# Patient Record
Sex: FEMALE | Race: WHITE | URBAN - METROPOLITAN AREA
[De-identification: names, ages, dates, MRNs, and addresses within clinical notes are randomized per-mention and may not be internally consistent; named-entity substitution may affect disease eponyms.]

---

## 2017-07-08 ENCOUNTER — OUTPATIENT (OUTPATIENT)
Dept: OUTPATIENT SERVICES | Facility: HOSPITAL | Age: 52
LOS: 1 days | Discharge: HOME | End: 2017-07-08

## 2017-07-08 DIAGNOSIS — Z12.31 ENCOUNTER FOR SCREENING MAMMOGRAM FOR MALIGNANT NEOPLASM OF BREAST: ICD-10-CM

## 2017-10-24 ENCOUNTER — OUTPATIENT (OUTPATIENT)
Dept: OUTPATIENT SERVICES | Facility: HOSPITAL | Age: 52
LOS: 1 days | Discharge: HOME | End: 2017-10-24

## 2017-10-24 DIAGNOSIS — R52 PAIN, UNSPECIFIED: ICD-10-CM

## 2018-09-14 ENCOUNTER — OUTPATIENT (OUTPATIENT)
Dept: OUTPATIENT SERVICES | Facility: HOSPITAL | Age: 53
LOS: 1 days | Discharge: HOME | End: 2018-09-14

## 2018-09-14 DIAGNOSIS — N92.1 EXCESSIVE AND FREQUENT MENSTRUATION WITH IRREGULAR CYCLE: ICD-10-CM

## 2018-09-14 DIAGNOSIS — Z12.31 ENCOUNTER FOR SCREENING MAMMOGRAM FOR MALIGNANT NEOPLASM OF BREAST: ICD-10-CM

## 2018-12-17 ENCOUNTER — OUTPATIENT (OUTPATIENT)
Dept: OUTPATIENT SERVICES | Facility: HOSPITAL | Age: 53
LOS: 1 days | Discharge: HOME | End: 2018-12-17

## 2018-12-17 DIAGNOSIS — E11.9 TYPE 2 DIABETES MELLITUS WITHOUT COMPLICATIONS: ICD-10-CM

## 2018-12-17 DIAGNOSIS — B58.81 TOXOPLASMA MYOCARDITIS: ICD-10-CM

## 2018-12-17 DIAGNOSIS — R78.89 FINDING OF OTHER SPECIFIED SUBSTANCES, NOT NORMALLY FOUND IN BLOOD: ICD-10-CM

## 2018-12-17 DIAGNOSIS — I25.10 ATHEROSCLEROTIC HEART DISEASE OF NATIVE CORONARY ARTERY WITHOUT ANGINA PECTORIS: ICD-10-CM

## 2019-03-14 ENCOUNTER — APPOINTMENT (OUTPATIENT)
Dept: SURGERY | Facility: CLINIC | Age: 54
End: 2019-03-14
Payer: COMMERCIAL

## 2019-03-14 VITALS
DIASTOLIC BLOOD PRESSURE: 84 MMHG | WEIGHT: 215 LBS | SYSTOLIC BLOOD PRESSURE: 148 MMHG | BODY MASS INDEX: 36.7 KG/M2 | HEIGHT: 64 IN

## 2019-03-14 DIAGNOSIS — Z86.69 PERSONAL HISTORY OF OTHER DISEASES OF THE NERVOUS SYSTEM AND SENSE ORGANS: ICD-10-CM

## 2019-03-14 DIAGNOSIS — Z83.3 FAMILY HISTORY OF DIABETES MELLITUS: ICD-10-CM

## 2019-03-14 DIAGNOSIS — Z82.49 FAMILY HISTORY OF ISCHEMIC HEART DISEASE AND OTHER DISEASES OF THE CIRCULATORY SYSTEM: ICD-10-CM

## 2019-03-14 DIAGNOSIS — Z78.9 OTHER SPECIFIED HEALTH STATUS: ICD-10-CM

## 2019-03-14 DIAGNOSIS — Z86.39 PERSONAL HISTORY OF OTHER ENDOCRINE, NUTRITIONAL AND METABOLIC DISEASE: ICD-10-CM

## 2019-03-14 DIAGNOSIS — Z87.898 PERSONAL HISTORY OF OTHER SPECIFIED CONDITIONS: ICD-10-CM

## 2019-03-14 DIAGNOSIS — Z87.39 PERSONAL HISTORY OF OTHER DISEASES OF THE MUSCULOSKELETAL SYSTEM AND CONNECTIVE TISSUE: ICD-10-CM

## 2019-03-14 PROCEDURE — 99202 OFFICE O/P NEW SF 15 MIN: CPT

## 2019-03-15 PROBLEM — Z86.39 HISTORY OF HIGH CHOLESTEROL: Status: RESOLVED | Noted: 2019-03-14 | Resolved: 2019-03-15

## 2019-03-15 PROBLEM — Z87.39 HISTORY OF JOINT PAIN: Status: RESOLVED | Noted: 2019-03-14 | Resolved: 2019-03-15

## 2019-03-15 PROBLEM — Z87.898 HISTORY OF HEARTBURN: Status: RESOLVED | Noted: 2019-03-14 | Resolved: 2019-03-15

## 2019-03-15 PROBLEM — Z78.9 CAFFEINE USE: Status: ACTIVE | Noted: 2019-03-14

## 2019-03-15 PROBLEM — Z86.69 HISTORY OF SLEEP APNEA: Status: RESOLVED | Noted: 2019-03-14 | Resolved: 2019-03-15

## 2019-03-15 PROBLEM — Z82.49 FAMILY HISTORY OF CARDIAC DISORDER: Status: ACTIVE | Noted: 2019-03-14

## 2019-03-15 PROBLEM — Z83.3 FAMILY HISTORY OF DIABETES MELLITUS: Status: ACTIVE | Noted: 2019-03-14

## 2019-03-15 NOTE — HISTORY OF PRESENT ILLNESS
[de-identified] : The patient presents with her  to be evaluated for umbilical hernia repair. This has been present for about one year and is enlarging. She has sharp pain in the area when sneezing and had one episode of persistent pain when it did not reduce quickly. She has had no weight loss or change in the bowel or bladder habit and she has had no prior abdominal surgery.

## 2019-03-15 NOTE — ASSESSMENT
[FreeTextEntry1] : Reducible umbilical hernia for which the patient should undergo elective repair, possibly with mesh, so as to try to avoid possible complications of an untreated hernia which were explained. The procedure and its risks and benefits were discussed and full and all their questions were answered. They will consult the pulmonologist to see if preoperative reevaluation as needed. They understand and agree and will contact the office for scheduling. Appropriate precautions and warning signs were discussed in full. They are happy with the assessment and plan.

## 2019-03-15 NOTE — PHYSICAL EXAM
[Normal Thyroid] : the thyroid was normal [Normal Breath Sounds] : Normal breath sounds [Normal Heart Sounds] : normal heart sounds [Normal Rate and Rhythm] : normal rate and rhythm [Abdominal Masses] : No abdominal masses [Abdomen Tenderness] : ~T ~M No abdominal tenderness [No HSM] : no hepatosplenomegaly [de-identified] : no adenopathy [de-identified] : The abdomen is obese and protuberant but soft, with a dependent pannus. She has a small umbilical hernia which is freely reducible and not associated with any overlying skin changes. No inguinal or femoral hernias are noted bilaterally.

## 2019-03-28 ENCOUNTER — OUTPATIENT (OUTPATIENT)
Dept: OUTPATIENT SERVICES | Facility: HOSPITAL | Age: 54
LOS: 1 days | Discharge: HOME | End: 2019-03-28

## 2019-03-28 VITALS
RESPIRATION RATE: 16 BRPM | WEIGHT: 210.1 LBS | OXYGEN SATURATION: 96 % | TEMPERATURE: 98 F | HEART RATE: 90 BPM | DIASTOLIC BLOOD PRESSURE: 90 MMHG | SYSTOLIC BLOOD PRESSURE: 138 MMHG | HEIGHT: 65 IN

## 2019-03-28 DIAGNOSIS — Z98.890 OTHER SPECIFIED POSTPROCEDURAL STATES: Chronic | ICD-10-CM

## 2019-03-28 DIAGNOSIS — K42.9 UMBILICAL HERNIA WITHOUT OBSTRUCTION OR GANGRENE: ICD-10-CM

## 2019-03-28 DIAGNOSIS — Z01.818 ENCOUNTER FOR OTHER PREPROCEDURAL EXAMINATION: ICD-10-CM

## 2019-03-28 NOTE — H&P PST ADULT - HISTORY OF PRESENT ILLNESS
53yr old female states was diagnosed with umbilical hernia for about 1yr but recently has increased discomfort-presents for umbilical hernia repair. Denies c/o CP, PALP, SOB, ABD PAIN , N/V, URI, FEVER, RASH OR UTI SYMPTOMS. Exercise alberta 1 FOS LTD by SANDRA.

## 2019-04-02 NOTE — ASU PATIENT PROFILE, ADULT - PMH
Asthmatic bronchitis  MILD , USES VENTOLIN Q2-3M  Chronic neck pain    GERD (gastroesophageal reflux disease)    HTN (hypertension)    Migraines    RAMAKRISHNA on CPAP

## 2019-04-03 ENCOUNTER — RESULT REVIEW (OUTPATIENT)
Age: 54
End: 2019-04-03

## 2019-04-03 ENCOUNTER — APPOINTMENT (OUTPATIENT)
Dept: SURGERY | Facility: AMBULATORY SURGERY CENTER | Age: 54
End: 2019-04-03

## 2019-04-03 ENCOUNTER — OUTPATIENT (OUTPATIENT)
Dept: OUTPATIENT SERVICES | Facility: HOSPITAL | Age: 54
LOS: 1 days | Discharge: HOME | End: 2019-04-03
Payer: COMMERCIAL

## 2019-04-03 VITALS
SYSTOLIC BLOOD PRESSURE: 119 MMHG | TEMPERATURE: 98 F | OXYGEN SATURATION: 98 % | RESPIRATION RATE: 18 BRPM | WEIGHT: 210.1 LBS | HEIGHT: 65 IN | DIASTOLIC BLOOD PRESSURE: 56 MMHG | HEART RATE: 94 BPM

## 2019-04-03 VITALS
TEMPERATURE: 98 F | DIASTOLIC BLOOD PRESSURE: 70 MMHG | OXYGEN SATURATION: 97 % | HEART RATE: 72 BPM | RESPIRATION RATE: 20 BRPM | SYSTOLIC BLOOD PRESSURE: 111 MMHG

## 2019-04-03 DIAGNOSIS — Z98.890 OTHER SPECIFIED POSTPROCEDURAL STATES: Chronic | ICD-10-CM

## 2019-04-03 PROCEDURE — 49585: CPT

## 2019-04-03 PROCEDURE — 12032 INTMD RPR S/A/T/EXT 2.6-7.5: CPT | Mod: 59

## 2019-04-03 PROCEDURE — 88302 TISSUE EXAM BY PATHOLOGIST: CPT | Mod: 26

## 2019-04-03 RX ORDER — SODIUM CHLORIDE 9 MG/ML
1000 INJECTION, SOLUTION INTRAVENOUS
Qty: 0 | Refills: 0 | Status: DISCONTINUED | OUTPATIENT
Start: 2019-04-03 | End: 2019-04-18

## 2019-04-03 RX ORDER — OXYCODONE AND ACETAMINOPHEN 5; 325 MG/1; MG/1
1 TABLET ORAL ONCE
Qty: 0 | Refills: 0 | Status: DISCONTINUED | OUTPATIENT
Start: 2019-04-03 | End: 2019-04-03

## 2019-04-03 RX ORDER — ONDANSETRON 8 MG/1
4 TABLET, FILM COATED ORAL ONCE
Qty: 0 | Refills: 0 | Status: DISCONTINUED | OUTPATIENT
Start: 2019-04-03 | End: 2019-04-18

## 2019-04-03 RX ORDER — MORPHINE SULFATE 50 MG/1
2 CAPSULE, EXTENDED RELEASE ORAL
Qty: 0 | Refills: 0 | Status: DISCONTINUED | OUTPATIENT
Start: 2019-04-03 | End: 2019-04-03

## 2019-04-03 RX ORDER — OXYCODONE AND ACETAMINOPHEN 5; 325 MG/1; MG/1
2 TABLET ORAL ONCE
Qty: 0 | Refills: 0 | Status: DISCONTINUED | OUTPATIENT
Start: 2019-04-03 | End: 2019-04-03

## 2019-04-03 RX ORDER — ACETAMINOPHEN WITH CODEINE 300MG-30MG
1 TABLET ORAL
Qty: 20 | Refills: 0
Start: 2019-04-03 | End: 2019-04-07

## 2019-04-03 RX ADMIN — MORPHINE SULFATE 2 MILLIGRAM(S): 50 CAPSULE, EXTENDED RELEASE ORAL at 10:26

## 2019-04-03 RX ADMIN — SODIUM CHLORIDE 100 MILLILITER(S): 9 INJECTION, SOLUTION INTRAVENOUS at 10:27

## 2019-04-03 NOTE — ASU DISCHARGE PLAN (ADULT/PEDIATRIC) - CARE PROVIDER_API CALL
Hernandez Orona (MD)  Surgery  [address] [place] [address], [address] [place] [address]  Phone: [phone]  Fax: [phone]  Follow Up Time:

## 2019-04-03 NOTE — ASU DISCHARGE PLAN (ADULT/PEDIATRIC) - CALL YOUR DOCTOR IF YOU HAVE ANY OF THE FOLLOWING:
Unable to urinate/Pain not relieved by Medications/Bleeding that does not stop/Nausea and vomiting that does not stop/Swelling that gets worse

## 2019-04-08 DIAGNOSIS — I10 ESSENTIAL (PRIMARY) HYPERTENSION: ICD-10-CM

## 2019-04-08 DIAGNOSIS — K42.9 UMBILICAL HERNIA WITHOUT OBSTRUCTION OR GANGRENE: ICD-10-CM

## 2019-04-08 DIAGNOSIS — G47.33 OBSTRUCTIVE SLEEP APNEA (ADULT) (PEDIATRIC): ICD-10-CM

## 2019-04-08 DIAGNOSIS — J45.909 UNSPECIFIED ASTHMA, UNCOMPLICATED: ICD-10-CM

## 2019-04-09 LAB — SURGICAL PATHOLOGY STUDY: SIGNIFICANT CHANGE UP

## 2019-04-15 ENCOUNTER — APPOINTMENT (OUTPATIENT)
Dept: SURGERY | Facility: CLINIC | Age: 54
End: 2019-04-15
Payer: COMMERCIAL

## 2019-04-15 VITALS
DIASTOLIC BLOOD PRESSURE: 82 MMHG | SYSTOLIC BLOOD PRESSURE: 136 MMHG | HEIGHT: 64 IN | BODY MASS INDEX: 36.7 KG/M2 | WEIGHT: 215 LBS

## 2019-04-15 PROCEDURE — 99024 POSTOP FOLLOW-UP VISIT: CPT

## 2019-04-15 NOTE — PHYSICAL EXAM
[de-identified] : Abdomen soft and not distended. This umbilical incision is clean and dry. Repair is intact.

## 2019-04-15 NOTE — HISTORY OF PRESENT ILLNESS
[de-identified] : First postoperative visit after umbilical hernia repair without mesh. The patient looks and feels well with a good appetite, bowel and bladder function. No specific complaints.

## 2019-04-15 NOTE — PLAN
[FreeTextEntry1] : Return to office in 3 months for final check. Local wound care and activity instructions given, precautions advised. All questions answered.

## 2019-04-23 PROBLEM — I10 ESSENTIAL (PRIMARY) HYPERTENSION: Chronic | Status: ACTIVE | Noted: 2019-03-28

## 2019-04-23 PROBLEM — K21.9 GASTRO-ESOPHAGEAL REFLUX DISEASE WITHOUT ESOPHAGITIS: Chronic | Status: ACTIVE | Noted: 2019-03-28

## 2019-04-23 PROBLEM — J45.909 UNSPECIFIED ASTHMA, UNCOMPLICATED: Chronic | Status: ACTIVE | Noted: 2019-03-28

## 2019-04-23 PROBLEM — G47.33 OBSTRUCTIVE SLEEP APNEA (ADULT) (PEDIATRIC): Chronic | Status: ACTIVE | Noted: 2019-03-28

## 2019-04-23 PROBLEM — G43.909 MIGRAINE, UNSPECIFIED, NOT INTRACTABLE, WITHOUT STATUS MIGRAINOSUS: Chronic | Status: ACTIVE | Noted: 2019-03-28

## 2019-04-23 PROBLEM — M54.2 CERVICALGIA: Chronic | Status: ACTIVE | Noted: 2019-03-28

## 2019-08-12 ENCOUNTER — APPOINTMENT (OUTPATIENT)
Dept: SURGERY | Facility: CLINIC | Age: 54
End: 2019-08-12
Payer: COMMERCIAL

## 2019-08-12 VITALS
DIASTOLIC BLOOD PRESSURE: 80 MMHG | BODY MASS INDEX: 37.9 KG/M2 | SYSTOLIC BLOOD PRESSURE: 124 MMHG | WEIGHT: 222 LBS | HEIGHT: 64 IN

## 2019-08-12 DIAGNOSIS — K42.9 UMBILICAL HERNIA W/OUT OBSTRUCTION OR GANGRENE: ICD-10-CM

## 2019-08-12 PROCEDURE — 99212 OFFICE O/P EST SF 10 MIN: CPT

## 2019-08-12 NOTE — PHYSICAL EXAM
[Abdominal Masses] : No abdominal masses [Abdomen Tenderness] : ~T ~M No abdominal tenderness [No HSM] : no hepatosplenomegaly [de-identified] : Protuberant but soft and not distended. Umbilical incision is healing well and fading very nicely. Both supine and standing with coughing and straining, the repair is intact. Postop changes have progressed very nicely

## 2019-08-12 NOTE — HISTORY OF PRESENT ILLNESS
[de-identified] : The patient looks and feels well. Her only complaints regarding her recent umbilical hernia is an occasional twinge in the area. Her bowel and bladder function are normal.

## 2019-08-12 NOTE — ASSESSMENT
[FreeTextEntry1] : No postoperative problems, umbilical hernia repair intact. No restrictions at this point the patient was encouraged to try to lose weight and reduce her risk of recurrence. All questions answered, she understands and agrees.

## 2019-12-13 ENCOUNTER — OUTPATIENT (OUTPATIENT)
Dept: OUTPATIENT SERVICES | Facility: HOSPITAL | Age: 54
LOS: 1 days | Discharge: HOME | End: 2019-12-13
Payer: COMMERCIAL

## 2019-12-13 DIAGNOSIS — Z12.31 ENCOUNTER FOR SCREENING MAMMOGRAM FOR MALIGNANT NEOPLASM OF BREAST: ICD-10-CM

## 2019-12-13 DIAGNOSIS — Z98.890 OTHER SPECIFIED POSTPROCEDURAL STATES: Chronic | ICD-10-CM

## 2019-12-13 PROCEDURE — 77063 BREAST TOMOSYNTHESIS BI: CPT | Mod: 26

## 2019-12-13 PROCEDURE — 77067 SCR MAMMO BI INCL CAD: CPT | Mod: 26

## 2020-06-12 NOTE — H&P PST ADULT - NSICDXPASTMEDICALHX_GEN_ALL_CORE_FT
[Breast Self Exam] : breast self exam [Nutrition] : nutrition [Exercise] : exercise PAST MEDICAL HISTORY:  Asthmatic bronchitis MILD , USES VENTOLIN Q2-3M    Chronic neck pain     GERD (gastroesophageal reflux disease)     HTN (hypertension)     Migraines     RAMAKRISHNA on CPAP

## 2020-06-18 ENCOUNTER — OUTPATIENT (OUTPATIENT)
Dept: OUTPATIENT SERVICES | Facility: HOSPITAL | Age: 55
LOS: 1 days | Discharge: HOME | End: 2020-06-18

## 2020-06-18 VITALS
OXYGEN SATURATION: 99 % | TEMPERATURE: 97 F | SYSTOLIC BLOOD PRESSURE: 142 MMHG | RESPIRATION RATE: 16 BRPM | HEART RATE: 68 BPM | HEIGHT: 62 IN | WEIGHT: 209.44 LBS | DIASTOLIC BLOOD PRESSURE: 76 MMHG

## 2020-06-18 DIAGNOSIS — Z98.890 OTHER SPECIFIED POSTPROCEDURAL STATES: Chronic | ICD-10-CM

## 2020-06-18 DIAGNOSIS — Z01.818 ENCOUNTER FOR OTHER PREPROCEDURAL EXAMINATION: ICD-10-CM

## 2020-06-18 DIAGNOSIS — G56.01 CARPAL TUNNEL SYNDROME, RIGHT UPPER LIMB: ICD-10-CM

## 2020-06-18 LAB
ALBUMIN SERPL ELPH-MCNC: 4.8 G/DL — SIGNIFICANT CHANGE UP (ref 3.5–5.2)
ALP SERPL-CCNC: 68 U/L — SIGNIFICANT CHANGE UP (ref 30–115)
ALT FLD-CCNC: 31 U/L — SIGNIFICANT CHANGE UP (ref 0–41)
ANION GAP SERPL CALC-SCNC: 15 MMOL/L — HIGH (ref 7–14)
APTT BLD: 29.7 SEC — SIGNIFICANT CHANGE UP (ref 27–39.2)
AST SERPL-CCNC: 28 U/L — SIGNIFICANT CHANGE UP (ref 0–41)
BASOPHILS # BLD AUTO: 0.04 K/UL — SIGNIFICANT CHANGE UP (ref 0–0.2)
BASOPHILS NFR BLD AUTO: 0.4 % — SIGNIFICANT CHANGE UP (ref 0–1)
BILIRUB SERPL-MCNC: 0.2 MG/DL — SIGNIFICANT CHANGE UP (ref 0.2–1.2)
BUN SERPL-MCNC: 11 MG/DL — SIGNIFICANT CHANGE UP (ref 10–20)
CALCIUM SERPL-MCNC: 9.8 MG/DL — SIGNIFICANT CHANGE UP (ref 8.5–10.1)
CHLORIDE SERPL-SCNC: 96 MMOL/L — LOW (ref 98–110)
CO2 SERPL-SCNC: 27 MMOL/L — SIGNIFICANT CHANGE UP (ref 17–32)
CREAT SERPL-MCNC: 0.6 MG/DL — LOW (ref 0.7–1.5)
EOSINOPHIL # BLD AUTO: 0.25 K/UL — SIGNIFICANT CHANGE UP (ref 0–0.7)
EOSINOPHIL NFR BLD AUTO: 2.7 % — SIGNIFICANT CHANGE UP (ref 0–8)
GLUCOSE SERPL-MCNC: 115 MG/DL — HIGH (ref 70–99)
HCT VFR BLD CALC: 31.8 % — LOW (ref 37–47)
HGB BLD-MCNC: 9.1 G/DL — LOW (ref 12–16)
IMM GRANULOCYTES NFR BLD AUTO: 0.2 % — SIGNIFICANT CHANGE UP (ref 0.1–0.3)
INR BLD: 0.93 RATIO — SIGNIFICANT CHANGE UP (ref 0.65–1.3)
LYMPHOCYTES # BLD AUTO: 3.3 K/UL — SIGNIFICANT CHANGE UP (ref 1.2–3.4)
LYMPHOCYTES # BLD AUTO: 35.6 % — SIGNIFICANT CHANGE UP (ref 20.5–51.1)
MCHC RBC-ENTMCNC: 20 PG — LOW (ref 27–31)
MCHC RBC-ENTMCNC: 28.6 G/DL — LOW (ref 32–37)
MCV RBC AUTO: 70 FL — LOW (ref 81–99)
MONOCYTES # BLD AUTO: 0.58 K/UL — SIGNIFICANT CHANGE UP (ref 0.1–0.6)
MONOCYTES NFR BLD AUTO: 6.3 % — SIGNIFICANT CHANGE UP (ref 1.7–9.3)
NEUTROPHILS # BLD AUTO: 5.09 K/UL — SIGNIFICANT CHANGE UP (ref 1.4–6.5)
NEUTROPHILS NFR BLD AUTO: 54.8 % — SIGNIFICANT CHANGE UP (ref 42.2–75.2)
NRBC # BLD: 0 /100 WBCS — SIGNIFICANT CHANGE UP (ref 0–0)
PLATELET # BLD AUTO: 507 K/UL — HIGH (ref 130–400)
POTASSIUM SERPL-MCNC: 3.8 MMOL/L — SIGNIFICANT CHANGE UP (ref 3.5–5)
POTASSIUM SERPL-SCNC: 3.8 MMOL/L — SIGNIFICANT CHANGE UP (ref 3.5–5)
PROT SERPL-MCNC: 7.5 G/DL — SIGNIFICANT CHANGE UP (ref 6–8)
PROTHROM AB SERPL-ACNC: 10.7 SEC — SIGNIFICANT CHANGE UP (ref 9.95–12.87)
RBC # BLD: 4.54 M/UL — SIGNIFICANT CHANGE UP (ref 4.2–5.4)
RBC # FLD: 16.6 % — HIGH (ref 11.5–14.5)
SODIUM SERPL-SCNC: 138 MMOL/L — SIGNIFICANT CHANGE UP (ref 135–146)
WBC # BLD: 9.28 K/UL — SIGNIFICANT CHANGE UP (ref 4.8–10.8)
WBC # FLD AUTO: 9.28 K/UL — SIGNIFICANT CHANGE UP (ref 4.8–10.8)

## 2020-06-18 RX ORDER — TOPIRAMATE 25 MG
0 TABLET ORAL
Qty: 0 | Refills: 0 | DISCHARGE

## 2020-06-18 NOTE — H&P PST ADULT - REASON FOR ADMISSION
54 yr old female to past for right open carpal tunnel release due to cts many yrs r > l s/p emg 2020  opts for sx right hand dominant NO recent fever no uti uri cp palp sob pain at this time no s/s covid denies contact for pre op test appt exercise tolerance is 2 flights no changes steven screen revd neg

## 2020-06-18 NOTE — H&P PST ADULT - HISTORY OF PRESENT ILLNESS
right hand dominant for carpal tunnel release   [facility] steven cpap uses to bring dos  htn dl anxiety  stable asthma no recent flares/meds  migranes   psh  d and c hernia sinus sx and shoulder sx

## 2020-06-18 NOTE — H&P PST ADULT - NSICDXPASTSURGICALHX_GEN_ALL_CORE_FT
PAST SURGICAL HISTORY:  H/O arthroscopy of shoulder RT    H/O sinus surgery     History of hernia repair 2019    Status post D&C

## 2020-06-18 NOTE — H&P PST ADULT - NSICDXPASTMEDICALHX_GEN_ALL_CORE_FT
PAST MEDICAL HISTORY:  Asthmatic bronchitis MILD , USES VENTOLIN Q2-3M    Chronic neck pain     GERD (gastroesophageal reflux disease)     HTN (hypertension)     Migraines     RAMAKRISHNA on CPAP

## 2020-06-22 NOTE — ASU PATIENT PROFILE, ADULT - PSH
H/O arthroscopy of shoulder  RT  H/O sinus surgery    History of hernia repair  2019  Status post D&C

## 2020-06-23 ENCOUNTER — OUTPATIENT (OUTPATIENT)
Dept: OUTPATIENT SERVICES | Facility: HOSPITAL | Age: 55
LOS: 1 days | Discharge: HOME | End: 2020-06-23

## 2020-06-23 VITALS
WEIGHT: 210.1 LBS | SYSTOLIC BLOOD PRESSURE: 154 MMHG | DIASTOLIC BLOOD PRESSURE: 98 MMHG | HEART RATE: 97 BPM | TEMPERATURE: 98 F | OXYGEN SATURATION: 98 % | RESPIRATION RATE: 18 BRPM | HEIGHT: 65 IN

## 2020-06-23 VITALS — RESPIRATION RATE: 17 BRPM | SYSTOLIC BLOOD PRESSURE: 137 MMHG | HEART RATE: 80 BPM | DIASTOLIC BLOOD PRESSURE: 67 MMHG

## 2020-06-23 DIAGNOSIS — Z98.890 OTHER SPECIFIED POSTPROCEDURAL STATES: Chronic | ICD-10-CM

## 2020-06-23 RX ORDER — ONDANSETRON 8 MG/1
4 TABLET, FILM COATED ORAL ONCE
Refills: 0 | Status: DISCONTINUED | OUTPATIENT
Start: 2020-06-23 | End: 2020-06-24

## 2020-06-23 RX ORDER — HYDROMORPHONE HYDROCHLORIDE 2 MG/ML
1 INJECTION INTRAMUSCULAR; INTRAVENOUS; SUBCUTANEOUS
Refills: 0 | Status: DISCONTINUED | OUTPATIENT
Start: 2020-06-23 | End: 2020-06-24

## 2020-06-23 RX ORDER — SODIUM CHLORIDE 9 MG/ML
1000 INJECTION, SOLUTION INTRAVENOUS
Refills: 0 | Status: DISCONTINUED | OUTPATIENT
Start: 2020-06-23 | End: 2020-06-24

## 2020-06-23 RX ORDER — HYDROMORPHONE HYDROCHLORIDE 2 MG/ML
0.5 INJECTION INTRAMUSCULAR; INTRAVENOUS; SUBCUTANEOUS
Refills: 0 | Status: DISCONTINUED | OUTPATIENT
Start: 2020-06-23 | End: 2020-06-24

## 2020-06-23 RX ADMIN — SODIUM CHLORIDE 100 MILLILITER(S): 9 INJECTION, SOLUTION INTRAVENOUS at 13:49

## 2020-06-23 RX ADMIN — HYDROMORPHONE HYDROCHLORIDE 0.5 MILLIGRAM(S): 2 INJECTION INTRAMUSCULAR; INTRAVENOUS; SUBCUTANEOUS at 14:02

## 2020-06-23 NOTE — ASU PREOP CHECKLIST - TO WHOM
opportunity for questions/ answers rendered B Alex MACHADO opportunity for questions/ answers rendered

## 2020-06-23 NOTE — ASU DISCHARGE PLAN (ADULT/PEDIATRIC) - ASU DC SPECIAL INSTRUCTIONSFT
DIET:    Resume normal diet  No alcoholic  beverages for 24 hours or if on prescribed narcotic pain medications.    MEDICATION:    Resume your preoperative oral medications.  Check with your physician before starting aspirin, Coumadin, or other blood thinners.  Prescriptions given to you - take as directed.      ACTIVITY:    Rest today and limit your activities for 24 hours.  Do not drive or operate machinery for 24 hours - if you received anesthesia.  When taking pain medication, be careful as you walk or climb stairs.  It is not advisable to drive while taking prescribed pain medication.    SPECIAL INSTRUCTIONS:    ___x__ Elevate operative site above heart level or as directed.  __x___ Apply ice to operative site as directed.  _____ Use  sling as directed.  _x____ Exercise fingers.    DRESSING CARE:    ___x__ You may change the dressing 4 days. Keep wound covered with band-aids.  _____ Do not change the dressing until your doctor see you.  _____ You can loosen or rewrap the dressing.  _____  Keep dressing clean and dry.  _____ You may shower in _____ day(s) with the extremity covered by a plastic bag.  ___x__ OK to wash hand , including showers, in _4____ day(s).    ADDITIONAL  INFORMATION:    Post operative visit should be scheduled for next week.  If you are not aware of visit please contact office.  If you have any questions or concerns call office at      Notify your doctor if you develop   Fever  Excessive Swelling  Chills   Drainage   Pain not controlled by medication  Persistent numbness in hand or fingers    If an Emergency arises call 911 and/or go to the Emergency Room

## 2020-06-29 DIAGNOSIS — I10 ESSENTIAL (PRIMARY) HYPERTENSION: ICD-10-CM

## 2020-06-29 DIAGNOSIS — G56.01 CARPAL TUNNEL SYNDROME, RIGHT UPPER LIMB: ICD-10-CM

## 2020-06-29 DIAGNOSIS — G47.33 OBSTRUCTIVE SLEEP APNEA (ADULT) (PEDIATRIC): ICD-10-CM

## 2020-06-29 DIAGNOSIS — J45.909 UNSPECIFIED ASTHMA, UNCOMPLICATED: ICD-10-CM

## 2020-06-29 DIAGNOSIS — F41.9 ANXIETY DISORDER, UNSPECIFIED: ICD-10-CM

## 2020-12-28 ENCOUNTER — OUTPATIENT (OUTPATIENT)
Dept: OUTPATIENT SERVICES | Facility: HOSPITAL | Age: 55
LOS: 1 days | Discharge: HOME | End: 2020-12-28
Payer: COMMERCIAL

## 2020-12-28 DIAGNOSIS — Z98.890 OTHER SPECIFIED POSTPROCEDURAL STATES: Chronic | ICD-10-CM

## 2020-12-28 DIAGNOSIS — Z12.31 ENCOUNTER FOR SCREENING MAMMOGRAM FOR MALIGNANT NEOPLASM OF BREAST: ICD-10-CM

## 2020-12-28 PROCEDURE — 77063 BREAST TOMOSYNTHESIS BI: CPT | Mod: 26

## 2020-12-28 PROCEDURE — 77067 SCR MAMMO BI INCL CAD: CPT | Mod: 26

## 2021-03-03 DIAGNOSIS — I10 ESSENTIAL (PRIMARY) HYPERTENSION: ICD-10-CM

## 2021-03-03 DIAGNOSIS — Z86.39 PERSONAL HISTORY OF OTHER ENDOCRINE, NUTRITIONAL AND METABOLIC DISEASE: ICD-10-CM

## 2021-03-03 RX ORDER — MULTIVIT,IRON,MINERALS/LUTEIN
TABLET ORAL
Refills: 0 | Status: ACTIVE | COMMUNITY

## 2021-03-03 RX ORDER — ALBUTEROL SULFATE 90 UG/1
108 (90 BASE) AEROSOL, METERED RESPIRATORY (INHALATION)
Refills: 0 | Status: ACTIVE | COMMUNITY

## 2021-03-17 ENCOUNTER — APPOINTMENT (OUTPATIENT)
Dept: PULMONOLOGY | Facility: CLINIC | Age: 56
End: 2021-03-17
Payer: COMMERCIAL

## 2021-03-17 VITALS
DIASTOLIC BLOOD PRESSURE: 84 MMHG | RESPIRATION RATE: 14 BRPM | BODY MASS INDEX: 37.73 KG/M2 | SYSTOLIC BLOOD PRESSURE: 130 MMHG | WEIGHT: 221 LBS | HEART RATE: 97 BPM | OXYGEN SATURATION: 97 % | HEIGHT: 64 IN

## 2021-03-17 DIAGNOSIS — R91.8 OTHER NONSPECIFIC ABNORMAL FINDING OF LUNG FIELD: ICD-10-CM

## 2021-03-17 PROCEDURE — 99213 OFFICE O/P EST LOW 20 MIN: CPT | Mod: 25

## 2021-03-17 PROCEDURE — 99072 ADDL SUPL MATRL&STAF TM PHE: CPT

## 2021-03-17 PROCEDURE — 94010 BREATHING CAPACITY TEST: CPT

## 2021-03-17 NOTE — ASSESSMENT
[FreeTextEntry1] : MIld COPD stable\par MOderate RAMAKRISHNA not on therapy\par RLL density on CXR done after COVID 19

## 2021-03-17 NOTE — REASON FOR VISIT
[Follow-Up] : a follow-up visit [Abnormal CXR/ Chest CT] : an abnormal CXR/ chest CT [Sleep Apnea] : sleep apnea [COPD] : COPD

## 2021-03-17 NOTE — HISTORY OF PRESENT ILLNESS
[Doing Well] : doing well [Difficulty Breathing During Exertion] : stable dyspnea on exertion [Feelings Of Weakness On Exertion] : stable exercise intolerance [Cough] : improved coughing [URI] : upper respiratory tract infection [Adherent] : the patient is adherent with ~his/her~ medication regimen [Goals--Doing Well] : the patient is doing well with ~his/her~ COPD goals [PFTs] : pulmonary function tests [de-identified] : COVID  [Follow-Up - Routine Clinic] : a routine clinic follow-up of [Excessive Daytime Sleepiness] : excessive daytime sleepiness [Snoring] : snoring [Unrefreshing Sleep] : unrefreshing sleep [Sleepy When Sedentary] : sleepy when sedentary [Currently Experiencing] : The patient is currently experiencing symptoms. [None] : No associated symptoms are reported

## 2021-06-17 NOTE — ASU PATIENT PROFILE, ADULT - TEACHING/LEARNING FACTORS IMPACT ABILITY TO LEARN
Pt complaining of b/l lower back pain radiating to flanks. hx of kidney stones. Pt wheel chair bound. catheter in place. none

## 2021-10-07 ENCOUNTER — APPOINTMENT (OUTPATIENT)
Age: 56
End: 2021-10-07
Payer: COMMERCIAL

## 2021-10-07 VITALS
SYSTOLIC BLOOD PRESSURE: 120 MMHG | DIASTOLIC BLOOD PRESSURE: 70 MMHG | RESPIRATION RATE: 12 BRPM | OXYGEN SATURATION: 96 % | BODY MASS INDEX: 39.44 KG/M2 | WEIGHT: 231 LBS | HEART RATE: 91 BPM | HEIGHT: 64 IN

## 2021-10-07 DIAGNOSIS — G47.33 OBSTRUCTIVE SLEEP APNEA (ADULT) (PEDIATRIC): ICD-10-CM

## 2021-10-07 DIAGNOSIS — J44.9 CHRONIC OBSTRUCTIVE PULMONARY DISEASE, UNSPECIFIED: ICD-10-CM

## 2021-10-07 PROCEDURE — 99214 OFFICE O/P EST MOD 30 MIN: CPT

## 2021-10-07 NOTE — ASSESSMENT
[FreeTextEntry1] : MIld COPD stable\par MOderate RAMAKRISHNA not on therapy\par RLL density on CXR done after COVID 19.  CT OK

## 2021-10-07 NOTE — HISTORY OF PRESENT ILLNESS
[Doing Well] : doing well [Difficulty Breathing During Exertion] : stable dyspnea on exertion [Feelings Of Weakness On Exertion] : stable exercise intolerance [Cough] : resolved coughing [URI] : upper respiratory tract infection [Adherent] : the patient is adherent with ~his/her~ medication regimen [Goals--Doing Well] : the patient is doing well with ~his/her~ COPD goals [PFTs] : pulmonary function tests [de-identified] : COVID  [Follow-Up - Routine Clinic] : a routine clinic follow-up of [Excessive Daytime Sleepiness] : excessive daytime sleepiness [Snoring] : snoring [Unrefreshing Sleep] : unrefreshing sleep [Sleepy When Sedentary] : sleepy when sedentary [Currently Experiencing] : The patient is currently experiencing symptoms. [None] : No associated symptoms are reported

## 2022-01-01 ENCOUNTER — OUTPATIENT (OUTPATIENT)
Dept: OUTPATIENT SERVICES | Facility: HOSPITAL | Age: 57
LOS: 1 days | Discharge: HOME | End: 2022-01-01
Payer: COMMERCIAL

## 2022-01-01 ENCOUNTER — APPOINTMENT (OUTPATIENT)
Dept: HEMATOLOGY ONCOLOGY | Facility: CLINIC | Age: 57
End: 2022-01-01
Payer: COMMERCIAL

## 2022-01-01 ENCOUNTER — NON-APPOINTMENT (OUTPATIENT)
Age: 57
End: 2022-01-01

## 2022-01-01 ENCOUNTER — APPOINTMENT (OUTPATIENT)
Dept: INFUSION THERAPY | Facility: CLINIC | Age: 57
End: 2022-01-01

## 2022-01-01 ENCOUNTER — APPOINTMENT (OUTPATIENT)
Dept: HEMATOLOGY ONCOLOGY | Facility: CLINIC | Age: 57
End: 2022-01-01

## 2022-01-01 ENCOUNTER — OUTPATIENT (OUTPATIENT)
Dept: OUTPATIENT SERVICES | Facility: HOSPITAL | Age: 57
LOS: 1 days | Discharge: HOME | End: 2022-01-01

## 2022-01-01 ENCOUNTER — LABORATORY RESULT (OUTPATIENT)
Age: 57
End: 2022-01-01

## 2022-01-01 ENCOUNTER — TRANSCRIPTION ENCOUNTER (OUTPATIENT)
Age: 57
End: 2022-01-01

## 2022-01-01 ENCOUNTER — APPOINTMENT (OUTPATIENT)
Dept: NEUROSURGERY | Facility: CLINIC | Age: 57
End: 2022-01-01
Payer: COMMERCIAL

## 2022-01-01 ENCOUNTER — APPOINTMENT (OUTPATIENT)
Dept: NEUROSURGERY | Facility: CLINIC | Age: 57
End: 2022-01-01

## 2022-01-01 ENCOUNTER — APPOINTMENT (OUTPATIENT)
Dept: NEUROLOGY | Facility: CLINIC | Age: 57
End: 2022-01-01
Payer: COMMERCIAL

## 2022-01-01 ENCOUNTER — APPOINTMENT (OUTPATIENT)
Dept: NEUROLOGY | Facility: CLINIC | Age: 57
End: 2022-01-01

## 2022-01-01 ENCOUNTER — INPATIENT (INPATIENT)
Facility: HOSPITAL | Age: 57
LOS: 8 days | Discharge: HOME | End: 2022-04-14
Attending: NEUROLOGICAL SURGERY | Admitting: NEUROLOGICAL SURGERY
Payer: COMMERCIAL

## 2022-01-01 ENCOUNTER — APPOINTMENT (OUTPATIENT)
Dept: RADIATION ONCOLOGY | Facility: HOSPITAL | Age: 57
End: 2022-01-01

## 2022-01-01 ENCOUNTER — INPATIENT (INPATIENT)
Facility: HOSPITAL | Age: 57
LOS: 2 days | Discharge: HOME | End: 2022-08-07
Attending: NEUROLOGICAL SURGERY | Admitting: NEUROLOGICAL SURGERY

## 2022-01-01 ENCOUNTER — RESULT REVIEW (OUTPATIENT)
Age: 57
End: 2022-01-01

## 2022-01-01 ENCOUNTER — APPOINTMENT (OUTPATIENT)
Age: 57
End: 2022-01-01

## 2022-01-01 ENCOUNTER — APPOINTMENT (OUTPATIENT)
Dept: RADIATION ONCOLOGY | Facility: HOSPITAL | Age: 57
End: 2022-01-01
Payer: COMMERCIAL

## 2022-01-01 VITALS
RESPIRATION RATE: 20 BRPM | SYSTOLIC BLOOD PRESSURE: 123 MMHG | HEIGHT: 66 IN | OXYGEN SATURATION: 99 % | WEIGHT: 215 LBS | TEMPERATURE: 98 F | DIASTOLIC BLOOD PRESSURE: 87 MMHG | HEART RATE: 99 BPM | BODY MASS INDEX: 34.55 KG/M2

## 2022-01-01 VITALS
BODY MASS INDEX: 34.55 KG/M2 | BODY MASS INDEX: 38.32 KG/M2 | WEIGHT: 215 LBS | HEIGHT: 66 IN | HEIGHT: 65 IN | WEIGHT: 230 LBS

## 2022-01-01 VITALS
RESPIRATION RATE: 14 BRPM | OXYGEN SATURATION: 96 % | WEIGHT: 229.25 LBS | HEART RATE: 86 BPM | TEMPERATURE: 96.4 F | DIASTOLIC BLOOD PRESSURE: 56 MMHG | BODY MASS INDEX: 38.15 KG/M2 | SYSTOLIC BLOOD PRESSURE: 110 MMHG

## 2022-01-01 VITALS
TEMPERATURE: 98 F | OXYGEN SATURATION: 96 % | DIASTOLIC BLOOD PRESSURE: 71 MMHG | HEART RATE: 80 BPM | RESPIRATION RATE: 18 BRPM | SYSTOLIC BLOOD PRESSURE: 128 MMHG

## 2022-01-01 VITALS
RESPIRATION RATE: 16 BRPM | WEIGHT: 223.5 LBS | BODY MASS INDEX: 37.19 KG/M2 | DIASTOLIC BLOOD PRESSURE: 63 MMHG | TEMPERATURE: 97 F | HEART RATE: 74 BPM | SYSTOLIC BLOOD PRESSURE: 119 MMHG | OXYGEN SATURATION: 96 %

## 2022-01-01 VITALS
WEIGHT: 222.13 LBS | OXYGEN SATURATION: 95 % | RESPIRATION RATE: 16 BRPM | TEMPERATURE: 97.2 F | DIASTOLIC BLOOD PRESSURE: 73 MMHG | BODY MASS INDEX: 36.96 KG/M2 | SYSTOLIC BLOOD PRESSURE: 130 MMHG | HEART RATE: 78 BPM

## 2022-01-01 VITALS
BODY MASS INDEX: 37.65 KG/M2 | WEIGHT: 226 LBS | HEART RATE: 89 BPM | OXYGEN SATURATION: 99 % | HEIGHT: 65 IN | TEMPERATURE: 97.8 F | SYSTOLIC BLOOD PRESSURE: 106 MMHG | DIASTOLIC BLOOD PRESSURE: 73 MMHG

## 2022-01-01 VITALS
TEMPERATURE: 97 F | WEIGHT: 227 LBS | DIASTOLIC BLOOD PRESSURE: 57 MMHG | HEART RATE: 85 BPM | HEIGHT: 65 IN | SYSTOLIC BLOOD PRESSURE: 139 MMHG | BODY MASS INDEX: 37.82 KG/M2

## 2022-01-01 VITALS
BODY MASS INDEX: 38.32 KG/M2 | RESPIRATION RATE: 14 BRPM | DIASTOLIC BLOOD PRESSURE: 80 MMHG | SYSTOLIC BLOOD PRESSURE: 136 MMHG | HEART RATE: 74 BPM | HEIGHT: 65 IN | WEIGHT: 230 LBS | TEMPERATURE: 97.9 F

## 2022-01-01 VITALS
HEART RATE: 76 BPM | WEIGHT: 231 LBS | DIASTOLIC BLOOD PRESSURE: 62 MMHG | WEIGHT: 230 LBS | RESPIRATION RATE: 18 BRPM | SYSTOLIC BLOOD PRESSURE: 115 MMHG | BODY MASS INDEX: 38.49 KG/M2 | TEMPERATURE: 96.3 F | OXYGEN SATURATION: 97 % | HEART RATE: 102 BPM | SYSTOLIC BLOOD PRESSURE: 158 MMHG | BODY MASS INDEX: 38.32 KG/M2 | HEIGHT: 65 IN | HEIGHT: 65 IN | TEMPERATURE: 98 F | DIASTOLIC BLOOD PRESSURE: 70 MMHG

## 2022-01-01 VITALS
BODY MASS INDEX: 38.11 KG/M2 | TEMPERATURE: 97.6 F | DIASTOLIC BLOOD PRESSURE: 56 MMHG | RESPIRATION RATE: 96 BRPM | WEIGHT: 229 LBS | HEART RATE: 80 BPM | SYSTOLIC BLOOD PRESSURE: 129 MMHG

## 2022-01-01 VITALS
TEMPERATURE: 97.2 F | BODY MASS INDEX: 37.69 KG/M2 | OXYGEN SATURATION: 96 % | SYSTOLIC BLOOD PRESSURE: 130 MMHG | HEART RATE: 82 BPM | DIASTOLIC BLOOD PRESSURE: 63 MMHG | RESPIRATION RATE: 16 BRPM | WEIGHT: 226.5 LBS

## 2022-01-01 VITALS
HEART RATE: 67 BPM | WEIGHT: 221 LBS | HEIGHT: 65 IN | BODY MASS INDEX: 36.82 KG/M2 | OXYGEN SATURATION: 98 % | TEMPERATURE: 98.1 F | DIASTOLIC BLOOD PRESSURE: 60 MMHG | SYSTOLIC BLOOD PRESSURE: 118 MMHG

## 2022-01-01 VITALS
WEIGHT: 235 LBS | TEMPERATURE: 97.5 F | SYSTOLIC BLOOD PRESSURE: 118 MMHG | DIASTOLIC BLOOD PRESSURE: 76 MMHG | BODY MASS INDEX: 39.15 KG/M2 | OXYGEN SATURATION: 99 % | HEIGHT: 65 IN | HEART RATE: 67 BPM

## 2022-01-01 VITALS
SYSTOLIC BLOOD PRESSURE: 114 MMHG | RESPIRATION RATE: 18 BRPM | WEIGHT: 224 LBS | BODY MASS INDEX: 36 KG/M2 | HEART RATE: 105 BPM | DIASTOLIC BLOOD PRESSURE: 67 MMHG | HEIGHT: 66 IN

## 2022-01-01 VITALS
HEART RATE: 95 BPM | WEIGHT: 231 LBS | RESPIRATION RATE: 18 BRPM | HEIGHT: 65 IN | SYSTOLIC BLOOD PRESSURE: 150 MMHG | BODY MASS INDEX: 38.49 KG/M2 | DIASTOLIC BLOOD PRESSURE: 70 MMHG | TEMPERATURE: 97.7 F

## 2022-01-01 VITALS
RESPIRATION RATE: 14 BRPM | HEIGHT: 65 IN | TEMPERATURE: 97.5 F | OXYGEN SATURATION: 95 % | WEIGHT: 230.13 LBS | SYSTOLIC BLOOD PRESSURE: 127 MMHG | DIASTOLIC BLOOD PRESSURE: 60 MMHG | BODY MASS INDEX: 38.34 KG/M2 | HEART RATE: 80 BPM

## 2022-01-01 VITALS — HEIGHT: 65 IN | WEIGHT: 220 LBS | BODY MASS INDEX: 36.65 KG/M2

## 2022-01-01 VITALS
OXYGEN SATURATION: 95 % | WEIGHT: 220.02 LBS | SYSTOLIC BLOOD PRESSURE: 120 MMHG | RESPIRATION RATE: 18 BRPM | HEIGHT: 65 IN | TEMPERATURE: 98 F | DIASTOLIC BLOOD PRESSURE: 76 MMHG | HEART RATE: 95 BPM

## 2022-01-01 VITALS
WEIGHT: 228 LBS | DIASTOLIC BLOOD PRESSURE: 71 MMHG | RESPIRATION RATE: 14 BRPM | HEART RATE: 70 BPM | OXYGEN SATURATION: 98 % | SYSTOLIC BLOOD PRESSURE: 125 MMHG | BODY MASS INDEX: 37.99 KG/M2 | TEMPERATURE: 97.1 F | HEIGHT: 65 IN

## 2022-01-01 VITALS
TEMPERATURE: 97.3 F | OXYGEN SATURATION: 96 % | HEART RATE: 85 BPM | DIASTOLIC BLOOD PRESSURE: 70 MMHG | RESPIRATION RATE: 16 BRPM | SYSTOLIC BLOOD PRESSURE: 121 MMHG

## 2022-01-01 VITALS
WEIGHT: 225 LBS | SYSTOLIC BLOOD PRESSURE: 118 MMHG | HEIGHT: 65 IN | BODY MASS INDEX: 37.49 KG/M2 | HEART RATE: 69 BPM | TEMPERATURE: 97.5 F | DIASTOLIC BLOOD PRESSURE: 60 MMHG | OXYGEN SATURATION: 99 %

## 2022-01-01 VITALS — BODY MASS INDEX: 36.65 KG/M2 | WEIGHT: 220 LBS | HEIGHT: 65 IN

## 2022-01-01 VITALS
WEIGHT: 220.02 LBS | HEIGHT: 65 IN | OXYGEN SATURATION: 99 % | DIASTOLIC BLOOD PRESSURE: 71 MMHG | TEMPERATURE: 98 F | RESPIRATION RATE: 18 BRPM | HEART RATE: 96 BPM | SYSTOLIC BLOOD PRESSURE: 153 MMHG

## 2022-01-01 VITALS — WEIGHT: 231.49 LBS | HEIGHT: 65 IN

## 2022-01-01 DIAGNOSIS — Z98.890 OTHER SPECIFIED POSTPROCEDURAL STATES: Chronic | ICD-10-CM

## 2022-01-01 DIAGNOSIS — C71.9 MALIGNANT NEOPLASM OF BRAIN, UNSPECIFIED: ICD-10-CM

## 2022-01-01 DIAGNOSIS — Z86.018 PERSONAL HISTORY OF OTHER BENIGN NEOPLASM: ICD-10-CM

## 2022-01-01 DIAGNOSIS — C71.4 MALIGNANT NEOPLASM OF OCCIPITAL LOBE: ICD-10-CM

## 2022-01-01 DIAGNOSIS — Z79.52 LONG TERM (CURRENT) USE OF SYSTEMIC STEROIDS: ICD-10-CM

## 2022-01-01 DIAGNOSIS — G93.6 CEREBRAL EDEMA: ICD-10-CM

## 2022-01-01 DIAGNOSIS — G93.5 COMPRESSION OF BRAIN: ICD-10-CM

## 2022-01-01 DIAGNOSIS — E87.2 ACIDOSIS: ICD-10-CM

## 2022-01-01 DIAGNOSIS — Z00.00 ENCOUNTER FOR GENERAL ADULT MEDICAL EXAMINATION W/OUT ABNORMAL FINDINGS: ICD-10-CM

## 2022-01-01 DIAGNOSIS — R56.9 UNSPECIFIED CONVULSIONS: ICD-10-CM

## 2022-01-01 DIAGNOSIS — R68.89 OTHER GENERAL SYMPTOMS AND SIGNS: ICD-10-CM

## 2022-01-01 DIAGNOSIS — F80.2 MIXED RECEPTIVE-EXPRESSIVE LANGUAGE DISORDER: ICD-10-CM

## 2022-01-01 DIAGNOSIS — G47.33 OBSTRUCTIVE SLEEP APNEA (ADULT) (PEDIATRIC): ICD-10-CM

## 2022-01-01 DIAGNOSIS — Z99.89 DEPENDENCE ON OTHER ENABLING MACHINES AND DEVICES: ICD-10-CM

## 2022-01-01 DIAGNOSIS — I10 ESSENTIAL (PRIMARY) HYPERTENSION: ICD-10-CM

## 2022-01-01 DIAGNOSIS — G43.909 MIGRAINE, UNSPECIFIED, NOT INTRACTABLE, WITHOUT STATUS MIGRAINOSUS: ICD-10-CM

## 2022-01-01 DIAGNOSIS — Z51.11 ENCOUNTER FOR ANTINEOPLASTIC CHEMOTHERAPY: ICD-10-CM

## 2022-01-01 DIAGNOSIS — E66.01 MORBID (SEVERE) OBESITY DUE TO EXCESS CALORIES: ICD-10-CM

## 2022-01-01 DIAGNOSIS — K21.9 GASTRO-ESOPHAGEAL REFLUX DISEASE WITHOUT ESOPHAGITIS: ICD-10-CM

## 2022-01-01 DIAGNOSIS — Z12.31 ENCOUNTER FOR SCREENING MAMMOGRAM FOR MALIGNANT NEOPLASM OF BREAST: ICD-10-CM

## 2022-01-01 DIAGNOSIS — R92.2 INCONCLUSIVE MAMMOGRAM: ICD-10-CM

## 2022-01-01 DIAGNOSIS — B34.8 OTHER VIRAL INFECTIONS OF UNSPECIFIED SITE: ICD-10-CM

## 2022-01-01 DIAGNOSIS — G93.89 OTHER SPECIFIED DISORDERS OF BRAIN: ICD-10-CM

## 2022-01-01 DIAGNOSIS — H53.461 HOMONYMOUS BILATERAL FIELD DEFECTS, RIGHT SIDE: ICD-10-CM

## 2022-01-01 DIAGNOSIS — Z87.891 PERSONAL HISTORY OF NICOTINE DEPENDENCE: ICD-10-CM

## 2022-01-01 DIAGNOSIS — E83.42 HYPOMAGNESEMIA: ICD-10-CM

## 2022-01-01 DIAGNOSIS — H53.9 UNSPECIFIED VISUAL DISTURBANCE: ICD-10-CM

## 2022-01-01 DIAGNOSIS — J45.909 UNSPECIFIED ASTHMA, UNCOMPLICATED: ICD-10-CM

## 2022-01-01 DIAGNOSIS — B35.9 DERMATOPHYTOSIS, UNSPECIFIED: ICD-10-CM

## 2022-01-01 DIAGNOSIS — Z80.0 FAMILY HISTORY OF MALIGNANT NEOPLASM OF DIGESTIVE ORGANS: ICD-10-CM

## 2022-01-01 DIAGNOSIS — R73.9 HYPERGLYCEMIA, UNSPECIFIED: ICD-10-CM

## 2022-01-01 DIAGNOSIS — G43.909 MIGRAINE, UNSPECIFIED, NOT INTRACTABLE, W/OUT STATUS MIGRAINOSUS: ICD-10-CM

## 2022-01-01 DIAGNOSIS — Z09 ENCOUNTER FOR FOLLOW-UP EXAMINATION AFTER COMPLETED TREATMENT FOR CONDITIONS OTHER THAN MALIGNANT NEOPLASM: ICD-10-CM

## 2022-01-01 LAB
A1C WITH ESTIMATED AVERAGE GLUCOSE RESULT: 6.1 % — HIGH (ref 4–5.6)
ALBUMIN SERPL ELPH-MCNC: 3.5 G/DL
ALBUMIN SERPL ELPH-MCNC: 4 G/DL — SIGNIFICANT CHANGE UP (ref 3.5–5.2)
ALBUMIN SERPL ELPH-MCNC: 4.1 G/DL
ALBUMIN SERPL ELPH-MCNC: 4.1 G/DL — SIGNIFICANT CHANGE UP (ref 3.5–5.2)
ALBUMIN SERPL ELPH-MCNC: 4.1 G/DL — SIGNIFICANT CHANGE UP (ref 3.5–5.2)
ALBUMIN SERPL ELPH-MCNC: 4.2 G/DL
ALBUMIN SERPL ELPH-MCNC: 4.2 G/DL — SIGNIFICANT CHANGE UP (ref 3.5–5.2)
ALBUMIN SERPL ELPH-MCNC: 4.3 G/DL
ALBUMIN SERPL ELPH-MCNC: 4.3 G/DL
ALBUMIN SERPL ELPH-MCNC: 4.4 G/DL
ALBUMIN SERPL ELPH-MCNC: 4.4 G/DL
ALBUMIN SERPL ELPH-MCNC: 4.4 G/DL — SIGNIFICANT CHANGE UP (ref 3.5–5.2)
ALBUMIN SERPL ELPH-MCNC: 4.5 G/DL — SIGNIFICANT CHANGE UP (ref 3.5–5.2)
ALBUMIN SERPL ELPH-MCNC: 4.6 G/DL
ALBUMIN SERPL ELPH-MCNC: 4.6 G/DL — SIGNIFICANT CHANGE UP (ref 3.5–5.2)
ALP BLD-CCNC: 60 U/L
ALP BLD-CCNC: 61 U/L
ALP BLD-CCNC: 64 U/L
ALP BLD-CCNC: 65 U/L
ALP BLD-CCNC: 66 U/L
ALP BLD-CCNC: 68 U/L
ALP BLD-CCNC: 69 U/L
ALP BLD-CCNC: 71 U/L
ALP SERPL-CCNC: 100 U/L — SIGNIFICANT CHANGE UP (ref 30–115)
ALP SERPL-CCNC: 101 U/L — SIGNIFICANT CHANGE UP (ref 30–115)
ALP SERPL-CCNC: 102 U/L — SIGNIFICANT CHANGE UP (ref 30–115)
ALP SERPL-CCNC: 82 U/L — SIGNIFICANT CHANGE UP (ref 30–115)
ALP SERPL-CCNC: 85 U/L — SIGNIFICANT CHANGE UP (ref 30–115)
ALP SERPL-CCNC: 91 U/L — SIGNIFICANT CHANGE UP (ref 30–115)
ALP SERPL-CCNC: 93 U/L — SIGNIFICANT CHANGE UP (ref 30–115)
ALP SERPL-CCNC: 94 U/L — SIGNIFICANT CHANGE UP (ref 30–115)
ALP SERPL-CCNC: 94 U/L — SIGNIFICANT CHANGE UP (ref 30–115)
ALT FLD-CCNC: 45 U/L — HIGH (ref 0–41)
ALT FLD-CCNC: 48 U/L — HIGH (ref 0–41)
ALT FLD-CCNC: 49 U/L — HIGH (ref 0–41)
ALT FLD-CCNC: 51 U/L — HIGH (ref 0–41)
ALT FLD-CCNC: 53 U/L — HIGH (ref 0–41)
ALT FLD-CCNC: 57 U/L — HIGH (ref 0–41)
ALT FLD-CCNC: 63 U/L — HIGH (ref 0–41)
ALT FLD-CCNC: 63 U/L — HIGH (ref 0–41)
ALT FLD-CCNC: 65 U/L — HIGH (ref 0–41)
ALT SERPL-CCNC: 29 U/L
ALT SERPL-CCNC: 50 U/L
ALT SERPL-CCNC: 50 U/L
ALT SERPL-CCNC: 51 U/L
ALT SERPL-CCNC: 53 U/L
ALT SERPL-CCNC: 53 U/L
ALT SERPL-CCNC: 62 U/L
ALT SERPL-CCNC: 77 U/L
ANION GAP SERPL CALC-SCNC: 11 MMOL/L — SIGNIFICANT CHANGE UP (ref 7–14)
ANION GAP SERPL CALC-SCNC: 11 MMOL/L — SIGNIFICANT CHANGE UP (ref 7–14)
ANION GAP SERPL CALC-SCNC: 12 MMOL/L
ANION GAP SERPL CALC-SCNC: 12 MMOL/L — SIGNIFICANT CHANGE UP (ref 7–14)
ANION GAP SERPL CALC-SCNC: 12 MMOL/L — SIGNIFICANT CHANGE UP (ref 7–14)
ANION GAP SERPL CALC-SCNC: 13 MMOL/L
ANION GAP SERPL CALC-SCNC: 13 MMOL/L — SIGNIFICANT CHANGE UP (ref 7–14)
ANION GAP SERPL CALC-SCNC: 13 MMOL/L — SIGNIFICANT CHANGE UP (ref 7–14)
ANION GAP SERPL CALC-SCNC: 14 MMOL/L — SIGNIFICANT CHANGE UP (ref 7–14)
ANION GAP SERPL CALC-SCNC: 14 MMOL/L — SIGNIFICANT CHANGE UP (ref 7–14)
ANION GAP SERPL CALC-SCNC: 15 MMOL/L
ANION GAP SERPL CALC-SCNC: 17 MMOL/L
ANION GAP SERPL CALC-SCNC: 17 MMOL/L
ANION GAP SERPL CALC-SCNC: 19 MMOL/L
ANION GAP SERPL CALC-SCNC: 21 MMOL/L — HIGH (ref 7–14)
ANION GAP SERPL CALC-SCNC: 9 MMOL/L — SIGNIFICANT CHANGE UP (ref 7–14)
APPEARANCE CSF: CLEAR — SIGNIFICANT CHANGE UP
APPEARANCE: ABNORMAL
APPEARANCE: CLEAR
APTT BLD: 28.9 SEC — SIGNIFICANT CHANGE UP (ref 27–39.2)
APTT BLD: 29.5 SEC — SIGNIFICANT CHANGE UP (ref 27–39.2)
APTT BLD: 32.4 SEC — SIGNIFICANT CHANGE UP (ref 27–39.2)
APTT BLD: 32.5 SEC — SIGNIFICANT CHANGE UP (ref 27–39.2)
AST SERPL-CCNC: 28 U/L
AST SERPL-CCNC: 35 U/L
AST SERPL-CCNC: 35 U/L — SIGNIFICANT CHANGE UP (ref 0–41)
AST SERPL-CCNC: 37 U/L — SIGNIFICANT CHANGE UP (ref 0–41)
AST SERPL-CCNC: 39 U/L
AST SERPL-CCNC: 39 U/L — SIGNIFICANT CHANGE UP (ref 0–41)
AST SERPL-CCNC: 41 U/L — SIGNIFICANT CHANGE UP (ref 0–41)
AST SERPL-CCNC: 42 U/L
AST SERPL-CCNC: 42 U/L — HIGH (ref 0–41)
AST SERPL-CCNC: 44 U/L — HIGH (ref 0–41)
AST SERPL-CCNC: 46 U/L
AST SERPL-CCNC: 47 U/L — HIGH (ref 0–41)
AST SERPL-CCNC: 49 U/L — HIGH (ref 0–41)
AST SERPL-CCNC: 51 U/L
AST SERPL-CCNC: 53 U/L
AST SERPL-CCNC: 54 U/L — HIGH (ref 0–41)
AST SERPL-CCNC: 65 U/L
BASE EXCESS BLDV CALC-SCNC: -2.8 MMOL/L — LOW (ref -2–3)
BASE EXCESS BLDV CALC-SCNC: 3.6 MMOL/L — HIGH (ref -2–3)
BASOPHILS # BLD AUTO: 0 K/UL
BASOPHILS # BLD AUTO: 0 K/UL
BASOPHILS # BLD AUTO: 0.01 K/UL
BASOPHILS # BLD AUTO: 0.01 K/UL — SIGNIFICANT CHANGE UP (ref 0–0.2)
BASOPHILS # BLD AUTO: 0.01 K/UL — SIGNIFICANT CHANGE UP (ref 0–0.2)
BASOPHILS # BLD AUTO: 0.02 K/UL
BASOPHILS # BLD AUTO: 0.02 K/UL — SIGNIFICANT CHANGE UP (ref 0–0.2)
BASOPHILS # BLD AUTO: 0.02 K/UL — SIGNIFICANT CHANGE UP (ref 0–0.2)
BASOPHILS # BLD AUTO: 0.03 K/UL — SIGNIFICANT CHANGE UP (ref 0–0.2)
BASOPHILS # BLD AUTO: 0.04 K/UL — SIGNIFICANT CHANGE UP (ref 0–0.2)
BASOPHILS # BLD AUTO: 0.05 K/UL — SIGNIFICANT CHANGE UP (ref 0–0.2)
BASOPHILS NFR BLD AUTO: 0 %
BASOPHILS NFR BLD AUTO: 0 %
BASOPHILS NFR BLD AUTO: 0.1 %
BASOPHILS NFR BLD AUTO: 0.1 %
BASOPHILS NFR BLD AUTO: 0.1 % — SIGNIFICANT CHANGE UP (ref 0–1)
BASOPHILS NFR BLD AUTO: 0.2 %
BASOPHILS NFR BLD AUTO: 0.2 % — SIGNIFICANT CHANGE UP (ref 0–1)
BASOPHILS NFR BLD AUTO: 0.2 % — SIGNIFICANT CHANGE UP (ref 0–1)
BASOPHILS NFR BLD AUTO: 0.3 % — SIGNIFICANT CHANGE UP (ref 0–1)
BASOPHILS NFR BLD AUTO: 0.4 %
BASOPHILS NFR BLD AUTO: 0.4 % — SIGNIFICANT CHANGE UP (ref 0–1)
BASOPHILS NFR BLD AUTO: 0.4 % — SIGNIFICANT CHANGE UP (ref 0–1)
BASOPHILS NFR BLD AUTO: 0.5 % — SIGNIFICANT CHANGE UP (ref 0–1)
BILIRUB SERPL-MCNC: 0.4 MG/DL — SIGNIFICANT CHANGE UP (ref 0.2–1.2)
BILIRUB SERPL-MCNC: 0.5 MG/DL — SIGNIFICANT CHANGE UP (ref 0.2–1.2)
BILIRUB SERPL-MCNC: 0.5 MG/DL — SIGNIFICANT CHANGE UP (ref 0.2–1.2)
BILIRUB SERPL-MCNC: 0.6 MG/DL — SIGNIFICANT CHANGE UP (ref 0.2–1.2)
BILIRUB SERPL-MCNC: 0.6 MG/DL — SIGNIFICANT CHANGE UP (ref 0.2–1.2)
BILIRUB SERPL-MCNC: 0.7 MG/DL — SIGNIFICANT CHANGE UP (ref 0.2–1.2)
BILIRUB SERPL-MCNC: 0.8 MG/DL
BILIRUB SERPL-MCNC: 0.9 MG/DL
BILIRUB SERPL-MCNC: 1 MG/DL
BILIRUB SERPL-MCNC: 1.1 MG/DL
BILIRUB SERPL-MCNC: 1.1 MG/DL — SIGNIFICANT CHANGE UP (ref 0.2–1.2)
BILIRUBIN URINE: ABNORMAL
BILIRUBIN URINE: NEGATIVE
BLD GP AB SCN SERPL QL: SIGNIFICANT CHANGE UP
BLOOD URINE: ABNORMAL
BLOOD URINE: NEGATIVE
BUN SERPL-MCNC: 10 MG/DL
BUN SERPL-MCNC: 10 MG/DL — SIGNIFICANT CHANGE UP (ref 10–20)
BUN SERPL-MCNC: 11 MG/DL
BUN SERPL-MCNC: 11 MG/DL — SIGNIFICANT CHANGE UP (ref 10–20)
BUN SERPL-MCNC: 12 MG/DL — SIGNIFICANT CHANGE UP (ref 10–20)
BUN SERPL-MCNC: 13 MG/DL — SIGNIFICANT CHANGE UP (ref 10–20)
BUN SERPL-MCNC: 14 MG/DL
BUN SERPL-MCNC: 14 MG/DL
BUN SERPL-MCNC: 14 MG/DL — SIGNIFICANT CHANGE UP (ref 10–20)
BUN SERPL-MCNC: 17 MG/DL
BUN SERPL-MCNC: 4 MG/DL
BUN SERPL-MCNC: 8 MG/DL — LOW (ref 10–20)
BUN SERPL-MCNC: 8 MG/DL — LOW (ref 10–20)
BUN SERPL-MCNC: 9 MG/DL — LOW (ref 10–20)
CA-I SERPL-SCNC: 1.14 MMOL/L — LOW (ref 1.15–1.33)
CA-I SERPL-SCNC: 1.21 MMOL/L — SIGNIFICANT CHANGE UP (ref 1.15–1.33)
CALCIUM SERPL-MCNC: 8.1 MG/DL
CALCIUM SERPL-MCNC: 8.5 MG/DL — SIGNIFICANT CHANGE UP (ref 8.5–10.1)
CALCIUM SERPL-MCNC: 8.6 MG/DL — SIGNIFICANT CHANGE UP (ref 8.5–10.1)
CALCIUM SERPL-MCNC: 8.7 MG/DL
CALCIUM SERPL-MCNC: 8.8 MG/DL — SIGNIFICANT CHANGE UP (ref 8.5–10.1)
CALCIUM SERPL-MCNC: 8.9 MG/DL
CALCIUM SERPL-MCNC: 8.9 MG/DL — SIGNIFICANT CHANGE UP (ref 8.5–10.1)
CALCIUM SERPL-MCNC: 9 MG/DL
CALCIUM SERPL-MCNC: 9.1 MG/DL
CALCIUM SERPL-MCNC: 9.2 MG/DL — SIGNIFICANT CHANGE UP (ref 8.5–10.1)
CALCIUM SERPL-MCNC: 9.3 MG/DL
CALCIUM SERPL-MCNC: 9.3 MG/DL
CALCIUM SERPL-MCNC: 9.4 MG/DL
CALCIUM SERPL-MCNC: 9.4 MG/DL — SIGNIFICANT CHANGE UP (ref 8.5–10.1)
CALCIUM SERPL-MCNC: 9.5 MG/DL — SIGNIFICANT CHANGE UP (ref 8.5–10.1)
CALCIUM SERPL-MCNC: 9.6 MG/DL — SIGNIFICANT CHANGE UP (ref 8.5–10.1)
CALCIUM SERPL-MCNC: 9.6 MG/DL — SIGNIFICANT CHANGE UP (ref 8.5–10.1)
CALCIUM SERPL-MCNC: 9.7 MG/DL — SIGNIFICANT CHANGE UP (ref 8.5–10.1)
CALCIUM SERPL-MCNC: 9.8 MG/DL
CHLORIDE SERPL-SCNC: 100 MMOL/L
CHLORIDE SERPL-SCNC: 100 MMOL/L — SIGNIFICANT CHANGE UP (ref 98–110)
CHLORIDE SERPL-SCNC: 100 MMOL/L — SIGNIFICANT CHANGE UP (ref 98–110)
CHLORIDE SERPL-SCNC: 101 MMOL/L
CHLORIDE SERPL-SCNC: 101 MMOL/L — SIGNIFICANT CHANGE UP (ref 98–110)
CHLORIDE SERPL-SCNC: 101 MMOL/L — SIGNIFICANT CHANGE UP (ref 98–110)
CHLORIDE SERPL-SCNC: 102 MMOL/L — SIGNIFICANT CHANGE UP (ref 98–110)
CHLORIDE SERPL-SCNC: 103 MMOL/L
CHLORIDE SERPL-SCNC: 103 MMOL/L — SIGNIFICANT CHANGE UP (ref 98–110)
CHLORIDE SERPL-SCNC: 106 MMOL/L — SIGNIFICANT CHANGE UP (ref 98–110)
CHLORIDE SERPL-SCNC: 94 MMOL/L
CHLORIDE SERPL-SCNC: 95 MMOL/L
CHLORIDE SERPL-SCNC: 97 MMOL/L — LOW (ref 98–110)
CHLORIDE SERPL-SCNC: 97 MMOL/L — LOW (ref 98–110)
CHLORIDE SERPL-SCNC: 99 MMOL/L — SIGNIFICANT CHANGE UP (ref 98–110)
CHOLEST SERPL-MCNC: 149 MG/DL — SIGNIFICANT CHANGE UP
CO2 SERPL-SCNC: 20 MMOL/L — SIGNIFICANT CHANGE UP (ref 17–32)
CO2 SERPL-SCNC: 24 MMOL/L
CO2 SERPL-SCNC: 24 MMOL/L
CO2 SERPL-SCNC: 24 MMOL/L — SIGNIFICANT CHANGE UP (ref 17–32)
CO2 SERPL-SCNC: 24 MMOL/L — SIGNIFICANT CHANGE UP (ref 17–32)
CO2 SERPL-SCNC: 25 MMOL/L
CO2 SERPL-SCNC: 25 MMOL/L — SIGNIFICANT CHANGE UP (ref 17–32)
CO2 SERPL-SCNC: 26 MMOL/L
CO2 SERPL-SCNC: 26 MMOL/L — SIGNIFICANT CHANGE UP (ref 17–32)
CO2 SERPL-SCNC: 26 MMOL/L — SIGNIFICANT CHANGE UP (ref 17–32)
CO2 SERPL-SCNC: 27 MMOL/L
CO2 SERPL-SCNC: 28 MMOL/L
CO2 SERPL-SCNC: 28 MMOL/L
CO2 SERPL-SCNC: 28 MMOL/L — SIGNIFICANT CHANGE UP (ref 17–32)
CO2 SERPL-SCNC: 28 MMOL/L — SIGNIFICANT CHANGE UP (ref 17–32)
CO2 SERPL-SCNC: 29 MMOL/L
CO2 SERPL-SCNC: 29 MMOL/L — SIGNIFICANT CHANGE UP (ref 17–32)
CO2 SERPL-SCNC: 31 MMOL/L
CO2 SERPL-SCNC: 31 MMOL/L — SIGNIFICANT CHANGE UP (ref 17–32)
COLOR CSF: COLORLESS — SIGNIFICANT CHANGE UP
COLOR: ABNORMAL
COLOR: NORMAL
COLOR: YELLOW
CREAT SERPL-MCNC: 0.5 MG/DL
CREAT SERPL-MCNC: 0.5 MG/DL — LOW (ref 0.7–1.5)
CREAT SERPL-MCNC: 0.6 MG/DL
CREAT SERPL-MCNC: 0.6 MG/DL — LOW (ref 0.7–1.5)
CREAT SERPL-MCNC: 0.7 MG/DL — SIGNIFICANT CHANGE UP (ref 0.7–1.5)
CRYPTOC AG CSF-ACNC: NEGATIVE — SIGNIFICANT CHANGE UP
CRYPTOC AG FLD QL: NEGATIVE — SIGNIFICANT CHANGE UP
CULTURE RESULTS: NO GROWTH — SIGNIFICANT CHANGE UP
CULTURE RESULTS: SIGNIFICANT CHANGE UP
CULTURE RESULTS: SIGNIFICANT CHANGE UP
EGFR: 103 ML/MIN/1.73M2 — SIGNIFICANT CHANGE UP
EGFR: 106 ML/MIN/1.73M2
EGFR: 107 ML/MIN/1.73M2 — SIGNIFICANT CHANGE UP
EGFR: 111 ML/MIN/1.73M2
EGFR: 111 ML/MIN/1.73M2 — SIGNIFICANT CHANGE UP
EOSINOPHIL # BLD AUTO: 0 K/UL
EOSINOPHIL # BLD AUTO: 0 K/UL
EOSINOPHIL # BLD AUTO: 0 K/UL — SIGNIFICANT CHANGE UP (ref 0–0.7)
EOSINOPHIL # BLD AUTO: 0.01 K/UL
EOSINOPHIL # BLD AUTO: 0.01 K/UL — SIGNIFICANT CHANGE UP (ref 0–0.7)
EOSINOPHIL # BLD AUTO: 0.02 K/UL
EOSINOPHIL # BLD AUTO: 0.03 K/UL — SIGNIFICANT CHANGE UP (ref 0–0.7)
EOSINOPHIL # BLD AUTO: 0.04 K/UL
EOSINOPHIL # BLD AUTO: 0.04 K/UL
EOSINOPHIL # BLD AUTO: 0.05 K/UL
EOSINOPHIL # BLD AUTO: 0.12 K/UL — SIGNIFICANT CHANGE UP (ref 0–0.7)
EOSINOPHIL # BLD AUTO: 0.15 K/UL — SIGNIFICANT CHANGE UP (ref 0–0.7)
EOSINOPHIL # BLD AUTO: 0.18 K/UL — SIGNIFICANT CHANGE UP (ref 0–0.7)
EOSINOPHIL # BLD AUTO: 0.18 K/UL — SIGNIFICANT CHANGE UP (ref 0–0.7)
EOSINOPHIL # BLD AUTO: 0.21 K/UL — SIGNIFICANT CHANGE UP (ref 0–0.7)
EOSINOPHIL # BLD AUTO: 0.38 K/UL — SIGNIFICANT CHANGE UP (ref 0–0.7)
EOSINOPHIL NFR BLD AUTO: 0 %
EOSINOPHIL NFR BLD AUTO: 0 %
EOSINOPHIL NFR BLD AUTO: 0 % — SIGNIFICANT CHANGE UP (ref 0–8)
EOSINOPHIL NFR BLD AUTO: 0.1 %
EOSINOPHIL NFR BLD AUTO: 0.1 % — SIGNIFICANT CHANGE UP (ref 0–8)
EOSINOPHIL NFR BLD AUTO: 0.2 %
EOSINOPHIL NFR BLD AUTO: 0.3 % — SIGNIFICANT CHANGE UP (ref 0–8)
EOSINOPHIL NFR BLD AUTO: 0.4 %
EOSINOPHIL NFR BLD AUTO: 0.4 %
EOSINOPHIL NFR BLD AUTO: 0.5 %
EOSINOPHIL NFR BLD AUTO: 0.9 %
EOSINOPHIL NFR BLD AUTO: 0.9 %
EOSINOPHIL NFR BLD AUTO: 1.6 % — SIGNIFICANT CHANGE UP (ref 0–8)
EOSINOPHIL NFR BLD AUTO: 2.2 % — SIGNIFICANT CHANGE UP (ref 0–8)
EOSINOPHIL NFR BLD AUTO: 2.5 % — SIGNIFICANT CHANGE UP (ref 0–8)
EOSINOPHIL NFR BLD AUTO: 2.7 % — SIGNIFICANT CHANGE UP (ref 0–8)
EOSINOPHIL NFR BLD AUTO: 2.8 % — SIGNIFICANT CHANGE UP (ref 0–8)
EOSINOPHIL NFR BLD AUTO: 3.9 % — SIGNIFICANT CHANGE UP (ref 0–8)
ESTIMATED AVERAGE GLUCOSE: 128 MG/DL — HIGH (ref 68–114)
FOLATE SERPL-MCNC: >20 NG/ML
GAS PNL BLDV: 134 MMOL/L — LOW (ref 136–145)
GAS PNL BLDV: 137 MMOL/L — SIGNIFICANT CHANGE UP (ref 136–145)
GAS PNL BLDV: SIGNIFICANT CHANGE UP
GAS PNL BLDV: SIGNIFICANT CHANGE UP
GLUCOSE CSF-MCNC: 69 MG/DL — SIGNIFICANT CHANGE UP (ref 45–75)
GLUCOSE QUALITATIVE U: NEGATIVE
GLUCOSE QUALITATIVE U: NORMAL
GLUCOSE SERPL-MCNC: 102 MG/DL — HIGH (ref 70–99)
GLUCOSE SERPL-MCNC: 104 MG/DL — HIGH (ref 70–99)
GLUCOSE SERPL-MCNC: 105 MG/DL
GLUCOSE SERPL-MCNC: 105 MG/DL — HIGH (ref 70–99)
GLUCOSE SERPL-MCNC: 116 MG/DL
GLUCOSE SERPL-MCNC: 117 MG/DL
GLUCOSE SERPL-MCNC: 124 MG/DL — HIGH (ref 70–99)
GLUCOSE SERPL-MCNC: 134 MG/DL
GLUCOSE SERPL-MCNC: 135 MG/DL
GLUCOSE SERPL-MCNC: 135 MG/DL — HIGH (ref 70–99)
GLUCOSE SERPL-MCNC: 144 MG/DL
GLUCOSE SERPL-MCNC: 164 MG/DL
GLUCOSE SERPL-MCNC: 166 MG/DL
GLUCOSE SERPL-MCNC: 189 MG/DL — HIGH (ref 70–99)
GLUCOSE SERPL-MCNC: 205 MG/DL — HIGH (ref 70–99)
GLUCOSE SERPL-MCNC: 86 MG/DL — SIGNIFICANT CHANGE UP (ref 70–99)
GLUCOSE SERPL-MCNC: 95 MG/DL
GLUCOSE SERPL-MCNC: 98 MG/DL — SIGNIFICANT CHANGE UP (ref 70–99)
GLUCOSE SERPL-MCNC: 99 MG/DL — SIGNIFICANT CHANGE UP (ref 70–99)
GRAM STN FLD: SIGNIFICANT CHANGE UP
GRAM STN FLD: SIGNIFICANT CHANGE UP
HCG SERPL QL: NEGATIVE — SIGNIFICANT CHANGE UP
HCG SERPL QL: NEGATIVE — SIGNIFICANT CHANGE UP
HCO3 BLDV-SCNC: 24 MMOL/L — SIGNIFICANT CHANGE UP (ref 22–29)
HCO3 BLDV-SCNC: 28 MMOL/L — SIGNIFICANT CHANGE UP (ref 22–29)
HCT VFR BLD CALC: 31.6 %
HCT VFR BLD CALC: 32.2 %
HCT VFR BLD CALC: 32.6 %
HCT VFR BLD CALC: 32.7 %
HCT VFR BLD CALC: 33.5 %
HCT VFR BLD CALC: 35.7 %
HCT VFR BLD CALC: 35.8 %
HCT VFR BLD CALC: 36 %
HCT VFR BLD CALC: 36.7 %
HCT VFR BLD CALC: 36.8 %
HCT VFR BLD CALC: 37.3 %
HCT VFR BLD CALC: 37.6 % — SIGNIFICANT CHANGE UP (ref 37–47)
HCT VFR BLD CALC: 38.7 % — SIGNIFICANT CHANGE UP (ref 37–47)
HCT VFR BLD CALC: 39 % — SIGNIFICANT CHANGE UP (ref 37–47)
HCT VFR BLD CALC: 39.1 % — SIGNIFICANT CHANGE UP (ref 37–47)
HCT VFR BLD CALC: 39.6 % — SIGNIFICANT CHANGE UP (ref 37–47)
HCT VFR BLD CALC: 41.7 % — SIGNIFICANT CHANGE UP (ref 37–47)
HCT VFR BLD CALC: 42.8 % — SIGNIFICANT CHANGE UP (ref 37–47)
HCT VFR BLD CALC: 42.9 % — SIGNIFICANT CHANGE UP (ref 37–47)
HCT VFR BLD CALC: 43.2 % — SIGNIFICANT CHANGE UP (ref 37–47)
HCT VFR BLD CALC: 44.1 %
HCT VFR BLD CALC: 44.7 %
HCT VFR BLD CALC: 45 % — SIGNIFICANT CHANGE UP (ref 37–47)
HCT VFR BLD CALC: 45.2 %
HCT VFR BLDA CALC: 42 % — SIGNIFICANT CHANGE UP (ref 39–51)
HCT VFR BLDA CALC: 49 % — SIGNIFICANT CHANGE UP (ref 39–51)
HDLC SERPL-MCNC: 37 MG/DL — LOW
HGB BLD CALC-MCNC: 14.1 G/DL — SIGNIFICANT CHANGE UP (ref 12.6–17.4)
HGB BLD CALC-MCNC: 16.3 G/DL — SIGNIFICANT CHANGE UP (ref 12.6–17.4)
HGB BLD-MCNC: 11.4 G/DL
HGB BLD-MCNC: 11.5 G/DL
HGB BLD-MCNC: 11.8 G/DL
HGB BLD-MCNC: 11.9 G/DL
HGB BLD-MCNC: 11.9 G/DL
HGB BLD-MCNC: 12.1 G/DL
HGB BLD-MCNC: 12.2 G/DL
HGB BLD-MCNC: 12.7 G/DL — SIGNIFICANT CHANGE UP (ref 12–16)
HGB BLD-MCNC: 12.8 G/DL — SIGNIFICANT CHANGE UP (ref 12–16)
HGB BLD-MCNC: 13.1 G/DL
HGB BLD-MCNC: 13.2 G/DL
HGB BLD-MCNC: 13.2 G/DL — SIGNIFICANT CHANGE UP (ref 12–16)
HGB BLD-MCNC: 13.2 G/DL — SIGNIFICANT CHANGE UP (ref 12–16)
HGB BLD-MCNC: 13.4 G/DL
HGB BLD-MCNC: 13.6 G/DL
HGB BLD-MCNC: 13.6 G/DL — SIGNIFICANT CHANGE UP (ref 12–16)
HGB BLD-MCNC: 13.8 G/DL — SIGNIFICANT CHANGE UP (ref 12–16)
HGB BLD-MCNC: 13.9 G/DL — SIGNIFICANT CHANGE UP (ref 12–16)
HGB BLD-MCNC: 13.9 G/DL — SIGNIFICANT CHANGE UP (ref 12–16)
HGB BLD-MCNC: 14.2 G/DL — SIGNIFICANT CHANGE UP (ref 12–16)
HGB BLD-MCNC: 14.6 G/DL — SIGNIFICANT CHANGE UP (ref 12–16)
HGB BLD-MCNC: 14.8 G/DL
HGB BLD-MCNC: 15 G/DL
HGB BLD-MCNC: 15.1 G/DL
HIV 1+2 AB+HIV1 P24 AG SERPL QL IA: SIGNIFICANT CHANGE UP
HPIV3 RNA SPEC QL NAA+PROBE: DETECTED
IMM GRANULOCYTES NFR BLD AUTO: 0.1 % — SIGNIFICANT CHANGE UP (ref 0.1–0.3)
IMM GRANULOCYTES NFR BLD AUTO: 0.1 % — SIGNIFICANT CHANGE UP (ref 0.1–0.3)
IMM GRANULOCYTES NFR BLD AUTO: 0.2 %
IMM GRANULOCYTES NFR BLD AUTO: 0.2 % — SIGNIFICANT CHANGE UP (ref 0.1–0.3)
IMM GRANULOCYTES NFR BLD AUTO: 0.4 %
IMM GRANULOCYTES NFR BLD AUTO: 0.4 %
IMM GRANULOCYTES NFR BLD AUTO: 0.4 % — HIGH (ref 0.1–0.3)
IMM GRANULOCYTES NFR BLD AUTO: 0.4 % — HIGH (ref 0.1–0.3)
IMM GRANULOCYTES NFR BLD AUTO: 0.5 %
IMM GRANULOCYTES NFR BLD AUTO: 0.6 %
IMM GRANULOCYTES NFR BLD AUTO: 0.7 %
IMM GRANULOCYTES NFR BLD AUTO: 0.7 % — HIGH (ref 0.1–0.3)
IMM GRANULOCYTES NFR BLD AUTO: 0.9 %
IMM GRANULOCYTES NFR BLD AUTO: 0.9 %
IMM GRANULOCYTES NFR BLD AUTO: 0.9 % — HIGH (ref 0.1–0.3)
IMM GRANULOCYTES NFR BLD AUTO: 1 %
IMM GRANULOCYTES NFR BLD AUTO: 1.5 %
IMM GRANULOCYTES NFR BLD AUTO: 1.7 %
INR BLD: 0.95 RATIO — SIGNIFICANT CHANGE UP (ref 0.65–1.3)
INR BLD: 0.99 RATIO — SIGNIFICANT CHANGE UP (ref 0.65–1.3)
INR BLD: 1.06 RATIO — SIGNIFICANT CHANGE UP (ref 0.65–1.3)
INR BLD: 1.13 RATIO — SIGNIFICANT CHANGE UP (ref 0.65–1.3)
KETONES URINE: NEGATIVE
KETONES URINE: NORMAL
LACTATE BLDV-MCNC: 2.3 MMOL/L — HIGH (ref 0.5–2)
LACTATE BLDV-MCNC: 7.2 MMOL/L — CRITICAL HIGH (ref 0.5–2)
LACTATE SERPL-SCNC: 1 MMOL/L — SIGNIFICANT CHANGE UP (ref 0.7–2)
LDH CSF L TO P-CCNC: 13 U/L — SIGNIFICANT CHANGE UP
LDH FLD-CCNC: 13 U/L — SIGNIFICANT CHANGE UP
LEUKOCYTE ESTERASE URINE: ABNORMAL
LEUKOCYTE ESTERASE URINE: NEGATIVE
LEUKOCYTE ESTERASE URINE: NEGATIVE
LIPID PNL WITH DIRECT LDL SERPL: 96 MG/DL — SIGNIFICANT CHANGE UP
LYMPHOCYTES # BLD AUTO: 0.59 K/UL — LOW (ref 1.2–3.4)
LYMPHOCYTES # BLD AUTO: 0.63 K/UL
LYMPHOCYTES # BLD AUTO: 0.63 K/UL
LYMPHOCYTES # BLD AUTO: 0.67 K/UL
LYMPHOCYTES # BLD AUTO: 0.79 K/UL
LYMPHOCYTES # BLD AUTO: 0.8 K/UL
LYMPHOCYTES # BLD AUTO: 0.83 K/UL
LYMPHOCYTES # BLD AUTO: 0.84 K/UL
LYMPHOCYTES # BLD AUTO: 0.87 K/UL
LYMPHOCYTES # BLD AUTO: 0.89 K/UL
LYMPHOCYTES # BLD AUTO: 1 K/UL — LOW (ref 1.2–3.4)
LYMPHOCYTES # BLD AUTO: 1.03 K/UL
LYMPHOCYTES # BLD AUTO: 1.05 K/UL
LYMPHOCYTES # BLD AUTO: 1.06 K/UL
LYMPHOCYTES # BLD AUTO: 1.98 K/UL — SIGNIFICANT CHANGE UP (ref 1.2–3.4)
LYMPHOCYTES # BLD AUTO: 1.98 K/UL — SIGNIFICANT CHANGE UP (ref 1.2–3.4)
LYMPHOCYTES # BLD AUTO: 10.2 % — LOW (ref 20.5–51.1)
LYMPHOCYTES # BLD AUTO: 10.2 % — LOW (ref 20.5–51.1)
LYMPHOCYTES # BLD AUTO: 2.01 K/UL — SIGNIFICANT CHANGE UP (ref 1.2–3.4)
LYMPHOCYTES # BLD AUTO: 2.04 K/UL — SIGNIFICANT CHANGE UP (ref 1.2–3.4)
LYMPHOCYTES # BLD AUTO: 2.09 K/UL
LYMPHOCYTES # BLD AUTO: 2.12 K/UL — SIGNIFICANT CHANGE UP (ref 1.2–3.4)
LYMPHOCYTES # BLD AUTO: 21.6 % — SIGNIFICANT CHANGE UP (ref 20.5–51.1)
LYMPHOCYTES # BLD AUTO: 27.6 % — SIGNIFICANT CHANGE UP (ref 20.5–51.1)
LYMPHOCYTES # BLD AUTO: 29.4 % — SIGNIFICANT CHANGE UP (ref 20.5–51.1)
LYMPHOCYTES # BLD AUTO: 3.09 K/UL — SIGNIFICANT CHANGE UP (ref 1.2–3.4)
LYMPHOCYTES # BLD AUTO: 3.49 K/UL — HIGH (ref 1.2–3.4)
LYMPHOCYTES # BLD AUTO: 30.9 % — SIGNIFICANT CHANGE UP (ref 20.5–51.1)
LYMPHOCYTES # BLD AUTO: 31.5 % — SIGNIFICANT CHANGE UP (ref 20.5–51.1)
LYMPHOCYTES # BLD AUTO: 35.5 % — SIGNIFICANT CHANGE UP (ref 20.5–51.1)
LYMPHOCYTES # BLD AUTO: 36.1 % — SIGNIFICANT CHANGE UP (ref 20.5–51.1)
LYMPHOCYTES NFR BLD AUTO: 11.5 %
LYMPHOCYTES NFR BLD AUTO: 12.8 %
LYMPHOCYTES NFR BLD AUTO: 13.2 %
LYMPHOCYTES NFR BLD AUTO: 13.6 %
LYMPHOCYTES NFR BLD AUTO: 14.3 %
LYMPHOCYTES NFR BLD AUTO: 17.5 %
LYMPHOCYTES NFR BLD AUTO: 19.9 %
LYMPHOCYTES NFR BLD AUTO: 21.3 %
LYMPHOCYTES NFR BLD AUTO: 21.8 %
LYMPHOCYTES NFR BLD AUTO: 23.6 %
LYMPHOCYTES NFR BLD AUTO: 28.5 %
LYMPHOCYTES NFR BLD AUTO: 7.5 %
LYMPHOCYTES NFR BLD AUTO: 8.3 %
MAGNESIUM SERPL-MCNC: 1.6 MG/DL — LOW (ref 1.8–2.4)
MAGNESIUM SERPL-MCNC: 1.7 MG/DL — LOW (ref 1.8–2.4)
MAGNESIUM SERPL-MCNC: 1.8 MG/DL — SIGNIFICANT CHANGE UP (ref 1.8–2.4)
MAGNESIUM SERPL-MCNC: 2.2 MG/DL — SIGNIFICANT CHANGE UP (ref 1.8–2.4)
MAGNESIUM SERPL-MCNC: 2.6 MG/DL — HIGH (ref 1.8–2.4)
MAN DIFF?: NO
MAN DIFF?: NO
MAN DIFF?: NORMAL
MCHC RBC-ENTMCNC: 27.9 PG — SIGNIFICANT CHANGE UP (ref 27–31)
MCHC RBC-ENTMCNC: 27.9 PG — SIGNIFICANT CHANGE UP (ref 27–31)
MCHC RBC-ENTMCNC: 28 PG — SIGNIFICANT CHANGE UP (ref 27–31)
MCHC RBC-ENTMCNC: 28 PG — SIGNIFICANT CHANGE UP (ref 27–31)
MCHC RBC-ENTMCNC: 28.2 PG — SIGNIFICANT CHANGE UP (ref 27–31)
MCHC RBC-ENTMCNC: 28.3 PG — SIGNIFICANT CHANGE UP (ref 27–31)
MCHC RBC-ENTMCNC: 28.4 PG — SIGNIFICANT CHANGE UP (ref 27–31)
MCHC RBC-ENTMCNC: 28.5 PG — SIGNIFICANT CHANGE UP (ref 27–31)
MCHC RBC-ENTMCNC: 28.9 PG — SIGNIFICANT CHANGE UP (ref 27–31)
MCHC RBC-ENTMCNC: 29 PG
MCHC RBC-ENTMCNC: 29.4 PG
MCHC RBC-ENTMCNC: 29.4 PG
MCHC RBC-ENTMCNC: 30.8 PG
MCHC RBC-ENTMCNC: 31.7 PG — HIGH (ref 27–31)
MCHC RBC-ENTMCNC: 32.2 G/DL — SIGNIFICANT CHANGE UP (ref 32–37)
MCHC RBC-ENTMCNC: 32.4 G/DL — SIGNIFICANT CHANGE UP (ref 32–37)
MCHC RBC-ENTMCNC: 32.4 G/DL — SIGNIFICANT CHANGE UP (ref 32–37)
MCHC RBC-ENTMCNC: 32.6 G/DL — SIGNIFICANT CHANGE UP (ref 32–37)
MCHC RBC-ENTMCNC: 32.6 G/DL — SIGNIFICANT CHANGE UP (ref 32–37)
MCHC RBC-ENTMCNC: 32.6 PG
MCHC RBC-ENTMCNC: 32.6 PG
MCHC RBC-ENTMCNC: 32.7 G/DL — SIGNIFICANT CHANGE UP (ref 32–37)
MCHC RBC-ENTMCNC: 33.2 G/DL
MCHC RBC-ENTMCNC: 33.2 G/DL — SIGNIFICANT CHANGE UP (ref 32–37)
MCHC RBC-ENTMCNC: 33.3 G/DL — SIGNIFICANT CHANGE UP (ref 32–37)
MCHC RBC-ENTMCNC: 33.4 G/DL
MCHC RBC-ENTMCNC: 33.6 GM/DL
MCHC RBC-ENTMCNC: 33.6 GM/DL
MCHC RBC-ENTMCNC: 33.7 PG
MCHC RBC-ENTMCNC: 33.9 PG
MCHC RBC-ENTMCNC: 34.1 G/DL — SIGNIFICANT CHANGE UP (ref 32–37)
MCHC RBC-ENTMCNC: 34.2 G/DL
MCHC RBC-ENTMCNC: 34.4 PG
MCHC RBC-ENTMCNC: 34.4 PG
MCHC RBC-ENTMCNC: 34.5 PG
MCHC RBC-ENTMCNC: 34.9 PG
MCHC RBC-ENTMCNC: 35.1 G/DL
MCHC RBC-ENTMCNC: 35.3 PG
MCHC RBC-ENTMCNC: 35.4 G/DL
MCHC RBC-ENTMCNC: 35.4 PG
MCHC RBC-ENTMCNC: 36.1 G/DL
MCHC RBC-ENTMCNC: 36.1 G/DL
MCHC RBC-ENTMCNC: 36.4 G/DL
MCHC RBC-ENTMCNC: 36.5 G/DL
MCHC RBC-ENTMCNC: 36.5 G/DL
MCHC RBC-ENTMCNC: 36.7 G/DL
MCHC RBC-ENTMCNC: 36.7 G/DL — SIGNIFICANT CHANGE UP (ref 32–37)
MCHC RBC-ENTMCNC: 37 G/DL
MCV RBC AUTO: 102 FL
MCV RBC AUTO: 102 FL
MCV RBC AUTO: 84.7 FL — SIGNIFICANT CHANGE UP (ref 81–99)
MCV RBC AUTO: 85.5 FL — SIGNIFICANT CHANGE UP (ref 81–99)
MCV RBC AUTO: 85.6 FL — SIGNIFICANT CHANGE UP (ref 81–99)
MCV RBC AUTO: 85.6 FL — SIGNIFICANT CHANGE UP (ref 81–99)
MCV RBC AUTO: 86 FL — SIGNIFICANT CHANGE UP (ref 81–99)
MCV RBC AUTO: 86.1 FL — SIGNIFICANT CHANGE UP (ref 81–99)
MCV RBC AUTO: 86.2 FL — SIGNIFICANT CHANGE UP (ref 81–99)
MCV RBC AUTO: 86.2 FL — SIGNIFICANT CHANGE UP (ref 81–99)
MCV RBC AUTO: 86.3 FL
MCV RBC AUTO: 87.1 FL — SIGNIFICANT CHANGE UP (ref 81–99)
MCV RBC AUTO: 87.1 FL — SIGNIFICANT CHANGE UP (ref 81–99)
MCV RBC AUTO: 87.5 FL
MCV RBC AUTO: 87.6 FL
MCV RBC AUTO: 88.1 FL
MCV RBC AUTO: 88.2 FL
MCV RBC AUTO: 89.3 FL
MCV RBC AUTO: 91.8 FL
MCV RBC AUTO: 94.2 FL
MCV RBC AUTO: 95.6 FL
MCV RBC AUTO: 96.9 FL
MCV RBC AUTO: 97.3 FL
MCV RBC AUTO: 98 FL
MONOCYTES # BLD AUTO: 0.09 K/UL — LOW (ref 0.1–0.6)
MONOCYTES # BLD AUTO: 0.12 K/UL — SIGNIFICANT CHANGE UP (ref 0.1–0.6)
MONOCYTES # BLD AUTO: 0.19 K/UL
MONOCYTES # BLD AUTO: 0.22 K/UL
MONOCYTES # BLD AUTO: 0.22 K/UL
MONOCYTES # BLD AUTO: 0.28 K/UL
MONOCYTES # BLD AUTO: 0.29 K/UL — SIGNIFICANT CHANGE UP (ref 0.1–0.6)
MONOCYTES # BLD AUTO: 0.3 K/UL
MONOCYTES # BLD AUTO: 0.31 K/UL
MONOCYTES # BLD AUTO: 0.31 K/UL
MONOCYTES # BLD AUTO: 0.32 K/UL
MONOCYTES # BLD AUTO: 0.32 K/UL — SIGNIFICANT CHANGE UP (ref 0.1–0.6)
MONOCYTES # BLD AUTO: 0.37 K/UL
MONOCYTES # BLD AUTO: 0.38 K/UL — SIGNIFICANT CHANGE UP (ref 0.1–0.6)
MONOCYTES # BLD AUTO: 0.41 K/UL — SIGNIFICANT CHANGE UP (ref 0.1–0.6)
MONOCYTES # BLD AUTO: 0.42 K/UL
MONOCYTES # BLD AUTO: 0.44 K/UL
MONOCYTES # BLD AUTO: 0.49 K/UL — SIGNIFICANT CHANGE UP (ref 0.1–0.6)
MONOCYTES # BLD AUTO: 0.62 K/UL
MONOCYTES # BLD AUTO: 0.63 K/UL
MONOCYTES # BLD AUTO: 0.64 K/UL — HIGH (ref 0.1–0.6)
MONOCYTES # BLD AUTO: 0.69 K/UL — HIGH (ref 0.1–0.6)
MONOCYTES NFR BLD AUTO: 0.9 % — LOW (ref 1.7–9.3)
MONOCYTES NFR BLD AUTO: 11 %
MONOCYTES NFR BLD AUTO: 2.1 % — SIGNIFICANT CHANGE UP (ref 1.7–9.3)
MONOCYTES NFR BLD AUTO: 2.9 %
MONOCYTES NFR BLD AUTO: 3.7 %
MONOCYTES NFR BLD AUTO: 3.9 %
MONOCYTES NFR BLD AUTO: 4.6 %
MONOCYTES NFR BLD AUTO: 4.9 %
MONOCYTES NFR BLD AUTO: 5.2 %
MONOCYTES NFR BLD AUTO: 5.2 % — SIGNIFICANT CHANGE UP (ref 1.7–9.3)
MONOCYTES NFR BLD AUTO: 5.3 % — SIGNIFICANT CHANGE UP (ref 1.7–9.3)
MONOCYTES NFR BLD AUTO: 5.4 % — SIGNIFICANT CHANGE UP (ref 1.7–9.3)
MONOCYTES NFR BLD AUTO: 5.6 % — SIGNIFICANT CHANGE UP (ref 1.7–9.3)
MONOCYTES NFR BLD AUTO: 5.6 % — SIGNIFICANT CHANGE UP (ref 1.7–9.3)
MONOCYTES NFR BLD AUTO: 5.7 % — SIGNIFICANT CHANGE UP (ref 1.7–9.3)
MONOCYTES NFR BLD AUTO: 6.5 %
MONOCYTES NFR BLD AUTO: 6.5 %
MONOCYTES NFR BLD AUTO: 6.6 %
MONOCYTES NFR BLD AUTO: 7 % — SIGNIFICANT CHANGE UP (ref 1.7–9.3)
MONOCYTES NFR BLD AUTO: 7.2 %
MONOCYTES NFR BLD AUTO: 7.8 %
MONOCYTES NFR BLD AUTO: 8.6 %
NEUTROPHILS # BLD AUTO: 1.67 K/UL
NEUTROPHILS # BLD AUTO: 3.1 K/UL
NEUTROPHILS # BLD AUTO: 3.11 K/UL
NEUTROPHILS # BLD AUTO: 3.12 K/UL — SIGNIFICANT CHANGE UP (ref 1.4–6.5)
NEUTROPHILS # BLD AUTO: 3.34 K/UL — SIGNIFICANT CHANGE UP (ref 1.4–6.5)
NEUTROPHILS # BLD AUTO: 3.44 K/UL
NEUTROPHILS # BLD AUTO: 3.62 K/UL
NEUTROPHILS # BLD AUTO: 3.7 K/UL
NEUTROPHILS # BLD AUTO: 4.16 K/UL — SIGNIFICANT CHANGE UP (ref 1.4–6.5)
NEUTROPHILS # BLD AUTO: 4.67 K/UL
NEUTROPHILS # BLD AUTO: 4.67 K/UL — SIGNIFICANT CHANGE UP (ref 1.4–6.5)
NEUTROPHILS # BLD AUTO: 4.9 K/UL
NEUTROPHILS # BLD AUTO: 5.03 K/UL — SIGNIFICANT CHANGE UP (ref 1.4–6.5)
NEUTROPHILS # BLD AUTO: 5.33 K/UL
NEUTROPHILS # BLD AUTO: 5.59 K/UL — SIGNIFICANT CHANGE UP (ref 1.4–6.5)
NEUTROPHILS # BLD AUTO: 6.25 K/UL
NEUTROPHILS # BLD AUTO: 6.61 K/UL — HIGH (ref 1.4–6.5)
NEUTROPHILS # BLD AUTO: 6.77 K/UL
NEUTROPHILS # BLD AUTO: 6.84 K/UL — HIGH (ref 1.4–6.5)
NEUTROPHILS # BLD AUTO: 7.26 K/UL
NEUTROPHILS # BLD AUTO: 8.3 K/UL
NEUTROPHILS # BLD AUTO: 8.7 K/UL — HIGH (ref 1.4–6.5)
NEUTROPHILS # CSF: SIGNIFICANT CHANGE UP % (ref 0–6)
NEUTROPHILS NFR BLD AUTO: 55.9 % — SIGNIFICANT CHANGE UP (ref 42.2–75.2)
NEUTROPHILS NFR BLD AUTO: 55.9 % — SIGNIFICANT CHANGE UP (ref 42.2–75.2)
NEUTROPHILS NFR BLD AUTO: 56.9 % — SIGNIFICANT CHANGE UP (ref 42.2–75.2)
NEUTROPHILS NFR BLD AUTO: 59.3 %
NEUTROPHILS NFR BLD AUTO: 60.6 % — SIGNIFICANT CHANGE UP (ref 42.2–75.2)
NEUTROPHILS NFR BLD AUTO: 61.8 % — SIGNIFICANT CHANGE UP (ref 42.2–75.2)
NEUTROPHILS NFR BLD AUTO: 63.4 % — SIGNIFICANT CHANGE UP (ref 42.2–75.2)
NEUTROPHILS NFR BLD AUTO: 69.7 %
NEUTROPHILS NFR BLD AUTO: 70.7 %
NEUTROPHILS NFR BLD AUTO: 71.1 %
NEUTROPHILS NFR BLD AUTO: 72.2 % — SIGNIFICANT CHANGE UP (ref 42.2–75.2)
NEUTROPHILS NFR BLD AUTO: 72.7 %
NEUTROPHILS NFR BLD AUTO: 73.8 %
NEUTROPHILS NFR BLD AUTO: 77.7 %
NEUTROPHILS NFR BLD AUTO: 78.4 %
NEUTROPHILS NFR BLD AUTO: 80.2 %
NEUTROPHILS NFR BLD AUTO: 80.3 %
NEUTROPHILS NFR BLD AUTO: 82.6 %
NEUTROPHILS NFR BLD AUTO: 86.8 %
NEUTROPHILS NFR BLD AUTO: 86.8 % — HIGH (ref 42.2–75.2)
NEUTROPHILS NFR BLD AUTO: 87.2 %
NEUTROPHILS NFR BLD AUTO: 88.3 % — HIGH (ref 42.2–75.2)
NIGHT BLUE STAIN TISS: SIGNIFICANT CHANGE UP
NITRITE URINE: NEGATIVE
NON HDL CHOLESTEROL: 112 MG/DL — SIGNIFICANT CHANGE UP
NON-GYNECOLOGICAL CYTOLOGY STUDY: SIGNIFICANT CHANGE UP
NRBC # BLD: 0 /100 WBCS — SIGNIFICANT CHANGE UP (ref 0–0)
NRBC NFR CSF: 1 /UL — SIGNIFICANT CHANGE UP (ref 0–5)
PCO2 BLDV: 43 MMHG — HIGH (ref 39–42)
PCO2 BLDV: 46 MMHG — HIGH (ref 39–42)
PH BLDV: 7.32 — SIGNIFICANT CHANGE UP (ref 7.32–7.43)
PH BLDV: 7.43 — SIGNIFICANT CHANGE UP (ref 7.32–7.43)
PH URINE: 6.5
PH URINE: 6.5
PH URINE: 7
PH URINE: 7.5
PHOSPHATE SERPL-MCNC: 3.8 MG/DL — SIGNIFICANT CHANGE UP (ref 2.1–4.9)
PLATELET # BLD AUTO: 137 K/UL
PLATELET # BLD AUTO: 157 K/UL
PLATELET # BLD AUTO: 157 K/UL
PLATELET # BLD AUTO: 162 K/UL
PLATELET # BLD AUTO: 181 K/UL — SIGNIFICANT CHANGE UP (ref 130–400)
PLATELET # BLD AUTO: 184 K/UL
PLATELET # BLD AUTO: 203 K/UL
PLATELET # BLD AUTO: 209 K/UL
PLATELET # BLD AUTO: 223 K/UL
PLATELET # BLD AUTO: 286 K/UL — SIGNIFICANT CHANGE UP (ref 130–400)
PLATELET # BLD AUTO: 294 K/UL
PLATELET # BLD AUTO: 298 K/UL — SIGNIFICANT CHANGE UP (ref 130–400)
PLATELET # BLD AUTO: 301 K/UL — SIGNIFICANT CHANGE UP (ref 130–400)
PLATELET # BLD AUTO: 301 K/UL — SIGNIFICANT CHANGE UP (ref 130–400)
PLATELET # BLD AUTO: 308 K/UL — SIGNIFICANT CHANGE UP (ref 130–400)
PLATELET # BLD AUTO: 318 K/UL — SIGNIFICANT CHANGE UP (ref 130–400)
PLATELET # BLD AUTO: 326 K/UL — SIGNIFICANT CHANGE UP (ref 130–400)
PLATELET # BLD AUTO: 333 K/UL — SIGNIFICANT CHANGE UP (ref 130–400)
PLATELET # BLD AUTO: 342 K/UL — SIGNIFICANT CHANGE UP (ref 130–400)
PLATELET # BLD AUTO: 364 K/UL
PLATELET # BLD AUTO: 369 K/UL
PLATELET # BLD AUTO: 74 K/UL
PLATELET # BLD AUTO: 76 K/UL
PLATELET # BLD AUTO: 92 K/UL
PMV BLD: 8.7 FL
PO2 BLDV: 59 MMHG — SIGNIFICANT CHANGE UP
PO2 BLDV: 60 MMHG — SIGNIFICANT CHANGE UP
POTASSIUM BLDV-SCNC: 3.2 MMOL/L — LOW (ref 3.5–5.1)
POTASSIUM BLDV-SCNC: 3.5 MMOL/L — SIGNIFICANT CHANGE UP (ref 3.5–5.1)
POTASSIUM SERPL-MCNC: 3 MMOL/L — LOW (ref 3.5–5)
POTASSIUM SERPL-MCNC: 3.4 MMOL/L — LOW (ref 3.5–5)
POTASSIUM SERPL-MCNC: 3.5 MMOL/L — SIGNIFICANT CHANGE UP (ref 3.5–5)
POTASSIUM SERPL-MCNC: 3.6 MMOL/L — SIGNIFICANT CHANGE UP (ref 3.5–5)
POTASSIUM SERPL-MCNC: 3.6 MMOL/L — SIGNIFICANT CHANGE UP (ref 3.5–5)
POTASSIUM SERPL-MCNC: 3.7 MMOL/L — SIGNIFICANT CHANGE UP (ref 3.5–5)
POTASSIUM SERPL-MCNC: 3.8 MMOL/L — SIGNIFICANT CHANGE UP (ref 3.5–5)
POTASSIUM SERPL-MCNC: 4.1 MMOL/L — SIGNIFICANT CHANGE UP (ref 3.5–5)
POTASSIUM SERPL-MCNC: 4.2 MMOL/L — SIGNIFICANT CHANGE UP (ref 3.5–5)
POTASSIUM SERPL-MCNC: 4.4 MMOL/L — SIGNIFICANT CHANGE UP (ref 3.5–5)
POTASSIUM SERPL-SCNC: 2.3 MMOL/L
POTASSIUM SERPL-SCNC: 2.9 MMOL/L
POTASSIUM SERPL-SCNC: 3 MMOL/L
POTASSIUM SERPL-SCNC: 3 MMOL/L — LOW (ref 3.5–5)
POTASSIUM SERPL-SCNC: 3.2 MMOL/L
POTASSIUM SERPL-SCNC: 3.3 MMOL/L
POTASSIUM SERPL-SCNC: 3.4 MMOL/L — LOW (ref 3.5–5)
POTASSIUM SERPL-SCNC: 3.5 MMOL/L — SIGNIFICANT CHANGE UP (ref 3.5–5)
POTASSIUM SERPL-SCNC: 3.6 MMOL/L — SIGNIFICANT CHANGE UP (ref 3.5–5)
POTASSIUM SERPL-SCNC: 3.6 MMOL/L — SIGNIFICANT CHANGE UP (ref 3.5–5)
POTASSIUM SERPL-SCNC: 3.7 MMOL/L — SIGNIFICANT CHANGE UP (ref 3.5–5)
POTASSIUM SERPL-SCNC: 3.8 MMOL/L
POTASSIUM SERPL-SCNC: 3.8 MMOL/L — SIGNIFICANT CHANGE UP (ref 3.5–5)
POTASSIUM SERPL-SCNC: 4.1 MMOL/L — SIGNIFICANT CHANGE UP (ref 3.5–5)
POTASSIUM SERPL-SCNC: 4.2 MMOL/L — SIGNIFICANT CHANGE UP (ref 3.5–5)
POTASSIUM SERPL-SCNC: 4.4 MMOL/L — SIGNIFICANT CHANGE UP (ref 3.5–5)
POTASSIUM SERPL-SCNC: 4.5 MMOL/L
PROT CSF-MCNC: 31 MG/DL — SIGNIFICANT CHANGE UP (ref 15–45)
PROT SERPL-MCNC: 5.9 G/DL
PROT SERPL-MCNC: 6.4 G/DL
PROT SERPL-MCNC: 6.4 G/DL — SIGNIFICANT CHANGE UP (ref 6–8)
PROT SERPL-MCNC: 6.5 G/DL
PROT SERPL-MCNC: 6.6 G/DL
PROT SERPL-MCNC: 6.6 G/DL
PROT SERPL-MCNC: 6.6 G/DL — SIGNIFICANT CHANGE UP (ref 6–8)
PROT SERPL-MCNC: 6.7 G/DL — SIGNIFICANT CHANGE UP (ref 6–8)
PROT SERPL-MCNC: 6.8 G/DL
PROT SERPL-MCNC: 6.8 G/DL — SIGNIFICANT CHANGE UP (ref 6–8)
PROT SERPL-MCNC: 6.8 G/DL — SIGNIFICANT CHANGE UP (ref 6–8)
PROT SERPL-MCNC: 6.9 G/DL — SIGNIFICANT CHANGE UP (ref 6–8)
PROT SERPL-MCNC: 6.9 G/DL — SIGNIFICANT CHANGE UP (ref 6–8)
PROT SERPL-MCNC: 7.2 G/DL — SIGNIFICANT CHANGE UP (ref 6–8)
PROT SERPL-MCNC: 7.2 G/DL — SIGNIFICANT CHANGE UP (ref 6–8)
PROTEIN URINE: ABNORMAL
PROTEIN URINE: ABNORMAL
PROTEIN URINE: NEGATIVE
PROTEIN URINE: NORMAL
PROTHROM AB SERPL-ACNC: 10.9 SEC — SIGNIFICANT CHANGE UP (ref 9.95–12.87)
PROTHROM AB SERPL-ACNC: 11.4 SEC — SIGNIFICANT CHANGE UP (ref 9.95–12.87)
PROTHROM AB SERPL-ACNC: 12.2 SEC — SIGNIFICANT CHANGE UP (ref 9.95–12.87)
PROTHROM AB SERPL-ACNC: 13 SEC — HIGH (ref 9.95–12.87)
RAPID RVP RESULT: DETECTED
RBC # BLD: 3.26 M/UL
RBC # BLD: 3.31 M/UL
RBC # BLD: 3.42 M/UL
RBC # BLD: 3.42 M/UL
RBC # BLD: 3.46 M/UL
RBC # BLD: 3.5 M/UL
RBC # BLD: 3.51 M/UL
RBC # BLD: 3.92 M/UL
RBC # BLD: 4.11 M/UL
RBC # BLD: 4.17 M/UL
RBC # BLD: 4.26 M/UL
RBC # BLD: 4.36 M/UL — SIGNIFICANT CHANGE UP (ref 4.2–5.4)
RBC # BLD: 4.48 M/UL — SIGNIFICANT CHANGE UP (ref 4.2–5.4)
RBC # BLD: 4.54 M/UL — SIGNIFICANT CHANGE UP (ref 4.2–5.4)
RBC # BLD: 4.57 M/UL — SIGNIFICANT CHANGE UP (ref 4.2–5.4)
RBC # BLD: 4.63 M/UL — SIGNIFICANT CHANGE UP (ref 4.2–5.4)
RBC # BLD: 4.87 M/UL — SIGNIFICANT CHANGE UP (ref 4.2–5.4)
RBC # BLD: 4.96 M/UL — SIGNIFICANT CHANGE UP (ref 4.2–5.4)
RBC # BLD: 4.99 M/UL — SIGNIFICANT CHANGE UP (ref 4.2–5.4)
RBC # BLD: 5 M/UL — SIGNIFICANT CHANGE UP (ref 4.2–5.4)
RBC # BLD: 5.04 M/UL
RBC # BLD: 5.13 M/UL
RBC # BLD: 5.18 M/UL
RBC # BLD: 5.22 M/UL — SIGNIFICANT CHANGE UP (ref 4.2–5.4)
RBC # CSF: 0 /UL — SIGNIFICANT CHANGE UP (ref 0–0)
RBC # FLD: 12.4 % — SIGNIFICANT CHANGE UP (ref 11.5–14.5)
RBC # FLD: 12.4 % — SIGNIFICANT CHANGE UP (ref 11.5–14.5)
RBC # FLD: 12.5 % — SIGNIFICANT CHANGE UP (ref 11.5–14.5)
RBC # FLD: 12.6 % — SIGNIFICANT CHANGE UP (ref 11.5–14.5)
RBC # FLD: 12.7 % — SIGNIFICANT CHANGE UP (ref 11.5–14.5)
RBC # FLD: 12.8 % — SIGNIFICANT CHANGE UP (ref 11.5–14.5)
RBC # FLD: 12.9 % — SIGNIFICANT CHANGE UP (ref 11.5–14.5)
RBC # FLD: 13.1 %
RBC # FLD: 13.3 %
RBC # FLD: 14.1 %
RBC # FLD: 14.3 %
RBC # FLD: 14.4 %
RBC # FLD: 14.5 % — SIGNIFICANT CHANGE UP (ref 11.5–14.5)
RBC # FLD: 14.6 %
RBC # FLD: 15.6 %
RBC # FLD: 16.1 %
RBC # FLD: 16.3 %
RBC # FLD: 17.3 %
RBC # FLD: 17.5 %
RBC # FLD: 17.7 %
RBC # FLD: 18.3 %
RBC # FLD: 18.6 %
RV+EV RNA SPEC QL NAA+PROBE: DETECTED
SAO2 % BLDV: 89.3 % — SIGNIFICANT CHANGE UP
SAO2 % BLDV: 89.9 % — SIGNIFICANT CHANGE UP
SARS-COV-2 RNA SPEC QL NAA+PROBE: DETECTED
SARS-COV-2 RNA SPEC QL NAA+PROBE: SIGNIFICANT CHANGE UP
SARS-COV-2 RNA SPEC QL NAA+PROBE: SIGNIFICANT CHANGE UP
SODIUM SERPL-SCNC: 135 MMOL/L
SODIUM SERPL-SCNC: 136 MMOL/L
SODIUM SERPL-SCNC: 137 MMOL/L
SODIUM SERPL-SCNC: 137 MMOL/L — SIGNIFICANT CHANGE UP (ref 135–146)
SODIUM SERPL-SCNC: 137 MMOL/L — SIGNIFICANT CHANGE UP (ref 135–146)
SODIUM SERPL-SCNC: 138 MMOL/L
SODIUM SERPL-SCNC: 138 MMOL/L — SIGNIFICANT CHANGE UP (ref 135–146)
SODIUM SERPL-SCNC: 139 MMOL/L — SIGNIFICANT CHANGE UP (ref 135–146)
SODIUM SERPL-SCNC: 140 MMOL/L
SODIUM SERPL-SCNC: 140 MMOL/L — SIGNIFICANT CHANGE UP (ref 135–146)
SODIUM SERPL-SCNC: 143 MMOL/L — SIGNIFICANT CHANGE UP (ref 135–146)
SODIUM SERPL-SCNC: 143 MMOL/L — SIGNIFICANT CHANGE UP (ref 135–146)
SODIUM SERPL-SCNC: 144 MMOL/L
SODIUM SERPL-SCNC: 144 MMOL/L — SIGNIFICANT CHANGE UP (ref 135–146)
SODIUM SERPL-SCNC: 147 MMOL/L
SPECIFIC GRAVITY URINE: 1
SPECIFIC GRAVITY URINE: 1
SPECIFIC GRAVITY URINE: 1.01
SPECIFIC GRAVITY URINE: 1.02
SPECIFIC GRAVITY URINE: 1.02
SPECIMEN SOURCE: SIGNIFICANT CHANGE UP
SURGICAL PATHOLOGY STUDY: SIGNIFICANT CHANGE UP
T GONDII IGG SER QL: <3 IU/ML — SIGNIFICANT CHANGE UP
T GONDII IGG SER QL: NEGATIVE — SIGNIFICANT CHANGE UP
T GONDII IGM SER QL: <3 AU/ML — SIGNIFICANT CHANGE UP
T GONDII IGM SER QL: NEGATIVE — SIGNIFICANT CHANGE UP
TM INTERPRETATION: SIGNIFICANT CHANGE UP
TRIGL SERPL-MCNC: 97 MG/DL — SIGNIFICANT CHANGE UP
TROPONIN T SERPL-MCNC: <0.01 NG/ML — SIGNIFICANT CHANGE UP
TUBE TYPE: SIGNIFICANT CHANGE UP
UROBILINOGEN URINE: NORMAL
VIT B12 SERPL-MCNC: 516 PG/ML
WBC # BLD: 11.28 K/UL — HIGH (ref 4.8–10.8)
WBC # BLD: 5.57 K/UL — SIGNIFICANT CHANGE UP (ref 4.8–10.8)
WBC # BLD: 5.79 K/UL — SIGNIFICANT CHANGE UP (ref 4.8–10.8)
WBC # BLD: 5.97 K/UL — SIGNIFICANT CHANGE UP (ref 4.8–10.8)
WBC # BLD: 6.74 K/UL — SIGNIFICANT CHANGE UP (ref 4.8–10.8)
WBC # BLD: 7.38 K/UL — SIGNIFICANT CHANGE UP (ref 4.8–10.8)
WBC # BLD: 9.17 K/UL — SIGNIFICANT CHANGE UP (ref 4.8–10.8)
WBC # BLD: 9.5 K/UL — SIGNIFICANT CHANGE UP (ref 4.8–10.8)
WBC # BLD: 9.82 K/UL — SIGNIFICANT CHANGE UP (ref 4.8–10.8)
WBC # BLD: 9.85 K/UL — SIGNIFICANT CHANGE UP (ref 4.8–10.8)
WBC # FLD AUTO: 11.28 K/UL — HIGH (ref 4.8–10.8)
WBC # FLD AUTO: 2.81 K/UL
WBC # FLD AUTO: 4.22 K/UL
WBC # FLD AUTO: 4.45 K/UL
WBC # FLD AUTO: 4.62 K/UL
WBC # FLD AUTO: 4.84 K/UL
WBC # FLD AUTO: 5.09 K/UL
WBC # FLD AUTO: 5.57 K/UL — SIGNIFICANT CHANGE UP (ref 4.8–10.8)
WBC # FLD AUTO: 5.79 K/UL — SIGNIFICANT CHANGE UP (ref 4.8–10.8)
WBC # FLD AUTO: 5.82 K/UL
WBC # FLD AUTO: 5.97 K/UL — SIGNIFICANT CHANGE UP (ref 4.8–10.8)
WBC # FLD AUTO: 6.1 K/UL
WBC # FLD AUTO: 6.46 K/UL
WBC # FLD AUTO: 6.74 K/UL — SIGNIFICANT CHANGE UP (ref 4.8–10.8)
WBC # FLD AUTO: 7.38 K/UL — SIGNIFICANT CHANGE UP (ref 4.8–10.8)
WBC # FLD AUTO: 8.05 K/UL
WBC # FLD AUTO: 8.14 K/UL
WBC # FLD AUTO: 8.37 K/UL
WBC # FLD AUTO: 9.17 K/UL — SIGNIFICANT CHANGE UP (ref 4.8–10.8)
WBC # FLD AUTO: 9.5 K/UL — SIGNIFICANT CHANGE UP (ref 4.8–10.8)
WBC # FLD AUTO: 9.52 K/UL
WBC # FLD AUTO: 9.58 K/UL
WBC # FLD AUTO: 9.82 K/UL — SIGNIFICANT CHANGE UP (ref 4.8–10.8)
WBC # FLD AUTO: 9.85 K/UL — SIGNIFICANT CHANGE UP (ref 4.8–10.8)

## 2022-01-01 PROCEDURE — 70553 MRI BRAIN STEM W/O & W/DYE: CPT | Mod: 26

## 2022-01-01 PROCEDURE — G6017: CPT

## 2022-01-01 PROCEDURE — 70250 X-RAY EXAM OF SKULL: CPT | Mod: 26

## 2022-01-01 PROCEDURE — G6002: CPT | Mod: 26

## 2022-01-01 PROCEDURE — 88189 FLOWCYTOMETRY/READ 16 & >: CPT

## 2022-01-01 PROCEDURE — 77427 RADIATION TX MANAGEMENT X5: CPT

## 2022-01-01 PROCEDURE — 99285 EMERGENCY DEPT VISIT HI MDM: CPT

## 2022-01-01 PROCEDURE — 70496 CT ANGIOGRAPHY HEAD: CPT | Mod: 26,MA

## 2022-01-01 PROCEDURE — 77067 SCR MAMMO BI INCL CAD: CPT | Mod: 26

## 2022-01-01 PROCEDURE — 99232 SBSQ HOSP IP/OBS MODERATE 35: CPT

## 2022-01-01 PROCEDURE — 77301 RADIOTHERAPY DOSE PLAN IMRT: CPT | Mod: 26

## 2022-01-01 PROCEDURE — 99215 OFFICE O/P EST HI 40 MIN: CPT

## 2022-01-01 PROCEDURE — 61510 CRNEC TREPH EXC BRN TUM STTL: CPT

## 2022-01-01 PROCEDURE — 99212 OFFICE O/P EST SF 10 MIN: CPT

## 2022-01-01 PROCEDURE — 74177 CT ABD & PELVIS W/CONTRAST: CPT | Mod: 26

## 2022-01-01 PROCEDURE — 77334 RADIATION TREATMENT AID(S): CPT | Mod: 26

## 2022-01-01 PROCEDURE — 99214 OFFICE O/P EST MOD 30 MIN: CPT

## 2022-01-01 PROCEDURE — 77014: CPT | Mod: 26

## 2022-01-01 PROCEDURE — 70450 CT HEAD/BRAIN W/O DYE: CPT | Mod: 26,MA

## 2022-01-01 PROCEDURE — 99024 POSTOP FOLLOW-UP VISIT: CPT

## 2022-01-01 PROCEDURE — 99211 OFF/OP EST MAY X REQ PHY/QHP: CPT

## 2022-01-01 PROCEDURE — 77300 RADIATION THERAPY DOSE PLAN: CPT | Mod: 26

## 2022-01-01 PROCEDURE — 93010 ELECTROCARDIOGRAM REPORT: CPT

## 2022-01-01 PROCEDURE — 99233 SBSQ HOSP IP/OBS HIGH 50: CPT

## 2022-01-01 PROCEDURE — 99291 CRITICAL CARE FIRST HOUR: CPT

## 2022-01-01 PROCEDURE — 62328 DX LMBR SPI PNXR W/FLUOR/CT: CPT

## 2022-01-01 PROCEDURE — 70498 CT ANGIOGRAPHY NECK: CPT | Mod: 26,MA

## 2022-01-01 PROCEDURE — 88307 TISSUE EXAM BY PATHOLOGIST: CPT | Mod: 26

## 2022-01-01 PROCEDURE — 61781 SCAN PROC CRANIAL INTRA: CPT

## 2022-01-01 PROCEDURE — 99223 1ST HOSP IP/OBS HIGH 75: CPT

## 2022-01-01 PROCEDURE — 99205 OFFICE O/P NEW HI 60 MIN: CPT

## 2022-01-01 PROCEDURE — 99496 TRANSJ CARE MGMT HIGH F2F 7D: CPT

## 2022-01-01 PROCEDURE — 71260 CT THORAX DX C+: CPT | Mod: 26

## 2022-01-01 PROCEDURE — 77063 BREAST TOMOSYNTHESIS BI: CPT | Mod: 26

## 2022-01-01 PROCEDURE — 61510 CRNEC TREPH EXC BRN TUM STTL: CPT | Mod: AS

## 2022-01-01 PROCEDURE — 77338 DESIGN MLC DEVICE FOR IMRT: CPT | Mod: 26

## 2022-01-01 PROCEDURE — 0042T: CPT

## 2022-01-01 PROCEDURE — 70450 CT HEAD/BRAIN W/O DYE: CPT | Mod: 26

## 2022-01-01 PROCEDURE — 88108 CYTOPATH CONCENTRATE TECH: CPT | Mod: 26

## 2022-01-01 PROCEDURE — 77263 THER RADIOLOGY TX PLNG CPLX: CPT

## 2022-01-01 PROCEDURE — 76641 ULTRASOUND BREAST COMPLETE: CPT | Mod: 26,50

## 2022-01-01 PROCEDURE — 99204 OFFICE O/P NEW MOD 45 MIN: CPT | Mod: 25

## 2022-01-01 PROCEDURE — 71045 X-RAY EXAM CHEST 1 VIEW: CPT | Mod: 26

## 2022-01-01 PROCEDURE — 70450 CT HEAD/BRAIN W/O DYE: CPT | Mod: 26,MA,59

## 2022-01-01 PROCEDURE — 99222 1ST HOSP IP/OBS MODERATE 55: CPT

## 2022-01-01 RX ORDER — DIPHENHYDRAMINE HCL 50 MG
25 CAPSULE ORAL ONCE
Refills: 0 | Status: COMPLETED | OUTPATIENT
Start: 2022-01-01 | End: 2022-01-01

## 2022-01-01 RX ORDER — VITAMIN K2 90 MCG
125 MCG CAPSULE ORAL
Refills: 0 | Status: DISCONTINUED | COMMUNITY
End: 2022-01-01

## 2022-01-01 RX ORDER — TETANUS TOXOID, REDUCED DIPHTHERIA TOXOID AND ACELLULAR PERTUSSIS VACCINE, ADSORBED 5; 2.5; 8; 8; 2.5 [IU]/.5ML; [IU]/.5ML; UG/.5ML; UG/.5ML; UG/.5ML
0.5 SUSPENSION INTRAMUSCULAR ONCE
Refills: 0 | Status: COMPLETED | OUTPATIENT
Start: 2022-01-01 | End: 2022-01-01

## 2022-01-01 RX ORDER — SERTRALINE 25 MG/1
1 TABLET, FILM COATED ORAL
Qty: 0 | Refills: 0 | DISCHARGE

## 2022-01-01 RX ORDER — ATORVASTATIN CALCIUM 80 MG/1
1 TABLET, FILM COATED ORAL
Qty: 0 | Refills: 0 | DISCHARGE

## 2022-01-01 RX ORDER — LANOLIN ALCOHOL/MO/W.PET/CERES
3 CREAM (GRAM) TOPICAL AT BEDTIME
Refills: 0 | Status: DISCONTINUED | OUTPATIENT
Start: 2022-01-01 | End: 2022-01-01

## 2022-01-01 RX ORDER — DEXAMETHASONE 0.5 MG/5ML
1 ELIXIR ORAL
Qty: 100 | Refills: 0
Start: 2022-01-01 | End: 2022-01-01

## 2022-01-01 RX ORDER — DEXAMETHASONE 0.5 MG/5ML
4 ELIXIR ORAL EVERY 12 HOURS
Refills: 0 | Status: DISCONTINUED | OUTPATIENT
Start: 2022-01-01 | End: 2022-01-01

## 2022-01-01 RX ORDER — DEXAMETHASONE 4 MG/1
4 TABLET ORAL
Refills: 0 | Status: DISCONTINUED | COMMUNITY
End: 2022-01-01

## 2022-01-01 RX ORDER — ACETAMINOPHEN 500 MG
1000 TABLET ORAL ONCE
Refills: 0 | Status: COMPLETED | OUTPATIENT
Start: 2022-01-01 | End: 2022-01-01

## 2022-01-01 RX ORDER — PANTOPRAZOLE SODIUM 20 MG/1
40 TABLET, DELAYED RELEASE ORAL
Refills: 0 | Status: DISCONTINUED | OUTPATIENT
Start: 2022-01-01 | End: 2022-01-01

## 2022-01-01 RX ORDER — ACETAMINOPHEN 500 MG
650 TABLET ORAL ONCE
Refills: 0 | Status: COMPLETED | OUTPATIENT
Start: 2022-01-01 | End: 2022-01-01

## 2022-01-01 RX ORDER — POTASSIUM CHLORIDE 1500 MG/1
20 TABLET, EXTENDED RELEASE ORAL
Refills: 0 | Status: ACTIVE | COMMUNITY

## 2022-01-01 RX ORDER — ACETAMINOPHEN 500 MG
650 TABLET ORAL EVERY 6 HOURS
Refills: 0 | Status: DISCONTINUED | OUTPATIENT
Start: 2022-01-01 | End: 2022-01-01

## 2022-01-01 RX ORDER — MONTELUKAST 4 MG/1
10 TABLET, CHEWABLE ORAL DAILY
Refills: 0 | Status: DISCONTINUED | OUTPATIENT
Start: 2022-01-01 | End: 2022-01-01

## 2022-01-01 RX ORDER — CEFAZOLIN SODIUM 1 G
1000 VIAL (EA) INJECTION EVERY 8 HOURS
Refills: 0 | Status: COMPLETED | OUTPATIENT
Start: 2022-01-01 | End: 2022-01-01

## 2022-01-01 RX ORDER — ONDANSETRON 8 MG/1
4 TABLET, FILM COATED ORAL EVERY 8 HOURS
Refills: 0 | Status: DISCONTINUED | OUTPATIENT
Start: 2022-01-01 | End: 2022-01-01

## 2022-01-01 RX ORDER — OXYCODONE AND ACETAMINOPHEN 5; 325 MG/1; MG/1
1 TABLET ORAL EVERY 4 HOURS
Refills: 0 | Status: DISCONTINUED | OUTPATIENT
Start: 2022-01-01 | End: 2022-01-01

## 2022-01-01 RX ORDER — LEVETIRACETAM 250 MG/1
1 TABLET, FILM COATED ORAL
Qty: 60 | Refills: 0
Start: 2022-01-01 | End: 2022-01-01

## 2022-01-01 RX ORDER — GABAPENTIN 400 MG
400 TABLET ORAL TWICE DAILY
Refills: 0 | Status: ACTIVE | COMMUNITY

## 2022-01-01 RX ORDER — BEVACIZUMAB 400 MG/16ML
1020 INJECTION, SOLUTION INTRAVENOUS ONCE
Refills: 0 | Status: COMPLETED | OUTPATIENT
Start: 2022-01-01 | End: 2022-01-01

## 2022-01-01 RX ORDER — ONDANSETRON 4 MG/1
4 TABLET ORAL
Qty: 60 | Refills: 6 | Status: ACTIVE | COMMUNITY
Start: 2022-01-01 | End: 1900-01-01

## 2022-01-01 RX ORDER — KETOCONAZOLE 20 MG/G
2 CREAM TOPICAL TWICE DAILY
Qty: 3 | Refills: 5 | Status: ACTIVE | COMMUNITY
Start: 2022-01-01 | End: 1900-01-01

## 2022-01-01 RX ORDER — HYDROCHLOROTHIAZIDE 12.5 MG/1
12.5 TABLET ORAL
Refills: 0 | Status: DISCONTINUED | COMMUNITY
End: 2022-01-01

## 2022-01-01 RX ORDER — LEVETIRACETAM 1000 MG/1
1000 TABLET, FILM COATED ORAL
Qty: 60 | Refills: 3 | Status: ACTIVE | COMMUNITY
Start: 1900-01-01 | End: 1900-01-01

## 2022-01-01 RX ORDER — MELOXICAM 15 MG/1
15 TABLET ORAL
Refills: 0 | Status: DISCONTINUED | COMMUNITY
End: 2022-01-01

## 2022-01-01 RX ORDER — SERTRALINE 25 MG/1
50 TABLET, FILM COATED ORAL DAILY
Refills: 0 | Status: DISCONTINUED | OUTPATIENT
Start: 2022-01-01 | End: 2022-01-01

## 2022-01-01 RX ORDER — HYDROCHLOROTHIAZIDE 25 MG
25 TABLET ORAL DAILY
Refills: 0 | Status: DISCONTINUED | OUTPATIENT
Start: 2022-01-01 | End: 2022-01-01

## 2022-01-01 RX ORDER — POTASSIUM CHLORIDE 20 MEQ
20 PACKET (EA) ORAL ONCE
Refills: 0 | Status: COMPLETED | OUTPATIENT
Start: 2022-01-01 | End: 2022-01-01

## 2022-01-01 RX ORDER — DEXAMETHASONE 0.5 MG/5ML
1 ELIXIR ORAL EVERY 24 HOURS
Refills: 0 | Status: CANCELLED | OUTPATIENT
Start: 2022-01-01 | End: 2022-01-01

## 2022-01-01 RX ORDER — METOPROLOL TARTRATE 50 MG
50 TABLET ORAL DAILY
Refills: 0 | Status: DISCONTINUED | OUTPATIENT
Start: 2022-01-01 | End: 2022-01-01

## 2022-01-01 RX ORDER — MAGNESIUM SULFATE 500 MG/ML
2 VIAL (ML) INJECTION
Refills: 0 | Status: COMPLETED | OUTPATIENT
Start: 2022-01-01 | End: 2022-01-01

## 2022-01-01 RX ORDER — DEXAMETHASONE 0.5 MG/5ML
2 ELIXIR ORAL EVERY 8 HOURS
Refills: 0 | Status: CANCELLED | OUTPATIENT
Start: 2022-01-01 | End: 2022-01-01

## 2022-01-01 RX ORDER — DEXAMETHASONE 2 MG/1
2 TABLET ORAL
Qty: 60 | Refills: 1 | Status: DISCONTINUED | COMMUNITY
Start: 2022-01-01 | End: 2022-01-01

## 2022-01-01 RX ORDER — CHROMIUM 200 MCG
500 TABLET ORAL
Refills: 0 | Status: DISCONTINUED | COMMUNITY
End: 2022-01-01

## 2022-01-01 RX ORDER — DEXAMETHASONE 0.5 MG/5ML
1 ELIXIR ORAL EVERY 12 HOURS
Refills: 0 | Status: CANCELLED | OUTPATIENT
Start: 2022-01-01 | End: 2022-01-01

## 2022-01-01 RX ORDER — HEPARIN SODIUM 5000 [USP'U]/ML
5000 INJECTION INTRAVENOUS; SUBCUTANEOUS EVERY 8 HOURS
Refills: 0 | Status: DISCONTINUED | OUTPATIENT
Start: 2022-01-01 | End: 2022-01-01

## 2022-01-01 RX ORDER — LEVETIRACETAM 250 MG/1
1000 TABLET, FILM COATED ORAL EVERY 12 HOURS
Refills: 0 | Status: DISCONTINUED | OUTPATIENT
Start: 2022-01-01 | End: 2022-01-01

## 2022-01-01 RX ORDER — LORATADINE 5 MG
TABLET,CHEWABLE ORAL
Refills: 0 | Status: ACTIVE | COMMUNITY

## 2022-01-01 RX ORDER — HYDROCHLOROTHIAZIDE 25 MG/1
25 TABLET ORAL DAILY
Qty: 30 | Refills: 0 | Status: DISCONTINUED | COMMUNITY
End: 2022-01-01

## 2022-01-01 RX ORDER — DEXAMETHASONE 0.5 MG/.5MG
0.5 TABLET ORAL DAILY
Qty: 30 | Refills: 5 | Status: ACTIVE | COMMUNITY
Start: 2022-01-01 | End: 1900-01-01

## 2022-01-01 RX ORDER — SIMVASTATIN 40 MG/1
40 TABLET, FILM COATED ORAL
Refills: 0 | Status: DISCONTINUED | COMMUNITY
End: 2022-01-01

## 2022-01-01 RX ORDER — GABAPENTIN 400 MG/1
400 CAPSULE ORAL
Refills: 0 | Status: DISCONTINUED | OUTPATIENT
Start: 2022-01-01 | End: 2022-01-01

## 2022-01-01 RX ORDER — DEXAMETHASONE 0.5 MG/5ML
4 ELIXIR ORAL EVERY 6 HOURS
Refills: 0 | Status: DISCONTINUED | OUTPATIENT
Start: 2022-01-01 | End: 2022-01-01

## 2022-01-01 RX ORDER — DEXAMETHASONE 0.5 MG/5ML
1 ELIXIR ORAL
Qty: 30 | Refills: 0
Start: 2022-01-01 | End: 2022-01-01

## 2022-01-01 RX ORDER — METOPROLOL SUCCINATE 50 MG/1
50 TABLET, EXTENDED RELEASE ORAL
Qty: 90 | Refills: 0 | Status: ACTIVE | COMMUNITY

## 2022-01-01 RX ORDER — OMEPRAZOLE 10 MG/1
1 CAPSULE, DELAYED RELEASE ORAL
Qty: 30 | Refills: 0
Start: 2022-01-01 | End: 2022-01-01

## 2022-01-01 RX ORDER — ATORVASTATIN CALCIUM 80 MG/1
80 TABLET, FILM COATED ORAL AT BEDTIME
Refills: 0 | Status: DISCONTINUED | OUTPATIENT
Start: 2022-01-01 | End: 2022-01-01

## 2022-01-01 RX ORDER — ONDANSETRON 8 MG/1
4 TABLET, FILM COATED ORAL ONCE
Refills: 0 | Status: COMPLETED | OUTPATIENT
Start: 2022-01-01 | End: 2022-01-01

## 2022-01-01 RX ORDER — SODIUM CHLORIDE 9 MG/ML
1000 INJECTION, SOLUTION INTRAVENOUS
Refills: 0 | Status: DISCONTINUED | OUTPATIENT
Start: 2022-01-01 | End: 2022-01-01

## 2022-01-01 RX ORDER — SODIUM CHLORIDE 9 MG/ML
1000 INJECTION INTRAMUSCULAR; INTRAVENOUS; SUBCUTANEOUS
Refills: 0 | Status: DISCONTINUED | OUTPATIENT
Start: 2022-01-01 | End: 2022-01-01

## 2022-01-01 RX ORDER — TEMOZOLOMIDE 140 MG/1
140 CAPSULE ORAL
Qty: 5 | Refills: 1 | Status: DISCONTINUED | COMMUNITY
Start: 2022-01-01 | End: 2022-01-01

## 2022-01-01 RX ORDER — SODIUM CHLORIDE 9 MG/ML
1000 INJECTION INTRAMUSCULAR; INTRAVENOUS; SUBCUTANEOUS ONCE
Refills: 0 | Status: COMPLETED | OUTPATIENT
Start: 2022-01-01 | End: 2022-01-01

## 2022-01-01 RX ORDER — SERTRALINE HYDROCHLORIDE 100 MG/1
100 TABLET, FILM COATED ORAL DAILY
Refills: 0 | Status: ACTIVE | COMMUNITY

## 2022-01-01 RX ORDER — ATORVASTATIN CALCIUM 80 MG/1
80 TABLET, FILM COATED ORAL DAILY
Qty: 30 | Refills: 0 | Status: ACTIVE | COMMUNITY
Start: 2022-01-01 | End: 1900-01-01

## 2022-01-01 RX ORDER — PREDNISONE 10 MG/1
10 TABLET ORAL
Qty: 10 | Refills: 1 | Status: DISCONTINUED | COMMUNITY
Start: 2022-01-01 | End: 2022-01-01

## 2022-01-01 RX ORDER — TEMOZOLOMIDE 140 MG/1
140 CAPSULE ORAL
Qty: 21 | Refills: 1 | Status: COMPLETED | COMMUNITY
Start: 2022-01-01 | End: 2022-01-01

## 2022-01-01 RX ORDER — TEMOZOLOMIDE 100 MG/1
100 CAPSULE ORAL
Qty: 20 | Refills: 5 | Status: ACTIVE | COMMUNITY
Start: 2022-01-01

## 2022-01-01 RX ORDER — MEPERIDINE HYDROCHLORIDE 50 MG/ML
12.5 INJECTION INTRAMUSCULAR; INTRAVENOUS; SUBCUTANEOUS
Refills: 0 | Status: DISCONTINUED | OUTPATIENT
Start: 2022-01-01 | End: 2022-01-01

## 2022-01-01 RX ORDER — DEXAMETHASONE 0.5 MG/5ML
4 ELIXIR ORAL EVERY 8 HOURS
Refills: 0 | Status: DISCONTINUED | OUTPATIENT
Start: 2022-01-01 | End: 2022-01-01

## 2022-01-01 RX ORDER — ONDANSETRON 4 MG/1
4 TABLET, ORALLY DISINTEGRATING ORAL
Qty: 24 | Refills: 6 | Status: ACTIVE | COMMUNITY
Start: 2022-01-01 | End: 1900-01-01

## 2022-01-01 RX ORDER — DEXAMETHASONE 0.5 MG/5ML
4 ELIXIR ORAL
Qty: 35 | Refills: 0
Start: 2022-01-01 | End: 2022-01-01

## 2022-01-01 RX ORDER — DEXAMETHASONE 0.5 MG/5ML
6 ELIXIR ORAL ONCE
Refills: 0 | Status: COMPLETED | OUTPATIENT
Start: 2022-01-01 | End: 2022-01-01

## 2022-01-01 RX ORDER — DEXAMETHASONE 0.5 MG/5ML
2 ELIXIR ORAL EVERY 12 HOURS
Refills: 0 | Status: CANCELLED | OUTPATIENT
Start: 2022-01-01 | End: 2022-01-01

## 2022-01-01 RX ORDER — PANTOPRAZOLE SODIUM 20 MG/1
1 TABLET, DELAYED RELEASE ORAL
Qty: 30 | Refills: 0
Start: 2022-01-01 | End: 2022-01-01

## 2022-01-01 RX ORDER — CLONAZEPAM 1 MG
0.5 TABLET ORAL EVERY 8 HOURS
Refills: 0 | Status: DISCONTINUED | OUTPATIENT
Start: 2022-01-01 | End: 2022-01-01

## 2022-01-01 RX ORDER — MELOXICAM 15 MG/1
1 TABLET ORAL
Qty: 0 | Refills: 0 | DISCHARGE

## 2022-01-01 RX ORDER — DEXAMETHASONE 0.5 MG/5ML
4 ELIXIR ORAL
Refills: 0 | Status: DISCONTINUED | OUTPATIENT
Start: 2022-01-01 | End: 2022-01-01

## 2022-01-01 RX ORDER — OXYCODONE AND ACETAMINOPHEN 5; 325 MG/1; MG/1
1 TABLET ORAL
Qty: 30 | Refills: 0
Start: 2022-01-01 | End: 2022-01-01

## 2022-01-01 RX ORDER — MONTELUKAST 10 MG/1
10 TABLET, FILM COATED ORAL
Refills: 0 | Status: DISCONTINUED | COMMUNITY
End: 2022-01-01

## 2022-01-01 RX ORDER — DEXAMETHASONE 0.5 MG/5ML
ELIXIR ORAL
Refills: 0 | Status: DISCONTINUED | OUTPATIENT
Start: 2022-01-01 | End: 2022-01-01

## 2022-01-01 RX ORDER — DEXAMETHASONE 4 MG/1
4 TABLET ORAL
Qty: 30 | Refills: 0 | Status: DISCONTINUED | COMMUNITY
Start: 2022-01-01 | End: 2022-01-01

## 2022-01-01 RX ORDER — TEMOZOLOMIDE 180 MG/1
180 CAPSULE ORAL
Qty: 5 | Refills: 1 | Status: DISCONTINUED | COMMUNITY
Start: 2022-01-01 | End: 2022-01-01

## 2022-01-01 RX ORDER — TEMOZOLOMIDE 140 MG/1
140 CAPSULE ORAL DAILY
Qty: 5 | Refills: 0 | Status: DISCONTINUED | COMMUNITY
Start: 2022-01-01 | End: 2022-01-01

## 2022-01-01 RX ORDER — PANTOPRAZOLE SODIUM 20 MG/1
1 TABLET, DELAYED RELEASE ORAL
Qty: 6 | Refills: 0
Start: 2022-01-01 | End: 2022-01-01

## 2022-01-01 RX ORDER — HYDROMORPHONE HYDROCHLORIDE 2 MG/ML
0.5 INJECTION INTRAMUSCULAR; INTRAVENOUS; SUBCUTANEOUS
Refills: 0 | Status: DISCONTINUED | OUTPATIENT
Start: 2022-01-01 | End: 2022-01-01

## 2022-01-01 RX ORDER — ASPIRIN/CALCIUM CARB/MAGNESIUM 324 MG
325 TABLET ORAL ONCE
Refills: 0 | Status: COMPLETED | OUTPATIENT
Start: 2022-01-01 | End: 2022-01-01

## 2022-01-01 RX ORDER — SPIRONOLACTONE 25 MG/1
25 TABLET ORAL
Refills: 0 | Status: ACTIVE | COMMUNITY

## 2022-01-01 RX ORDER — METOCLOPRAMIDE HCL 10 MG
10 TABLET ORAL ONCE
Refills: 0 | Status: COMPLETED | OUTPATIENT
Start: 2022-01-01 | End: 2022-01-01

## 2022-01-01 RX ORDER — OMEPRAZOLE 40 MG/1
40 CAPSULE, DELAYED RELEASE ORAL DAILY
Refills: 0 | Status: ACTIVE | COMMUNITY

## 2022-01-01 RX ORDER — LORAZEPAM 1 MG/1
1 TABLET ORAL
Qty: 5 | Refills: 0 | Status: DISCONTINUED | COMMUNITY
Start: 2022-01-01 | End: 2022-01-01

## 2022-01-01 RX ORDER — GABAPENTIN 400 MG/1
1 CAPSULE ORAL
Qty: 0 | Refills: 0 | DISCHARGE

## 2022-01-01 RX ORDER — SIMVASTATIN 20 MG/1
1 TABLET, FILM COATED ORAL
Qty: 0 | Refills: 0 | DISCHARGE

## 2022-01-01 RX ORDER — BEVACIZUMAB 400 MG/16ML
1060 INJECTION, SOLUTION INTRAVENOUS ONCE
Refills: 0 | Status: COMPLETED | OUTPATIENT
Start: 2022-01-01 | End: 2022-01-01

## 2022-01-01 RX ORDER — DEXAMETHASONE 4 MG/1
4 TABLET ORAL
Qty: 60 | Refills: 6 | Status: DISCONTINUED | COMMUNITY
Start: 2022-01-01 | End: 2022-01-01

## 2022-01-01 RX ORDER — OXYCODONE AND ACETAMINOPHEN 5; 325 MG/1; MG/1
2 TABLET ORAL EVERY 6 HOURS
Refills: 0 | Status: DISCONTINUED | OUTPATIENT
Start: 2022-01-01 | End: 2022-01-01

## 2022-01-01 RX ORDER — OMEPRAZOLE 10 MG/1
1 CAPSULE, DELAYED RELEASE ORAL
Qty: 0 | Refills: 0 | DISCHARGE

## 2022-01-01 RX ORDER — LEVETIRACETAM 250 MG/1
1000 TABLET, FILM COATED ORAL
Refills: 0 | Status: DISCONTINUED | OUTPATIENT
Start: 2022-01-01 | End: 2022-01-01

## 2022-01-01 RX ORDER — ENOXAPARIN SODIUM 100 MG/ML
40 INJECTION SUBCUTANEOUS EVERY 24 HOURS
Refills: 0 | Status: DISCONTINUED | OUTPATIENT
Start: 2022-01-01 | End: 2022-01-01

## 2022-01-01 RX ORDER — DEXAMETHASONE 1 MG/1
1 TABLET ORAL DAILY
Qty: 30 | Refills: 1 | Status: DISCONTINUED | COMMUNITY
Start: 2022-01-01 | End: 2022-01-01

## 2022-01-01 RX ORDER — DEXAMETHASONE 0.5 MG/5ML
4 ELIXIR ORAL EVERY 6 HOURS
Refills: 0 | Status: COMPLETED | OUTPATIENT
Start: 2022-01-01 | End: 2022-01-01

## 2022-01-01 RX ORDER — HYDROMORPHONE HYDROCHLORIDE 2 MG/ML
1 INJECTION INTRAMUSCULAR; INTRAVENOUS; SUBCUTANEOUS
Refills: 0 | Status: DISCONTINUED | OUTPATIENT
Start: 2022-01-01 | End: 2022-01-01

## 2022-01-01 RX ORDER — TEMOZOLOMIDE 180 MG/1
180 CAPSULE ORAL DAILY
Qty: 5 | Refills: 0 | Status: DISCONTINUED | COMMUNITY
Start: 2022-01-01 | End: 2022-01-01

## 2022-01-01 RX ORDER — LISINOPRIL 5 MG/1
5 TABLET ORAL
Refills: 0 | Status: DISCONTINUED | COMMUNITY
End: 2022-01-01

## 2022-01-01 RX ORDER — MORPHINE SULFATE 50 MG/1
2 CAPSULE, EXTENDED RELEASE ORAL EVERY 4 HOURS
Refills: 0 | Status: DISCONTINUED | OUTPATIENT
Start: 2022-01-01 | End: 2022-01-01

## 2022-01-01 RX ORDER — TOPIRAMATE 100 MG/1
100 TABLET, FILM COATED ORAL
Refills: 0 | Status: DISCONTINUED | COMMUNITY
End: 2022-01-01

## 2022-01-01 RX ORDER — TEMOZOLOMIDE 20 MG/1
20 CAPSULE ORAL
Qty: 21 | Refills: 1 | Status: COMPLETED | COMMUNITY
Start: 2022-01-01 | End: 2022-01-01

## 2022-01-01 RX ORDER — SERTRALINE HYDROCHLORIDE 50 MG/1
50 TABLET, FILM COATED ORAL DAILY
Refills: 0 | Status: DISCONTINUED | COMMUNITY
End: 2022-01-01

## 2022-01-01 RX ORDER — MAGNESIUM SULFATE 500 MG/ML
2 VIAL (ML) INJECTION ONCE
Refills: 0 | Status: COMPLETED | OUTPATIENT
Start: 2022-01-01 | End: 2022-01-01

## 2022-01-01 RX ORDER — ONDANSETRON 8 MG/1
4 TABLET, FILM COATED ORAL ONCE
Refills: 0 | Status: DISCONTINUED | OUTPATIENT
Start: 2022-01-01 | End: 2022-01-01

## 2022-01-01 RX ADMIN — SERTRALINE 50 MILLIGRAM(S): 25 TABLET, FILM COATED ORAL at 12:08

## 2022-01-01 RX ADMIN — PANTOPRAZOLE SODIUM 40 MILLIGRAM(S): 20 TABLET, DELAYED RELEASE ORAL at 05:49

## 2022-01-01 RX ADMIN — SODIUM CHLORIDE 60 MILLILITER(S): 9 INJECTION INTRAMUSCULAR; INTRAVENOUS; SUBCUTANEOUS at 09:05

## 2022-01-01 RX ADMIN — SODIUM CHLORIDE 2000 MILLILITER(S): 9 INJECTION INTRAMUSCULAR; INTRAVENOUS; SUBCUTANEOUS at 19:27

## 2022-01-01 RX ADMIN — Medication 650 MILLIGRAM(S): at 18:53

## 2022-01-01 RX ADMIN — Medication 325 MILLIGRAM(S): at 10:20

## 2022-01-01 RX ADMIN — PANTOPRAZOLE SODIUM 40 MILLIGRAM(S): 20 TABLET, DELAYED RELEASE ORAL at 05:24

## 2022-01-01 RX ADMIN — LEVETIRACETAM 400 MILLIGRAM(S): 250 TABLET, FILM COATED ORAL at 17:25

## 2022-01-01 RX ADMIN — BEVACIZUMAB 1020 MILLIGRAM(S): 400 INJECTION, SOLUTION INTRAVENOUS at 15:45

## 2022-01-01 RX ADMIN — Medication 650 MILLIGRAM(S): at 05:28

## 2022-01-01 RX ADMIN — Medication 50 MILLIGRAM(S): at 05:15

## 2022-01-01 RX ADMIN — GABAPENTIN 400 MILLIGRAM(S): 400 CAPSULE ORAL at 17:36

## 2022-01-01 RX ADMIN — PANTOPRAZOLE SODIUM 40 MILLIGRAM(S): 20 TABLET, DELAYED RELEASE ORAL at 06:00

## 2022-01-01 RX ADMIN — Medication 50 MILLIGRAM(S): at 05:24

## 2022-01-01 RX ADMIN — GABAPENTIN 400 MILLIGRAM(S): 400 CAPSULE ORAL at 17:25

## 2022-01-01 RX ADMIN — MONTELUKAST 10 MILLIGRAM(S): 4 TABLET, CHEWABLE ORAL at 11:10

## 2022-01-01 RX ADMIN — Medication 25 GRAM(S): at 10:01

## 2022-01-01 RX ADMIN — Medication 50 MILLIGRAM(S): at 06:42

## 2022-01-01 RX ADMIN — SERTRALINE 50 MILLIGRAM(S): 25 TABLET, FILM COATED ORAL at 11:44

## 2022-01-01 RX ADMIN — Medication 400 MILLIGRAM(S): at 10:20

## 2022-01-01 RX ADMIN — LEVETIRACETAM 400 MILLIGRAM(S): 250 TABLET, FILM COATED ORAL at 17:23

## 2022-01-01 RX ADMIN — Medication 50 MILLIGRAM(S): at 05:29

## 2022-01-01 RX ADMIN — MONTELUKAST 10 MILLIGRAM(S): 4 TABLET, CHEWABLE ORAL at 17:24

## 2022-01-01 RX ADMIN — LEVETIRACETAM 400 MILLIGRAM(S): 250 TABLET, FILM COATED ORAL at 20:39

## 2022-01-01 RX ADMIN — BEVACIZUMAB 1020 MILLIGRAM(S): 400 INJECTION, SOLUTION INTRAVENOUS at 10:44

## 2022-01-01 RX ADMIN — HEPARIN SODIUM 5000 UNIT(S): 5000 INJECTION INTRAVENOUS; SUBCUTANEOUS at 14:18

## 2022-01-01 RX ADMIN — Medication 100 MILLIGRAM(S): at 08:00

## 2022-01-01 RX ADMIN — Medication 25 MILLIGRAM(S): at 05:42

## 2022-01-01 RX ADMIN — GABAPENTIN 400 MILLIGRAM(S): 400 CAPSULE ORAL at 17:33

## 2022-01-01 RX ADMIN — Medication 25 MILLIGRAM(S): at 14:33

## 2022-01-01 RX ADMIN — MONTELUKAST 10 MILLIGRAM(S): 4 TABLET, CHEWABLE ORAL at 12:08

## 2022-01-01 RX ADMIN — LEVETIRACETAM 400 MILLIGRAM(S): 250 TABLET, FILM COATED ORAL at 05:15

## 2022-01-01 RX ADMIN — Medication 650 MILLIGRAM(S): at 14:19

## 2022-01-01 RX ADMIN — Medication 4 MILLIGRAM(S): at 21:52

## 2022-01-01 RX ADMIN — PANTOPRAZOLE SODIUM 40 MILLIGRAM(S): 20 TABLET, DELAYED RELEASE ORAL at 06:06

## 2022-01-01 RX ADMIN — Medication 25 MILLIGRAM(S): at 06:07

## 2022-01-01 RX ADMIN — BEVACIZUMAB 1060 MILLIGRAM(S): 400 INJECTION, SOLUTION INTRAVENOUS at 10:54

## 2022-01-01 RX ADMIN — Medication 650 MILLIGRAM(S): at 09:30

## 2022-01-01 RX ADMIN — ATORVASTATIN CALCIUM 80 MILLIGRAM(S): 80 TABLET, FILM COATED ORAL at 21:48

## 2022-01-01 RX ADMIN — LEVETIRACETAM 400 MILLIGRAM(S): 250 TABLET, FILM COATED ORAL at 21:17

## 2022-01-01 RX ADMIN — LEVETIRACETAM 400 MILLIGRAM(S): 250 TABLET, FILM COATED ORAL at 05:29

## 2022-01-01 RX ADMIN — MONTELUKAST 10 MILLIGRAM(S): 4 TABLET, CHEWABLE ORAL at 11:14

## 2022-01-01 RX ADMIN — OXYCODONE AND ACETAMINOPHEN 1 TABLET(S): 5; 325 TABLET ORAL at 08:13

## 2022-01-01 RX ADMIN — Medication 4 MILLIGRAM(S): at 23:40

## 2022-01-01 RX ADMIN — LEVETIRACETAM 1000 MILLIGRAM(S): 250 TABLET, FILM COATED ORAL at 06:08

## 2022-01-01 RX ADMIN — Medication 0.5 MILLIGRAM(S): at 20:47

## 2022-01-01 RX ADMIN — GABAPENTIN 400 MILLIGRAM(S): 400 CAPSULE ORAL at 17:23

## 2022-01-01 RX ADMIN — BEVACIZUMAB 1020 MILLIGRAM(S): 400 INJECTION, SOLUTION INTRAVENOUS at 15:30

## 2022-01-01 RX ADMIN — Medication 25 MILLIGRAM(S): at 05:46

## 2022-01-01 RX ADMIN — OXYCODONE AND ACETAMINOPHEN 2 TABLET(S): 5; 325 TABLET ORAL at 01:32

## 2022-01-01 RX ADMIN — ENOXAPARIN SODIUM 40 MILLIGRAM(S): 100 INJECTION SUBCUTANEOUS at 17:25

## 2022-01-01 RX ADMIN — Medication 25 MILLIGRAM(S): at 14:53

## 2022-01-01 RX ADMIN — Medication 50 MILLIGRAM(S): at 05:36

## 2022-01-01 RX ADMIN — GABAPENTIN 400 MILLIGRAM(S): 400 CAPSULE ORAL at 06:43

## 2022-01-01 RX ADMIN — OXYCODONE AND ACETAMINOPHEN 2 TABLET(S): 5; 325 TABLET ORAL at 02:02

## 2022-01-01 RX ADMIN — Medication 50 MILLIGRAM(S): at 05:32

## 2022-01-01 RX ADMIN — BEVACIZUMAB 1020 MILLIGRAM(S): 400 INJECTION, SOLUTION INTRAVENOUS at 14:35

## 2022-01-01 RX ADMIN — LEVETIRACETAM 400 MILLIGRAM(S): 250 TABLET, FILM COATED ORAL at 06:22

## 2022-01-01 RX ADMIN — GABAPENTIN 400 MILLIGRAM(S): 400 CAPSULE ORAL at 05:29

## 2022-01-01 RX ADMIN — Medication 10 MILLIGRAM(S): at 20:26

## 2022-01-01 RX ADMIN — SERTRALINE 50 MILLIGRAM(S): 25 TABLET, FILM COATED ORAL at 11:10

## 2022-01-01 RX ADMIN — LEVETIRACETAM 1000 MILLIGRAM(S): 250 TABLET, FILM COATED ORAL at 06:43

## 2022-01-01 RX ADMIN — ENOXAPARIN SODIUM 40 MILLIGRAM(S): 100 INJECTION SUBCUTANEOUS at 17:08

## 2022-01-01 RX ADMIN — ATORVASTATIN CALCIUM 80 MILLIGRAM(S): 80 TABLET, FILM COATED ORAL at 21:17

## 2022-01-01 RX ADMIN — Medication 50 MILLIEQUIVALENT(S): at 20:28

## 2022-01-01 RX ADMIN — Medication 1000 MILLIGRAM(S): at 21:56

## 2022-01-01 RX ADMIN — MONTELUKAST 10 MILLIGRAM(S): 4 TABLET, CHEWABLE ORAL at 12:03

## 2022-01-01 RX ADMIN — MONTELUKAST 10 MILLIGRAM(S): 4 TABLET, CHEWABLE ORAL at 12:42

## 2022-01-01 RX ADMIN — Medication 650 MILLIGRAM(S): at 15:17

## 2022-01-01 RX ADMIN — Medication 20 MILLIEQUIVALENT(S): at 21:45

## 2022-01-01 RX ADMIN — Medication 4 MILLIGRAM(S): at 11:22

## 2022-01-01 RX ADMIN — GABAPENTIN 400 MILLIGRAM(S): 400 CAPSULE ORAL at 05:32

## 2022-01-01 RX ADMIN — HEPARIN SODIUM 5000 UNIT(S): 5000 INJECTION INTRAVENOUS; SUBCUTANEOUS at 05:28

## 2022-01-01 RX ADMIN — Medication 25 GRAM(S): at 01:52

## 2022-01-01 RX ADMIN — MONTELUKAST 10 MILLIGRAM(S): 4 TABLET, CHEWABLE ORAL at 11:22

## 2022-01-01 RX ADMIN — LEVETIRACETAM 1000 MILLIGRAM(S): 250 TABLET, FILM COATED ORAL at 17:10

## 2022-01-01 RX ADMIN — SERTRALINE 50 MILLIGRAM(S): 25 TABLET, FILM COATED ORAL at 17:24

## 2022-01-01 RX ADMIN — Medication 400 MILLIGRAM(S): at 21:42

## 2022-01-01 RX ADMIN — Medication 650 MILLIGRAM(S): at 15:48

## 2022-01-01 RX ADMIN — LEVETIRACETAM 400 MILLIGRAM(S): 250 TABLET, FILM COATED ORAL at 17:32

## 2022-01-01 RX ADMIN — Medication 25 GRAM(S): at 08:00

## 2022-01-01 RX ADMIN — Medication 25 MILLIGRAM(S): at 09:58

## 2022-01-01 RX ADMIN — Medication 4 MILLIGRAM(S): at 06:08

## 2022-01-01 RX ADMIN — Medication 4 MILLIGRAM(S): at 06:46

## 2022-01-01 RX ADMIN — BEVACIZUMAB 1020 MILLIGRAM(S): 400 INJECTION, SOLUTION INTRAVENOUS at 14:34

## 2022-01-01 RX ADMIN — LEVETIRACETAM 400 MILLIGRAM(S): 250 TABLET, FILM COATED ORAL at 05:46

## 2022-01-01 RX ADMIN — Medication 25 MILLIGRAM(S): at 15:36

## 2022-01-01 RX ADMIN — PANTOPRAZOLE SODIUM 40 MILLIGRAM(S): 20 TABLET, DELAYED RELEASE ORAL at 06:30

## 2022-01-01 RX ADMIN — PANTOPRAZOLE SODIUM 40 MILLIGRAM(S): 20 TABLET, DELAYED RELEASE ORAL at 06:16

## 2022-01-01 RX ADMIN — ATORVASTATIN CALCIUM 80 MILLIGRAM(S): 80 TABLET, FILM COATED ORAL at 21:09

## 2022-01-01 RX ADMIN — Medication 4 MILLIGRAM(S): at 05:24

## 2022-01-01 RX ADMIN — Medication 50 MILLIGRAM(S): at 06:21

## 2022-01-01 RX ADMIN — Medication 25 MILLIGRAM(S): at 05:49

## 2022-01-01 RX ADMIN — PANTOPRAZOLE SODIUM 40 MILLIGRAM(S): 20 TABLET, DELAYED RELEASE ORAL at 06:13

## 2022-01-01 RX ADMIN — Medication 50 MILLIGRAM(S): at 05:49

## 2022-01-01 RX ADMIN — SERTRALINE 50 MILLIGRAM(S): 25 TABLET, FILM COATED ORAL at 11:46

## 2022-01-01 RX ADMIN — Medication 4 MILLIGRAM(S): at 00:52

## 2022-01-01 RX ADMIN — Medication 25 MILLIGRAM(S): at 14:45

## 2022-01-01 RX ADMIN — GABAPENTIN 400 MILLIGRAM(S): 400 CAPSULE ORAL at 06:23

## 2022-01-01 RX ADMIN — Medication 25 MILLIGRAM(S): at 13:52

## 2022-01-01 RX ADMIN — Medication 25 MILLIGRAM(S): at 06:22

## 2022-01-01 RX ADMIN — GABAPENTIN 400 MILLIGRAM(S): 400 CAPSULE ORAL at 05:24

## 2022-01-01 RX ADMIN — BEVACIZUMAB 1020 MILLIGRAM(S): 400 INJECTION, SOLUTION INTRAVENOUS at 16:39

## 2022-01-01 RX ADMIN — SERTRALINE 50 MILLIGRAM(S): 25 TABLET, FILM COATED ORAL at 11:14

## 2022-01-01 RX ADMIN — LEVETIRACETAM 400 MILLIGRAM(S): 250 TABLET, FILM COATED ORAL at 05:49

## 2022-01-01 RX ADMIN — Medication 100 MILLIGRAM(S): at 23:40

## 2022-01-01 RX ADMIN — Medication 100 MILLIGRAM(S): at 16:08

## 2022-01-01 RX ADMIN — ATORVASTATIN CALCIUM 80 MILLIGRAM(S): 80 TABLET, FILM COATED ORAL at 21:01

## 2022-01-01 RX ADMIN — GABAPENTIN 400 MILLIGRAM(S): 400 CAPSULE ORAL at 17:12

## 2022-01-01 RX ADMIN — LEVETIRACETAM 400 MILLIGRAM(S): 250 TABLET, FILM COATED ORAL at 17:08

## 2022-01-01 RX ADMIN — Medication 1 MILLIGRAM(S): at 20:26

## 2022-01-01 RX ADMIN — GABAPENTIN 400 MILLIGRAM(S): 400 CAPSULE ORAL at 05:15

## 2022-01-01 RX ADMIN — GABAPENTIN 400 MILLIGRAM(S): 400 CAPSULE ORAL at 05:42

## 2022-01-01 RX ADMIN — HEPARIN SODIUM 5000 UNIT(S): 5000 INJECTION INTRAVENOUS; SUBCUTANEOUS at 21:08

## 2022-01-01 RX ADMIN — ATORVASTATIN CALCIUM 80 MILLIGRAM(S): 80 TABLET, FILM COATED ORAL at 21:20

## 2022-01-01 RX ADMIN — ATORVASTATIN CALCIUM 80 MILLIGRAM(S): 80 TABLET, FILM COATED ORAL at 21:45

## 2022-01-01 RX ADMIN — GABAPENTIN 400 MILLIGRAM(S): 400 CAPSULE ORAL at 05:36

## 2022-01-01 RX ADMIN — TETANUS TOXOID, REDUCED DIPHTHERIA TOXOID AND ACELLULAR PERTUSSIS VACCINE, ADSORBED 0.5 MILLILITER(S): 5; 2.5; 8; 8; 2.5 SUSPENSION INTRAMUSCULAR at 19:28

## 2022-01-01 RX ADMIN — SODIUM CHLORIDE 75 MILLILITER(S): 9 INJECTION INTRAMUSCULAR; INTRAVENOUS; SUBCUTANEOUS at 22:52

## 2022-01-01 RX ADMIN — Medication 4 MILLIGRAM(S): at 06:19

## 2022-01-01 RX ADMIN — Medication 650 MILLIGRAM(S): at 09:48

## 2022-01-01 RX ADMIN — Medication 4 MILLIGRAM(S): at 17:12

## 2022-01-01 RX ADMIN — GABAPENTIN 400 MILLIGRAM(S): 400 CAPSULE ORAL at 17:44

## 2022-01-01 RX ADMIN — Medication 25 MILLIGRAM(S): at 05:24

## 2022-01-01 RX ADMIN — PANTOPRAZOLE SODIUM 40 MILLIGRAM(S): 20 TABLET, DELAYED RELEASE ORAL at 06:03

## 2022-01-01 RX ADMIN — Medication 650 MILLIGRAM(S): at 13:50

## 2022-01-01 RX ADMIN — OXYCODONE AND ACETAMINOPHEN 1 TABLET(S): 5; 325 TABLET ORAL at 21:08

## 2022-01-01 RX ADMIN — ATORVASTATIN CALCIUM 80 MILLIGRAM(S): 80 TABLET, FILM COATED ORAL at 21:52

## 2022-01-01 RX ADMIN — LEVETIRACETAM 400 MILLIGRAM(S): 250 TABLET, FILM COATED ORAL at 05:36

## 2022-01-01 RX ADMIN — GABAPENTIN 400 MILLIGRAM(S): 400 CAPSULE ORAL at 23:41

## 2022-01-01 RX ADMIN — Medication 25 MILLIGRAM(S): at 09:49

## 2022-01-01 RX ADMIN — GABAPENTIN 400 MILLIGRAM(S): 400 CAPSULE ORAL at 05:51

## 2022-01-01 RX ADMIN — LEVETIRACETAM 400 MILLIGRAM(S): 250 TABLET, FILM COATED ORAL at 17:13

## 2022-01-01 RX ADMIN — Medication 25 MILLIGRAM(S): at 06:42

## 2022-01-01 RX ADMIN — Medication 650 MILLIGRAM(S): at 14:33

## 2022-01-01 RX ADMIN — Medication 650 MILLIGRAM(S): at 14:45

## 2022-01-01 RX ADMIN — LEVETIRACETAM 400 MILLIGRAM(S): 250 TABLET, FILM COATED ORAL at 05:23

## 2022-01-01 RX ADMIN — ENOXAPARIN SODIUM 40 MILLIGRAM(S): 100 INJECTION SUBCUTANEOUS at 17:44

## 2022-01-01 RX ADMIN — LEVETIRACETAM 400 MILLIGRAM(S): 250 TABLET, FILM COATED ORAL at 05:42

## 2022-01-01 RX ADMIN — PANTOPRAZOLE SODIUM 40 MILLIGRAM(S): 20 TABLET, DELAYED RELEASE ORAL at 07:27

## 2022-01-01 RX ADMIN — PANTOPRAZOLE SODIUM 40 MILLIGRAM(S): 20 TABLET, DELAYED RELEASE ORAL at 06:43

## 2022-01-01 RX ADMIN — Medication 0.5 MILLIGRAM(S): at 21:32

## 2022-01-01 RX ADMIN — SERTRALINE 50 MILLIGRAM(S): 25 TABLET, FILM COATED ORAL at 11:03

## 2022-01-01 RX ADMIN — Medication 650 MILLIGRAM(S): at 13:52

## 2022-01-01 RX ADMIN — LEVETIRACETAM 1000 MILLIGRAM(S): 250 TABLET, FILM COATED ORAL at 21:45

## 2022-01-01 RX ADMIN — SERTRALINE 50 MILLIGRAM(S): 25 TABLET, FILM COATED ORAL at 11:22

## 2022-01-01 RX ADMIN — LEVETIRACETAM 400 MILLIGRAM(S): 250 TABLET, FILM COATED ORAL at 05:44

## 2022-01-01 RX ADMIN — Medication 25 MILLIGRAM(S): at 14:19

## 2022-01-01 RX ADMIN — SODIUM CHLORIDE 1000 MILLILITER(S): 9 INJECTION INTRAMUSCULAR; INTRAVENOUS; SUBCUTANEOUS at 21:45

## 2022-01-01 RX ADMIN — Medication 650 MILLIGRAM(S): at 14:53

## 2022-01-01 RX ADMIN — LEVETIRACETAM 400 MILLIGRAM(S): 250 TABLET, FILM COATED ORAL at 17:36

## 2022-01-01 RX ADMIN — MONTELUKAST 10 MILLIGRAM(S): 4 TABLET, CHEWABLE ORAL at 11:03

## 2022-01-01 RX ADMIN — Medication 25 MILLIGRAM(S): at 15:48

## 2022-01-01 RX ADMIN — ONDANSETRON 4 MILLIGRAM(S): 8 TABLET, FILM COATED ORAL at 20:27

## 2022-01-01 RX ADMIN — BEVACIZUMAB 1020 MILLIGRAM(S): 400 INJECTION, SOLUTION INTRAVENOUS at 14:42

## 2022-01-01 RX ADMIN — BEVACIZUMAB 1020 MILLIGRAM(S): 400 INJECTION, SOLUTION INTRAVENOUS at 15:00

## 2022-01-01 RX ADMIN — SODIUM CHLORIDE 1000 MILLILITER(S): 9 INJECTION INTRAMUSCULAR; INTRAVENOUS; SUBCUTANEOUS at 20:27

## 2022-01-01 RX ADMIN — LEVETIRACETAM 1000 MILLIGRAM(S): 250 TABLET, FILM COATED ORAL at 17:32

## 2022-01-01 RX ADMIN — Medication 650 MILLIGRAM(S): at 05:50

## 2022-01-01 RX ADMIN — Medication 50 MILLIGRAM(S): at 05:42

## 2022-01-01 RX ADMIN — Medication 2 MILLIGRAM(S): at 15:10

## 2022-01-01 RX ADMIN — Medication 650 MILLIGRAM(S): at 10:24

## 2022-01-01 RX ADMIN — Medication 25 MILLIGRAM(S): at 05:15

## 2022-01-01 RX ADMIN — Medication 650 MILLIGRAM(S): at 09:58

## 2022-01-01 RX ADMIN — Medication 25 GRAM(S): at 20:28

## 2022-01-01 RX ADMIN — GABAPENTIN 400 MILLIGRAM(S): 400 CAPSULE ORAL at 17:09

## 2022-01-01 RX ADMIN — Medication 4 MILLIGRAM(S): at 13:59

## 2022-01-01 RX ADMIN — ATORVASTATIN CALCIUM 80 MILLIGRAM(S): 80 TABLET, FILM COATED ORAL at 21:27

## 2022-01-01 RX ADMIN — LEVETIRACETAM 400 MILLIGRAM(S): 250 TABLET, FILM COATED ORAL at 17:45

## 2022-01-01 RX ADMIN — Medication 25 MILLIGRAM(S): at 05:35

## 2022-01-01 RX ADMIN — Medication 650 MILLIGRAM(S): at 15:35

## 2022-01-01 RX ADMIN — MONTELUKAST 10 MILLIGRAM(S): 4 TABLET, CHEWABLE ORAL at 11:46

## 2022-01-01 RX ADMIN — Medication 25 MILLIGRAM(S): at 05:33

## 2022-01-01 RX ADMIN — SERTRALINE 50 MILLIGRAM(S): 25 TABLET, FILM COATED ORAL at 11:23

## 2022-01-01 RX ADMIN — BEVACIZUMAB 1020 MILLIGRAM(S): 400 INJECTION, SOLUTION INTRAVENOUS at 14:11

## 2022-01-01 RX ADMIN — Medication 6 MILLIGRAM(S): at 00:38

## 2022-01-01 RX ADMIN — BEVACIZUMAB 1020 MILLIGRAM(S): 400 INJECTION, SOLUTION INTRAVENOUS at 15:12

## 2022-01-01 RX ADMIN — Medication 25 GRAM(S): at 00:00

## 2022-01-01 RX ADMIN — SERTRALINE 50 MILLIGRAM(S): 25 TABLET, FILM COATED ORAL at 12:42

## 2022-01-01 RX ADMIN — GABAPENTIN 400 MILLIGRAM(S): 400 CAPSULE ORAL at 17:31

## 2022-01-01 RX ADMIN — Medication 25 MILLIGRAM(S): at 13:50

## 2022-01-01 RX ADMIN — MONTELUKAST 10 MILLIGRAM(S): 4 TABLET, CHEWABLE ORAL at 11:23

## 2022-01-01 RX ADMIN — Medication 50 MILLIGRAM(S): at 06:07

## 2022-01-01 RX ADMIN — SERTRALINE 50 MILLIGRAM(S): 25 TABLET, FILM COATED ORAL at 12:03

## 2022-01-01 RX ADMIN — ATORVASTATIN CALCIUM 80 MILLIGRAM(S): 80 TABLET, FILM COATED ORAL at 21:08

## 2022-01-01 RX ADMIN — GABAPENTIN 400 MILLIGRAM(S): 400 CAPSULE ORAL at 06:07

## 2022-01-01 RX ADMIN — PANTOPRAZOLE SODIUM 40 MILLIGRAM(S): 20 TABLET, DELAYED RELEASE ORAL at 05:29

## 2022-01-01 RX ADMIN — Medication 25 MILLIGRAM(S): at 05:28

## 2022-01-01 RX ADMIN — Medication 50 MILLIGRAM(S): at 05:47

## 2022-01-01 RX ADMIN — GABAPENTIN 400 MILLIGRAM(S): 400 CAPSULE ORAL at 05:46

## 2022-04-05 NOTE — ED PROVIDER NOTE - NS ED ATTENDING STATEMENT MOD
This was a shared visit with the SHIRA. I reviewed and verified the documentation and independently performed the documented: I have personally provided the amount of critical care time documented below excluding time spent on separate procedures.

## 2022-04-05 NOTE — ED PROVIDER NOTE - PHYSICAL EXAMINATION
PHYSICAL EXAM:    GENERAL: Alert, appears stated age, well appearing, non-toxic  SKIN: Warm, pink and dry. MMM.   HEAD: NC, AT, no step offs   EYE: Normal lids/conjunctiva, PERRL, EOMI--partial gaze palsy-- gaze to R, but able to overcome.   ENT: Normal hearing, patent oropharynx   NECK: +supple. No meningismus, or JVD, +Trachea midline. no tenderness/step offs.   Pulm: Bilateral BS, normal resp effort, no wheezes, stridor, or retractions  CV: RRR, no M/R/G, 2+and = radial pulses  Abd: soft, non-tender, non-distended  Mskel: no erythema, cyanosis, edema. no calf tenderness  Neuro: AAOx1 ,unable to follow commands. repeating " my name is barry".

## 2022-04-05 NOTE — ED PROVIDER NOTE - CARE PLAN
1 Principal Discharge DX:	Seizure  Secondary Diagnosis:	Abnormal head CT  Secondary Diagnosis:	Parainfluenza  Secondary Diagnosis:	Rhinovirus

## 2022-04-05 NOTE — ED PROVIDER NOTE - PROGRESS NOTE DETAILS
stroke code called EMILY BISWAS: discussed with neuro-- admit to med NO DEX FOR NOW; MR with and without contrast. EMILY BISWAS: discussed with taryn rdz-- will eval pt and on board-- no additional recs for now.

## 2022-04-05 NOTE — H&P ADULT - NSHPLABSRESULTS_GEN_ALL_CORE
14.6   11.28 )-----------( 333      ( 05 Apr 2022 19:17 )             45.0       04-05    138  |  97<L>  |  9<L>  ----------------------------<  189<H>  3.0<L>   |  20  |  0.7    Ca    9.4      05 Apr 2022 19:17  Mg     1.6     04-05    TPro  7.2  /  Alb  4.6  /  TBili  0.4  /  DBili  x   /  AST  49<H>  /  ALT  65<H>  /  AlkPhos  100  04-05          Lactate Trend      CARDIAC MARKERS ( 05 Apr 2022 19:17 )  x     / <0.01 ng/mL / x     / x     / x          CAPILLARY BLOOD GLUCOSE        < from: CT Brain Stroke Protocol (04.05.22 @ 20:15) >  IMPRESSION:  Small focal hypodensity in left occipital lobe, nonspecific, could   reflect small age indeterminate infarct versus small mass. Recommend   further characterization with contrast-enhanced MRI.  Asymmetric effacement of the left cerebral sulci which could reflect mild   cerebral edema.  No intracranial hemorrhage or midline shift.  < end of copied text >    < from: CT Angio Head w/ IV Cont (04.05.22 @ 20:39) >  CTA HEAD:  Mild scattered calcification of the right carotid siphon with mild   narrowing.  Trace enhancement surrounding the small region of hypodensity in the left   occipital lobe noted on CT head exam. Recommend MRI Brain withcontrast   for further evaluation to rule out a small mass.    CTA NECK:  No evidence of carotid or vertebral artery stenosis.  < end of copied text >

## 2022-04-05 NOTE — ED PROVIDER NOTE - CLINICAL SUMMARY MEDICAL DECISION MAKING FREE TEXT BOX
57 y.o. female, PMH of migraine, BIBA after she had an episode of seizure. No h/o seizures. As per , pt has been having URI symptoms for last few days. Started on Prednisone and abx yesterday. Today pt c/o HA, consistent with her usual migraine HA. Took a nap, woke up, vomited, and had a seizure which lasted for about 1 min, witnessed by a . Denies fevers, chills, diarrhea, constipation, abdominal pain, urinary symptoms, chest pain, palpitations, shortness of breath, dyspnea on exertion, syncope/near syncope, weakness, numbness, focal deficits, visual changes, gait or balance changes, dizziness. On exam, pt in NAD, awake but is not answering questions, moaning stating she is not feeling well, head NC/AT, CN II-XII intact, PEERL, EOMi, lungs CTA B/L, CV S1S2 regular, abdomen soft/NT/ND/(+)BS, ext (-) edema, moving all extremities, not following commands.Labs, CT, XR done and reviewed. Pt with ?brain mass. Will admit for further work up.

## 2022-04-05 NOTE — ED PROVIDER NOTE - CRITICAL CARE ATTENDING CONTRIBUTION TO CARE
57 y.o. female, PMH of migraine, BIBA after she had an episode of seizure. No h/o seizures. As per , pt has been having URI symptoms for last few days. Started on Prednisone and abx yesterday. Today pt c/o HA, consistent with her usual migraine HA. Took a nap, woke up, vomited, and had a seizure which lasted for about 1 min, witnessed by a . Denies fevers, chills, diarrhea, constipation, abdominal pain, urinary symptoms, chest pain, palpitations, shortness of breath, dyspnea on exertion, syncope/near syncope, weakness, numbness, focal deficits, visual changes, gait or balance changes, dizziness. On exam, pt in NAD, awake but is not answering questions, moaning stating she is not feeling well, head NC/AT, CN II-XII intact, PEERL, EOMi, lungs CTA B/L, CV S1S2 regular, abdomen soft/NT/ND/(+)BS, ext (-) edema, moving all extremities, not following commands. Will do labs, CT, XR and reevaluate.

## 2022-04-05 NOTE — CONSULT NOTE ADULT - ASSESSMENT
Impression:  57 y.o. female, PMH of migraine, BIBA after she had an episode of seizure. No h/o seizures. Described as holding up her left arm, became rigid and rolling eyes. As per , pt has been having URI symptoms for last few days. Started on Prednisone and abx yesterday. Today pt c/o HA, consistent with her usual migraine HA at 1130. Took a nap, woke up, vomited, and had a seizure which lasted for about 1 min, witnessed by a . Stroke code called on arrival. Patient last known well 1130. On exam patient with altered mental status. Lethargic, knows name and husbands name. Patient with tongue trauma. Patient out of the window for IV thrombolytics.    Suggestion:  Routine EEG  Seizure precautions  Start Keppra 1000 mg IV x1. Also start Keppra 500 mg q12 hours IV.  Replete magnesium.   Follow up CTA  Impression:  57 y.o. female, PMH of migraine, BIBA after she had an episode of seizure. No h/o seizures. Described as holding up her left arm, became rigid and rolling eyes. As per , pt has been having URI symptoms for last few days. Started on Prednisone and abx yesterday. Today pt c/o HA, consistent with her usual migraine HA at 1130. Took a nap, woke up, vomited, and had a seizure which lasted for about 1 min, witnessed by a . Stroke code called on arrival. Patient last known well 1130. On exam patient with altered mental status. Lethargic, knows name and husbands name. Patient with tongue trauma. Patient out of the window for IV thrombolytics. No LVO on CTA not a candidate for IA intervention.     Suggestion:  Routine EEG  Seizure precautions  Start Keppra 1000 mg IV x1. Also start Keppra 500 mg q12 hours IV.  Replete magnesium.   MRI brain with and without spencer Impression:  57 y.o. female, PMH of migraine, BIBA after she had an episode of seizure. No h/o seizures. Described as holding up her left arm, became rigid and rolling eyes. As per , pt has been having URI symptoms for last few days. Started on Prednisone and abx yesterday. Today pt c/o HA, consistent with her usual migraine HA at 1130. Took a nap, woke up, vomited, and had a seizure which lasted for about 1 min, witnessed by a . Stroke code called on arrival. Patient last known well 1130. On exam patient with altered mental status. Lethargic, knows name and husbands name. Patient with tongue trauma. Patient out of the window for IV thrombolytics. No LVO on CTA not a candidate for IA intervention. CTH suggestive of stroke vs seizure in left occipital lobe. Suggestive of left sided headache(history of migraines). CTH also suggestive of cerebral edema. Discussed at length with Dr. Dhaliwal. Patient refused stat MRI brain for claustrophobia, would require sedation. Currently no nursing available to monitor patient in MRI. Patient alert and oriented to self place time. Patient with sleep apnea history and from a safety perspective would benefit from MRI in am sedated and monitored. Discussed with Dr. Dhaliwal.     Suggestion:  Routine EEG  Seizure precautions  Start Keppra 1000 mg IV x1. Also start Keppra 500 mg q12 hours IV.  Replete magnesium.   MRI brain with and without spencer Impression:  57 y.o. female, PMH of migraine, BIBA after she had an episode of seizure. No h/o seizures. Described as holding up her left arm, became rigid and rolling eyes. As per , pt has been having URI symptoms for last few days. Started on Prednisone and abx yesterday. Today pt c/o HA, consistent with her usual migraine HA at 1130. Took a nap, woke up, vomited, and had a seizure which lasted for about 1 min, witnessed by a . Stroke code called on arrival. Patient last known well 1130. On exam patient with altered mental status. Lethargic, knows name and husbands name. Patient with tongue trauma. Patient out of the window for IV thrombolytics. No LVO on CTA not a candidate for IA intervention. CTH suggestive of stroke vs seizure in left occipital lobe. Suggestive of left sided headache(history of migraines). CTH also suggestive of cerebral edema. Discussed at length with Dr. Dhaliwal. Patient refused stat MRI brain for claustrophobia, would require sedation. Currently no nursing available to monitor patient in MRI. Patient alert and oriented to self place time. Patient with sleep apnea history and from a safety perspective would benefit from MRI in am sedated and monitored. Discussed with Dr. Dhaliwal. Dr. Dhaliwal waiting for [facility] callback. Concern for cerebral edema affecting 4th ventricle.     Suggestion:  Routine EEG  Seizure precautions  Start Keppra 1000 mg IV x1. Also start Keppra 500 mg q12 hours IV.  Replete magnesium.   MRI brain with and without spencer  Neurosurgery evaluation prior to dispo, and please comment on dispo Impression:  57 y.o. female, PMH of migraine, BIBA after she had an episode of seizure. No h/o seizures. Described as holding up her left arm, became rigid and rolling eyes. As per , pt has been having URI symptoms for last few days. Started on Prednisone and abx yesterday. Today pt c/o HA, consistent with her usual migraine HA at 1130. Took a nap, woke up, vomited, and had a seizure which lasted for about 1 min, witnessed by a . Stroke code called on arrival. Patient last known well 1130. On exam patient with altered mental status. Lethargic, knows name and husbands name. Patient with tongue trauma. Patient out of the window for IV thrombolytics. No LVO on CTA not a candidate for IA intervention. CTH suggestive of stroke vs seizure in left occipital lobe. Suggestive of left sided headache(history of migraines). CTH also suggestive of cerebral edema. Discussed at length with Dr. Dhaliwal. Patient refused stat MRI brain for claustrophobia, would require sedation. Currently no nursing available to monitor patient in MRI. Patient alert and oriented to self place time. Patient with sleep apnea history and from a safety perspective would benefit from MRI in am sedated and monitored. Discussed with Dr. Dhaliwal. Dr. Dhaliwal discussed with radiology and 4th ventricle appears patent. Discussed case with neurosurgery, if needed will consult post MRI brain. Patient with recent uterine fibroid removal, swabbed + for enterovirus and parainfluenza.    Suggestion:  Routine EEG  Seizure precautions  Start Keppra 1000 mg IV x1. Also start Keppra 500 mg q12 hours IV.  Replete magnesium.   MRI brain with and without spencer

## 2022-04-05 NOTE — H&P ADULT - HISTORY OF PRESENT ILLNESS
57 y.o. female with PMH of HTN, asthma, sleep apnea on cpap, migraines BIBA after she had a seizure. Patient has no history of seizures. As per , pt has been having URI symptoms for last few days. Started on Prednisone and zpack yesterday. Today, pt was complaining of headache consistent with her usual migraine HA at 1130. She took a nap and when she woke up, she vomited and had a seizure which lasted for about 1 min, witnessed by a . Stroke code called on arrival. Patient last known well 1130. Patient with tongue trauma. Patient out of the window for IV thrombolytics. CTH suggestive of stroke vs seizure in left occipital lobe and cerebral edema. Patient refused stat MRI brain due to claustrophobia and would require sedation. Currently no nursing available to monitor patient in MRI so plan for MRI in AM with sedation.    In ED vitals: /71, HR 96, RR 18, T 97.8, spo2 99% on RA. Patient got 2L IVF, reglan, zofran and keppra in ED. RVP positive for parainfluenza and rhinovirus. Labs significant for lactate of 7.2

## 2022-04-05 NOTE — CONSULT NOTE ADULT - NS ATTEND AMEND GEN_ALL_CORE FT
Patient examined this morning with neurology team.   came in after exam and was included in discussion.  Patient c/o headache worse than her normal migraines.  She had confusion yesterday and it persists.  She has abnormal CTH showing hypodensity and edema in posterior left hemisphere.  She has right homonymous field defect.  NIH score is 5. (2 for homonymous field defect, 1 for wrong month, 1 for RUE drift, 1 for language errors).   Fundoscopic exam does not show crisp disc margins.  Will give 325 mg aspirin though it is not clear this is stroke.  May have underlying mass lesion.  Neurology team communicated with radiology and neurosurgery last night regarding question of 4th ventricle abnormality and this was not felt to warrant decadron at that time.  This morning patient able to lay flat w/o nausea, though headache increases.  Will get MRI brain w/wo contrast.  I called and walked down to radiology to discuss with nurse manager need for anesthesiology support for the MRI given patients claustrophobia and clinical concerns for ICP and nausea last night and RAMAKRISHNA.  If enhancing mass is seen will formally consult neurosurgery and heme-onc.  If acute stroke then contact stroke team.  I discussed case with stroke attending and agreed to give 325 mg aspirin.  Patient is NPO for the MRI otherwise.

## 2022-04-05 NOTE — ED PROVIDER NOTE - OBJECTIVE STATEMENT
55 y/o F with PMH asthma, HTN, RAMAKRISHNA on CPAP, complex migraines which include "tunnel vision/numbness tingling/HA", BIBA for unable to obtain hpi from pt as she is only A&Ox 1 today.   55 y/o F with PMH asthma, HTN, RAMAKRISHNA on CPAP, complex migraines which include "tunnel vision/numbness tingling/HA", BIBA for evaluation of seizurelike activity. x 45 min pta.   per  pt has had cough x 5 days, on zpak/prednisone, @ 1130am left work early for onset of headache/tunnel vision/tingling but whereas her typical headaches last 40 min but today lasted 4 hrs, she went to sleep and when she woke up had one episode of vomiting followed by rigidity, lifted up R arm, was only able to look to the R, bit her tongue and had a period of unresponsiveness.  pt now only able to say her name.

## 2022-04-05 NOTE — CONSULT NOTE ADULT - SUBJECTIVE AND OBJECTIVE BOX
Neurology Consult    Patient is a 56y old  Female who presents with a chief complaint of seizure    HPI:  57 y.o. female, PMH of migraine, BIBA after she had an episode of seizure. No h/o seizures. As per , pt has been having URI symptoms for last few days. Started on Prednisone and abx yesterday. Today pt c/o HA, consistent with her usual migraine HA at 1130. Took a nap, woke up, vomited, and had a seizure which lasted for about 1 min, witnessed by a . Stroke code called on arrival.       PAST MEDICAL & SURGICAL HISTORY:  RAMAKRISHNA on CPAP    GERD (gastroesophageal reflux disease)    Asthmatic bronchitis  MILD , USES VENTOLIN Q2-3M    HTN (hypertension)    Migraines    Chronic neck pain    H/O sinus surgery    Status post D&amp;C    H/O arthroscopy of shoulder  RT    History of hernia repair  2019        FAMILY HISTORY:  No pertinent family history in first degree relatives        Social History: (-) x 3    Allergies    No Known Allergies    Intolerances        MEDICATIONS  (STANDING):  levETIRAcetam  IVPB 1000 milliGRAM(s) IV Intermittent every 12 hours    MEDICATIONS  (PRN):    Vital Signs Last 24 Hrs  T(C): 36.6 (05 Apr 2022 19:19), Max: 36.6 (05 Apr 2022 19:19)  T(F): 97.8 (05 Apr 2022 19:19), Max: 97.8 (05 Apr 2022 19:19)  HR: 96 (05 Apr 2022 19:19) (96 - 96)  BP: 153/71 (05 Apr 2022 19:19) (153/71 - 153/71)  BP(mean): --  RR: 18 (05 Apr 2022 19:19) (18 - 18)  SpO2: 99% (05 Apr 2022 19:19) (99% - 99%)    Examination:  Patient lethargic and arousable to verbal stimuli.  Patient oriented to her name her  and speaking full sentences.   Patient with difficulty naming objects and following commands.   Eyes: Lack of cooperation limiting exam, reacts to visual threat in both eyes.   Face:  facial asymmetry.    Formal Muscle Strength Testing: no drift of biltateral UE LE   Sensory examination:   Reacts to touch in all four extremities   Cerebellum:   FTN/HKS intact with normal ELISE in all limbs.  No dysmetria or dysdiadokinesia.  Gait deferred      NIHSS 6      Labs:   CBC Full  -  ( 05 Apr 2022 19:17 )  WBC Count : 11.28 K/uL  RBC Count : 5.22 M/uL  Hemoglobin : 14.6 g/dL  Hematocrit : 45.0 %  Platelet Count - Automated : 333 K/uL  Mean Cell Volume : 86.2 fL  Mean Cell Hemoglobin : 28.0 pg  Mean Cell Hemoglobin Concentration : 32.4 g/dL  Auto Neutrophil # : 6.84 K/uL  Auto Lymphocyte # : 3.49 K/uL  Auto Monocyte # : 0.64 K/uL  Auto Eosinophil # : 0.18 K/uL  Auto Basophil # : 0.03 K/uL  Auto Neutrophil % : 60.6 %  Auto Lymphocyte % : 30.9 %  Auto Monocyte % : 5.7 %  Auto Eosinophil % : 1.6 %  Auto Basophil % : 0.3 %    04-05    138  |  97<L>  |  9<L>  ----------------------------<  189<H>  3.0<L>   |  20  |  0.7    Ca    9.4      05 Apr 2022 19:17  Mg     1.6     04-05    TPro  7.2  /  Alb  4.6  /  TBili  0.4  /  DBili  x   /  AST  49<H>  /  ALT  65<H>  /  AlkPhos  100  04-05    LIVER FUNCTIONS - ( 05 Apr 2022 19:17 )  Alb: 4.6 g/dL / Pro: 7.2 g/dL / ALK PHOS: 100 U/L / ALT: 65 U/L / AST: 49 U/L / GGT: x                   Neuroimaging:  NCHCT: RROC reports no acute intracranial pathology    04-05-22 @ 20:42       Neurology Consult    Patient is a 56y old  Female who presents with a chief complaint of seizure    HPI:  57 y.o. female, PMH of migraine, BIBA after she had an episode of seizure. No h/o seizures. As per , pt has been having URI symptoms for last few days. Started on Prednisone and abx yesterday. Today pt c/o HA, consistent with her usual migraine HA at 1130. Took a nap, woke up, vomited, and had a seizure which lasted for about 1 min, witnessed by a . Stroke code called on arrival.       PAST MEDICAL & SURGICAL HISTORY:  RAMAKRISHNA on CPAP    GERD (gastroesophageal reflux disease)    Asthmatic bronchitis  MILD , USES VENTOLIN Q2-3M    HTN (hypertension)    Migraines    Chronic neck pain    H/O sinus surgery    Status post D&amp;C    H/O arthroscopy of shoulder  RT    History of hernia repair  2019        FAMILY HISTORY:  No pertinent family history in first degree relatives        Social History: (-) x 3    Allergies    No Known Allergies    Intolerances        MEDICATIONS  (STANDING):  levETIRAcetam  IVPB 1000 milliGRAM(s) IV Intermittent every 12 hours    MEDICATIONS  (PRN):    Vital Signs Last 24 Hrs  T(C): 36.6 (05 Apr 2022 19:19), Max: 36.6 (05 Apr 2022 19:19)  T(F): 97.8 (05 Apr 2022 19:19), Max: 97.8 (05 Apr 2022 19:19)  HR: 96 (05 Apr 2022 19:19) (96 - 96)  BP: 153/71 (05 Apr 2022 19:19) (153/71 - 153/71)  BP(mean): --  RR: 18 (05 Apr 2022 19:19) (18 - 18)  SpO2: 99% (05 Apr 2022 19:19) (99% - 99%)    Examination:  Patient lethargic and arousable to verbal stimuli.  Patient oriented to her name her  and speaking full sentences.   Patient with difficulty naming objects and following commands.   Eyes: As patient returned to baseline Right visual field deficit detected.  Face:  facial asymmetry.    Formal Muscle Strength Testing: no drift of biltateral UE LE   Sensory examination:   Reacts to touch in all four extremities   Cerebellum:   FTN/HKS intact with normal ELISE in all limbs.  No dysmetria or dysdiadokinesia.  Gait deferred      NIHSS 6      Labs:   CBC Full  -  ( 05 Apr 2022 19:17 )  WBC Count : 11.28 K/uL  RBC Count : 5.22 M/uL  Hemoglobin : 14.6 g/dL  Hematocrit : 45.0 %  Platelet Count - Automated : 333 K/uL  Mean Cell Volume : 86.2 fL  Mean Cell Hemoglobin : 28.0 pg  Mean Cell Hemoglobin Concentration : 32.4 g/dL  Auto Neutrophil # : 6.84 K/uL  Auto Lymphocyte # : 3.49 K/uL  Auto Monocyte # : 0.64 K/uL  Auto Eosinophil # : 0.18 K/uL  Auto Basophil # : 0.03 K/uL  Auto Neutrophil % : 60.6 %  Auto Lymphocyte % : 30.9 %  Auto Monocyte % : 5.7 %  Auto Eosinophil % : 1.6 %  Auto Basophil % : 0.3 %    04-05    138  |  97<L>  |  9<L>  ----------------------------<  189<H>  3.0<L>   |  20  |  0.7    Ca    9.4      05 Apr 2022 19:17  Mg     1.6     04-05    TPro  7.2  /  Alb  4.6  /  TBili  0.4  /  DBili  x   /  AST  49<H>  /  ALT  65<H>  /  AlkPhos  100  04-05    LIVER FUNCTIONS - ( 05 Apr 2022 19:17 )  Alb: 4.6 g/dL / Pro: 7.2 g/dL / ALK PHOS: 100 U/L / ALT: 65 U/L / AST: 49 U/L / GGT: x                   Neuroimaging:  NCHCT: RROC reports no acute intracranial pathology    04-05-22 @ 20:42       Neurology Consult    Patient is a 56y old  Female who presents with a chief complaint of seizure    HPI:  57 y.o. female, PMH of migraine, BIBA after she had an episode of seizure. No h/o seizures. As per , pt has been having URI symptoms for last few days. Started on Prednisone and abx yesterday. Today pt c/o HA, consistent with her usual migraine HA at 1130. Took a nap, woke up, vomited, and had a seizure which lasted for about 1 min, witnessed by a . Stroke code called on arrival.       PAST MEDICAL & SURGICAL HISTORY:  RAMAKRISHNA on CPAP    GERD (gastroesophageal reflux disease)    Asthmatic bronchitis  MILD , USES VENTOLIN Q2-3M    HTN (hypertension)    Migraines    Chronic neck pain    H/O sinus surgery    Status post D&amp;C    H/O arthroscopy of shoulder  RT    History of hernia repair  2019        FAMILY HISTORY:  No pertinent family history in first degree relatives        Social History: (-) x 3    Allergies    No Known Allergies    Intolerances        MEDICATIONS  (STANDING):  levETIRAcetam  IVPB 1000 milliGRAM(s) IV Intermittent every 12 hours    MEDICATIONS  (PRN):    Vital Signs Last 24 Hrs  T(C): 36.6 (05 Apr 2022 19:19), Max: 36.6 (05 Apr 2022 19:19)  T(F): 97.8 (05 Apr 2022 19:19), Max: 97.8 (05 Apr 2022 19:19)  HR: 96 (05 Apr 2022 19:19) (96 - 96)  BP: 153/71 (05 Apr 2022 19:19) (153/71 - 153/71)  BP(mean): --  RR: 18 (05 Apr 2022 19:19) (18 - 18)  SpO2: 99% (05 Apr 2022 19:19) (99% - 99%)    Examination:  After ativan, Keppra patient returned to baseline.   Patient oriented to self place time.   No aphasia or dysarthria.   Eyes: As patient returned to baseline Right superior and inferior visual field deficit detected.  Face:  facial asymmetry.    Formal Muscle Strength Testing: no drift of biltateral UE LE   Sensory examination:   Reacts to touch in all four extremities   Cerebellum:   FTN/HKS intact with normal ELISE in all limbs.  No dysmetria or dysdiadokinesia.  Gait deferred      NIHSS 1      Labs:   CBC Full  -  ( 05 Apr 2022 19:17 )  WBC Count : 11.28 K/uL  RBC Count : 5.22 M/uL  Hemoglobin : 14.6 g/dL  Hematocrit : 45.0 %  Platelet Count - Automated : 333 K/uL  Mean Cell Volume : 86.2 fL  Mean Cell Hemoglobin : 28.0 pg  Mean Cell Hemoglobin Concentration : 32.4 g/dL  Auto Neutrophil # : 6.84 K/uL  Auto Lymphocyte # : 3.49 K/uL  Auto Monocyte # : 0.64 K/uL  Auto Eosinophil # : 0.18 K/uL  Auto Basophil # : 0.03 K/uL  Auto Neutrophil % : 60.6 %  Auto Lymphocyte % : 30.9 %  Auto Monocyte % : 5.7 %  Auto Eosinophil % : 1.6 %  Auto Basophil % : 0.3 %    04-05    138  |  97<L>  |  9<L>  ----------------------------<  189<H>  3.0<L>   |  20  |  0.7    Ca    9.4      05 Apr 2022 19:17  Mg     1.6     04-05    TPro  7.2  /  Alb  4.6  /  TBili  0.4  /  DBili  x   /  AST  49<H>  /  ALT  65<H>  /  AlkPhos  100  04-05    LIVER FUNCTIONS - ( 05 Apr 2022 19:17 )  Alb: 4.6 g/dL / Pro: 7.2 g/dL / ALK PHOS: 100 U/L / ALT: 65 U/L / AST: 49 U/L / GGT: x                   Neuroimaging:  NCHCT: RROC reports no acute intracranial pathology    04-05-22 @ 20:42       Neurology Consult    Patient is a 56y old  Female who presents with a chief complaint of seizure    HPI:  57 y.o. female, PMH of migraine, BIBA after she had an episode of seizure. No h/o seizures. As per , pt has been having URI symptoms for last few days. Started on Prednisone and abx yesterday. Today pt c/o HA, consistent with her usual migraine HA at 1130. Took a nap, woke up, vomited, and had a seizure which lasted for about 1 min, witnessed by a . Stroke code called on arrival.       PAST MEDICAL & SURGICAL HISTORY:  RAMAKRISHNA on CPAP    GERD (gastroesophageal reflux disease)    Asthmatic bronchitis  MILD , USES VENTOLIN Q2-3M    HTN (hypertension)    Migraines    Chronic neck pain    H/O sinus surgery    Status post D&amp;C    H/O arthroscopy of shoulder  RT    History of hernia repair  2019        FAMILY HISTORY:  No pertinent family history in first degree relatives        Social History: (-) x 3    Allergies    No Known Allergies    Intolerances        MEDICATIONS  (STANDING):  levETIRAcetam  IVPB 1000 milliGRAM(s) IV Intermittent every 12 hours    MEDICATIONS  (PRN):    Vital Signs Last 24 Hrs  T(C): 36.6 (05 Apr 2022 19:19), Max: 36.6 (05 Apr 2022 19:19)  T(F): 97.8 (05 Apr 2022 19:19), Max: 97.8 (05 Apr 2022 19:19)  HR: 96 (05 Apr 2022 19:19) (96 - 96)  BP: 153/71 (05 Apr 2022 19:19) (153/71 - 153/71)  BP(mean): --  RR: 18 (05 Apr 2022 19:19) (18 - 18)  SpO2: 99% (05 Apr 2022 19:19) (99% - 99%)    Examination:  After ativan, Keppra patient returned to baseline.   Patient oriented to self place time.   No aphasia or dysarthria.   Pupils 3mm bilaterally and reactive to light.   Eyes: As patient returned to baseline Right superior and inferior visual field deficit detected.  Face:  facial asymmetry.    Formal Muscle Strength Testing: no drift of biltateral UE LE   Sensory examination:   Reacts to touch in all four extremities   Cerebellum:   FTN/HKS intact with normal ELISE in all limbs.  No dysmetria or dysdiadokinesia.  Gait deferred  No nuchal rigidity    NIHSS 1      Labs:   CBC Full  -  ( 05 Apr 2022 19:17 )  WBC Count : 11.28 K/uL  RBC Count : 5.22 M/uL  Hemoglobin : 14.6 g/dL  Hematocrit : 45.0 %  Platelet Count - Automated : 333 K/uL  Mean Cell Volume : 86.2 fL  Mean Cell Hemoglobin : 28.0 pg  Mean Cell Hemoglobin Concentration : 32.4 g/dL  Auto Neutrophil # : 6.84 K/uL  Auto Lymphocyte # : 3.49 K/uL  Auto Monocyte # : 0.64 K/uL  Auto Eosinophil # : 0.18 K/uL  Auto Basophil # : 0.03 K/uL  Auto Neutrophil % : 60.6 %  Auto Lymphocyte % : 30.9 %  Auto Monocyte % : 5.7 %  Auto Eosinophil % : 1.6 %  Auto Basophil % : 0.3 %    04-05    138  |  97<L>  |  9<L>  ----------------------------<  189<H>  3.0<L>   |  20  |  0.7    Ca    9.4      05 Apr 2022 19:17  Mg     1.6     04-05    TPro  7.2  /  Alb  4.6  /  TBili  0.4  /  DBili  x   /  AST  49<H>  /  ALT  65<H>  /  AlkPhos  100  04-05    LIVER FUNCTIONS - ( 05 Apr 2022 19:17 )  Alb: 4.6 g/dL / Pro: 7.2 g/dL / ALK PHOS: 100 U/L / ALT: 65 U/L / AST: 49 U/L / GGT: x                   Neuroimaging:  NCHCT: RROC reports no acute intracranial pathology    04-05-22 @ 20:42

## 2022-04-05 NOTE — ED ADULT NURSE NOTE - OBJECTIVE STATEMENT
BIBA from home with reports of AMS. Patient  states patient complaining of headache this afternoon which was followed by vomiting, seizure like activity and inability to form full sentences. Patient noted to have dried blood around mouth with suspected tongue laceration.

## 2022-04-05 NOTE — H&P ADULT - NSHPPHYSICALEXAM_GEN_ALL_CORE
Vital Signs Last 24 Hrs  T(C): 36.6 (05 Apr 2022 19:19), Max: 36.6 (05 Apr 2022 19:19)  T(F): 97.8 (05 Apr 2022 19:19), Max: 97.8 (05 Apr 2022 19:19)  HR: 87 (05 Apr 2022 23:19) (87 - 100)  BP: 121/65 (05 Apr 2022 23:19) (121/65 - 160/76)  BP(mean): 91 (05 Apr 2022 23:19) (91 - 91)  RR: 18 (05 Apr 2022 23:19) (18 - 18)  SpO2: 99% (05 Apr 2022 23:19) (99% - 100%) Vital Signs Last 24 Hrs  T(C): 36.6 (05 Apr 2022 19:19), Max: 36.6 (05 Apr 2022 19:19)  T(F): 97.8 (05 Apr 2022 19:19), Max: 97.8 (05 Apr 2022 19:19)  HR: 87 (05 Apr 2022 23:19) (87 - 100)  BP: 121/65 (05 Apr 2022 23:19) (121/65 - 160/76)  BP(mean): 91 (05 Apr 2022 23:19) (91 - 91)  RR: 18 (05 Apr 2022 23:19) (18 - 18)  SpO2: 99% (05 Apr 2022 23:19) (99% - 100%)    GENERAL: NAD, lying in bed comfortably  HEAD:  Atraumatic, Normocephalic  EYES: EOMI, conjunctiva and sclera clear  ENT: Moist mucous membranes  NECK: Supple, No JVD  CHEST/LUNG: Clear to auscultation bilaterally; Unlabored respirations  HEART: Regular rate and rhythm; No murmurs, rubs, or gallops  ABDOMEN: Bowel sounds present; Soft, Nontender, Nondistended.   EXTREMITIES: No clubbing, cyanosis, or edema  NERVOUS SYSTEM:  lethargic, sleeping in bed   SKIN: No rashes or lesions

## 2022-04-05 NOTE — H&P ADULT - ATTENDING COMMENTS
patient seen and examined independently on morning rounds, chart reviewed and discussed with medicine and neuro teams and agree with the above overnight H/P and assessment and plan with the following addendum:     in brief, 57 yo woman with h/o htn, asthma/ steven on cpap and chronic migraine who p/w new onset seizure with URI.  she was started on oral prednisone and antibiotics by pcp and on day of admission developed headache but different than usual migraine.  after sleeping she woke up, vomited and had witnessed seizure by .  In the ED admitted to medicine service for seizure vs stroke and URI (parainfluenza/rhinovirus) and CTHead with small focal hypodensity in L occipital lobe with surrounding edema    ROS- as per above otherwise review of systems unremarkable    pcp- Dr. Cervantes    PE:  GEN-NAD, AAOx3  PULM- Clear to auscultation bilaterally, fair air entry  CVS- +s1/s2 RRR   GI- soft NT ND +bs, no rebound, no guarding  EXT- no edema    labs/radiology reviewed    a/p:  #seizure vs stroke--L occipital lobe hypodensity with surrounding edema  -neurology following  -awaiting MRI with anaesthesia today due to severe claustrophobia and vomiting---patient intubated by anaesthesia for MRI  -cont zofran prn  -cont keppra 500 mg iv bid  -check EEG  -neurochecks and seizure precautions  -LA 7--->2.3 2/2 suspected seizure--repeat LA level  -start decadron and neurosurgery & heme/onc consults if confirm L occipital lobe mass on mri    #Parainfluenza/Rhinovirus  -contact precautions  -supportive symptomatic care    DVT/GI ppx  guarded prognosis    FULL CODE

## 2022-04-05 NOTE — H&P ADULT - ASSESSMENT
57 y.o. female with PMH of HTN, asthma, sleep apnea on cpap, migraines BIBA after she had a seizure. Patient has no history of seizures. As per , pt has been having URI symptoms for last few days. Started on Prednisone and zpack yesterday. Today, pt was complaining of headache consistent with her usual migraine HA at 1130. She took a nap and when she woke up, she vomited and had a seizure which lasted for about 1 min, witnessed by a . Stroke code called on arrival with NIH stroke score of 6. Patient was outside the appropriate time window for IV Alteplase.    #Seizure vs Stroke   - stroke code called with NIHS score of 6   - CT head: Small focal hypodensity in left occipital lobe, nonspecific, could reflect small age indeterminate infarct versus small mass. Asymmetric effacement of the left cerebral sulci which could reflect mild cerebral edema.  - CTA head: Mild scattered calcification of the right carotid siphon with mild narrowing.  - s/p 1g IV keppra in ED -> cont with 500mg IV q 12hrs   - Routine EEG   - Seizure precautions   - MRI brain w and w/o spencer in AM -> pt will need sedation     #Lactic Acidosis likely 2/2 Seizure   - Lactate 7.2 -> f/u repeat   - s/p 2L IVF in ED     #Hypomagnesemia   - Mg 1.6 on admission   - replete to keep Mg > 2     #Parainfluenza Virus and Rhinovirus   - contact and droplet precautions   - supportive treatment     #Asthma - cont montelukast   #RAMAKRISHNA on CPAP - cont qhs   #HLD - cont statin, f/u lipid panel   #HTN - cont hctz, metorpolol succ 50   #GERD - cont PPI   #Anxiety - cont sertraline 50     #Misc   Diet DASH  Activity IAT   GI ppx PPI   DVT ppx lovenox   full code

## 2022-04-06 NOTE — CONSULT NOTE ADULT - ATTENDING COMMENTS
Agree with above.    Consider LP for cytology and flow cytology since there is evidence of leptomeningeal enhancement    If CT C/A/P and LP are negative would recommend a left occipital clare hole biopsy of occipital lesion.

## 2022-04-06 NOTE — PROGRESS NOTE ADULT - SUBJECTIVE AND OBJECTIVE BOX
************************************************  William Schneider MD (PGY-1)  Spectra: [phone]  ************************************************    SUBJECTIVE / OVERNIGHT EVENTS  Patient admitted last night. Reports 20/10 headache that started this AM. Headache different from typical migraines. Additionally, pt reporting nausea and vomiting while lying down. Patient does not report fevers, chills, CP, or SOB.    MEDICATIONS  atorvastatin 80 milliGRAM(s) Oral at bedtime  enoxaparin Injectable 40 milliGRAM(s) SubCutaneous every 24 hours  gabapentin 400 milliGRAM(s) Oral two times a day  hydrochlorothiazide 25 milliGRAM(s) Oral daily  levETIRAcetam  IVPB 1000 milliGRAM(s) IV Intermittent every 12 hours  metoprolol succinate ER 50 milliGRAM(s) Oral daily  montelukast 10 milliGRAM(s) Oral daily  pantoprazole    Tablet 40 milliGRAM(s) Oral before breakfast  sertraline 50 milliGRAM(s) Oral daily    acetaminophen     Tablet .. 650 milliGRAM(s) Oral every 6 hours PRN Temp greater or equal to 38C (100.4F), Mild Pain (1 - 3)  melatonin 3 milliGRAM(s) Oral at bedtime PRN Insomnia  ondansetron Injectable 4 milliGRAM(s) IV Push every 8 hours PRN Nausea and/or Vomiting    VITALS /  EXAM    T(C): 36.4 (04-06-22 @ 17:06), Max: 36.6 (04-05-22 @ 19:19)  HR: 76 (04-06-22 @ 17:06) (76 - 100)  BP: 124/65 (04-06-22 @ 17:06) (117/56 - 160/76)  RR: 17 (04-06-22 @ 17:06) (17 - 18)  SpO2: 96% (04-06-22 @ 02:26) (95% - 100%)    GENERAL: NAD, well-developed  CHEST/LUNG: Clear to auscultation bilaterally; No wheezes, rales or rhonchi  HEART: Regular rate and rhythm; No murmurs, rubs, or gallops  ABDOMEN: Soft, Nontender, Nondistended; Bowel sounds present, no masses.  EXTREMITIES:  2+ Peripheral Pulses, No clubbing, cyanosis, or edema    LABS             13.2   9.17  )-----------( 301      ( 04-06-22 @ 06:45 )             38.7     138  |  101  |  8   -------------------------<  102   04-06-22 @ 06:45  3.5  |  25  |  0.5    Ca      8.6     04-06-22 @ 06:45  Mg     2.6     04-06-22 @ 06:45    TPro  6.6  /  Alb  4.1  /  TBili  0.4  /  DBili  x   /  AST  42  /  ALT  57  /  AlkPhos  94  /  GGT  x     04-06-22 @ 06:45    Troponin T, Serum: <0.01 ng/mL (04-05-22 @ 19:17)    Lactate, Blood: 1.0 mmol/L (04-06-22 @ 06:45)  Blood Gas Venous - Lactate: 2.30 mmol/L (04-05-22 @ 23:21)  Blood Gas Venous - Lactate: 7.20 mmol/L (04-05-22 @ 20:01)    MICRO / IMAGING / CARDIOLOGY    MR Head w/wo IV Cont (04.06.22 @ 14:42)  1. Enhancing cystic/necrotic lesion within the left occipital lobe measuring 2.2 cm. Smaller rim-enhancing lesion in the medial left periatrial region / splenium measuring measuring 7 mm. Findings are most compatible with neoplastic etiology (primary multifocal versus metastatic).  2.  Expansile nonenhancing FLAIR signal abnormality involving the splenium of the corpus callosum with extension along the occipital and temporal horns greater on the left. Additional faint diffuse white matter signal abnormality. Findings are also likely neoplastic.  3.  Leptomeningeal signal abnormality (FLAIR hyperintense, faintly hyper-enhancing) within the left frontal and parietal sulci suspicious for leptomeningeal involvement.

## 2022-04-06 NOTE — PATIENT PROFILE ADULT - FALL HARM RISK - HARM RISK INTERVENTIONS

## 2022-04-06 NOTE — PROGRESS NOTE ADULT - ASSESSMENT
57F w/ h/o HTN, asthma, sleep apnea on cpap, migraines BIBA after she had a seizure. Patient has no history of seizures. As per , pt has been having URI symptoms for last few days. Started on Prednisone and zpack yesterday. Today, pt was complaining of headache consistent with her usual migraine HA at 1130. She took a nap and when she woke up, she vomited and had a seizure which lasted for about 1 min, witnessed by a . Stroke code called on arrival with NIH stroke score of 6. Patient was outside the appropriate time window for IV Alteplase.    #Brain lesion  Initially presenting s/p witnessed seizure. Lactate 7.2 on admission (likely due to to seizure), but repeat lactate 1.0. Loaded on Keppra in ED. Stroke code called in ED w/ NIHSS 6. Outside window for tPA. Admission CTH demonstrated small focal hypodensity in left occipital lobe. Lesion further clarified on MR as 2.2cm cystic/necrotic lesion w/ 7mm rim-enhancing lesion in medial left periatrial region, expansile nonenhancing FLAIR signal abnormality involving splenium of corpus collosum, and leptomeningeal signal abnormality within left frontal and parietal sulci.  - c/w Keppra 500mg IV Q12H  - Obtain rEEG   - CT C/A/P w/ w/o IV Contrast to evaluate for malignancy  - consult IR ([id number]) tomorrow for LP given possible leptomeningeal involvement on MRI  - NO steroids due to concern for CNS lymphoma    #Parainfluenza Virus  #Entero/Rhinovirus  Reports URI symptoms since few days prior to admission. Started on azithromycin and prednisone day prior to admission. Admission RVP positive for parainfluenza and entero/rhinovirus. Will monitor off abx as symptoms likely secondary to viral as opposed to bacterial illness.  - c/w support treatment  - contact precautions     #Hypertension  #Dyslipidemia  - c/w HCTZ 25mg PO QD  - c/w Toprol 50mg PO QD  - c/w atorvastatin 80mg PO QHS    #Asthma  - c/w montelukast 10mg PO QD    #Anxiety  - c/w sertraline 50mg PO QD    #GERD  - c/w pantoprazole 40mg PO QD    DVT PPX: Lovenox 40mg SQ QD  GI PPX: pantoprazole 40mg PO QD  DIET: DASH/TLC  ACTIVITY: IAT  CODE STATUS: Full Code  DISPOSITION: From Home    PENDING: CT C/A/P; IR-guided LP

## 2022-04-06 NOTE — CHART NOTE - NSCHARTNOTEFT_GEN_A_CORE
Neurology addendum:  Dr. Patterson called Dr. Chavarrai [facility] attending on call.  [facility] oncall resident reported to me he spoke with Dr. Chavarria and   patient does not require ICU bed at this time.

## 2022-04-06 NOTE — CONSULT NOTE ADULT - SUBJECTIVE AND OBJECTIVE BOX
HPI:  57 y.o. female with PMH of HTN, asthma, sleep apnea on cpap, migraines BIBA after she had a seizure. Patient has no history of seizures. As per , pt has been having URI symptoms for last few days. Started on Prednisone and zpack yesterday. Today, pt was complaining of headache consistent with her usual migraine HA at 1130. She took a nap and when she woke up, she vomited and had a seizure which lasted for about 1 min, witnessed by a . Stroke code called on arrival. Patient last known well 1130. Patient with tongue trauma. Patient out of the window for IV thrombolytics. CTH suggestive of stroke vs seizure in left occipital lobe and cerebral edema. Patient refused stat MRI brain due to claustrophobia and would require sedation. Currently no nursing available to monitor patient in MRI so plan for MRI in AM with sedation.    In ED vitals: /71, HR 96, RR 18, T 97.8, spo2 99% on RA. Patient got 2L IVF, reglan, zofran and keppra in ED. RVP positive for parainfluenza and rhinovirus. Labs significant for lactate of 7.2  (05 Apr 2022 23:11)    Called to see patient with above history.      PAST MEDICAL & SURGICAL HISTORY:  RAMAKRISHNA on CPAP    GERD (gastroesophageal reflux disease)    Asthmatic bronchitis  MILD , USES VENTOLIN Q2-3M    HTN (hypertension)    Migraines    Chronic neck pain    H/O sinus surgery    Status post D&amp;C    H/O arthroscopy of shoulder  RT    History of hernia repair  2019        Imaging:      from: CT Brain Stroke Protocol (04.05.22 @ 20:15) >  IMPRESSION:  Small focal hypodensity in left occipital lobe, nonspecific, could   reflect small age indeterminate infarct versus small mass. Recommend   further characterization with contrast-enhanced MRI.  Asymmetric effacement of the left cerebral sulci which could reflect mild   cerebral edema.  No intracranial hemorrhage or midline shift.  < end of copied text >      < from: MR Head w/wo IV Cont (04.06.22 @ 14:42) >  IMPRESSION:  1.  Enhancing cystic/necrotic lesion within the left occipital lobe   measuring 2.2 cm. Smaller rim-enhancing lesion in the medial left   periatrial region / splenium measuring measuring 7 mm. Findings are most   compatible with neoplastic etiology (primary multifocal versus   metastatic).  2.  Expansile nonenhancing FLAIR signal abnormality involving the   splenium of the corpus callosum with extension along the occipital and   temporal horns greater on the left. Additional faint diffuse white matter   signal abnormality. Findings are also likely neoplastic.  3.  Leptomeningeal signal abnormality (FLAIR hyperintense, faintly   hyperenhancing) within the left frontal and parietal sulci suspicious for   leptomeningeal involvement.  < end of copied text >      Home Medications:  atorvastatin 80 mg oral tablet: 1 tab(s) orally once a day (05 Apr 2022 23:24)  gabapentin 400 mg oral capsule: 1 cap(s) orally 2 times a day (05 Apr 2022 23:24)  hydroCHLOROthiazide 25 mg oral tablet: 1 tab(s) orally once a day (05 Apr 2022 23:24)  metoprolol succinate 50 mg oral tablet, extended release: 1 tab(s) orally once a day (05 Apr 2022 23:24)  montelukast 10 mg oral tablet: 1 tab(s) orally once a day (05 Apr 2022 23:26)  omeprazole 40 mg oral delayed release capsule: 1 cap(s) orally once a day (05 Apr 2022 23:24)  sertraline 50 mg oral tablet: 1 tab(s) orally once a day (05 Apr 2022 23:24)      Allergies    No Known Allergies    Intolerances        ROS:  [ x ] A ten-point review of systems is negative except as noted   [  ] Due to altered mental status/intubation, subjective information were not able to be obtained from the patient. History was obtained, to the extent possible, from review of the chart and collateral sources of information    MEDICATIONS  (STANDING):  atorvastatin 80 milliGRAM(s) Oral at bedtime  enoxaparin Injectable 40 milliGRAM(s) SubCutaneous every 24 hours  gabapentin 400 milliGRAM(s) Oral two times a day  hydrochlorothiazide 25 milliGRAM(s) Oral daily  levETIRAcetam  IVPB 1000 milliGRAM(s) IV Intermittent every 12 hours  metoprolol succinate ER 50 milliGRAM(s) Oral daily  montelukast 10 milliGRAM(s) Oral daily  pantoprazole    Tablet 40 milliGRAM(s) Oral before breakfast  sertraline 50 milliGRAM(s) Oral daily    MEDICATIONS  (PRN):  acetaminophen     Tablet .. 650 milliGRAM(s) Oral every 6 hours PRN Temp greater or equal to 38C (100.4F), Mild Pain (1 - 3)  melatonin 3 milliGRAM(s) Oral at bedtime PRN Insomnia  ondansetron Injectable 4 milliGRAM(s) IV Push every 8 hours PRN Nausea and/or Vomiting      ICU Vital Signs Last 24 Hrs  T(C): 35.5 (06 Apr 2022 05:22), Max: 36.6 (05 Apr 2022 19:19)  T(F): 95.9 (06 Apr 2022 05:22), Max: 97.8 (05 Apr 2022 19:19)  HR: 81 (06 Apr 2022 05:22) (81 - 100)  BP: 149/75 (06 Apr 2022 05:22) (117/56 - 160/76)  BP(mean): 104 (06 Apr 2022 05:22) (91 - 104)  ABP: --  ABP(mean): --  RR: 18 (06 Apr 2022 05:22) (18 - 18)  SpO2: 96% (06 Apr 2022 02:26) (95% - 100%)      I&O's Detail      CBC Full  -  ( 06 Apr 2022 06:45 )  WBC Count : 9.17 K/uL  RBC Count : 4.57 M/uL  Hemoglobin : 13.2 g/dL  Hematocrit : 38.7 %  Platelet Count - Automated : 301 K/uL  Mean Cell Volume : 84.7 fL  Mean Cell Hemoglobin : 28.9 pg  Mean Cell Hemoglobin Concentration : 34.1 g/dL  Auto Neutrophil # : 6.61 K/uL  Auto Lymphocyte # : 1.98 K/uL  Auto Monocyte # : 0.49 K/uL  Auto Eosinophil # : 0.03 K/uL  Auto Basophil # : 0.02 K/uL  Auto Neutrophil % : 72.2 %  Auto Lymphocyte % : 21.6 %  Auto Monocyte % : 5.3 %  Auto Eosinophil % : 0.3 %  Auto Basophil % : 0.2 %    04-06    138  |  101  |  8<L>  ----------------------------<  102<H>  3.5   |  25  |  0.5<L>    Ca    8.6      06 Apr 2022 06:45  Mg     2.6     04-06    TPro  6.6  /  Alb  4.1  /  TBili  0.4  /  DBili  x   /  AST  42<H>  /  ALT  57<H>  /  AlkPhos  94  04-06    CARDIAC MARKERS ( 05 Apr 2022 19:17 )  x     / <0.01 ng/mL / x     / x     / x            Neuro exam:  AAOX3. Verbal function intact  tongue midline, facial motions symmetric  PERRLA, EOMI  Pronator Drift:   Finger to Nose intact  Motor: MAEx4, 5/5 power in b/l UE and LE  Sensation: intact to touch in all extremities        Assessment/Plan:  Images reviewed with attending  Please obtain CT Chest/abd/pelvis for metastatic work up  Hold steroids for now   cont Keppra for seizure treatment  please provide Medical clearance for possible OR for brain biopsy  order new coags and T&S  d/w attg     HPI:  57 y.o. female with PMH of HTN, asthma, sleep apnea on cpap, migraines BIBA after she had a seizure. Patient has no history of seizures. As per , pt has been having URI symptoms for last few days. Started on Prednisone and zpack yesterday. Today, pt was complaining of headache consistent with her usual migraine HA at 1130. She took a nap and when she woke up, she vomited and had a seizure which lasted for about 1 min, witnessed by a . Stroke code called on arrival. Patient last known well 1130. Patient with tongue trauma. Patient out of the window for IV thrombolytics. CTH suggestive of stroke vs seizure in left occipital lobe and cerebral edema. Patient refused stat MRI brain due to claustrophobia and would require sedation. Currently no nursing available to monitor patient in MRI so plan for MRI in AM with sedation.    In ED vitals: /71, HR 96, RR 18, T 97.8, spo2 99% on RA. Patient got 2L IVF, reglan, zofran and keppra in ED. RVP positive for parainfluenza and rhinovirus. Labs significant for lactate of 7.2  (05 Apr 2022 23:11)    Called to see patient with above history.  Spoke with patient, patients  and daughter.  Pt sts she has 30 year history of migraine headaches that are often accompanied by dizziness, slurred speech, headaches.  Pt sts yesterday she experienced a headache typical for her migraine history.  Pt came home, vomited and was noted to have slurred speech.  Then pt went "blank" according to her  and then had a seizure lasting about 1 minute.  Pt brought to [facility] for evaluation.  Currently, pt just returned from MRI, laying in bed.  AAOX3, no distress.  Following commands.  Speech clear.  Admits to slight headache.  Denies dizziness.  Pt states she noticed some "blotchy" vision since her seizure and admits to decreased R peripheral vision loss.     PAST MEDICAL & SURGICAL HISTORY:  RAMAKRISHNA on CPAP    GERD (gastroesophageal reflux disease)    Asthmatic bronchitis  MILD , USES VENTOLIN Q2-3M    HTN (hypertension)    Migraines    Chronic neck pain    H/O sinus surgery    Status post D&amp;C    H/O arthroscopy of shoulder  RT    History of hernia repair  2019        Imaging:      from: CT Brain Stroke Protocol (04.05.22 @ 20:15) >  IMPRESSION:  Small focal hypodensity in left occipital lobe, nonspecific, could   reflect small age indeterminate infarct versus small mass. Recommend   further characterization with contrast-enhanced MRI.  Asymmetric effacement of the left cerebral sulci which could reflect mild   cerebral edema.  No intracranial hemorrhage or midline shift.  < end of copied text >      < from: MR Head w/wo IV Cont (04.06.22 @ 14:42) >  IMPRESSION:  1.  Enhancing cystic/necrotic lesion within the left occipital lobe   measuring 2.2 cm. Smaller rim-enhancing lesion in the medial left   periatrial region / splenium measuring measuring 7 mm. Findings are most   compatible with neoplastic etiology (primary multifocal versus   metastatic).  2.  Expansile nonenhancing FLAIR signal abnormality involving the   splenium of the corpus callosum with extension along the occipital and   temporal horns greater on the left. Additional faint diffuse white matter   signal abnormality. Findings are also likely neoplastic.  3.  Leptomeningeal signal abnormality (FLAIR hyperintense, faintly   hyperenhancing) within the left frontal and parietal sulci suspicious for   leptomeningeal involvement.  < end of copied text >      Home Medications:  atorvastatin 80 mg oral tablet: 1 tab(s) orally once a day (05 Apr 2022 23:24)  gabapentin 400 mg oral capsule: 1 cap(s) orally 2 times a day (05 Apr 2022 23:24)  hydroCHLOROthiazide 25 mg oral tablet: 1 tab(s) orally once a day (05 Apr 2022 23:24)  metoprolol succinate 50 mg oral tablet, extended release: 1 tab(s) orally once a day (05 Apr 2022 23:24)  montelukast 10 mg oral tablet: 1 tab(s) orally once a day (05 Apr 2022 23:26)  omeprazole 40 mg oral delayed release capsule: 1 cap(s) orally once a day (05 Apr 2022 23:24)  sertraline 50 mg oral tablet: 1 tab(s) orally once a day (05 Apr 2022 23:24)      Allergies    No Known Allergies    Intolerances        ROS:  [ x ] A ten-point review of systems is negative except as noted   [  ] Due to altered mental status/intubation, subjective information were not able to be obtained from the patient. History was obtained, to the extent possible, from review of the chart and collateral sources of information    MEDICATIONS  (STANDING):  atorvastatin 80 milliGRAM(s) Oral at bedtime  enoxaparin Injectable 40 milliGRAM(s) SubCutaneous every 24 hours  gabapentin 400 milliGRAM(s) Oral two times a day  hydrochlorothiazide 25 milliGRAM(s) Oral daily  levETIRAcetam  IVPB 1000 milliGRAM(s) IV Intermittent every 12 hours  metoprolol succinate ER 50 milliGRAM(s) Oral daily  montelukast 10 milliGRAM(s) Oral daily  pantoprazole    Tablet 40 milliGRAM(s) Oral before breakfast  sertraline 50 milliGRAM(s) Oral daily    MEDICATIONS  (PRN):  acetaminophen     Tablet .. 650 milliGRAM(s) Oral every 6 hours PRN Temp greater or equal to 38C (100.4F), Mild Pain (1 - 3)  melatonin 3 milliGRAM(s) Oral at bedtime PRN Insomnia  ondansetron Injectable 4 milliGRAM(s) IV Push every 8 hours PRN Nausea and/or Vomiting      ICU Vital Signs Last 24 Hrs  T(C): 35.5 (06 Apr 2022 05:22), Max: 36.6 (05 Apr 2022 19:19)  T(F): 95.9 (06 Apr 2022 05:22), Max: 97.8 (05 Apr 2022 19:19)  HR: 81 (06 Apr 2022 05:22) (81 - 100)  BP: 149/75 (06 Apr 2022 05:22) (117/56 - 160/76)  BP(mean): 104 (06 Apr 2022 05:22) (91 - 104)  ABP: --  ABP(mean): --  RR: 18 (06 Apr 2022 05:22) (18 - 18)  SpO2: 96% (06 Apr 2022 02:26) (95% - 100%)      I&O's Detail      CBC Full  -  ( 06 Apr 2022 06:45 )  WBC Count : 9.17 K/uL  RBC Count : 4.57 M/uL  Hemoglobin : 13.2 g/dL  Hematocrit : 38.7 %  Platelet Count - Automated : 301 K/uL  Mean Cell Volume : 84.7 fL  Mean Cell Hemoglobin : 28.9 pg  Mean Cell Hemoglobin Concentration : 34.1 g/dL  Auto Neutrophil # : 6.61 K/uL  Auto Lymphocyte # : 1.98 K/uL  Auto Monocyte # : 0.49 K/uL  Auto Eosinophil # : 0.03 K/uL  Auto Basophil # : 0.02 K/uL  Auto Neutrophil % : 72.2 %  Auto Lymphocyte % : 21.6 %  Auto Monocyte % : 5.3 %  Auto Eosinophil % : 0.3 %  Auto Basophil % : 0.2 %    04-06    138  |  101  |  8<L>  ----------------------------<  102<H>  3.5   |  25  |  0.5<L>    Ca    8.6      06 Apr 2022 06:45  Mg     2.6     04-06    TPro  6.6  /  Alb  4.1  /  TBili  0.4  /  DBili  x   /  AST  42<H>  /  ALT  57<H>  /  AlkPhos  94  04-06    CARDIAC MARKERS ( 05 Apr 2022 19:17 )  x     / <0.01 ng/mL / x     / x     / x            Neuro exam:  AAOX3. Speech clear.   tongue midline  no facial droop  PERRL, EOMI  R peripheral visual field cut  No drift  Finger to Nose intact  Motor: MAEx4  5/5 power in b/l UE and LE  Sensation: intact to LT        Assessment/Plan:  Images reviewed with attending  Please obtain CT Chest/abd/pelvis for metastatic work up  Recommend Neurology follow up for possible LP  Hold steroids for now   cont Keppra for seizure treatment  please provide Medical clearance for possible OR for brain biopsy  restart home meds if appropriate  order new coags and T&S  d/w attg

## 2022-04-07 NOTE — PROGRESS NOTE ADULT - SUBJECTIVE AND OBJECTIVE BOX
Patient is a 56y old  Female who presents with a chief complaint of Seizure (07 Apr 2022 12:13)    INTERVAL HPI/OVERNIGHT EVENTS: Patient was examined and seen at bedside. This morning pt is resting comfortably in bed and reports no new issues or overnight events. No complaints, feels better. Anxious. IR unable to do LP at least until Monday due to pt receiving ASA.  ROS: Denies CP, SOB, AP, new weakness  All other systems reviewed and are within normal limits.  InitialHPI:  57 y.o. female with PMH of HTN, asthma, sleep apnea on cpap, migraines BIBA after she had a seizure. Patient has no history of seizures. As per , pt has been having URI symptoms for last few days. Started on Prednisone and zpack yesterday. Today, pt was complaining of headache consistent with her usual migraine HA at 1130. She took a nap and when she woke up, she vomited and had a seizure which lasted for about 1 min, witnessed by a . Stroke code called on arrival. Patient last known well 1130. Patient with tongue trauma. Patient out of the window for IV thrombolytics. CTH suggestive of stroke vs seizure in left occipital lobe and cerebral edema. Patient refused stat MRI brain due to claustrophobia and would require sedation. Currently no nursing available to monitor patient in MRI so plan for MRI in AM with sedation.    In ED vitals: /71, HR 96, RR 18, T 97.8, spo2 99% on RA. Patient got 2L IVF, reglan, zofran and keppra in ED. RVP positive for parainfluenza and rhinovirus. Labs significant for lactate of 7.2  (05 Apr 2022 23:11)    PAST MEDICAL & SURGICAL HISTORY:  RAMAKRISHNA on CPAP    GERD (gastroesophageal reflux disease)    Asthmatic bronchitis  MILD , USES VENTOLIN Q2-3M    HTN (hypertension)    Migraines    Chronic neck pain    H/O sinus surgery    Status post D&amp;C    H/O arthroscopy of shoulder  RT    History of hernia repair  2019        General: NAD, AAO3  HEENT:  EOMI, no LAD  CV: S1 S2  Resp: decreased breath sounds at bases  GI: NT/ND/S +BS  MS: no clubbing/cyanosis/edema, + pulses b/l  Neuro: nonfocal, +reflexes thruout    MEDICATIONS  (STANDING):  atorvastatin 80 milliGRAM(s) Oral at bedtime  enoxaparin Injectable 40 milliGRAM(s) SubCutaneous every 24 hours  gabapentin 400 milliGRAM(s) Oral two times a day  hydrochlorothiazide 25 milliGRAM(s) Oral daily  levETIRAcetam  IVPB 1000 milliGRAM(s) IV Intermittent every 12 hours  metoprolol succinate ER 50 milliGRAM(s) Oral daily  montelukast 10 milliGRAM(s) Oral daily  pantoprazole    Tablet 40 milliGRAM(s) Oral before breakfast  sertraline 50 milliGRAM(s) Oral daily    MEDICATIONS  (PRN):  acetaminophen     Tablet .. 650 milliGRAM(s) Oral every 6 hours PRN Temp greater or equal to 38C (100.4F), Mild Pain (1 - 3)  benzonatate 100 milliGRAM(s) Oral every 8 hours PRN Cough  melatonin 3 milliGRAM(s) Oral at bedtime PRN Insomnia  ondansetron Injectable 4 milliGRAM(s) IV Push every 8 hours PRN Nausea and/or Vomiting    Vital Signs Last 24 Hrs  T(C): 36.2 (07 Apr 2022 13:22), Max: 36.7 (06 Apr 2022 21:25)  T(F): 97.1 (07 Apr 2022 13:22), Max: 98 (06 Apr 2022 21:25)  HR: 78 (07 Apr 2022 13:22) (76 - 92)  BP: 111/57 (07 Apr 2022 13:22) (111/57 - 142/70)  BP(mean): --  RR: 18 (07 Apr 2022 13:22) (17 - 18)  SpO2: --  CAPILLARY BLOOD GLUCOSE                              12.7   5.57  )-----------( 286      ( 07 Apr 2022 08:00 )             39.0     04-07    139  |  102  |  8<L>  ----------------------------<  86  3.6   |  26  |  0.6<L>    Ca    8.5      07 Apr 2022 08:00  Mg     2.2     04-07    TPro  6.4  /  Alb  4.0  /  TBili  0.5  /  DBili  x   /  AST  41  /  ALT  49<H>  /  AlkPhos  93  04-07    LIVER FUNCTIONS - ( 07 Apr 2022 08:00 )  Alb: 4.0 g/dL / Pro: 6.4 g/dL / ALK PHOS: 93 U/L / ALT: 49 U/L / AST: 41 U/L / GGT: x           CARDIAC MARKERS ( 05 Apr 2022 19:17 )  x     / <0.01 ng/mL / x     / x     / x          PT/INR - ( 07 Apr 2022 11:00 )   PT: 10.90 sec;   INR: 0.95 ratio         PTT - ( 07 Apr 2022 11:00 )  PTT:32.5 sec            Chart, Consultant(s) Notes Reviewed:  [x ] YES  [ ] NO  Care Discussed with Consultants/Other Providers/ Housestaff [ x] YES  [ ] NO  Radiology, labs, old available records personally reviewed.        CT Chest w/ IV Cont (04.06.22 @ 22:54)  No current CT evidence of active intrathoracic disease.    CT Abdomen and Pelvis w/ IV Cont (04.06.22 @ 22:55)  No CT evidence of acute or metastatic disease within the abdomen or pelvis    MR Head w/wo IV Cont (04.06.22 @ 14:42)  1. Enhancing cystic/necrotic lesion within the left occipital lobe measuring 2.2 cm. Smaller rim-enhancing lesion in the medial left periatrial region / splenium measuring 7 mm. Findings are most compatible with neoplastic etiology (primary multifocal versus metastatic).  2. Expansile nonenhancing FLAIR signal abnormality involving the splenium of the corpus callosum with extension along the occipital and temporal horns greater on the left. Additional faint diffuse white matter signal abnormality. Findings are also likely neoplastic.  3. Leptomeningeal signal abnormality (FLAIR hyperintense, faintly hyper-enhancing) within the left frontal and parietal sulci suspicious for leptomeningeal involvement.

## 2022-04-07 NOTE — PROGRESS NOTE ADULT - ASSESSMENT
57F w/ h/o HTN, asthma, sleep apnea on cpap, migraines BIBA after she had a seizure. Patient has no history of seizures. As per , pt has been having URI symptoms for last few days. Started on Prednisone and zpack yesterday. Today, pt was complaining of headache consistent with her usual migraine HA at 1130. She took a nap and when she woke up, she vomited and had a seizure which lasted for about 1 min, witnessed by a .     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 Lactate 7.2 on admission (likely due to seizure), but repeat lactate 1.0. Loaded on Keppra in ED. rEEG showed no epileptiform activity. Stroke code called in ED w/ NIHSS 6. Outside window for tPA. Admission CTH demonstrated small focal hypodensity in left occipital lobe. Lesion further clarified on MRI as 2.2cm cystic/necrotic lesion w/ 7mm rim-enhancing lesion in medial left periatrial region, expansile nonenhancing FLAIR signal abnormality involving splenium of corpus collosum, and leptomeningeal signal abnormality within left frontal and parietal sulci. No evidence of acute or metastatic disease visualized on CT C/A/P.   - c/w Keppra 500mg IV Q12H  - Neuro IR ([id number]) consulted for LP to r/o CNS involvement given leptomeningeal findings on MRI but unable to do it as pt received ASA on admission  - NO steroids due to concern for primary CNS lymphoma  - f/u NSGY for brain biopsy  - pt >>4METS, no h/o CP, SOB. Pt is low risk for intermed risk procedure (brain Bx)    #Parainfluenza Virus  #Entero/Rhinovirus  Reports URI symptoms since few days prior to admission. Started on azithromycin and prednisone day prior to admission. Admission RVP positive for parainfluenza and entero/rhinovirus. Will monitor off abx as symptoms likely secondary to viral as opposed to bacterial illness.  - start Tessalon Perle 100mg PO Q8H PRN cough  - contact precautions     #Hypertension  #Dyslipidemia  - c/w HCTZ 25mg PO QD  - c/w Toprol 50mg PO QD  - c/w atorvastatin 80mg PO QHS    #Asthma  - c/w montelukast 10mg PO QD    #Anxiety  - c/w sertraline 50mg PO QD    #GERD  - c/w pantoprazole 40mg PO QD    DVT PPX: Lovenox 40mg SQ QD  GI PPX: pantoprazole 40mg PO QD  DIET: DASH/TLC  ACTIVITY: IAT  CODE STATUS: Full Code  DISPOSITION: From Home    #Progress Note Handoff  Pending: Consults____Clinical improvement and stability__x___Tests__Brain Bx______PT____x____  Pt/Family discussion: Pt/family informed and agrees with the current plan  Disposition: Home______/SNF_______/4A______/To be determined____x____    My note supersedes the residents note should a discrepancy arise.    Chart and notes personally reviewed.  Care Discussed with Consultants/Other Providers/ Housestaff [ x] YES [ ] NO   Radiology, labs, old records personally reviewed.    discussed w/ housestaff, nursing, case management, neurosurgery team, , son

## 2022-04-07 NOTE — PROGRESS NOTE ADULT - SUBJECTIVE AND OBJECTIVE BOX
************************************************  William Schneider MD (PGY-1)  Spectra: [phone]  ************************************************    SUBJECTIVE / OVERNIGHT EVENTS  Patient underwent MR Brain yesterday. Required intubation w/ Anesthesia for procedure as pt could not tolerate lying flat. Has since been extubated and saturating well on RA. Patient does not report fevers, chills, CP, or SOB.    MEDICATIONS  atorvastatin 80 milliGRAM(s) Oral at bedtime  enoxaparin Injectable 40 milliGRAM(s) SubCutaneous every 24 hours  gabapentin 400 milliGRAM(s) Oral two times a day  hydrochlorothiazide 25 milliGRAM(s) Oral daily  levETIRAcetam  IVPB 1000 milliGRAM(s) IV Intermittent every 12 hours  metoprolol succinate ER 50 milliGRAM(s) Oral daily  montelukast 10 milliGRAM(s) Oral daily  pantoprazole    Tablet 40 milliGRAM(s) Oral before breakfast  sertraline 50 milliGRAM(s) Oral daily    acetaminophen     Tablet .. 650 milliGRAM(s) Oral every 6 hours PRN Temp greater or equal to 38C (100.4F), Mild Pain (1 - 3)  melatonin 3 milliGRAM(s) Oral at bedtime PRN Insomnia  ondansetron Injectable 4 milliGRAM(s) IV Push every 8 hours PRN Nausea and/or Vomiting    VITALS /  EXAM    T(C): 36.7 (04-07-22 @ 05:05), Max: 36.7 (04-06-22 @ 21:25)  HR: 79 (04-07-22 @ 05:05) (76 - 92)  BP: 142/70 (04-07-22 @ 05:05) (124/65 - 142/70)  RR: 18 (04-07-22 @ 05:05) (17 - 18)  SpO2: --    GENERAL: NAD, well-developed  CHEST/LUNG: Clear to auscultation bilaterally; No wheezes, rales or rhonchi  HEART: Regular rate and rhythm; No murmurs, rubs, or gallops  ABDOMEN: Soft, Nontender, Nondistended; Bowel sounds present, no masses.  EXTREMITIES:  2+ Peripheral Pulses, No clubbing, cyanosis, or edema    LABS             12.7   5.57  )-----------( 286      ( 04-07-22 @ 08:00 )             39.0     139  |  102  |  8   -------------------------<  86   04-07-22 @ 08:00  3.6  |  26  |  0.6    Ca      8.5     04-07-22 @ 08:00  Mg     2.2     04-07-22 @ 08:00    TPro  6.4  /  Alb  4.0  /  TBili  0.5  /  DBili  x   /  AST  41  /  ALT  49  /  AlkPhos  93  /  GGT  x     04-07-22 @ 08:00    Troponin T, Serum: <0.01 ng/mL (04-05-22 @ 19:17)    Lactate, Blood: 1.0 mmol/L (04-06-22 @ 06:45)  Blood Gas Venous - Lactate: 2.30 mmol/L (04-05-22 @ 23:21)  Blood Gas Venous - Lactate: 7.20 mmol/L (04-05-22 @ 20:01)    MICRO / IMAGING / CARDIOLOGY    CT Chest w/ IV Cont (04.06.22 @ 22:54)  No current CT evidence of active intrathoracic disease.    MR Head w/wo IV Cont (04.06.22 @ 14:42)  1. Enhancing cystic/necrotic lesion within the left occipital lobe measuring 2.2 cm. Smaller rim-enhancing lesion in the medial left periatrial region / splenium measuring 7 mm. Findings are most compatible with neoplastic etiology (primary multifocal versus metastatic).  2. Expansile nonenhancing FLAIR signal abnormality involving the splenium of the corpus callosum with extension along the occipital and temporal horns greater on the left. Additional faint diffuse white matter signal abnormality. Findings are also likely neoplastic.  3. Leptomeningeal signal abnormality (FLAIR hyperintense, faintly hyper-enhancing) within the left frontal and parietal sulci suspicious for leptomeningeal involvement. ************************************************  William Schneider MD (PGY-1)  Spectra: [phone]  ************************************************    SUBJECTIVE / OVERNIGHT EVENTS  Patient underwent MR Brain yesterday. Required intubation w/ Anesthesia for procedure as pt could not tolerate lying flat. Has since been extubated and saturating well on RA. Reports moderate cough this AM. Otherwise, no reported fevers, chills, CP, or SOB.    MEDICATIONS  atorvastatin 80 milliGRAM(s) Oral at bedtime  enoxaparin Injectable 40 milliGRAM(s) SubCutaneous every 24 hours  gabapentin 400 milliGRAM(s) Oral two times a day  hydrochlorothiazide 25 milliGRAM(s) Oral daily  levETIRAcetam  IVPB 1000 milliGRAM(s) IV Intermittent every 12 hours  metoprolol succinate ER 50 milliGRAM(s) Oral daily  montelukast 10 milliGRAM(s) Oral daily  pantoprazole    Tablet 40 milliGRAM(s) Oral before breakfast  sertraline 50 milliGRAM(s) Oral daily    acetaminophen     Tablet .. 650 milliGRAM(s) Oral every 6 hours PRN Temp greater or equal to 38C (100.4F), Mild Pain (1 - 3)  melatonin 3 milliGRAM(s) Oral at bedtime PRN Insomnia  ondansetron Injectable 4 milliGRAM(s) IV Push every 8 hours PRN Nausea and/or Vomiting    VITALS /  EXAM    T(C): 36.7 (04-07-22 @ 05:05), Max: 36.7 (04-06-22 @ 21:25)  HR: 79 (04-07-22 @ 05:05) (76 - 92)  BP: 142/70 (04-07-22 @ 05:05) (124/65 - 142/70)  RR: 18 (04-07-22 @ 05:05) (17 - 18)  SpO2: --    GENERAL: NAD, well-developed  CHEST/LUNG: Clear to auscultation bilaterally; No wheezes, rales or rhonchi  HEART: Regular rate and rhythm; No murmurs, rubs, or gallops  ABDOMEN: Soft, Nontender, Nondistended; Bowel sounds present, no masses.  EXTREMITIES:  2+ Peripheral Pulses, No clubbing, cyanosis, or edema    LABS             12.7   5.57  )-----------( 286      ( 04-07-22 @ 08:00 )             39.0     139  |  102  |  8   -------------------------<  86   04-07-22 @ 08:00  3.6  |  26  |  0.6    Ca      8.5     04-07-22 @ 08:00  Mg     2.2     04-07-22 @ 08:00    TPro  6.4  /  Alb  4.0  /  TBili  0.5  /  DBili  x   /  AST  41  /  ALT  49  /  AlkPhos  93  /  GGT  x     04-07-22 @ 08:00    Troponin T, Serum: <0.01 ng/mL (04-05-22 @ 19:17)    Lactate, Blood: 1.0 mmol/L (04-06-22 @ 06:45)  Blood Gas Venous - Lactate: 2.30 mmol/L (04-05-22 @ 23:21)  Blood Gas Venous - Lactate: 7.20 mmol/L (04-05-22 @ 20:01)    MICRO / IMAGING / CARDIOLOGY    CT Chest w/ IV Cont (04.06.22 @ 22:54)  No current CT evidence of active intrathoracic disease.    MR Head w/wo IV Cont (04.06.22 @ 14:42)  1. Enhancing cystic/necrotic lesion within the left occipital lobe measuring 2.2 cm. Smaller rim-enhancing lesion in the medial left periatrial region / splenium measuring 7 mm. Findings are most compatible with neoplastic etiology (primary multifocal versus metastatic).  2. Expansile nonenhancing FLAIR signal abnormality involving the splenium of the corpus callosum with extension along the occipital and temporal horns greater on the left. Additional faint diffuse white matter signal abnormality. Findings are also likely neoplastic.  3. Leptomeningeal signal abnormality (FLAIR hyperintense, faintly hyper-enhancing) within the left frontal and parietal sulci suspicious for leptomeningeal involvement. ************************************************  William Schneider MD (PGY-1)  Spectra: [phone]  ************************************************    SUBJECTIVE / OVERNIGHT EVENTS  Patient underwent MR Brain yesterday. Required intubation w/ Anesthesia for procedure as pt could not tolerate lying flat. Has since been extubated and saturating well on RA. Reports moderate cough this AM. Otherwise, no reported fevers, chills, CP, or SOB.    MEDICATIONS  atorvastatin 80 milliGRAM(s) Oral at bedtime  enoxaparin Injectable 40 milliGRAM(s) SubCutaneous every 24 hours  gabapentin 400 milliGRAM(s) Oral two times a day  hydrochlorothiazide 25 milliGRAM(s) Oral daily  levETIRAcetam  IVPB 1000 milliGRAM(s) IV Intermittent every 12 hours  metoprolol succinate ER 50 milliGRAM(s) Oral daily  montelukast 10 milliGRAM(s) Oral daily  pantoprazole    Tablet 40 milliGRAM(s) Oral before breakfast  sertraline 50 milliGRAM(s) Oral daily    acetaminophen     Tablet .. 650 milliGRAM(s) Oral every 6 hours PRN Temp greater or equal to 38C (100.4F), Mild Pain (1 - 3)  melatonin 3 milliGRAM(s) Oral at bedtime PRN Insomnia  ondansetron Injectable 4 milliGRAM(s) IV Push every 8 hours PRN Nausea and/or Vomiting    VITALS /  EXAM    T(C): 36.7 (04-07-22 @ 05:05), Max: 36.7 (04-06-22 @ 21:25)  HR: 79 (04-07-22 @ 05:05) (76 - 92)  BP: 142/70 (04-07-22 @ 05:05) (124/65 - 142/70)  RR: 18 (04-07-22 @ 05:05) (17 - 18)  SpO2: --    GENERAL: NAD, well-developed  CHEST/LUNG: Clear to auscultation bilaterally; No wheezes, rales or rhonchi  HEART: Regular rate and rhythm; No murmurs, rubs, or gallops  ABDOMEN: Soft, Nontender, Nondistended; Bowel sounds present, no masses.  EXTREMITIES:  2+ Peripheral Pulses, No clubbing, cyanosis, or edema    LABS             12.7   5.57  )-----------( 286      ( 04-07-22 @ 08:00 )             39.0     139  |  102  |  8   -------------------------<  86   04-07-22 @ 08:00  3.6  |  26  |  0.6    Ca      8.5     04-07-22 @ 08:00  Mg     2.2     04-07-22 @ 08:00    TPro  6.4  /  Alb  4.0  /  TBili  0.5  /  DBili  x   /  AST  41  /  ALT  49  /  AlkPhos  93  /  GGT  x     04-07-22 @ 08:00    Troponin T, Serum: <0.01 ng/mL (04-05-22 @ 19:17)    Lactate, Blood: 1.0 mmol/L (04-06-22 @ 06:45)  Blood Gas Venous - Lactate: 2.30 mmol/L (04-05-22 @ 23:21)  Blood Gas Venous - Lactate: 7.20 mmol/L (04-05-22 @ 20:01)    MICRO / IMAGING / CARDIOLOGY    CT Chest w/ IV Cont (04.06.22 @ 22:54)  No current CT evidence of active intrathoracic disease.    CT Abdomen and Pelvis w/ IV Cont (04.06.22 @ 22:55)  No CT evidence of acute or metastatic disease within the abdomen or pelvis    MR Head w/wo IV Cont (04.06.22 @ 14:42)  1. Enhancing cystic/necrotic lesion within the left occipital lobe measuring 2.2 cm. Smaller rim-enhancing lesion in the medial left periatrial region / splenium measuring 7 mm. Findings are most compatible with neoplastic etiology (primary multifocal versus metastatic).  2. Expansile nonenhancing FLAIR signal abnormality involving the splenium of the corpus callosum with extension along the occipital and temporal horns greater on the left. Additional faint diffuse white matter signal abnormality. Findings are also likely neoplastic.  3. Leptomeningeal signal abnormality (FLAIR hyperintense, faintly hyper-enhancing) within the left frontal and parietal sulci suspicious for leptomeningeal involvement.

## 2022-04-07 NOTE — PROGRESS NOTE ADULT - SUBJECTIVE AND OBJECTIVE BOX
Patient seen by Dr Dumont, spoke to patient and family member    T(C): 36.7 (04-07-22 @ 05:05), Max: 36.7 (04-06-22 @ 21:25)  HR: 79 (04-07-22 @ 05:05) (76 - 92)  BP: 142/70 (04-07-22 @ 05:05) (124/65 - 142/70)  RR: 18 (04-07-22 @ 05:05) (17 - 18)  SpO2: --  Wt(kg): --      CBC Full  -  ( 07 Apr 2022 08:00 )  WBC Count : 5.57 K/uL  RBC Count : 4.48 M/uL  Hemoglobin : 12.7 g/dL  Hematocrit : 39.0 %  Platelet Count - Automated : 286 K/uL  Mean Cell Volume : 87.1 fL  Mean Cell Hemoglobin : 28.3 pg  Mean Cell Hemoglobin Concentration : 32.6 g/dL  Auto Neutrophil # : 3.12 K/uL  Auto Lymphocyte # : 2.01 K/uL  Auto Monocyte # : 0.29 K/uL  Auto Eosinophil # : 0.12 K/uL  Auto Basophil # : 0.02 K/uL  Auto Neutrophil % : 55.9 %  Auto Lymphocyte % : 36.1 %  Auto Monocyte % : 5.2 %  Auto Eosinophil % : 2.2 %  Auto Basophil % : 0.4 %    04-07    139  |  102  |  8<L>  ----------------------------<  86  3.6   |  26  |  0.6<L>    Ca    8.5      07 Apr 2022 08:00  Mg     2.2     04-07    TPro  6.4  /  Alb  4.0  /  TBili  0.5  /  DBili  x   /  AST  41  /  ALT  49<H>  /  AlkPhos  93  04-07    Assessment/Plan: plan for LP by IR today or tommorrow, IF no pathology found when test results are posted will plan for stereotactic brain  biopsy on Monday.

## 2022-04-07 NOTE — PROGRESS NOTE ADULT - ASSESSMENT
57F w/ h/o HTN, asthma, sleep apnea on cpap, migraines BIBA after she had a seizure. Patient has no history of seizures. As per , pt has been having URI symptoms for last few days. Started on Prednisone and zpack yesterday. Today, pt was complaining of headache consistent with her usual migraine HA at 1130. She took a nap and when she woke up, she vomited and had a seizure which lasted for about 1 min, witnessed by a . Stroke code called on arrival with NIH stroke score of 6. Patient was outside the appropriate time window for IV Alteplase.    #Brain Lesion  Initially presenting s/p witnessed seizure. Lactate 7.2 on admission (likely due to seizure), but repeat lactate 1.0. Loaded on Keppra in ED. rEEG showed no epileptiform activity. Stroke code called in ED w/ NIHSS 6. Outside window for tPA. Admission CTH demonstrated small focal hypodensity in left occipital lobe. Lesion further clarified on MRI as 2.2cm cystic/necrotic lesion w/ 7mm rim-enhancing lesion in medial left periatrial region, expansile nonenhancing FLAIR signal abnormality involving splenium of corpus collosum, and leptomeningeal signal abnormality within left frontal and parietal sulci.   - c/w Keppra 500mg IV Q12H  - CT A/P w/ w/o IV Contrast to evaluate for malignancy  - Neuro IR (x1133) consulted for LP to r/o CNS involvement given leptomeningeal findings on MRI  - NO steroids due to concern for primary CNS lymphoma  - f/u NSGY if no evidence of malignancy on CT C/A/P    #Parainfluenza Virus  #Entero/Rhinovirus  Reports URI symptoms since few days prior to admission. Started on azithromycin and prednisone day prior to admission. Admission RVP positive for parainfluenza and entero/rhinovirus. Will monitor off abx as symptoms likely secondary to viral as opposed to bacterial illness.  - start Tessalon Perle 100mg PO Q8H PRN cough  - contact precautions     #Hypertension  #Dyslipidemia  - c/w HCTZ 25mg PO QD  - c/w Toprol 50mg PO QD  - c/w atorvastatin 80mg PO QHS    #Asthma  - c/w montelukast 10mg PO QD    #Anxiety  - c/w sertraline 50mg PO QD    #GERD  - c/w pantoprazole 40mg PO QD    DVT PPX: Lovenox 40mg SQ QD  GI PPX: pantoprazole 40mg PO QD  DIET: DASH/TLC  ACTIVITY: IAT  CODE STATUS: Full Code  DISPOSITION: From Home    PENDING: CT A/P; IR-guided LP; f/u NSGY 57F w/ h/o HTN, asthma, sleep apnea on cpap, migraines BIBA after she had a seizure. Patient has no history of seizures. As per , pt has been having URI symptoms for last few days. Started on Prednisone and zpack yesterday. Today, pt was complaining of headache consistent with her usual migraine HA at 1130. She took a nap and when she woke up, she vomited and had a seizure which lasted for about 1 min, witnessed by a . Stroke code called on arrival with NIH stroke score of 6. Patient was outside the appropriate time window for IV Alteplase.    #Brain Lesion  Initially presenting s/p witnessed seizure. Lactate 7.2 on admission (likely due to seizure), but repeat lactate 1.0. Loaded on Keppra in ED. rEEG showed no epileptiform activity. Stroke code called in ED w/ NIHSS 6. Outside window for tPA. Admission CTH demonstrated small focal hypodensity in left occipital lobe. Lesion further clarified on MRI as 2.2cm cystic/necrotic lesion w/ 7mm rim-enhancing lesion in medial left periatrial region, expansile nonenhancing FLAIR signal abnormality involving splenium of corpus collosum, and leptomeningeal signal abnormality within left frontal and parietal sulci. No evidence of acute or metastatic disease visualized on CT C/A/P.   - c/w Keppra 500mg IV Q12H  - Neuro IR (x1133) consulted for LP to r/o CNS involvement given leptomeningeal findings on MRI  - NO steroids due to concern for primary CNS lymphoma  - f/u NSGY for brain biopsy    #Parainfluenza Virus  #Entero/Rhinovirus  Reports URI symptoms since few days prior to admission. Started on azithromycin and prednisone day prior to admission. Admission RVP positive for parainfluenza and entero/rhinovirus. Will monitor off abx as symptoms likely secondary to viral as opposed to bacterial illness.  - start Tessalon Perle 100mg PO Q8H PRN cough  - contact precautions     #Hypertension  #Dyslipidemia  - c/w HCTZ 25mg PO QD  - c/w Toprol 50mg PO QD  - c/w atorvastatin 80mg PO QHS    #Asthma  - c/w montelukast 10mg PO QD    #Anxiety  - c/w sertraline 50mg PO QD    #GERD  - c/w pantoprazole 40mg PO QD    DVT PPX: Lovenox 40mg SQ QD  GI PPX: pantoprazole 40mg PO QD  DIET: DASH/TLC  ACTIVITY: IAT  CODE STATUS: Full Code  DISPOSITION: From Home    PENDING: IR-guided LP; f/u NSGY

## 2022-04-08 NOTE — PROGRESS NOTE ADULT - ASSESSMENT
57F w/ h/o HTN, asthma, sleep apnea on cpap, migraines BIBA after she had a seizure. Patient has no history of seizures. As per , pt has been having URI symptoms for last few days. Started on Prednisone and zpack yesterday. Today, pt was complaining of headache consistent with her usual migraine HA at 1130. She took a nap and when she woke up, she vomited and had a seizure which lasted for about 1 min, witnessed by a .     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 Lactate 7.2 on admission (likely due to seizure), but repeat lactate 1.0. Loaded on Keppra in ED. rEEG showed no epileptiform activity. Stroke code called in ED w/ NIHSS 6. Outside window for tPA. Admission CTH demonstrated small focal hypodensity in left occipital lobe. Lesion further clarified on MRI as 2.2cm cystic/necrotic lesion w/ 7mm rim-enhancing lesion in medial left periatrial region, expansile nonenhancing FLAIR signal abnormality involving splenium of corpus collosum, and leptomeningeal signal abnormality within left frontal and parietal sulci. No evidence of acute or metastatic disease visualized on CT C/A/P.   - c/w Keppra 500mg IV Q12H  - Neuro IR (x1133) consulted for LP to r/o CNS involvement given leptomeningeal findings on MRI but unable to do until Monday it as pt received ASA on admission  - d/w Dr. Kim: pt will get LP on Monday am: Need fluid for cytology, flow cytometry, cell count, etc  - NO steroids due to concern for primary CNS lymphoma  - d/w Dr. Dumont NSMANI  brain biopsy tentatively on Tuesday  - pt >>4METS, no h/o CP, SOB. Pt is low risk for intermed risk procedure (brain Bx)    #Parainfluenza Virus  #Entero/Rhinovirus  Reports URI symptoms since few days prior to admission. Started on azithromycin and prednisone day prior to admission. Admission RVP positive for parainfluenza and entero/rhinovirus. Will monitor off abx as symptoms likely secondary to viral as opposed to bacterial illness.  - Tessalon Perle 100mg PO Q8H PRN cough  - contact precautions for 5d total    #Hypertension  #Dyslipidemia  - c/w HCTZ 25mg PO QD  - c/w Toprol 50mg PO QD  - c/w atorvastatin 80mg PO QHS    #Asthma  - c/w montelukast 10mg PO QD    #Anxiety  - c/w sertraline 50mg PO QD    #GERD  - c/w pantoprazole 40mg PO QD    DVT PPX: Lovenox 40mg SQ QD  GI PPX: pantoprazole 40mg PO QD  DIET: DASH/TLC  ACTIVITY: IAT  CODE STATUS: Full Code  DISPOSITION: From Home    #Progress Note Handoff  Pending: Consults____Clinical improvement and stability__x___Tests_LP, Brain Bx______PT____x____  Pt/Family discussion: Pt/family informed and agrees with the current plan  Disposition: Home______/SNF_______/4A______/To be determined____x____    My note supersedes the residents note should a discrepancy arise.    Chart and notes personally reviewed.  Care Discussed with Consultants/Other Providers/ Housestaff [ x] YES [ ] NO   Radiology, labs, old records personally reviewed.    discussed w/ housestaff, nursing, case management, neurosurgery team, , son

## 2022-04-08 NOTE — PROGRESS NOTE ADULT - ASSESSMENT
57F w/ h/o HTN, asthma, sleep apnea on cpap, migraines BIBA after she had a seizure. Patient has no history of seizures. As per , pt has been having URI symptoms for last few days. Started on Prednisone and zpack yesterday. Today, pt was complaining of headache consistent with her usual migraine HA at 1130. She took a nap and when she woke up, she vomited and had a seizure which lasted for about 1 min, witnessed by a . Stroke code called on arrival with NIH stroke score of 6. Patient was outside the appropriate time window for IV Alteplase.    #Brain Lesion  Initially presenting s/p witnessed seizure. Lactate 7.2 on admission (likely due to seizure), but repeat lactate 1.0. Loaded on Keppra in ED. rEEG showed no epileptiform activity. Stroke code called in ED w/ NIHSS 6. Outside window for tPA. Admission CTH demonstrated small focal hypodensity in left occipital lobe. Lesion further clarified on MRI as 2.2cm cystic/necrotic lesion w/ 7mm rim-enhancing lesion in medial left periatrial region, expansile nonenhancing FLAIR signal abnormality involving splenium of corpus collosum, and leptomeningeal signal abnormality within left frontal and parietal sulci. No evidence of acute or metastatic disease visualized on CT C/A/P.   - c/w Keppra 500mg IV Q12H  - Neuro IR (x1133) consulted for LP to r/o CNS involvement given leptomeningeal findings on MRI (delayed until 4/11 as pt received loading-dose ASA on 4/6)  - NO steroids due to concern for primary CNS lymphoma  - f/u NSGY for brain biopsy (likely Tuesday)    #Parainfluenza Virus  #Entero/Rhinovirus  Reports URI symptoms since few days prior to admission. Started on azithromycin and prednisone day prior to admission. Admission RVP positive for parainfluenza and entero/rhinovirus. Will monitor off abx as symptoms likely secondary to viral as opposed to bacterial illness.  - c/w Tessalon Perle 100mg PO Q8H PRN cough  - c/w contact precautions (can d/c on 4/10)    #Hypertension  #Dyslipidemia  - c/w HCTZ 25mg PO QD  - c/w Toprol 50mg PO QD  - c/w atorvastatin 80mg PO QHS    #Asthma  - c/w montelukast 10mg PO QD    #Anxiety  - c/w sertraline 50mg PO QD    #GERD  - c/w pantoprazole 40mg PO QD    DVT PPX: Lovenox 40mg SQ QD  GI PPX: pantoprazole 40mg PO QD  DIET: DASH/TLC  ACTIVITY: IAT  CODE STATUS: Full Code  DISPOSITION: From Home    PENDING: IR-guided LP on Monday; f/u NSGY for brain biopsy tentatively Tuesday)

## 2022-04-08 NOTE — PROGRESS NOTE ADULT - SUBJECTIVE AND OBJECTIVE BOX
Patient is a 56y old  Female who presents with a chief complaint of Seizure (07 Apr 2022 12:13)    INTERVAL HPI/OVERNIGHT EVENTS: Patient was examined and seen at bedside. This morning pt is resting comfortably in bed and reports no new issues or overnight events. No complaints, feels better. Anxious. IR unable to do LP at least until Monday due to pt receiving ASA.  ROS: Denies CP, SOB, AP, new weakness  All other systems reviewed and are within normal limits.  InitialHPI:  57 y.o. female with PMH of HTN, asthma, sleep apnea on cpap, migraines BIBA after she had a seizure. Patient has no history of seizures. As per , pt has been having URI symptoms for last few days. Started on Prednisone and zpack yesterday. Today, pt was complaining of headache consistent with her usual migraine HA at 1130. She took a nap and when she woke up, she vomited and had a seizure which lasted for about 1 min, witnessed by a . Stroke code called on arrival. Patient last known well 1130. Patient with tongue trauma. Patient out of the window for IV thrombolytics. CTH suggestive of stroke vs seizure in left occipital lobe and cerebral edema. Patient refused stat MRI brain due to claustrophobia and would require sedation. Currently no nursing available to monitor patient in MRI so plan for MRI in AM with sedation.    In ED vitals: /71, HR 96, RR 18, T 97.8, spo2 99% on RA. Patient got 2L IVF, reglan, zofran and keppra in ED. RVP positive for parainfluenza and rhinovirus. Labs significant for lactate of 7.2  (05 Apr 2022 23:11)    PAST MEDICAL & SURGICAL HISTORY:  RAMAKRISHNA on CPAP    GERD (gastroesophageal reflux disease)    Asthmatic bronchitis  MILD , USES VENTOLIN Q2-3M    HTN (hypertension)    Migraines    Chronic neck pain    H/O sinus surgery    Status post D&amp;C    H/O arthroscopy of shoulder  RT    History of hernia repair  2019        General: NAD, AAO3  HEENT:  EOMI, no LAD  CV: S1 S2  Resp: decreased breath sounds at bases  GI: NT/ND/S +BS  MS: no clubbing/cyanosis/edema, + pulses b/l  Neuro: nonfocal, +reflexes thruout            MEDICATIONS  (STANDING):  atorvastatin 80 milliGRAM(s) Oral at bedtime  enoxaparin Injectable 40 milliGRAM(s) SubCutaneous every 24 hours  gabapentin 400 milliGRAM(s) Oral two times a day  hydrochlorothiazide 25 milliGRAM(s) Oral daily  levETIRAcetam  IVPB 1000 milliGRAM(s) IV Intermittent every 12 hours  metoprolol succinate ER 50 milliGRAM(s) Oral daily  montelukast 10 milliGRAM(s) Oral daily  pantoprazole    Tablet 40 milliGRAM(s) Oral before breakfast  sertraline 50 milliGRAM(s) Oral daily    MEDICATIONS  (PRN):  acetaminophen     Tablet .. 650 milliGRAM(s) Oral every 6 hours PRN Temp greater or equal to 38C (100.4F), Mild Pain (1 - 3)  benzonatate 100 milliGRAM(s) Oral every 8 hours PRN Cough  clonazePAM  Tablet 0.5 milliGRAM(s) Oral every 8 hours PRN anxiety  melatonin 3 milliGRAM(s) Oral at bedtime PRN Insomnia  ondansetron Injectable 4 milliGRAM(s) IV Push every 8 hours PRN Nausea and/or Vomiting    Home Medications:  atorvastatin 80 mg oral tablet: 1 tab(s) orally once a day (05 Apr 2022 23:24)  gabapentin 400 mg oral capsule: 1 cap(s) orally 2 times a day (05 Apr 2022 23:24)  hydroCHLOROthiazide 25 mg oral tablet: 1 tab(s) orally once a day (05 Apr 2022 23:24)  metoprolol succinate 50 mg oral tablet, extended release: 1 tab(s) orally once a day (05 Apr 2022 23:24)  montelukast 10 mg oral tablet: 1 tab(s) orally once a day (05 Apr 2022 23:26)  omeprazole 40 mg oral delayed release capsule: 1 cap(s) orally once a day (05 Apr 2022 23:24)  sertraline 50 mg oral tablet: 1 tab(s) orally once a day (05 Apr 2022 23:24)    Vital Signs Last 24 Hrs  T(C): 35.8 (08 Apr 2022 13:15), Max: 36.9 (08 Apr 2022 05:21)  T(F): 96.4 (08 Apr 2022 13:15), Max: 98.4 (08 Apr 2022 05:21)  HR: 87 (08 Apr 2022 13:15) (87 - 93)  BP: 132/80 (08 Apr 2022 13:15) (130/60 - 159/77)  BP(mean): --  RR: 18 (08 Apr 2022 13:15) (18 - 18)  SpO2: 96% (08 Apr 2022 05:21) (96% - 97%)  CAPILLARY BLOOD GLUCOSE        LABS:                        13.2   5.97  )-----------( 298      ( 08 Apr 2022 05:50 )             39.6     04-08    143  |  103  |  11  ----------------------------<  99  3.6   |  29  |  0.6<L>    Ca    9.2      08 Apr 2022 05:50  Mg     2.2     04-07    TPro  6.8  /  Alb  4.2  /  TBili  0.7  /  DBili  x   /  AST  44<H>  /  ALT  53<H>  /  AlkPhos  85  04-08    LIVER FUNCTIONS - ( 08 Apr 2022 05:50 )  Alb: 4.2 g/dL / Pro: 6.8 g/dL / ALK PHOS: 85 U/L / ALT: 53 U/L / AST: 44 U/L / GGT: x               PT/INR - ( 07 Apr 2022 11:00 )   PT: 10.90 sec;   INR: 0.95 ratio         PTT - ( 07 Apr 2022 11:00 )  PTT:32.5 sec            Consultant Notes Reviewed:  [x ] YES  [ ] NO  Care Discussed with Consultants/Other Providers/ Housestaff [ x] YES  [ ] NO  Radiology, labs, new studies personally reviewed.                          CT Chest w/ IV Cont (04.06.22 @ 22:54)  No current CT evidence of active intrathoracic disease.    CT Abdomen and Pelvis w/ IV Cont (04.06.22 @ 22:55)  No CT evidence of acute or metastatic disease within the abdomen or pelvis    MR Head w/wo IV Cont (04.06.22 @ 14:42)  1. Enhancing cystic/necrotic lesion within the left occipital lobe measuring 2.2 cm. Smaller rim-enhancing lesion in the medial left periatrial region / splenium measuring 7 mm. Findings are most compatible with neoplastic etiology (primary multifocal versus metastatic).  2. Expansile nonenhancing FLAIR signal abnormality involving the splenium of the corpus callosum with extension along the occipital and temporal horns greater on the left. Additional faint diffuse white matter signal abnormality. Findings are also likely neoplastic.  3. Leptomeningeal signal abnormality (FLAIR hyperintense, faintly hyper-enhancing) within the left frontal and parietal sulci suspicious for leptomeningeal involvement.

## 2022-04-08 NOTE — PROGRESS NOTE ADULT - SUBJECTIVE AND OBJECTIVE BOX
************************************************  William Schneider MD (PGY-1)  Spectra: [phone]  ************************************************    SUBJECTIVE / OVERNIGHT EVENTS  Patient slept well overnight. No acute complaints this AM. Headache and nausea have improved since yesterday. Patient does not report fevers, chills, CP, SOB, or n/v/d    MEDICATIONS  atorvastatin 80 milliGRAM(s) Oral at bedtime  enoxaparin Injectable 40 milliGRAM(s) SubCutaneous every 24 hours  gabapentin 400 milliGRAM(s) Oral two times a day  hydrochlorothiazide 25 milliGRAM(s) Oral daily  levETIRAcetam  IVPB 1000 milliGRAM(s) IV Intermittent every 12 hours  metoprolol succinate ER 50 milliGRAM(s) Oral daily  montelukast 10 milliGRAM(s) Oral daily  pantoprazole    Tablet 40 milliGRAM(s) Oral before breakfast  sertraline 50 milliGRAM(s) Oral daily    acetaminophen     Tablet .. 650 milliGRAM(s) Oral every 6 hours PRN Temp greater or equal to 38C (100.4F), Mild Pain (1 - 3)  benzonatate 100 milliGRAM(s) Oral every 8 hours PRN Cough  melatonin 3 milliGRAM(s) Oral at bedtime PRN Insomnia  ondansetron Injectable 4 milliGRAM(s) IV Push every 8 hours PRN Nausea and/or Vomiting    VITALS /  EXAM    T(C): 36.9 (04-08-22 @ 05:21), Max: 36.9 (04-08-22 @ 05:21)  HR: 92 (04-08-22 @ 05:21) (78 - 93)  BP: 159/77 (04-08-22 @ 05:21) (111/57 - 159/77)  RR: 18 (04-08-22 @ 05:21) (18 - 18)  SpO2: 96% (04-08-22 @ 05:21) (96% - 97%)    GENERAL: NAD, well-developed  CHEST/LUNG: Clear to auscultation bilaterally; No wheezes, rales or rhonchi  HEART: Regular rate and rhythm; No murmurs, rubs, or gallops  ABDOMEN: Soft, Nontender, Nondistended; Bowel sounds present, no masses.  EXTREMITIES:  2+ Peripheral Pulses, No clubbing, cyanosis, or edema    I's & O's     LABS             13.2   5.97  )-----------( 298      ( 04-08-22 @ 05:50 )             39.6     143  |  103  |  11  -------------------------<  99   04-08-22 @ 05:50  3.6  |  29  |  0.6    Ca      9.2     04-08-22 @ 05:50  Mg     2.2     04-07-22 @ 08:00    TPro  6.8  /  Alb  4.2  /  TBili  0.7  /  DBili  x   /  AST  44  /  ALT  53  /  AlkPhos  85  /  GGT  x     04-08-22 @ 05:50    PT/INR - ( 04-07-22 @ 11:00 )   PT: 10.90 sec;   INR: 0.95 ratio  PTT - ( 04-07-22 @ 11:00 )  PTT:32.5 sec    Troponin T, Serum: <0.01 ng/mL (04-05-22 @ 19:17)    Lactate, Blood: 1.0 mmol/L (04-06-22 @ 06:45)  Blood Gas Venous - Lactate: 2.30 mmol/L (04-05-22 @ 23:21)  Blood Gas Venous - Lactate: 7.20 mmol/L (04-05-22 @ 20:01)

## 2022-04-09 NOTE — PROGRESS NOTE ADULT - ASSESSMENT
57F w/ h/o HTN, asthma, sleep apnea on cpap, migraines BIBA after she had a seizure. Patient has no history of seizures. As per , pt has been having URI symptoms for last few days. Started on Prednisone and zpack yesterday. Today, pt was complaining of headache consistent with her usual migraine HA at 1130. She took a nap and when she woke up, she vomited and had a seizure which lasted for about 1 min, witnessed by a . Stroke code called on arrival with NIH stroke score of 6. Patient was outside the appropriate time window for IV Alteplase.    #Brain Lesion  Initially presenting s/p witnessed seizure. Lactate 7.2 on admission (likely due to seizure), but repeat lactate 1.0. Loaded on Keppra in ED. rEEG showed no epileptiform activity. Stroke code called in ED w/ NIHSS 6. Outside window for tPA. Admission CTH demonstrated small focal hypodensity in left occipital lobe. Lesion further clarified on MRI as 2.2cm cystic/necrotic lesion w/ 7mm rim-enhancing lesion in medial left periatrial region, expansile nonenhancing FLAIR signal abnormality involving splenium of corpus collosum, and leptomeningeal signal abnormality within left frontal and parietal sulci. No evidence of acute or metastatic disease visualized on CT C/A/P.   - c/w Keppra 500mg IV Q12H  - Neuro IR ([id number]) consulted for LP to r/o CNS involvement given leptomeningeal findings on MRI (delayed until Monday 4/11 as pt received loading-dose ASA on 4/6; pre-op labs ordered for 4/10 AM)  - NSGY planning for brain biopsy on Tuesday 4/12 (pre-op labs ordered for 4/11 8PM)  - NO steroids due to concern for primary CNS lymphoma    #Parainfluenza Virus  #Entero/Rhinovirus  Reports URI symptoms since few days prior to admission. Started on azithromycin and prednisone day prior to admission. Admission RVP positive for parainfluenza and entero/rhinovirus. Will monitor off abx as symptoms likely secondary to viral as opposed to bacterial illness.  - c/w Tessalon Perle 100mg PO Q8H PRN cough  - c/w contact precautions (can d/c on 4/10)    #Hypertension  #Dyslipidemia  - c/w HCTZ 25mg PO QD  - c/w Toprol 50mg PO QD  - c/w atorvastatin 80mg PO QHS    #Asthma  - c/w montelukast 10mg PO QD    #Anxiety  - c/w sertraline 50mg PO QD    #GERD  - c/w pantoprazole 40mg PO QD    DVT PPX: Lovenox 40mg SQ QD  GI PPX: pantoprazole 40mg PO QD  DIET: DASH/TLC  ACTIVITY: IAT  CODE STATUS: Full Code  DISPOSITION: From Home    PENDING: IR-guided LP on Monday (pre-op labs tomorrow AM); brain biopsy w/ NSGY on Tuesday (pre-op labs Monday 8PM) 57F w/ h/o HTN, asthma, sleep apnea on cpap, migraines BIBA after she had a seizure. Patient has no history of seizures. As per , pt has been having URI symptoms for last few days. Started on Prednisone and zpack yesterday. Today, pt was complaining of headache consistent with her usual migraine HA at 1130. She took a nap and when she woke up, she vomited and had a seizure which lasted for about 1 min, witnessed by a . Stroke code called on arrival with NIH stroke score of 6. Patient was outside the appropriate time window for IV Alteplase.    #Brain Lesion  Initially presenting s/p witnessed seizure. Lactate 7.2 on admission (likely due to seizure), but repeat lactate 1.0. Loaded on Keppra in ED. rEEG showed no epileptiform activity. Stroke code called in ED w/ NIHSS 6. Outside window for tPA. Admission CTH demonstrated small focal hypodensity in left occipital lobe. Lesion further clarified on MRI as 2.2cm cystic/necrotic lesion w/ 7mm rim-enhancing lesion in medial left periatrial region, expansile nonenhancing FLAIR signal abnormality involving splenium of corpus collosum, and leptomeningeal signal abnormality within left frontal and parietal sulci. No evidence of acute or metastatic disease visualized on CT C/A/P.   - c/w Keppra 500mg IV Q12H  - Neuro IR ([id number]) consulted for LP to r/o CNS involvement given leptomeningeal findings on MRI (delayed until Monday 4/11 as pt received loading-dose ASA on 4/6; pre-op labs ordered for 4/10 AM)  - NSGY planning for brain biopsy on Tuesday 4/12 (pre-op labs ordered for 4/11 8PM)  - NO steroids due to concern for primary CNS lymphoma    #Parainfluenza Virus  #Entero/Rhinovirus  Reports URI symptoms since few days prior to admission. Started on azithromycin and prednisone day prior to admission. Admission RVP positive for parainfluenza and entero/rhinovirus. Will monitor off abx as symptoms likely secondary to viral as opposed to bacterial illness.  - c/w Tessalon Perle 100mg PO Q8H PRN cough  - c/w contact precautions (can d/c on 4/10)    #Hypertension  #Dyslipidemia  - c/w HCTZ 25mg PO QD  - c/w Toprol 50mg PO QD  - c/w atorvastatin 80mg PO QHS    #Asthma  - c/w montelukast 10mg PO QD    #Anxiety  - c/w sertraline 50mg PO QD    #GERD  - c/w pantoprazole 40mg PO QD    DVT PPX: Lovenox 40mg SQ QD  GI PPX: pantoprazole 40mg PO QD  DIET: DASH/TLC  ACTIVITY: IAT  CODE STATUS: Full Code  DISPOSITION: From Home    PENDING: d/c contact precautions tomorrow (4/10); IR-guided LP on Monday (pre-op labs tomorrow AM); brain biopsy w/ NSGY on Tuesday (pre-op labs Monday 8PM) 56F w/ h/o HTN, asthma, sleep apnea on cpap, migraines BIBA after she had a seizure. Patient has no history of seizures. As per , pt has been having URI symptoms for last few days. Started on Prednisone and zpack yesterday. Today, pt was complaining of headache consistent with her usual migraine HA at 1130. She took a nap and when she woke up, she vomited and had a seizure which lasted for about 1 min, witnessed by a . Stroke code called on arrival with NIH stroke score of 6. Patient was outside the appropriate time window for IV Alteplase.    #Brain Lesion  Initially presenting s/p witnessed seizure. Lactate 7.2 on admission (likely due to seizure), but repeat lactate 1.0. Loaded on Keppra in ED. rEEG showed no epileptiform activity. Stroke code called in ED w/ NIHSS 6. Outside window for tPA. Admission CTH demonstrated small focal hypodensity in left occipital lobe. Lesion further clarified on MRI as 2.2cm cystic/necrotic lesion w/ 7mm rim-enhancing lesion in medial left periatrial region, expansile nonenhancing FLAIR signal abnormality involving splenium of corpus collosum, and leptomeningeal signal abnormality within left frontal and parietal sulci. No evidence of acute or metastatic disease visualized on CT C/A/P.   - c/w Keppra 500mg IV Q12H  - Neuro IR ([id number]) consulted for LP to r/o CNS involvement given leptomeningeal findings on MRI (delayed until Monday 4/11 as pt received loading-dose ASA on 4/6; pre-op labs ordered for 4/10 AM)  - NSGY planning for brain biopsy on Tuesday 4/12 (pre-op labs ordered for 4/11 8PM)  - NO steroids due to concern for primary CNS lymphoma    #Parainfluenza Virus  #Entero/Rhinovirus  Reports URI symptoms since few days prior to admission. Started on azithromycin and prednisone day prior to admission. Admission RVP positive for parainfluenza and entero/rhinovirus. Will monitor off abx as symptoms likely secondary to viral as opposed to bacterial illness.  - c/w Tessalon Perle 100mg PO Q8H PRN cough  - c/w contact precautions (can d/c on 4/10)    #Hypertension  #Dyslipidemia  - c/w HCTZ 25mg PO QD  - c/w Toprol 50mg PO QD  - c/w atorvastatin 80mg PO QHS    #Asthma  - c/w montelukast 10mg PO QD    #Anxiety  - c/w sertraline 50mg PO QD    #GERD  - c/w pantoprazole 40mg PO QD    DVT PPX: Lovenox 40mg SQ QD  GI PPX: pantoprazole 40mg PO QD  DIET: DASH/TLC  ACTIVITY: IAT  CODE STATUS: Full Code  DISPOSITION: From Home    PENDING: d/c contact precautions tomorrow (4/10); IR-guided LP on Monday (pre-op labs tomorrow AM); brain biopsy w/ NSGY on Tuesday (pre-op labs Monday 8PM)

## 2022-04-09 NOTE — PROGRESS NOTE ADULT - ASSESSMENT
57F w/ h/o HTN, asthma, sleep apnea on cpap, migraines BIBA after she had a seizure. Patient has no history of seizures. As per , pt has been having URI symptoms for last few days. Started on Prednisone and zpack yesterday. Today, pt was complaining of headache consistent with her usual migraine HA at 1130. She took a nap and when she woke up, she vomited and had a seizure which lasted for about 1 min, witnessed by a .     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 Lactate 7.2 on admission (likely due to seizure), but repeat lactate 1.0. Loaded on Keppra in ED. rEEG showed no epileptiform activity. Stroke code called in ED w/ NIHSS 6. Outside window for tPA. Admission CTH demonstrated small focal hypodensity in left occipital lobe. Lesion further clarified on MRI as 2.2cm cystic/necrotic lesion w/ 7mm rim-enhancing lesion in medial left periatrial region, expansile nonenhancing FLAIR signal abnormality involving splenium of corpus collosum, and leptomeningeal signal abnormality within left frontal and parietal sulci. No evidence of acute or metastatic disease visualized on CT C/A/P.   - c/w Keppra 500mg IV Q12H  - Neuro IR ([id number]) consulted for LP to r/o CNS involvement given leptomeningeal findings on MRI but unable to do until Monday it as pt received ASA on admission  - d/w Dr. Kim: pt will get LP on Monday am: Need fluid for cytology, flow cytometry, cell count, etc  - NO steroids due to concern for primary CNS lymphoma  - d/w Dr. Dumont NSMANI  brain biopsy tentatively on Tuesday  - pt >>4METS, no h/o CP, SOB. Pt is low risk for intermed risk procedure (brain Bx)    #Parainfluenza Virus  #Entero/Rhinovirus  Reports URI symptoms since few days prior to admission. Started on azithromycin and prednisone day prior to admission. Admission RVP positive for parainfluenza and entero/rhinovirus. Will monitor off abx as symptoms likely secondary to viral as opposed to bacterial illness.  - Tessalon Perle 100mg PO Q8H PRN cough  - contact precautions for 5d total    #Hypertension  #Dyslipidemia  - c/w HCTZ 25mg PO QD  - c/w Toprol 50mg PO QD  - c/w atorvastatin 80mg PO QHS    #Asthma  - c/w montelukast 10mg PO QD    #Anxiety  - c/w sertraline 50mg PO QD    #GERD  - c/w pantoprazole 40mg PO QD    DVT PPX: Lovenox 40mg SQ QD  GI PPX: pantoprazole 40mg PO QD  DIET: DASH/TLC  ACTIVITY: IAT  CODE STATUS: Full Code  DISPOSITION: From Home    #Progress Note Handoff  Pending: Consults____Clinical improvement and stability__x___Tests_LP, Brain Bx______PT____x____  Pt/Family discussion: Pt/family informed and agrees with the current plan  Disposition: Home______/SNF_______/4A______/To be determined____x____    My note supersedes the residents note should a discrepancy arise.    Chart and notes personally reviewed.  Care Discussed with Consultants/Other Providers/ Housestaff [ x] YES [ ] NO   Radiology, labs, old records personally reviewed.    discussed w/ housestaff, nursing, case management, son

## 2022-04-09 NOTE — PROGRESS NOTE ADULT - SUBJECTIVE AND OBJECTIVE BOX
************************************************  William Schneider MD (PGY-1)  Spectra: [phone]  ************************************************    SUBJECTIVE / OVERNIGHT EVENTS  Patient slept well overnight. No acute complaints this AM. Patient does not report fevers, chills, CP, SOB, or n/v/d    MEDICATIONS  atorvastatin 80 milliGRAM(s) Oral at bedtime  enoxaparin Injectable 40 milliGRAM(s) SubCutaneous every 24 hours  gabapentin 400 milliGRAM(s) Oral two times a day  hydrochlorothiazide 25 milliGRAM(s) Oral daily  levETIRAcetam  IVPB 1000 milliGRAM(s) IV Intermittent every 12 hours  metoprolol succinate ER 50 milliGRAM(s) Oral daily  montelukast 10 milliGRAM(s) Oral daily  pantoprazole    Tablet 40 milliGRAM(s) Oral before breakfast  sertraline 50 milliGRAM(s) Oral daily    acetaminophen     Tablet .. 650 milliGRAM(s) Oral every 6 hours PRN Temp greater or equal to 38C (100.4F), Mild Pain (1 - 3)  benzonatate 100 milliGRAM(s) Oral every 8 hours PRN Cough  clonazePAM  Tablet 0.5 milliGRAM(s) Oral every 8 hours PRN anxiety  melatonin 3 milliGRAM(s) Oral at bedtime PRN Insomnia  ondansetron Injectable 4 milliGRAM(s) IV Push every 8 hours PRN Nausea and/or Vomiting    VITALS /  EXAM    T(C): 36 (04-09-22 @ 04:30), Max: 36.9 (04-08-22 @ 20:03)  HR: 90 (04-09-22 @ 04:30) (76 - 90)  BP: 169/92 (04-09-22 @ 04:30) (132/80 - 169/92)  RR: 18 (04-09-22 @ 04:30) (18 - 18)  SpO2: 96% (04-09-22 @ 04:30) (96% - 96%)    GENERAL: NAD, well-developed  CHEST/LUNG: Clear to auscultation bilaterally; No wheezes, rales or rhonchi  HEART: Regular rate and rhythm; No murmurs, rubs, or gallops  ABDOMEN: Soft, Nontender, Nondistended; Bowel sounds present, no masses.  EXTREMITIES:  2+ Peripheral Pulses, No clubbing, cyanosis, or edema    LABS             13.2   5.97  )-----------( 298      ( 04-08-22 @ 05:50 )             39.6     143  |  103  |  11  -------------------------<  99   04-08-22 @ 05:50  3.6  |  29  |  0.6    Ca      9.2     04-08-22 @ 05:50    TPro  6.8  /  Alb  4.2  /  TBili  0.7  /  DBili  x   /  AST  44  /  ALT  53  /  AlkPhos  85  /  GGT  x     04-08-22 @ 05:50    PT/INR - ( 04-07-22 @ 11:00 )   PT: 10.90 sec;   INR: 0.95 ratio  PTT - ( 04-07-22 @ 11:00 )  PTT:32.5 sec

## 2022-04-09 NOTE — PROGRESS NOTE ADULT - SUBJECTIVE AND OBJECTIVE BOX
Patient is a 56y old  Female who presents with a chief complaint of Seizure (07 Apr 2022 12:13)    INTERVAL HPI/OVERNIGHT EVENTS: Patient was examined and seen at bedside. This morning pt is resting comfortably in bed and reports no new issues or overnight events. No complaints, feels better. Anxious. Slept well after getting Klonopin.  ROS: Denies CP, SOB, AP, new weakness  All other systems reviewed and are within normal limits.  InitialHPI:  57 y.o. female with PMH of HTN, asthma, sleep apnea on cpap, migraines BIBA after she had a seizure. Patient has no history of seizures. As per , pt has been having URI symptoms for last few days. Started on Prednisone and zpack yesterday. Today, pt was complaining of headache consistent with her usual migraine HA at 1130. She took a nap and when she woke up, she vomited and had a seizure which lasted for about 1 min, witnessed by a . Stroke code called on arrival. Patient last known well 1130. Patient with tongue trauma. Patient out of the window for IV thrombolytics. CTH suggestive of stroke vs seizure in left occipital lobe and cerebral edema. Patient refused stat MRI brain due to claustrophobia and would require sedation. Currently no nursing available to monitor patient in MRI so plan for MRI in AM with sedation.    In ED vitals: /71, HR 96, RR 18, T 97.8, spo2 99% on RA. Patient got 2L IVF, reglan, zofran and keppra in ED. RVP positive for parainfluenza and rhinovirus. Labs significant for lactate of 7.2  (05 Apr 2022 23:11)    PAST MEDICAL & SURGICAL HISTORY:  RAMAKRISHNA on CPAP    GERD (gastroesophageal reflux disease)    Asthmatic bronchitis  MILD , USES VENTOLIN Q2-3M    HTN (hypertension)    Migraines    Chronic neck pain    H/O sinus surgery    Status post D&amp;C    H/O arthroscopy of shoulder  RT    History of hernia repair  2019        General: NAD, AAO3  HEENT:  EOMI, no LAD  CV: S1 S2  Resp: decreased breath sounds at bases  GI: NT/ND/S +BS  MS: no clubbing/cyanosis/edema, + pulses b/l  Neuro: nonfocal, +reflexes thruout            MEDICATIONS  (STANDING):  atorvastatin 80 milliGRAM(s) Oral at bedtime  enoxaparin Injectable 40 milliGRAM(s) SubCutaneous every 24 hours  gabapentin 400 milliGRAM(s) Oral two times a day  hydrochlorothiazide 25 milliGRAM(s) Oral daily  levETIRAcetam  IVPB 1000 milliGRAM(s) IV Intermittent every 12 hours  metoprolol succinate ER 50 milliGRAM(s) Oral daily  montelukast 10 milliGRAM(s) Oral daily  pantoprazole    Tablet 40 milliGRAM(s) Oral before breakfast  sertraline 50 milliGRAM(s) Oral daily    MEDICATIONS  (PRN):  acetaminophen     Tablet .. 650 milliGRAM(s) Oral every 6 hours PRN Temp greater or equal to 38C (100.4F), Mild Pain (1 - 3)  benzonatate 100 milliGRAM(s) Oral every 8 hours PRN Cough  clonazePAM  Tablet 0.5 milliGRAM(s) Oral every 8 hours PRN anxiety  melatonin 3 milliGRAM(s) Oral at bedtime PRN Insomnia  ondansetron Injectable 4 milliGRAM(s) IV Push every 8 hours PRN Nausea and/or Vomiting    Home Medications:  atorvastatin 80 mg oral tablet: 1 tab(s) orally once a day (05 Apr 2022 23:24)  gabapentin 400 mg oral capsule: 1 cap(s) orally 2 times a day (05 Apr 2022 23:24)  hydroCHLOROthiazide 25 mg oral tablet: 1 tab(s) orally once a day (05 Apr 2022 23:24)  metoprolol succinate 50 mg oral tablet, extended release: 1 tab(s) orally once a day (05 Apr 2022 23:24)  montelukast 10 mg oral tablet: 1 tab(s) orally once a day (05 Apr 2022 23:26)  omeprazole 40 mg oral delayed release capsule: 1 cap(s) orally once a day (05 Apr 2022 23:24)  sertraline 50 mg oral tablet: 1 tab(s) orally once a day (05 Apr 2022 23:24)    Vital Signs Last 24 Hrs  T(C): 36 (09 Apr 2022 04:30), Max: 36.9 (08 Apr 2022 20:03)  T(F): 96.8 (09 Apr 2022 04:30), Max: 98.4 (08 Apr 2022 20:03)  HR: 78 (09 Apr 2022 08:17) (76 - 90)  BP: 124/76 (09 Apr 2022 08:17) (124/76 - 169/92)  BP(mean): --  RR: 18 (09 Apr 2022 04:30) (18 - 18)  SpO2: 96% (09 Apr 2022 04:30) (96% - 96%)  CAPILLARY BLOOD GLUCOSE        LABS:                        13.9   6.74  )-----------( 318      ( 09 Apr 2022 04:30 )             43.2     04-09    143  |  99  |  13  ----------------------------<  98  4.4   |  31  |  0.6<L>    Ca    9.7      09 Apr 2022 04:30    TPro  7.2  /  Alb  4.5  /  TBili  0.6  /  DBili  x   /  AST  54<H>  /  ALT  63<H>  /  AlkPhos  91  04-09    LIVER FUNCTIONS - ( 09 Apr 2022 04:30 )  Alb: 4.5 g/dL / Pro: 7.2 g/dL / ALK PHOS: 91 U/L / ALT: 63 U/L / AST: 54 U/L / GGT: x                           Consultant Notes Reviewed:  [x ] YES  [ ] NO  Care Discussed with Consultants/Other Providers/ Housestaff [ x] YES  [ ] NO  Radiology, labs, new studies personally reviewed.                                      CT Chest w/ IV Cont (04.06.22 @ 22:54)  No current CT evidence of active intrathoracic disease.    CT Abdomen and Pelvis w/ IV Cont (04.06.22 @ 22:55)  No CT evidence of acute or metastatic disease within the abdomen or pelvis    MR Head w/wo IV Cont (04.06.22 @ 14:42)  1. Enhancing cystic/necrotic lesion within the left occipital lobe measuring 2.2 cm. Smaller rim-enhancing lesion in the medial left periatrial region / splenium measuring 7 mm. Findings are most compatible with neoplastic etiology (primary multifocal versus metastatic).  2. Expansile nonenhancing FLAIR signal abnormality involving the splenium of the corpus callosum with extension along the occipital and temporal horns greater on the left. Additional faint diffuse white matter signal abnormality. Findings are also likely neoplastic.  3. Leptomeningeal signal abnormality (FLAIR hyperintense, faintly hyper-enhancing) within the left frontal and parietal sulci suspicious for leptomeningeal involvement.

## 2022-04-10 NOTE — PROGRESS NOTE ADULT - SUBJECTIVE AND OBJECTIVE BOX
Patient is a 56y old  Female who presents with a chief complaint of Seizure (07 Apr 2022 12:13)    INTERVAL HPI/OVERNIGHT EVENTS: Patient was examined and seen at bedside. This morning pt is resting comfortably in bed and reports no new issues or overnight events. No complaints, feels better. Anxious. Slept well after getting Klonopin. Declines SQ Lovenox (aware of benefits/risks).  ROS: Denies CP, SOB, AP, new weakness  All other systems reviewed and are within normal limits.  InitialHPI:  57 y.o. female with PMH of HTN, asthma, sleep apnea on cpap, migraines BIBA after she had a seizure. Patient has no history of seizures. As per , pt has been having URI symptoms for last few days. Started on Prednisone and zpack yesterday. Today, pt was complaining of headache consistent with her usual migraine HA at 1130. She took a nap and when she woke up, she vomited and had a seizure which lasted for about 1 min, witnessed by a . Stroke code called on arrival. Patient last known well 1130. Patient with tongue trauma. Patient out of the window for IV thrombolytics. CTH suggestive of stroke vs seizure in left occipital lobe and cerebral edema. Patient refused stat MRI brain due to claustrophobia and would require sedation. Currently no nursing available to monitor patient in MRI so plan for MRI in AM with sedation.    In ED vitals: /71, HR 96, RR 18, T 97.8, spo2 99% on RA. Patient got 2L IVF, reglan, zofran and keppra in ED. RVP positive for parainfluenza and rhinovirus. Labs significant for lactate of 7.2  (05 Apr 2022 23:11)    PAST MEDICAL & SURGICAL HISTORY:  RAMAKRISHNA on CPAP    GERD (gastroesophageal reflux disease)    Asthmatic bronchitis  MILD , USES VENTOLIN Q2-3M    HTN (hypertension)    Migraines    Chronic neck pain    H/O sinus surgery    Status post D&amp;C    H/O arthroscopy of shoulder  RT    History of hernia repair  2019        General: NAD, AAO3  HEENT:  EOMI, no LAD  CV: S1 S2  Resp: decreased breath sounds at bases  GI: NT/ND/S +BS  MS: no clubbing/cyanosis/edema, + pulses b/l  Neuro: nonfocal, +reflexes thruout              MEDICATIONS  (STANDING):  atorvastatin 80 milliGRAM(s) Oral at bedtime  enoxaparin Injectable 40 milliGRAM(s) SubCutaneous every 24 hours  gabapentin 400 milliGRAM(s) Oral two times a day  hydrochlorothiazide 25 milliGRAM(s) Oral daily  levETIRAcetam  IVPB 1000 milliGRAM(s) IV Intermittent every 12 hours  metoprolol succinate ER 50 milliGRAM(s) Oral daily  montelukast 10 milliGRAM(s) Oral daily  pantoprazole    Tablet 40 milliGRAM(s) Oral before breakfast  sertraline 50 milliGRAM(s) Oral daily    MEDICATIONS  (PRN):  acetaminophen     Tablet .. 650 milliGRAM(s) Oral every 6 hours PRN Temp greater or equal to 38C (100.4F), Mild Pain (1 - 3)  benzonatate 100 milliGRAM(s) Oral every 8 hours PRN Cough  clonazePAM  Tablet 0.5 milliGRAM(s) Oral every 8 hours PRN anxiety  melatonin 3 milliGRAM(s) Oral at bedtime PRN Insomnia  ondansetron Injectable 4 milliGRAM(s) IV Push every 8 hours PRN Nausea and/or Vomiting    Home Medications:  atorvastatin 80 mg oral tablet: 1 tab(s) orally once a day (05 Apr 2022 23:24)  gabapentin 400 mg oral capsule: 1 cap(s) orally 2 times a day (05 Apr 2022 23:24)  hydroCHLOROthiazide 25 mg oral tablet: 1 tab(s) orally once a day (05 Apr 2022 23:24)  metoprolol succinate 50 mg oral tablet, extended release: 1 tab(s) orally once a day (05 Apr 2022 23:24)  montelukast 10 mg oral tablet: 1 tab(s) orally once a day (05 Apr 2022 23:26)  omeprazole 40 mg oral delayed release capsule: 1 cap(s) orally once a day (05 Apr 2022 23:24)  sertraline 50 mg oral tablet: 1 tab(s) orally once a day (05 Apr 2022 23:24)    Vital Signs Last 24 Hrs  T(C): 36.3 (10 Apr 2022 13:40), Max: 36.6 (09 Apr 2022 20:44)  T(F): 97.3 (10 Apr 2022 13:40), Max: 97.9 (09 Apr 2022 20:44)  HR: 89 (10 Apr 2022 13:40) (81 - 89)  BP: 115/66 (10 Apr 2022 13:40) (115/66 - 130/80)  BP(mean): --  RR: 19 (10 Apr 2022 13:40) (18 - 19)  SpO2: --  CAPILLARY BLOOD GLUCOSE        LABS:                        14.2   7.38  )-----------( 308      ( 10 Apr 2022 04:30 )             42.8     04-10    140  |  100  |  14  ----------------------------<  105<H>  4.1   |  28  |  0.6<L>    Ca    9.6      10 Apr 2022 04:30  Mg     1.8     04-10    TPro  6.9  /  Alb  4.4  /  TBili  0.6  /  DBili  x   /  AST  47<H>  /  ALT  63<H>  /  AlkPhos  94  04-10    LIVER FUNCTIONS - ( 10 Apr 2022 04:30 )  Alb: 4.4 g/dL / Pro: 6.9 g/dL / ALK PHOS: 94 U/L / ALT: 63 U/L / AST: 47 U/L / GGT: x               PT/INR - ( 10 Apr 2022 04:30 )   PT: 12.20 sec;   INR: 1.06 ratio         PTT - ( 10 Apr 2022 04:30 )  PTT:32.4 sec            Consultant Notes Reviewed:  [x ] YES  [ ] NO  Care Discussed with Consultants/Other Providers/ Housestaff [ x] YES  [ ] NO  Radiology, labs, new studies personally reviewed.                                        CT Chest w/ IV Cont (04.06.22 @ 22:54)  No current CT evidence of active intrathoracic disease.    CT Abdomen and Pelvis w/ IV Cont (04.06.22 @ 22:55)  No CT evidence of acute or metastatic disease within the abdomen or pelvis    MR Head w/wo IV Cont (04.06.22 @ 14:42)  1. Enhancing cystic/necrotic lesion within the left occipital lobe measuring 2.2 cm. Smaller rim-enhancing lesion in the medial left periatrial region / splenium measuring 7 mm. Findings are most compatible with neoplastic etiology (primary multifocal versus metastatic).  2. Expansile nonenhancing FLAIR signal abnormality involving the splenium of the corpus callosum with extension along the occipital and temporal horns greater on the left. Additional faint diffuse white matter signal abnormality. Findings are also likely neoplastic.  3. Leptomeningeal signal abnormality (FLAIR hyperintense, faintly hyper-enhancing) within the left frontal and parietal sulci suspicious for leptomeningeal involvement.

## 2022-04-10 NOTE — PROGRESS NOTE ADULT - ASSESSMENT
57F w/ h/o HTN, asthma, sleep apnea on cpap, migraines BIBA after she had a seizure. Patient has no history of seizures. As per , pt has been having URI symptoms for last few days. Started on Prednisone and zpack yesterday. Today, pt was complaining of headache consistent with her usual migraine HA at 1130. She took a nap and when she woke up, she vomited and had a seizure which lasted for about 1 min, witnessed by a .     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 Lactate 7.2 on admission (likely due to seizure), but repeat lactate 1.0. Loaded on Keppra in ED. rEEG showed no epileptiform activity. Stroke code called in ED w/ NIHSS 6. Outside window for tPA. Admission CTH demonstrated small focal hypodensity in left occipital lobe. Lesion further clarified on MRI as 2.2cm cystic/necrotic lesion w/ 7mm rim-enhancing lesion in medial left periatrial region, expansile nonenhancing FLAIR signal abnormality involving splenium of corpus collosum, and leptomeningeal signal abnormality within left frontal and parietal sulci. No evidence of acute or metastatic disease visualized on CT C/A/P.   - c/w Keppra 500mg IV Q12H  - Neuro IR (x1133) consulted for LP to r/o CNS involvement given leptomeningeal findings on MRI but unable to do until Monday it as pt received ASA on admission  - d/w Dr. Kim: pt will get LP on Monday am: Need fluid for cytology, flow cytometry, cell count, etc  - NO steroids due to concern for primary CNS lymphoma  - d/w Dr. Dumont NSMANI  brain biopsy tentatively on Tuesday  - pt >>4METS, no h/o CP, SOB. Pt is low risk for intermed risk procedure (brain Bx)  - NPO after MN    #Parainfluenza Virus  #Entero/Rhinovirus  Reports URI symptoms since few days prior to admission. Started on azithromycin and prednisone day prior to admission. Admission RVP positive for parainfluenza and entero/rhinovirus. Will monitor off abx as symptoms likely secondary to viral as opposed to bacterial illness.  - Tessalon Perle 100mg PO Q8H PRN cough  - contact precautions for 5d total (d/c)    #Hypertension  #Dyslipidemia  - c/w HCTZ 25mg PO QD  - c/w Toprol 50mg PO QD  - c/w atorvastatin 80mg PO QHS    #Asthma  - c/w montelukast 10mg PO QD    #Anxiety  - c/w sertraline 50mg PO QD    #GERD  - c/w pantoprazole 40mg PO QD    DVT PPX: Lovenox 40mg SQ QD  GI PPX: pantoprazole 40mg PO QD  DIET: DASH/TLC  ACTIVITY: IAT  CODE STATUS: Full Code  DISPOSITION: From Home    #Progress Note Handoff  Pending: Consults____Clinical improvement and stability__x___Tests_LP, Brain Bx______PT____x____  Pt/Family discussion: Pt/family informed and agree with the current plan  Disposition: Home______/SNF_______/4A______/To be determined____x____    My note supersedes the residents note should a discrepancy arise.    Chart and notes personally reviewed.  Care Discussed with Consultants/Other Providers/ Housestaff [ x] YES [ ] NO   Radiology, labs, old records personally reviewed.    discussed w/ housestaff, nursing, case management, son

## 2022-04-11 NOTE — PRE-ANESTHESIA EVALUATION ADULT - NSANTHPMHFT_GEN_ALL_CORE
pt with witnessed seizure and visual field deficit,  CTH suggestive of stroke vs seizure in left occipital lobe. Suggestive of left sided headache(history of migraines). CTH also suggestive of cerebral edema. Discussed at length with Dr. Dhaliwal. Patient refused stat MRI brain for claustrophobia, would require sedation. Patient alert and oriented to self place time. Patient with sleep apnea history and from a safety perspective would benefit from MRI this  am sedated and monitored.     pt also has droplet precautions for flu currently
Reviewed

## 2022-04-11 NOTE — PROGRESS NOTE ADULT - ASSESSMENT
57F w/ h/o HTN, asthma, sleep apnea on cpap, migraines BIBA after she had a seizure. Patient has no history of seizures. As per , pt has been having URI symptoms for last few days. Started on Prednisone and zpack yesterday. Today, pt was complaining of headache consistent with her usual migraine HA at 1130. She took a nap and when she woke up, she vomited and had a seizure which lasted for about 1 min, witnessed by a .     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 Lactate 7.2 on admission (likely due to seizure), but repeat lactate 1.0. Loaded on Keppra in ED. rEEG showed no epileptiform activity. Stroke code called in ED w/ NIHSS 6. Outside window for tPA. Admission CTH demonstrated small focal hypodensity in left occipital lobe. Lesion further clarified on MRI as 2.2cm cystic/necrotic lesion w/ 7mm rim-enhancing lesion in medial left periatrial region, expansile nonenhancing FLAIR signal abnormality involving splenium of corpus collosum, and leptomeningeal signal abnormality within left frontal and parietal sulci. No evidence of acute or metastatic disease visualized on CT C/A/P.   - c/w Keppra 500mg IV Q12H  - Neuro IR (x1133) consulted for LP to r/o CNS involvement given leptomeningeal findings on MRI but unable to do until Monday it as pt received ASA on admission  - d/w Dr. Kim: pt will get LP on Monday am: Need fluid for cytology, flow cytometry, cell count, etc  - NO steroids due to concern for primary CNS lymphoma  - d/w Dr. Dumont NSMANI  brain biopsy tentatively on Tuesday  - pt >>4METS, no h/o CP, SOB. Pt is low risk for intermed risk procedure (brain Bx)  - 4/11 s/p LP. f/u LP results. - NPO after MN for possible brain Bx. Hold Lovenox    #Parainfluenza Virus  #Entero/Rhinovirus  Reports URI symptoms since few days prior to admission. Started on azithromycin and prednisone day prior to admission. Admission RVP positive for parainfluenza and entero/rhinovirus. Will monitor off abx as symptoms likely secondary to viral as opposed to bacterial illness.  - Tessalon Perle 100mg PO Q8H PRN cough  - contact precautions for 5d total (d/c)    #Hypertension  #Dyslipidemia  - c/w HCTZ 25mg PO QD  - c/w Toprol 50mg PO QD  - c/w atorvastatin 80mg PO QHS    #Asthma  - c/w montelukast 10mg PO QD    #Anxiety  - c/w sertraline 50mg PO QD    #GERD  - c/w pantoprazole 40mg PO QD    DVT PPX: Lovenox 40mg SQ QD  GI PPX: pantoprazole 40mg PO QD  DIET: DASH/TLC  ACTIVITY: IAT  CODE STATUS: Full Code  DISPOSITION: From Home    #Progress Note Handoff  Pending: Consults____Clinical improvement and stability__x___Tests_LP results, Brain Bx______PT____x____  Pt/Family discussion: Pt/family informed and agree with the current plan  Disposition: Home______/SNF_______/4A______/To be determined____x____    My note supersedes the residents note should a discrepancy arise.    Chart and notes personally reviewed.  Care Discussed with Consultants/Other Providers/ Housestaff [ x] YES [ ] NO   Radiology, labs, old records personally reviewed.    discussed w/ housestaff, nursing, case management, , father in law

## 2022-04-11 NOTE — PROGRESS NOTE ADULT - SUBJECTIVE AND OBJECTIVE BOX
Hospital Day:  6d    Subjective: Patient is a 56y old  Female who presents with a chief complaint of Seizure (10 Apr 2022 14:16)      Pt seen and evaluated at bedside.   Complaints:  Over the night Events:    Past Medical Hx:   RAMAKRISHNA on CPAP    GERD (gastroesophageal reflux disease)    Asthmatic bronchitis    HTN (hypertension)    Migraines    Chronic neck pain      Past Sx:  H/O sinus surgery    Status post D&amp;C    H/O arthroscopy of shoulder    History of hernia repair      Allergies:  No Known Allergies    Current Meds:   Standng Meds:  atorvastatin 80 milliGRAM(s) Oral at bedtime  gabapentin 400 milliGRAM(s) Oral two times a day  hydrochlorothiazide 25 milliGRAM(s) Oral daily  levETIRAcetam  IVPB 1000 milliGRAM(s) IV Intermittent every 12 hours  metoprolol succinate ER 50 milliGRAM(s) Oral daily  montelukast 10 milliGRAM(s) Oral daily  pantoprazole    Tablet 40 milliGRAM(s) Oral before breakfast  sertraline 50 milliGRAM(s) Oral daily  sodium chloride 0.9%. 1000 milliLiter(s) (60 mL/Hr) IV Continuous <Continuous>    PRN Meds:  acetaminophen     Tablet .. 650 milliGRAM(s) Oral every 6 hours PRN Temp greater or equal to 38C (100.4F), Mild Pain (1 - 3)  benzonatate 100 milliGRAM(s) Oral every 8 hours PRN Cough  clonazePAM  Tablet 0.5 milliGRAM(s) Oral every 8 hours PRN anxiety  melatonin 3 milliGRAM(s) Oral at bedtime PRN Insomnia  ondansetron Injectable 4 milliGRAM(s) IV Push every 8 hours PRN Nausea and/or Vomiting      Vital Signs:   T(F): 96.5 (04-11-22 @ 06:19), Max: 97.3 (04-10-22 @ 13:40)  HR: 77 (04-11-22 @ 06:19) (77 - 89)  BP: 145/74 (04-11-22 @ 06:19) (115/66 - 145/74)  RR: 18 (04-11-22 @ 06:19) (18 - 19)  SpO2: --    Physical Exam:   GENERAL: NAD, Resting in bed  HEENT: NCAT  CHEST/LUNG: Clear to auscultation bilaterally; No wheezing or rubs.   HEART: Regular rate and rhythm; No murmurs, rubs, or gallops  ABDOMEN: Bowel sounds present; Soft, Nontender, Nondistended.   EXTREMITIES:  No clubbing, cyanosis, or edema  NERVOUS SYSTEM:  Alert & Oriented X3    FLUID BALANCE      Labs:                         14.2   7.38  )-----------( 308      ( 10 Apr 2022 04:30 )             42.8       10 Apr 2022 04:30    140    |  100    |  14     ----------------------------<  105    4.1     |  28     |  0.6      Ca    9.6        10 Apr 2022 04:30  Mg     1.8       10 Apr 2022 04:30    TPro  6.9    /  Alb  4.4    /  TBili  0.6    /  DBili  x      /  AST  47     /  ALT  63     /  AlkPhos  94     10 Apr 2022 04:30                                      Radiology:

## 2022-04-11 NOTE — PROGRESS NOTE ADULT - SUBJECTIVE AND OBJECTIVE BOX
Surgery:     Left Occipital Inola Hole with Biopsy of Mass    HPI  Procedure:  SEIZURE; ABNORMAL HEAD CT; PARAINFLUENZA; RHINOVIRUS    ^SEIZURE; ABNORMAL HEAD CT; PARAINFLUENZA; RHINOVIRUS    No pertinent family history in first degree relatives    Handoff    MEWS Score    RAMAKRISHNA on CPAP    GERD (gastroesophageal reflux disease)    Asthmatic bronchitis    HTN (hypertension)    Migraines    Chronic neck pain    Seizure    H/O sinus surgery    Status post D&amp;C    H/O arthroscopy of shoulder    History of hernia repair    Abnormal head CT    Parainfluenza    Rhinovirus    [email or web address]      acetaminophen     Tablet .. 650 milliGRAM(s) Oral every 6 hours PRN  atorvastatin 80 milliGRAM(s) Oral at bedtime  benzonatate 100 milliGRAM(s) Oral every 8 hours PRN  clonazePAM  Tablet 0.5 milliGRAM(s) Oral every 8 hours PRN  gabapentin 400 milliGRAM(s) Oral two times a day  hydrochlorothiazide 25 milliGRAM(s) Oral daily  levETIRAcetam  IVPB 1000 milliGRAM(s) IV Intermittent every 12 hours  melatonin 3 milliGRAM(s) Oral at bedtime PRN  metoprolol succinate ER 50 milliGRAM(s) Oral daily  montelukast 10 milliGRAM(s) Oral daily  ondansetron Injectable 4 milliGRAM(s) IV Push every 8 hours PRN  pantoprazole    Tablet 40 milliGRAM(s) Oral before breakfast  sertraline 50 milliGRAM(s) Oral daily  sodium chloride 0.9%. 1000 milliLiter(s) IV Continuous <Continuous>    No Known Allergies      04-10    140  |  100  |  14  ----------------------------<  105<H>  4.1   |  28  |  0.6<L>    Ca    9.6      10 Apr 2022 04:30  Mg     1.8     10    TPro  6.9  /  Alb  4.4  /  TBili  0.6  /  DBili  x   /  AST  47<H>  /  ALT  63<H>  /  AlkPhos  94  10    CBC Full  -  ( 10 Apr 2022 04:30 )  WBC Count : 7.38 K/uL  RBC Count : 5.00 M/uL  Hemoglobin : 14.2 g/dL  Hematocrit : 42.8 %  Platelet Count - Automated : 308 K/uL  Mean Cell Volume : 85.6 fL  Mean Cell Hemoglobin : 28.4 pg  Mean Cell Hemoglobin Concentration : 33.2 g/dL  Auto Neutrophil # : 4.67 K/uL  Auto Lymphocyte # : 2.04 K/uL  Auto Monocyte # : 0.41 K/uL  Auto Eosinophil # : 0.21 K/uL  Auto Basophil # : 0.04 K/uL  Auto Neutrophil % : 63.4 %  Auto Lymphocyte % : 27.6 %  Auto Monocyte % : 5.6 %  Auto Eosinophil % : 2.8 %  Auto Basophil % : 0.5 %    PT/INR - ( 10 Apr 2022 04:30 )   PT: 12.20 sec;   INR: 1.06 ratio    PTT - ( 10 Apr 2022 04:30 )  PTT:32.4 sec  Type & Screen: Done    CXR: < from: CT Chest w/ IV Cont (22 @ 22:54) >  No current CT evidence of active intrathoracic disease.    < end of copied text >    EKG: < from: 12 Lead ECG (22 @ 19:47) >  Nonspecific T wave abnormality  Abnormal ECG      < end of copied text >     Pt is low risk for intermed risk procedure (brain Bx)Medical Clearances:  Other Clearances:     Last dose of antiplatelet/anticoagulation dru22    Orders: NPO after midnight             Consent: To be obtained

## 2022-04-11 NOTE — PROGRESS NOTE ADULT - SUBJECTIVE AND OBJECTIVE BOX
Patient is a 56y old  Female who presents with a chief complaint of Seizure (07 Apr 2022 12:13)    INTERVAL HPI/OVERNIGHT EVENTS: Patient was examined and seen at bedside. This morning pt is resting comfortably in bed and reports no new issues or overnight events. No complaints, feels well. URI Sx resolving. S/p LP.  ROS: Denies CP, SOB, AP, new weakness  All other systems reviewed and are within normal limits.  InitialHPI:  57 y.o. female with PMH of HTN, asthma, sleep apnea on cpap, migraines BIBA after she had a seizure. Patient has no history of seizures. As per , pt has been having URI symptoms for last few days. Started on Prednisone and zpack yesterday. Today, pt was complaining of headache consistent with her usual migraine HA at 1130. She took a nap and when she woke up, she vomited and had a seizure which lasted for about 1 min, witnessed by a . Stroke code called on arrival. Patient last known well 1130. Patient with tongue trauma. Patient out of the window for IV thrombolytics. CTH suggestive of stroke vs seizure in left occipital lobe and cerebral edema. Patient refused stat MRI brain due to claustrophobia and would require sedation. Currently no nursing available to monitor patient in MRI so plan for MRI in AM with sedation.    In ED vitals: /71, HR 96, RR 18, T 97.8, spo2 99% on RA. Patient got 2L IVF, reglan, zofran and keppra in ED. RVP positive for parainfluenza and rhinovirus. Labs significant for lactate of 7.2  (05 Apr 2022 23:11)    PAST MEDICAL & SURGICAL HISTORY:  RAMAKRISHNA on CPAP    GERD (gastroesophageal reflux disease)    Asthmatic bronchitis  MILD , USES VENTOLIN Q2-3M    HTN (hypertension)    Migraines    Chronic neck pain    H/O sinus surgery    Status post D&amp;C    H/O arthroscopy of shoulder  RT    History of hernia repair  2019        General: NAD, AAO3  HEENT:  EOMI, no LAD  CV: S1 S2  Resp: decreased breath sounds at bases  GI: NT/ND/S +BS  MS: no clubbing/cyanosis/edema, + pulses b/l  Neuro: nonfocal, +reflexes thruout            MEDICATIONS  (STANDING):  atorvastatin 80 milliGRAM(s) Oral at bedtime  gabapentin 400 milliGRAM(s) Oral two times a day  hydrochlorothiazide 25 milliGRAM(s) Oral daily  levETIRAcetam  IVPB 1000 milliGRAM(s) IV Intermittent every 12 hours  metoprolol succinate ER 50 milliGRAM(s) Oral daily  montelukast 10 milliGRAM(s) Oral daily  pantoprazole    Tablet 40 milliGRAM(s) Oral before breakfast  sertraline 50 milliGRAM(s) Oral daily  sodium chloride 0.9%. 1000 milliLiter(s) (60 mL/Hr) IV Continuous <Continuous>    MEDICATIONS  (PRN):  acetaminophen     Tablet .. 650 milliGRAM(s) Oral every 6 hours PRN Temp greater or equal to 38C (100.4F), Mild Pain (1 - 3)  benzonatate 100 milliGRAM(s) Oral every 8 hours PRN Cough  clonazePAM  Tablet 0.5 milliGRAM(s) Oral every 8 hours PRN anxiety  melatonin 3 milliGRAM(s) Oral at bedtime PRN Insomnia  ondansetron Injectable 4 milliGRAM(s) IV Push every 8 hours PRN Nausea and/or Vomiting    Home Medications:  atorvastatin 80 mg oral tablet: 1 tab(s) orally once a day (05 Apr 2022 23:24)  gabapentin 400 mg oral capsule: 1 cap(s) orally 2 times a day (05 Apr 2022 23:24)  hydroCHLOROthiazide 25 mg oral tablet: 1 tab(s) orally once a day (05 Apr 2022 23:24)  metoprolol succinate 50 mg oral tablet, extended release: 1 tab(s) orally once a day (05 Apr 2022 23:24)  montelukast 10 mg oral tablet: 1 tab(s) orally once a day (05 Apr 2022 23:26)  omeprazole 40 mg oral delayed release capsule: 1 cap(s) orally once a day (05 Apr 2022 23:24)  sertraline 50 mg oral tablet: 1 tab(s) orally once a day (05 Apr 2022 23:24)    Vital Signs Last 24 Hrs  T(C): 37.3 (11 Apr 2022 13:50), Max: 37.3 (11 Apr 2022 13:50)  T(F): 99.2 (11 Apr 2022 13:50), Max: 99.2 (11 Apr 2022 13:50)  HR: 89 (11 Apr 2022 13:50) (77 - 89)  BP: 116/63 (11 Apr 2022 13:50) (116/63 - 145/74)  BP(mean): --  RR: 18 (11 Apr 2022 13:50) (18 - 19)  SpO2: --  CAPILLARY BLOOD GLUCOSE        LABS:                        14.2   7.38  )-----------( 308      ( 10 Apr 2022 04:30 )             42.8     04-10    140  |  100  |  14  ----------------------------<  105<H>  4.1   |  28  |  0.6<L>    Ca    9.6      10 Apr 2022 04:30  Mg     1.8     04-10    TPro  6.9  /  Alb  4.4  /  TBili  0.6  /  DBili  x   /  AST  47<H>  /  ALT  63<H>  /  AlkPhos  94  04-10    LIVER FUNCTIONS - ( 10 Apr 2022 04:30 )  Alb: 4.4 g/dL / Pro: 6.9 g/dL / ALK PHOS: 94 U/L / ALT: 63 U/L / AST: 47 U/L / GGT: x               PT/INR - ( 10 Apr 2022 04:30 )   PT: 12.20 sec;   INR: 1.06 ratio         PTT - ( 10 Apr 2022 04:30 )  PTT:32.4 sec            Consultant Notes Reviewed:  [x ] YES  [ ] NO  Care Discussed with Consultants/Other Providers/ Housestaff [ x] YES  [ ] NO  Radiology, labs, new studies personally reviewed.                                    CT Chest w/ IV Cont (04.06.22 @ 22:54)  No current CT evidence of active intrathoracic disease.    CT Abdomen and Pelvis w/ IV Cont (04.06.22 @ 22:55)  No CT evidence of acute or metastatic disease within the abdomen or pelvis    MR Head w/wo IV Cont (04.06.22 @ 14:42)  1. Enhancing cystic/necrotic lesion within the left occipital lobe measuring 2.2 cm. Smaller rim-enhancing lesion in the medial left periatrial region / splenium measuring 7 mm. Findings are most compatible with neoplastic etiology (primary multifocal versus metastatic).  2. Expansile nonenhancing FLAIR signal abnormality involving the splenium of the corpus callosum with extension along the occipital and temporal horns greater on the left. Additional faint diffuse white matter signal abnormality. Findings are also likely neoplastic.  3. Leptomeningeal signal abnormality (FLAIR hyperintense, faintly hyper-enhancing) within the left frontal and parietal sulci suspicious for leptomeningeal involvement.

## 2022-04-11 NOTE — PROGRESS NOTE ADULT - ASSESSMENT
56F w/ h/o HTN, asthma, sleep apnea on cpap, migraines BIBA after she had a seizure. Patient has no history of seizures. As per , pt has been having URI symptoms for last few days. Started on Prednisone and zpack yesterday. Today, pt was complaining of headache consistent with her usual migraine HA at 1130. She took a nap and when she woke up, she vomited and had a seizure which lasted for about 1 min, witnessed by a . Stroke code called on arrival with NIH stroke score of 6. Patient was outside the appropriate time window for IV Alteplase.    #Brain Lesion  Initially presenting s/p witnessed seizure. Lactate 7.2 on admission (likely due to seizure), but repeat lactate 1.0. Loaded on Keppra in ED. rEEG showed no epileptiform activity. Stroke code called in ED w/ NIHSS 6. Outside window for tPA. Admission CTH demonstrated small focal hypodensity in left occipital lobe. Lesion further clarified on MRI as 2.2cm cystic/necrotic lesion w/ 7mm rim-enhancing lesion in medial left periatrial region, expansile nonenhancing FLAIR signal abnormality involving splenium of corpus collosum, and leptomeningeal signal abnormality within left frontal and parietal sulci. No evidence of acute or metastatic disease visualized on CT C/A/P.   - c/w Keppra 500mg IV Q12H  - Neuro IR (x1133) consulted for LP to r/o CNS involvement given leptomeningeal findings on MRI  - NSGY planning for brain biopsy on Tuesday 4/12 (pre-op labs ordered for 4/11 8PM)  - NO steroids due to concern for primary CNS lymphoma  - LP done 04/11 >>> hydration post LP  - pending NSgx recs -- plan for stereotactic brain biopsy??  - f/u cytopathology/flow/Cx and other CSF studies    #Parainfluenza Virus  #Entero/Rhinovirus  Reports URI symptoms since few days prior to admission. Started on azithromycin and prednisone day prior to admission. Admission RVP positive for parainfluenza and entero/rhinovirus. Will monitor off abx as symptoms likely secondary to viral as opposed to bacterial illness.  - c/w Tessalon Perle 100mg PO Q8H PRN cough  - contact precautions d/c on 4/10)    #Hypertension  #Dyslipidemia  - c/w HCTZ 25mg PO QD  - c/w Toprol 50mg PO QD  - c/w atorvastatin 80mg PO QHS    #Asthma - c/w montelukast 10mg PO QD  #Anxiety - c/w sertraline 50mg PO QD  #GERD - c/w pantoprazole 40mg PO QD    DVT PPX: Lovenox 40mg SQ QD  GI PPX: pantoprazole 40mg PO QD  DIET: DASH/TLC  ACTIVITY: IAT  CODE STATUS: Full Code  DISPOSITION: From Home    PENDING: d/c contact precautions tomorrow (4/10); IR-guided LP on Monday (pre-op labs tomorrow AM); brain biopsy w/ NSGY on Tuesday (pre-op labs Monday 8PM)

## 2022-04-12 NOTE — PROGRESS NOTE ADULT - TIME BILLING
time spent on review of labs, imaging studies, old records, obtaining history, personally examining patient, counselling and communicating with patient/ family, entering orders for medications/tests/etc, discussions with other health care providers, documentation in electronic health records, independent interpretation of labs, imaging/procedure results and care coordination.

## 2022-04-12 NOTE — PROGRESS NOTE ADULT - ASSESSMENT
56F w/ h/o HTN, asthma, sleep apnea on cpap, migraines BIBA after she had a seizure. Patient has no history of seizures. As per , pt has been having URI symptoms for last few days. Started on Prednisone and zpack yesterday. Today, pt was complaining of headache consistent with her usual migraine HA at 1130. She took a nap and when she woke up, she vomited and had a seizure which lasted for about 1 min, witnessed by a . Stroke code called on arrival with NIH stroke score of 6. Patient was outside the appropriate time window for IV Alteplase.    #Brain Lesion  Initially presenting s/p witnessed seizure. Lactate 7.2 on admission (likely due to seizure), but repeat lactate 1.0. Loaded on Keppra in ED. rEEG showed no epileptiform activity. Stroke code called in ED w/ NIHSS 6. Outside window for tPA. Admission CTH demonstrated small focal hypodensity in left occipital lobe. Lesion further clarified on MRI as 2.2cm cystic/necrotic lesion w/ 7mm rim-enhancing lesion in medial left periatrial region, expansile nonenhancing FLAIR signal abnormality involving splenium of corpus collosum, and leptomeningeal signal abnormality within left frontal and parietal sulci. No evidence of acute or metastatic disease visualized on CT C/A/P.   - c/w Keppra 500mg IV Q12H  - Neuro IR (x1133) consulted for LP to r/o CNS involvement given leptomeningeal findings on MRI  - NSGY planning for brain biopsy on Tuesday 4/12 (pre-op labs ordered for 4/11 8PM)  - NO steroids due to concern for primary CNS lymphoma  - LP done 04/11 >>> hydration post LP  - NSgx recs -- stereotactic brain biopsy today >> f/u Bx results  - CSF studies neg so far --- pending cytopathology/flow/Cx     #Parainfluenza Virus  #Entero/Rhinovirus  Reports URI symptoms since few days prior to admission. Started on azithromycin and prednisone day prior to admission. Admission RVP positive for parainfluenza and entero/rhinovirus. Will monitor off abx as symptoms likely secondary to viral as opposed to bacterial illness.  - c/w Tessalon Perle 100mg PO Q8H PRN cough  - contact precautions d/c on 4/10)    #Hypertension  #Dyslipidemia  - c/w HCTZ 25mg PO QD  - c/w Toprol 50mg PO QD  - c/w atorvastatin 80mg PO QHS    #Asthma - c/w montelukast 10mg PO QD  #Anxiety - c/w sertraline 50mg PO QD  #GERD - c/w pantoprazole 40mg PO QD    DVT PPX: Lovenox 40mg SQ QD  GI PPX: pantoprazole 40mg PO QD  DIET: DASH/TLC  ACTIVITY: IAT  CODE STATUS: Full Code  DISPOSITION: From Home 56F w/ h/o HTN, asthma, sleep apnea on cpap, migraines BIBA after she had a seizure. Patient has no history of seizures. As per , pt has been having URI symptoms for last few days. Started on Prednisone and zpack yesterday. Today, pt was complaining of headache consistent with her usual migraine HA at 1130. She took a nap and when she woke up, she vomited and had a seizure which lasted for about 1 min, witnessed by a . Stroke code called on arrival with NIH stroke score of 6. Patient was outside the appropriate time window for IV Alteplase.      #Stereotactic Brain Bx Day 0  - procedure by NSgx  - NPO overnight  - Preop labs stable  - outsource CSF study pending    #Brain Lesion  Initially presenting s/p witnessed seizure. Lactate 7.2 on admission (likely due to seizure), but repeat lactate 1.0. Loaded on Keppra in ED. rEEG showed no epileptiform activity. Stroke code called in ED w/ NIHSS 6. Outside window for tPA. Admission CTH demonstrated small focal hypodensity in left occipital lobe. Lesion further clarified on MRI as 2.2cm cystic/necrotic lesion w/ 7mm rim-enhancing lesion in medial left periatrial region, expansile nonenhancing FLAIR signal abnormality involving splenium of corpus collosum, and leptomeningeal signal abnormality within left frontal and parietal sulci. No evidence of acute or metastatic disease visualized on CT C/A/P.   - c/w Keppra 500mg IV Q12H  - Neuro IR (x1133) consulted for LP to r/o CNS involvement given leptomeningeal findings on MRI  - NS planning for brain biopsy on Tuesday 4/12   - NO steroids due to concern for primary CNS lymphoma  - LP done 04/11 >>> hydration post LP  - NSgx recs -- stereotactic brain biopsy today >> f/u Bx results  - CSF studies neg so far --- pending cytopathology/flow/Cx     #Parainfluenza Virus  #Entero/Rhinovirus  Reports URI symptoms since few days prior to admission. Started on azithromycin and prednisone day prior to admission. Admission RVP positive for parainfluenza and entero/rhinovirus. Will monitor off abx as symptoms likely secondary to viral as opposed to bacterial illness.  - c/w Tessalon Perle 100mg PO Q8H PRN cough  - contact precautions d/c on 4/10)    #Hypertension  #Dyslipidemia  - c/w HCTZ 25mg PO QD  - c/w Toprol 50mg PO QD  - c/w atorvastatin 80mg PO QHS    #Asthma - c/w montelukast 10mg PO QD  #Anxiety - c/w sertraline 50mg PO QD  #GERD - c/w pantoprazole 40mg PO QD    DVT PPX: Lovenox 40mg SQ QD  GI PPX: pantoprazole 40mg PO QD  DIET: DASH/TLC  ACTIVITY: IAT  CODE STATUS: Full Code  DISPOSITION: From Home

## 2022-04-12 NOTE — PROGRESS NOTE ADULT - NS ATTEST RISK GEN_ALL_CORE
Risk Statement (NON-critical care)

## 2022-04-12 NOTE — PROGRESS NOTE ADULT - SUBJECTIVE AND OBJECTIVE BOX
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 She currently denies nausea/vomiting or double vision but c/o mild headache and dry throat.  Pt has  at bedside for support. HCT done post op shows pneumocephalus w/i surgical bed without evidence of hemorrhage. Pt started on IV tylenol for pain.. prn. 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 650 milliGRAM(s) Oral every 6 hours PRN  atorvastatin 80 milliGRAM(s) Oral at bedtime  benzonatate 100 milliGRAM(s) Oral every 8 hours PRN  ceFAZolin   IVPB 1000 milliGRAM(s) IV Intermittent every 8 hours  clonazePAM  Tablet 0.5 milliGRAM(s) Oral every 8 hours PRN  dexAMETHasone  Injectable 4 milliGRAM(s) IV Push every 6 hours  gabapentin 400 milliGRAM(s) Oral two times a day  hydrochlorothiazide 25 milliGRAM(s) Oral daily  HYDROmorphone  Injectable 0.5 milliGRAM(s) IV Push every 10 minutes PRN  HYDROmorphone  Injectable 1 milliGRAM(s) IV Push every 10 minutes PRN  lactated ringers. 1000 milliLiter(s) IV Continuous <Continuous>  levETIRAcetam  IVPB 1000 milliGRAM(s) IV Intermittent every 12 hours  melatonin 3 milliGRAM(s) Oral at bedtime PRN  meperidine     Injectable 12.5 milliGRAM(s) IV Push every 10 minutes PRN  metoprolol succinate ER 50 milliGRAM(s) Oral daily  montelukast 10 milliGRAM(s) Oral daily  morphine  - Injectable 2 milliGRAM(s) IntraMuscular every 4 hours PRN  ondansetron Injectable 4 milliGRAM(s) IV Push once PRN  ondansetron Injectable 4 milliGRAM(s) IV Push every 8 hours PRN  oxycodone    5 mG/acetaminophen 325 mG 1 Tablet(s) Oral every 4 hours PRN  oxycodone    5 mG/acetaminophen 325 mG 2 Tablet(s) Oral every 6 hours PRN  pantoprazole    Tablet 40 milliGRAM(s) Oral before breakfast  sertraline 50 milliGRAM(s) Oral daily  sodium chloride 0.9%. 1000 milliLiter(s) IV Continuous <Continuous>      Exam:  AAOX3. Verbal function intact  tongue midline, facial motions symmetric  PERRLA, EOMI  Pronator Drift:   Finger to Nose intact  Motor: MAEx4, 5/5 power in b/l UE and LE  Sensation: intact to touch in all extremities                Assessment: 55 y/o F with hx of siezure, htn, depression and Asthma s/p biopsy of high grade astrocytoma      Plan: Await Read for HCT          ABX - ancef x 24H          Decadron 4mg q 6H with GI coverage         Keppra 1G q 12h         Neuro checks q 1H         HOB at 30 degrees

## 2022-04-12 NOTE — CHART NOTE - NSCHARTNOTEFT_GEN_A_CORE
PACU ANESTHESIA ADMISSION NOTE      Procedure: Excision of supratentorial neoplasm of brain      Post op diagnosis:      ____  Intubated  TV:______       Rate: ______      FiO2: ______    _x___  Patent Airway    _x___  Full return of protective reflexes    ___  Full recovery from anesthesia / back to baseline status    Vitals:            T:   98.1             BP :    137/74            R:   16           Sat:   100%            P: 96      Mental Status:  _x___ Awake   _____ Alert   _____ Drowsy   _____ Sedated    Nausea/Vomiting:  _x___  NO       ______Yes,   See Post - Op Orders         Pain Scale (0-10):  __0___    Treatment: ___ None    __x__ See Post - Op/PCA Orders    Post - Operative Fluids:   __ Oral   _x___ See Post - Op Orders    Plan: Discharge:   __Home       _____Floor     ___x__Critical Care    _Neuro ICU____  Other:_________________    Comments:  No anesthesia issues or complications noted. Pt to be admitted to Neuro ICU, Report given to ICU team.  Stable.

## 2022-04-12 NOTE — PROGRESS NOTE ADULT - NS ATTEST RISK PROBLEM GEN_ALL_CORE FT
----- Message from Maryam Leon RN sent at 6/16/2015  9:04 AM CDT -----  Regarding: f/u colon  Colonoscopy 6/3/2015--polyps  F/u colon in 3 years      
new brain lesions w/ leptomeningeal inv-nt

## 2022-04-12 NOTE — PROGRESS NOTE ADULT - ASSESSMENT
57F w/ h/o HTN, asthma, sleep apnea on cpap, migraines BIBA after she had a seizure. Patient has no history of seizures. As per , pt has been having URI symptoms for last few days. Started on Prednisone and zpack yesterday. Today, pt was complaining of headache consistent with her usual migraine HA at 1130. She took a nap and when she woke up, she vomited and had a seizure which lasted for about 1 min, witnessed by a .     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 Lactate 7.2 on admission (likely due to seizure), but repeat lactate 1.0. Loaded on Keppra in ED. rEEG showed no epileptiform activity. Stroke code called in ED w/ NIHSS 6. Outside window for tPA. Admission CTH demonstrated small focal hypodensity in left occipital lobe. Lesion further clarified on MRI as 2.2cm cystic/necrotic lesion w/ 7mm rim-enhancing lesion in medial left periatrial region, expansile nonenhancing FLAIR signal abnormality involving splenium of corpus collosum, and leptomeningeal signal abnormality within left frontal and parietal sulci. No evidence of acute or metastatic disease visualized on CT C/A/P.   - c/w Keppra 500mg IV Q12H  - Neuro IR (x1133) consulted for LP to r/o CNS involvement given leptomeningeal findings on MRI but unable to do until Monday it as pt received ASA on admission  - d/w Dr. Kim: pt will get LP on Monday am: Need fluid for cytology, flow cytometry, cell count, etc  - NO steroids due to concern for primary CNS lymphoma  - d/w Dr. Dumont NSMANI  brain biopsy tentatively on Tuesday  - pt >>4METS, no h/o CP, SOB. Pt is low risk for intermed risk procedure (brain Bx)  - 4/11 s/p LP. f/u LP results. - NPO after MN for possible brain Bx. Hold Lovenox  - 4/12 LP results non-Dx. Needs Brain Bx.    #Parainfluenza Virus  #Entero/Rhinovirus  Reports URI symptoms since few days prior to admission. Started on azithromycin and prednisone day prior to admission. Admission RVP positive for parainfluenza and entero/rhinovirus. Will monitor off abx as symptoms likely secondary to viral as opposed to bacterial illness.  - Tessalon Perle 100mg PO Q8H PRN cough  - contact precautions for 5d total (d/c)    #Hypertension  #Dyslipidemia  - c/w HCTZ 25mg PO QD  - c/w Toprol 50mg PO QD  - c/w atorvastatin 80mg PO QHS    #Asthma  - c/w montelukast 10mg PO QD    #Anxiety  - c/w sertraline 50mg PO QD    #GERD  - c/w pantoprazole 40mg PO QD    DVT PPX: Lovenox 40mg SQ QD (on hold)  GI PPX: pantoprazole 40mg PO QD  DIET: DASH/TLC  ACTIVITY: IAT  CODE STATUS: Full Code  DISPOSITION: From Home    #Progress Note Handoff  Pending: Consults____Clinical improvement and stability__x___Tests_ Brain Bx______PT____x____  Pt/Family discussion: Pt/family informed and agree with the current plan  Disposition: Home______/SNF_______/4A______/To be determined____x____    My note supersedes the residents note should a discrepancy arise.    Chart and notes personally reviewed.  Care Discussed with Consultants/Other Providers/ Housestaff [ x] YES [ ] NO   Radiology, labs, old records personally reviewed.    discussed w/ housestaff, nursing, case management, , NS

## 2022-04-12 NOTE — PROGRESS NOTE ADULT - SUBJECTIVE AND OBJECTIVE BOX
Patient is a 56y old  Female who presents with a chief complaint of Seizure (07 Apr 2022 12:13)    INTERVAL HPI/OVERNIGHT EVENTS: Patient was examined and seen at bedside. This morning pt is resting comfortably in bed and reports no new issues or overnight events. No complaints, feels well. URI Sx resolving. S/p LP yesterday.  ROS: Denies CP, SOB, AP, new weakness  All other systems reviewed and are within normal limits.  InitialHPI:  57 y.o. female with PMH of HTN, asthma, sleep apnea on cpap, migraines BIBA after she had a seizure. Patient has no history of seizures. As per , pt has been having URI symptoms for last few days. Started on Prednisone and zpack yesterday. Today, pt was complaining of headache consistent with her usual migraine HA at 1130. She took a nap and when she woke up, she vomited and had a seizure which lasted for about 1 min, witnessed by a . Stroke code called on arrival. Patient last known well 1130. Patient with tongue trauma. Patient out of the window for IV thrombolytics. CTH suggestive of stroke vs seizure in left occipital lobe and cerebral edema. Patient refused stat MRI brain due to claustrophobia and would require sedation. Currently no nursing available to monitor patient in MRI so plan for MRI in AM with sedation.    In ED vitals: /71, HR 96, RR 18, T 97.8, spo2 99% on RA. Patient got 2L IVF, reglan, zofran and keppra in ED. RVP positive for parainfluenza and rhinovirus. Labs significant for lactate of 7.2  (05 Apr 2022 23:11)    PAST MEDICAL & SURGICAL HISTORY:  RAMAKRISHNA on CPAP    GERD (gastroesophageal reflux disease)    Asthmatic bronchitis  MILD , USES VENTOLIN Q2-3M    HTN (hypertension)    Migraines    Chronic neck pain    H/O sinus surgery    Status post D&amp;C    H/O arthroscopy of shoulder  RT    History of hernia repair  2019        General: NAD, AAO3  HEENT:  EOMI, no LAD  CV: S1 S2  Resp: decreased breath sounds at bases  GI: NT/ND/S +BS  MS: no clubbing/cyanosis/edema, + pulses b/l  Neuro: nonfocal, +reflexes thruout            MEDICATIONS  (STANDING):  atorvastatin 80 milliGRAM(s) Oral at bedtime  gabapentin 400 milliGRAM(s) Oral two times a day  hydrochlorothiazide 25 milliGRAM(s) Oral daily  levETIRAcetam  IVPB 1000 milliGRAM(s) IV Intermittent every 12 hours  metoprolol succinate ER 50 milliGRAM(s) Oral daily  montelukast 10 milliGRAM(s) Oral daily  pantoprazole    Tablet 40 milliGRAM(s) Oral before breakfast  sertraline 50 milliGRAM(s) Oral daily  sodium chloride 0.9%. 1000 milliLiter(s) (60 mL/Hr) IV Continuous <Continuous>    MEDICATIONS  (PRN):  acetaminophen     Tablet .. 650 milliGRAM(s) Oral every 6 hours PRN Temp greater or equal to 38C (100.4F), Mild Pain (1 - 3)  benzonatate 100 milliGRAM(s) Oral every 8 hours PRN Cough  clonazePAM  Tablet 0.5 milliGRAM(s) Oral every 8 hours PRN anxiety  melatonin 3 milliGRAM(s) Oral at bedtime PRN Insomnia  ondansetron Injectable 4 milliGRAM(s) IV Push every 8 hours PRN Nausea and/or Vomiting    Home Medications:  atorvastatin 80 mg oral tablet: 1 tab(s) orally once a day (05 Apr 2022 23:24)  gabapentin 400 mg oral capsule: 1 cap(s) orally 2 times a day (05 Apr 2022 23:24)  hydroCHLOROthiazide 25 mg oral tablet: 1 tab(s) orally once a day (05 Apr 2022 23:24)  metoprolol succinate 50 mg oral tablet, extended release: 1 tab(s) orally once a day (05 Apr 2022 23:24)  montelukast 10 mg oral tablet: 1 tab(s) orally once a day (05 Apr 2022 23:26)  omeprazole 40 mg oral delayed release capsule: 1 cap(s) orally once a day (05 Apr 2022 23:24)  sertraline 50 mg oral tablet: 1 tab(s) orally once a day (05 Apr 2022 23:24)    Vital Signs Last 24 Hrs  T(C): 37 (12 Apr 2022 05:25), Max: 37.3 (11 Apr 2022 13:50)  T(F): 98.6 (12 Apr 2022 05:25), Max: 99.2 (11 Apr 2022 13:50)  HR: 85 (12 Apr 2022 05:25) (85 - 89)  BP: 150/80 (12 Apr 2022 05:25) (116/63 - 150/80)  BP(mean): --  RR: 18 (12 Apr 2022 05:25) (18 - 18)  SpO2: --  CAPILLARY BLOOD GLUCOSE        LABS:                        13.9   9.82  )-----------( 342      ( 11 Apr 2022 20:00 )             42.9     04-11    144  |  106  |  12  ----------------------------<  104<H>  3.8   |  24  |  0.6<L>    Ca    9.6      11 Apr 2022 20:00    TPro  6.8  /  Alb  4.4  /  TBili  0.4  /  DBili  x   /  AST  39  /  ALT  51<H>  /  AlkPhos  101  04-11    LIVER FUNCTIONS - ( 11 Apr 2022 20:00 )  Alb: 4.4 g/dL / Pro: 6.8 g/dL / ALK PHOS: 101 U/L / ALT: 51 U/L / AST: 39 U/L / GGT: x               PT/INR - ( 11 Apr 2022 20:00 )   PT: 11.40 sec;   INR: 0.99 ratio         PTT - ( 11 Apr 2022 20:00 )  PTT:28.9 sec            Culture - Fungal, CSF (collected 11 Apr 2022 08:51)  Source: .CSF CSF  Preliminary Report (12 Apr 2022 07:57):    Testing in progress    Culture - Acid Fast - CSF (collected 11 Apr 2022 08:51)  Source: .CSF CSF    Culture - CSF with Gram Stain (collected 11 Apr 2022 08:51)  Source: .CSF CSF  Gram Stain (11 Apr 2022 19:43):    No polymorphonuclear leukocytes seen    No organisms seen    by cytocentrifuge      Consultant Notes Reviewed:  [x ] YES  [ ] NO  Care Discussed with Consultants/Other Providers/ Housestaff [ x] YES  [ ] NO  Radiology, labs, new studies personally reviewed.      CT Chest w/ IV Cont (04.06.22 @ 22:54)  No current CT evidence of active intrathoracic disease.    CT Abdomen and Pelvis w/ IV Cont (04.06.22 @ 22:55)  No CT evidence of acute or metastatic disease within the abdomen or pelvis    MR Head w/wo IV Cont (04.06.22 @ 14:42)  1. Enhancing cystic/necrotic lesion within the left occipital lobe measuring 2.2 cm. Smaller rim-enhancing lesion in the medial left periatrial region / splenium measuring 7 mm. Findings are most compatible with neoplastic etiology (primary multifocal versus metastatic).  2. Expansile nonenhancing FLAIR signal abnormality involving the splenium of the corpus callosum with extension along the occipital and temporal horns greater on the left. Additional faint diffuse white matter signal abnormality. Findings are also likely neoplastic.  3. Leptomeningeal signal abnormality (FLAIR hyperintense, faintly hyper-enhancing) within the left frontal and parietal sulci suspicious for leptomeningeal involvement.

## 2022-04-12 NOTE — PROGRESS NOTE ADULT - SUBJECTIVE AND OBJECTIVE BOX
Hospital Day:  7d    Subjective: Patient is a 56y old  Female who presents with a chief complaint of Seizure (11 Apr 2022 17:16)      Pt seen and evaluated at bedside.   Complaints:  Over the night Events:    Past Medical Hx:   RAMAKRISHNA on CPAP    GERD (gastroesophageal reflux disease)    Asthmatic bronchitis    HTN (hypertension)    Migraines    Chronic neck pain      Past Sx:  H/O sinus surgery    Status post D&amp;C    H/O arthroscopy of shoulder    History of hernia repair      Allergies:  No Known Allergies    Current Meds:   Standng Meds:  atorvastatin 80 milliGRAM(s) Oral at bedtime  gabapentin 400 milliGRAM(s) Oral two times a day  hydrochlorothiazide 25 milliGRAM(s) Oral daily  levETIRAcetam  IVPB 1000 milliGRAM(s) IV Intermittent every 12 hours  metoprolol succinate ER 50 milliGRAM(s) Oral daily  montelukast 10 milliGRAM(s) Oral daily  pantoprazole    Tablet 40 milliGRAM(s) Oral before breakfast  sertraline 50 milliGRAM(s) Oral daily    PRN Meds:  acetaminophen     Tablet .. 650 milliGRAM(s) Oral every 6 hours PRN Temp greater or equal to 38C (100.4F), Mild Pain (1 - 3)  benzonatate 100 milliGRAM(s) Oral every 8 hours PRN Cough  clonazePAM  Tablet 0.5 milliGRAM(s) Oral every 8 hours PRN anxiety  melatonin 3 milliGRAM(s) Oral at bedtime PRN Insomnia  ondansetron Injectable 4 milliGRAM(s) IV Push every 8 hours PRN Nausea and/or Vomiting      Vital Signs:   T(F): 98.6 (04-12-22 @ 05:25), Max: 99.2 (04-11-22 @ 13:50)  HR: 85 (04-12-22 @ 05:25) (85 - 89)  BP: 150/80 (04-12-22 @ 05:25) (116/63 - 150/80)  RR: 18 (04-12-22 @ 05:25) (18 - 18)  SpO2: --    Physical Exam:   GENERAL: NAD, Resting in bed  HEENT: NCAT  CHEST/LUNG: Clear to auscultation bilaterally; No wheezing or rubs.   HEART: Regular rate and rhythm; No murmurs, rubs, or gallops  ABDOMEN: Bowel sounds present; Soft, Nontender, Nondistended.   EXTREMITIES:  No clubbing, cyanosis, or edema  NERVOUS SYSTEM:  Alert & Oriented X3    FLUID BALANCE    04-11-22 @ 07:01  -  04-12-22 @ 06:56  --------------------------------------------------------  IN: 240 mL / OUT: 0 mL / NET: 240 mL        Labs:                         13.9   9.82  )-----------( 342      ( 11 Apr 2022 20:00 )             42.9     Neutophil% 56.9, Lymphocyte% 31.5, Monocyte% 7.0, Bands% 0.2 04-11-22 @ 20:00    11 Apr 2022 20:00    144    |  106    |  12     ----------------------------<  104    3.8     |  24     |  0.6      Ca    9.6        11 Apr 2022 20:00    TPro  6.8    /  Alb  4.4    /  TBili  0.4    /  DBili  x      /  AST  39     /  ALT  51     /  AlkPhos  101    11 Apr 2022 20:00       pTT    28.9             ----< 0.99 INR  (04-11-22 @ 20:00)    11.40        PT                                  Radiology:  Hospital Day:  7d    Subjective: Patient is a 56y old  Female who presents with a chief complaint of Seizure (11 Apr 2022 17:16)      Pt seen and evaluated at bedside. Family present at bedside. She is a bit nervous this morning for her pending Bx. She is aware of the procedure and will discussed her concerns w/ the anesthesiologist.   Complaints:  Over the night Events:    Past Medical Hx:   RAMAKRISHNA on CPAP    GERD (gastroesophageal reflux disease)    Asthmatic bronchitis    HTN (hypertension)    Migraines    Chronic neck pain      Past Sx:  H/O sinus surgery    Status post D&amp;C    H/O arthroscopy of shoulder    History of hernia repair      Allergies:  No Known Allergies    Current Meds:   Standng Meds:  atorvastatin 80 milliGRAM(s) Oral at bedtime  gabapentin 400 milliGRAM(s) Oral two times a day  hydrochlorothiazide 25 milliGRAM(s) Oral daily  levETIRAcetam  IVPB 1000 milliGRAM(s) IV Intermittent every 12 hours  metoprolol succinate ER 50 milliGRAM(s) Oral daily  montelukast 10 milliGRAM(s) Oral daily  pantoprazole    Tablet 40 milliGRAM(s) Oral before breakfast  sertraline 50 milliGRAM(s) Oral daily    PRN Meds:  acetaminophen     Tablet .. 650 milliGRAM(s) Oral every 6 hours PRN Temp greater or equal to 38C (100.4F), Mild Pain (1 - 3)  benzonatate 100 milliGRAM(s) Oral every 8 hours PRN Cough  clonazePAM  Tablet 0.5 milliGRAM(s) Oral every 8 hours PRN anxiety  melatonin 3 milliGRAM(s) Oral at bedtime PRN Insomnia  ondansetron Injectable 4 milliGRAM(s) IV Push every 8 hours PRN Nausea and/or Vomiting      Vital Signs:   T(F): 98.6 (04-12-22 @ 05:25), Max: 99.2 (04-11-22 @ 13:50)  HR: 85 (04-12-22 @ 05:25) (85 - 89)  BP: 150/80 (04-12-22 @ 05:25) (116/63 - 150/80)  RR: 18 (04-12-22 @ 05:25) (18 - 18)  SpO2: --    Physical Exam:   GENERAL: Nervous. Sitting in chair.   HEENT: NCAT  CHEST/LUNG: Clear to auscultation bilaterally; No wheezing or rubs.   HEART: Regular rate and rhythm; No murmurs, rubs, or gallops  ABDOMEN: Bowel sounds present; Soft, Nontender, Nondistended.   EXTREMITIES:  No clubbing, cyanosis, or edema  NERVOUS SYSTEM:  Alert & Oriented X3    FLUID BALANCE    04-11-22 @ 07:01  -  04-12-22 @ 06:56  --------------------------------------------------------  IN: 240 mL / OUT: 0 mL / NET: 240 mL        Labs:                         13.9   9.82  )-----------( 342      ( 11 Apr 2022 20:00 )             42.9     Neutophil% 56.9, Lymphocyte% 31.5, Monocyte% 7.0, Bands% 0.2 04-11-22 @ 20:00    11 Apr 2022 20:00    144    |  106    |  12     ----------------------------<  104    3.8     |  24     |  0.6      Ca    9.6        11 Apr 2022 20:00    TPro  6.8    /  Alb  4.4    /  TBili  0.4    /  DBili  x      /  AST  39     /  ALT  51     /  AlkPhos  101    11 Apr 2022 20:00       pTT    28.9             ----< 0.99 INR  (04-11-22 @ 20:00)    11.40        PT                                  Radiology:

## 2022-04-13 NOTE — CONSULT NOTE ADULT - ASSESSMENT
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,    PRECAUTIONS: [   ] Cardiac  [   ] Respiratory  [   ] Seizures [   ] Contact Isolation  [   ] Droplet Isolation  [   ] Other    Weight Bearing Status:     RECOMMENDATION:    Out of Bed to Chair     DVT/Decubiti Prophylaxis    REHAB PLAN:     [  x  ] Bedside P/T 3-5 times a week   [  x  ]   Bedside O/T  2-3 times a week             [    ] Speech Therapy               [    ]  No Rehab Therapy Indicated   Conditioning/ROM                                    ADL  Bed Mobility                                               Conditioning/ROM  Transfers                                                     Bed Mobility  Sitting /Standing Balance                         Transfers                                        Gait Training                                               Sitting/Standing Balance  Stair Training [   ]Applicable                    Home equipment Eval                                                                        Splinting  [   ] Only      GOALS:   ADL   [   x ]   Independent                    Transfers  [x    ] Independent                          Ambulation  [  x  ] Independent     [ x    ] With device                            [    ]  CG                                                         [    ]  CG                                                                  [    ] CG                            [    ] Min A                                                   [    ] Min A                                                              [    ] Min  A          DISCHARGE PLAN:   [    ]  Good candidate for Intensive Rehabilitation/Hospital based                                             Will tolerate 3hrs Intensive Rehab Daily                                       [     ]  Short Term Rehab in Skilled Nursing Facility                                       [  x   ]  Home with Outpatient or VN services                                         [     ]  Possible Candidate for Intensive Hospital based Rehab

## 2022-04-13 NOTE — DISCHARGE NOTE PROVIDER - NSDCFUSCHEDAPPT_GEN_ALL_CORE_FT
PRAVIN JHAVERI ; 04/22/2022 ; [facility] [address] ; 04/28/2022 ; [facility] [address]

## 2022-04-13 NOTE — DISCHARGE NOTE PROVIDER - HOSPITAL COURSE
On 4.5.22 patient was BIBA to the ED after she had a seizure and reported headache consistent with her usual migraine HA at 1130. She took a nap and when she woke up, she vomited and had a seizure which lasted for about 1 min, witnessed by a . Stroke code called on arrival. Patient last known well 1130. CTH suggestive of stroke vs seizure in left occipital lobe and cerebral edema. MRI brain revealed Enhancing lesion within the left occipital lobe, smaller rim-enhancing lesion in the medial left periatrial region as well as   leptomeningeal signal abnormality. Neurosurgery and Neurology were consulted. Patient underwent diagnostic LP on 4.11 and Left Occipital [place] [facility] Dr. Dumont on 4.12.22. The patient tolerated the procedure well was extubated without complication and vitals and blood work remained stable. post op the patient remained neurologically intact reporting transient blurriness to her right eye. Patient evaluated by Physiatry and cleared for discharge home on 4.13.22 with home services.

## 2022-04-13 NOTE — CONSULT NOTE ADULT - SUBJECTIVE AND OBJECTIVE BOX
HPI:  57 y.o. female with PMH of HTN, asthma, sleep apnea on cpap, migraines BIBA after she had a seizure. Patient has no history of seizures. As per , pt has been having URI symptoms for last few days. Started on Prednisone and zpack yesterday. Today, pt was complaining of headache consistent with her usual migraine HA at 1130. She took a nap and when she woke up, she vomited and had a seizure which lasted for about 1 min, witnessed by a . Stroke code called on arrival. Patient last known well 1130. Patient with tongue trauma. Patient out of the window for IV thrombolytics. CTH suggestive of stroke vs seizure in left occipital lobe and cerebral edema. Patient refused stat MRI brain due to claustrophobia and would require sedation. Currently no nursing available to monitor patient in MRI so plan for MRI in AM with sedation.    In ED vitals: /71, HR 96, RR 18, T 97.8, spo2 99% on RA. Patient got 2L IVF, reglan, zofran and keppra in ED. RVP positive for parainfluenza and rhinovirus. Labs significant for lactate of 7.2    MR Head w/wo IV Cont (04.06.22 @ 14:42)  1. Enhancing cystic/necrotic lesion within the left occipital lobe measuring 2.2 cm. Smaller rim-enhancing lesion in the medial left periatrial region / splenium measuring 7 mm. Findings are most compatible with neoplastic etiology (primary multifocal versus metastatic).  2. Expansile nonenhancing FLAIR signal abnormality involving the splenium of the corpus callosum with extension along the occipital and temporal horns greater on the left. Additional faint diffuse white matter signal abnormality. Findings are also likely neoplastic.  3. Leptomeningeal signal abnormality (FLAIR hyperintense, faintly hyper-enhancing) within the left frontal and parietal sulci suspicious for leptomeningeal involvement.  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    PAST MEDICAL & SURGICAL HISTORY:  RAMAKRISHNA on CPAP    GERD (gastroesophageal reflux disease)    Asthmatic bronchitis  MILD , USES VENTOLIN Q2-3M    HTN (hypertension)    Migraines    Chronic neck pain    H/O sinus surgery    Status post D&amp;C    H/O arthroscopy of shoulder  RT    History of hernia repair  2019        Hospital Course:    TODAY'S SUBJECTIVE & REVIEW OF SYMPTOMS:     Constitutional WNL   Cardio WNL   Resp WNL   GI WNL  Heme WNL  Endo WNL  Skin WNL  MSK WNL  Neuro seizure  Cognitive WNL  Psych WNL      MEDICATIONS  (STANDING):  atorvastatin 80 milliGRAM(s) Oral at bedtime  ceFAZolin   IVPB 1000 milliGRAM(s) IV Intermittent every 8 hours  dexAMETHasone  Injectable 4 milliGRAM(s) IV Push every 6 hours  gabapentin 400 milliGRAM(s) Oral two times a day  hydrochlorothiazide 25 milliGRAM(s) Oral daily  levETIRAcetam  IVPB 1000 milliGRAM(s) IV Intermittent every 12 hours  magnesium sulfate  IVPB 2 Gram(s) IV Intermittent every 2 hours  metoprolol succinate ER 50 milliGRAM(s) Oral daily  montelukast 10 milliGRAM(s) Oral daily  pantoprazole    Tablet 40 milliGRAM(s) Oral before breakfast  sertraline 50 milliGRAM(s) Oral daily  sodium chloride 0.9%. 1000 milliLiter(s) (75 mL/Hr) IV Continuous <Continuous>    MEDICATIONS  (PRN):  acetaminophen     Tablet .. 650 milliGRAM(s) Oral every 6 hours PRN Temp greater or equal to 38C (100.4F), Mild Pain (1 - 3)  benzonatate 100 milliGRAM(s) Oral every 8 hours PRN Cough  clonazePAM  Tablet 0.5 milliGRAM(s) Oral every 8 hours PRN anxiety  melatonin 3 milliGRAM(s) Oral at bedtime PRN Insomnia  meperidine     Injectable 12.5 milliGRAM(s) IV Push every 10 minutes PRN Shivering  morphine  - Injectable 2 milliGRAM(s) IntraMuscular every 4 hours PRN Severe Pain (7 - 10)  ondansetron Injectable 4 milliGRAM(s) IV Push once PRN Nausea and/or Vomiting  ondansetron Injectable 4 milliGRAM(s) IV Push every 8 hours PRN Nausea and/or Vomiting  oxycodone    5 mG/acetaminophen 325 mG 1 Tablet(s) Oral every 4 hours PRN Mild Pain (1 - 3)  oxycodone    5 mG/acetaminophen 325 mG 2 Tablet(s) Oral every 6 hours PRN Moderate Pain (4 - 6)      FAMILY HISTORY:      Allergies    No Known Allergies    Intolerances        SOCIAL HISTORY:    [  ] Etoh  [  ] Smoking  [  ] Substance abuse     Home Environment:  [   ] Home Alone  [ x  ] Lives with Family  [   ] Home Health Aid    Dwelling:  [   ] Apartment  [ x  ] Private House  [   ] Adult Home  [   ] Skilled Nursing Facility      [   ] Short Term  [   ] Long Term  [ x  ] Stairs       Elevator [   ]    FUNCTIONAL STATUS PTA: (Check all that apply)  Ambulation: [  x  ]Independent    [   ] Dependent     [   ] Non-Ambulatory  Assistive Device: [   ] SA Cane  [   ]  Q Cane  [   ] Walker  [   ]  Wheelchair  ADL : [ x  ] Independent  [    ]  Dependent       Vital Signs Last 24 Hrs  T(C): 36.6 (13 Apr 2022 04:10), Max: 36.9 (12 Apr 2022 15:10)  T(F): 97.8 (13 Apr 2022 04:10), Max: 98.5 (12 Apr 2022 15:10)  HR: 79 (13 Apr 2022 08:15) (77 - 95)  BP: 118/57 (13 Apr 2022 08:15) (118/57 - 146/75)  BP(mean): 81 (13 Apr 2022 08:15) (81 - 89)  RR: 15 (13 Apr 2022 08:15) (13 - 19)  SpO2: 96% (13 Apr 2022 08:15) (93% - 98%)      PHYSICAL EXAM: Awake & Alert  GENERAL: NAD  HEAD:  Normocephalic  CHEST/LUNG: Clear   HEART: S1S2+  ABDOMEN: Soft, Nontender  EXTREMITIES:  no calf tenderness    NERVOUS SYSTEM:  Cranial Nerves 2-12 intact [   ] Abnormal  [   ]  ROM: WFL all extremities [  x ]  Abnormal [   ]  Motor Strength: WFL all extremities  [ x  ]  Abnormal [   ]  Sensation: intact to light touch [ x  ] Abnormal [   ]    FUNCTIONAL STATUS:  Bed Mobility: Independent [   ]  Supervision [ x  ]  Needs Assistance [   ]  N/A [   ]  Transfers: Independent [   ]  Supervision [   ]  Needs Assistance [   ]  N/A [   ]   Ambulation: Independent [   ]  Supervision [   ]  Needs Assistance [   ]  N/A [   ]  ADL: Independent [   ] Requires Assistance [   ] N/A [   ]      LABS:                        13.6   9.85  )-----------( 326      ( 13 Apr 2022 06:04 )             41.7     04-13    138  |  101  |  10  ----------------------------<  135<H>  4.2   |  24  |  0.5<L>    Ca    8.8      13 Apr 2022 06:04  Phos  3.8     04-13  Mg     1.7     04-13    TPro  6.9  /  Alb  4.1  /  TBili  0.5  /  DBili  x   /  AST  37  /  ALT  45<H>  /  AlkPhos  102  04-13    PT/INR - ( 12 Apr 2022 21:49 )   PT: 13.00 sec;   INR: 1.13 ratio         PTT - ( 12 Apr 2022 21:49 )  PTT:29.5 sec      RADIOLOGY & ADDITIONAL STUDIES:

## 2022-04-13 NOTE — DISCHARGE NOTE PROVIDER - NSDCFUADDINST_GEN_ALL_CORE_FT
- Upon discharge,  please call to schedule a follow up with Dr. Dumont in 1-2 weeks.  - Upon discharge, please call to make a follow up appointment with your primary care provider to discuss your recent hospitalization/operation.  - Keep dressings dry for 48 hours after which you may remove dressing and cleanse with soap and water in the shower (no scrubbing). Leave the steri strips (white tape) on your incisions, they will fall off on their own. Run water and soap over incision sites and pat dry (no scrubbing). No submerging your incision sites in water (i.e. no swimming or baths) for 2-3 weeks and avoid exposing the area to jets/streams of water.   - You can resume your normal activities as tolerated, but avoid heavy (>15lb.) lifting and strenuous exercise for 4-6 weeks.    - You were prescribed percocet (oxycodone-acetaminophen) for pain, take these only as needed.  Your pain should subside over the next few days.  While taking narcotic pain medications, you should not drive or operate heavy machinery. If taking with other medications containing acetaminophen (Tylenol), reduce your dose of percocet so that you  do not exceed 3000mg of acetaminophen per day.  ***You were prescribed narcotic pain medications, take these only as needed.  Your pain should subside over the next few days.  While taking narcotic pain medications, you should not drive or operate heavy machinery.   - Narcotic pain medicine tends to cause constipation, you have been prescribed colace 3 times a day to help prevent that. Hold the colace if you start to have loose stools.  - If you experience fevers, chills, increasing abdominal pain, nausea, vomiting, inability to pass stool or gas, bleeding, or any other acute symptoms, please call your doctor and report to the emergency room immediately for further management.   - Upon discharge,  please call to schedule a follow up with Dr. Dumont in 1-2 weeks.  - Upon discharge, please call to make a follow up appointment with your primary care provider to discuss your recent hospitalization/operation.  - Keep dressings dry for 48 hours after which you may remove dressing and cleanse with soap and water in the shower (no scrubbing). Leave the steri strips (white tape) on your incisions, they will fall off on their own. Run water and soap over incision sites and pat dry (no scrubbing). No submerging your incision sites in water (i.e. no swimming or baths) for 2-3 weeks and avoid exposing the area to jets/streams of water.   - You can resume your normal activities as tolerated, but avoid heavy (>15lb.) lifting and strenuous exercise for 4-6 weeks.    - You were prescribed percocet (oxycodone-acetaminophen) for pain, take these only as needed.  Your pain should subside over the next few days.  While taking narcotic pain medications, you should not drive or operate heavy machinery. If taking with other medications containing acetaminophen (Tylenol), reduce your dose of percocet so that you  do not exceed 3000mg of acetaminophen per day.  ***You were prescribed narcotic pain medications, take these only as needed.  Your pain should subside over the next few days.  While taking narcotic pain medications, you should not drive or operate heavy machinery.   - Narcotic pain medicine tends to cause constipation, you have been prescribed colace 3 times a day to help prevent that. Hold the colace if you start to have loose stools.  - If you experience fevers, chills, increasing abdominal pain, nausea, vomiting, inability to pass stool or gas, bleeding, or any other acute symptoms, please call your doctor and report to the emergency room immediately for further management.    - You have prescribed a steroid taper. Full course is 7 days total with decreasing dose. Take as directed.

## 2022-04-13 NOTE — DISCHARGE NOTE PROVIDER - CARE PROVIDERS DIRECT ADDRESSES
,[email or web address] [email or web address] [email or web address]

## 2022-04-13 NOTE — DISCHARGE NOTE PROVIDER - PROVIDER TOKENS
PROVIDER:[TOKEN:[[id number]]] PROVIDER:[TOKEN:[[id number]]],PROVIDER:[TOKEN:[[id number]]]

## 2022-04-13 NOTE — DISCHARGE NOTE PROVIDER - NSFOLLOWUPCLINICS_GEN_ALL_ED_FT
[facility]  Neurology  [address] [place] [address], [address]  [place] [address]  Phone: [phone]  Fax:   Established Patient

## 2022-04-13 NOTE — DISCHARGE NOTE PROVIDER - NSDCCPTREATMENT_GEN_ALL_CORE_FT
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

## 2022-04-13 NOTE — CONSULT NOTE ADULT - ASSESSMENT
ASSESSMENT:    PLAN:   NEURO:  - Neuro checks q1hrs  - post-op CTH, 4/12: pending official read  - Keppra 1 gm q12hrs  - Dec 4mg q6hrs  - Seizure precautions  - ICU delirium precautions  Activity: [X] Increase as tolerated [] Bedrest [X] PT [X] OT [] PMNR    PULM:  - Aggressive chest PT/pulmonary toileting/NT suctioning as needed   - Incentive spirometry 10x/hr while awake  - HOB > 35 degrees  - Aspiration precautions  - Keep SaO2 > 95%    CV:  - Keep -150  - Telemetry monitoring      RENAL:  - Strict I/Os, daily weights  - Keep euvolemic  - Keep normonatremic   - Keep Magnesium level > 2  - Monitor lytes, replete as needed    GI:  Diet: Dysphagia screen and then advance diet as tolerated  GI prophylaxis [] not indicated [X] PPI [] other:  Bowel regimen [] colace [X] senna [] other:    ENDO:   - Goal euglycemia (-180)  - HgA1c, TSH    HEME/ONC:  VTE prophylaxis: [X] SCDs [] chemoprophylaxis [] hold chemoprophylaxis due to: [] high risk of DVT/PE on admission due to:      ID:  - Keep normothermic, avoid fevers    MISC:  PT/OT    CODE STATUS:  [X] Full Code [] DNR [] DNI [] Palliative/Comfort Care    DISPOSITION:  [X] NCCU [] Stroke Unit [] Floor [] EMU [] RCU [] PCU                 ASSESSMENT: 57-year-old female p/w seizures, 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 Admit to NCCU for post-op management    PLAN:   NEURO:  - Neuro checks q1hrs  - post-op CTH, 4/12: pending official read  - Keppra 1 gm q12hrs  - Dec 4mg q6hrs  - Seizure precautions  - F/U surgical pathology: high-grade astrocytoma  - ICU delirium precautions  Activity: [X] Increase as tolerated [] Bedrest [X] PT [X] OT [] PMNR    PULM:  - Aggressive chest PT/pulmonary toileting/NT suctioning as needed   - Incentive spirometry 10x/hr while awake  - HOB > 35 degrees  - Aspiration precautions  - Keep SaO2 > 95%    CV:  - Keep -150  - Telemetry monitoring    RENAL:  - Strict I/Os, daily weights  - Keep euvolemic  - Keep normonatremic   - Keep Magnesium level > 2  - Monitor lytes, replete as needed  - IVF when tolerating PO    GI:  Diet: Dysphagia screen and then advance diet as tolerated  GI prophylaxis [] not indicated [X] PPI [] other:  Bowel regimen [] colace [X] senna [] other:    ENDO:   - Goal euglycemia (-180)  - HgA1c, 4/6: 6.1    HEME/ONC:  VTE prophylaxis: [X] SCDs [] chemoprophylaxis [] hold chemoprophylaxis due to: [] high risk of DVT/PE on admission due to:    ID:  - Keep normothermic, avoid fevers    MISC:  PT/OT    CODE STATUS:  [X] Full Code [] DNR [] DNI [] Palliative/Comfort Care    DISPOSITION:  [X] NCCU [] Stroke Unit [] Floor [] EMU [] RCU [] PCU

## 2022-04-13 NOTE — DISCHARGE NOTE PROVIDER - NSDCCPCAREPLAN_GEN_ALL_CORE_FT
PRINCIPAL DISCHARGE DIAGNOSIS  Diagnosis: Brain lesion  Assessment and Plan of Treatment:       SECONDARY DISCHARGE DIAGNOSES  Diagnosis: Abnormal head CT  Assessment and Plan of Treatment:     Diagnosis: Parainfluenza  Assessment and Plan of Treatment:     Diagnosis: Rhinovirus  Assessment and Plan of Treatment:

## 2022-04-13 NOTE — DISCHARGE NOTE PROVIDER - NSDCMRMEDTOKEN_GEN_ALL_CORE_FT
atorvastatin 80 mg oral tablet: 1 tab(s) orally once a day  gabapentin 400 mg oral capsule: 1 cap(s) orally 2 times a day  hydroCHLOROthiazide 25 mg oral tablet: 1 tab(s) orally once a day  metoprolol succinate 50 mg oral tablet, extended release: 1 tab(s) orally once a day  montelukast 10 mg oral tablet: 1 tab(s) orally once a day  omeprazole 40 mg oral delayed release capsule: 1 cap(s) orally once a day  sertraline 50 mg oral tablet: 1 tab(s) orally once a day   atorvastatin 80 mg oral tablet: 1 tab(s) orally once a day  dexamethasone 1 mg oral tablet: 4mg orally every 6 hours for1 day then 4mg every 8hours for 1 day then 4mg every 12hours for one day then 2mg every 8hours for one day then 2mg every 12hours for one day then 1mg every 12hours for one day then 1mg once daily x 1 day then stop   gabapentin 400 mg oral capsule: 1 cap(s) orally 2 times a day  hydroCHLOROthiazide 25 mg oral tablet: 1 tab(s) orally once a day  levETIRAcetam 1000 mg oral tablet, dispersible: 1 tab(s) orally 2 times a day  metoprolol succinate 50 mg oral tablet, extended release: 1 tab(s) orally once a day  montelukast 10 mg oral tablet: 1 tab(s) orally once a day  omeprazole 40 mg oral delayed release capsule: 1 cap(s) orally once a day  oxycodone-acetaminophen 5 mg-325 mg oral tablet: 2 tab(s) orally every 6 hours, As Needed -Mild Pain (1 - 3) - for severe pain MDD:8   sertraline 50 mg oral tablet: 1 tab(s) orally once a day   atorvastatin 80 mg oral tablet: 1 tab(s) orally once a day  dexamethasone 1 mg oral tablet: 4 tab(s) orally every 8 hours x 1 day   3 tabs every 8 hours x 1 day   2 tabs every 8 hours x 1 day   2 tabs every 12 hours x 1 day   1 tab every 12 hours x 1 day   1 tab every 24 hours x 2 days then stop  gabapentin 400 mg oral capsule: 1 cap(s) orally 2 times a day  hydroCHLOROthiazide 25 mg oral tablet: 1 tab(s) orally once a day  Keppra 1000 mg oral tablet: 1 tab(s) orally every 12 hours for seizure ppx  metoprolol succinate 50 mg oral tablet, extended release: 1 tab(s) orally once a day  montelukast 10 mg oral tablet: 1 tab(s) orally once a day  oxycodone-acetaminophen 5 mg-325 mg oral tablet: 1 tab(s) orally every 6 hours, As Needed -for moderate pain MDD:4   pantoprazole 40 mg oral delayed release tablet: 1 tab(s) orally once a day   sertraline 50 mg oral tablet: 1 tab(s) orally once a day

## 2022-04-13 NOTE — DISCHARGE NOTE PROVIDER - CARE PROVIDER_API CALL
Allison Dumont)  Neurosurgery  [address] [facility], [address]  [place] [address]  Phone: [phone]  Fax: [phone]  Follow Up Time:    Allison Dumont)  Neurosurgery  [address] [place] [address], [address]  [place] [address]  Phone: [phone]  Fax: [phone]  Follow Up Time:     Dayday Kennedy)  Neurology  [facility], [address] [place] [address]  Phone: [phone]  Fax: [phone]  Follow Up Time:

## 2022-04-13 NOTE — CONSULT NOTE ADULT - SUBJECTIVE AND OBJECTIVE BOX
SUMMARY: HPI:  57 y.o. female with PMH of HTN, asthma, sleep apnea on cpap, migraines BIBA after she had a seizure. Patient has no history of seizures. As per , pt has been having URI symptoms for last few days. Started on Prednisone and zpack yesterday. Today, pt was complaining of headache consistent with her usual migraine HA at 1130. She took a nap and when she woke up, she vomited and had a seizure which lasted for about 1 min, witnessed by a . Stroke code called on arrival. Patient last known well 1130. Patient with tongue trauma. Patient out of the window for IV thrombolytics. CTH suggestive of stroke vs seizure in left occipital lobe and cerebral edema. Patient refused stat MRI brain due to claustrophobia and would require sedation. Currently no nursing available to monitor patient in MRI so plan for MRI in AM with sedation.    In ED vitals: /71, HR 96, RR 18, T 97.8, spo2 99% on RA. Patient got 2L IVF, reglan, zofran and keppra in ED. RVP positive for parainfluenza and rhinovirus. Labs significant for lactate of 7.2  (05 Apr 2022 23:11)      OVERNIGHT EVENTS:     ADMISSION SCORES:   GCS: HH: MF: NIHSS: ICH Score:    SEDATION SCORES:  RASS: CAM-ICU:     REVIEW OF SYSTEMS:     VITALS: [X] Reviewed    IMAGING/DATA: [X] Reviewed    IVF FLUIDS/MEDICATIONS: [X] Reviewed    ALLERGIES: Allergies    No Known Allergies    Intolerances        DEVICES:   [] Restraints [] PIVs [] ET tube [] central line [] PICC [] arterial line [] hutchison [] NGT/OGT [] EVD [] LD [] AI/HMV [] LiCOX [] ICP monitor [] Trach [] PEG [] Chest Tube [] other:    EXAMINATION:  General: No acute distress  HEENT: Anicteric sclerae  Cardiac: [id number]  Lungs: Clear  Abdomen: Soft, non-tender, +BS  Extremities: No c/c/e  Skin/Incision Site: Clean, dry and intact  Neurologic: Awake, alert, fully oriented, follows commands, PERRL, VFFtc, EOMI, face symmetric, tongue midline, no drift, full strength    ICU Vital Signs Last 24 Hrs  T(C): 36.1 (13 Apr 2022 00:00), Max: 37 (12 Apr 2022 05:25)  T(F): 97 (13 Apr 2022 00:00), Max: 98.6 (12 Apr 2022 05:25)  HR: 80 (13 Apr 2022 00:00) (80 - 95)  BP: 139/74 (13 Apr 2022 00:00) (119/58 - 150/80)  BP(mean): --  ABP: --  ABP(mean): --  RR: 16 (13 Apr 2022 00:00) (13 - 18)  SpO2: 95% (13 Apr 2022 00:00) (93% - 98%)      04-11-22 @ 07:01  -  04-12-22 @ 07:00  --------------------------------------------------------  IN: 240 mL / OUT: 0 mL / NET: 240 mL    04-12-22 @ 07:01  -  04-13-22 @ 00:35  --------------------------------------------------------  IN: 200 mL / OUT: 105 mL / NET: 95 mL            acetaminophen     Tablet .. 650 milliGRAM(s) Oral every 6 hours PRN  atorvastatin 80 milliGRAM(s) Oral at bedtime  benzonatate 100 milliGRAM(s) Oral every 8 hours PRN  ceFAZolin   IVPB 1000 milliGRAM(s) IV Intermittent every 8 hours  clonazePAM  Tablet 0.5 milliGRAM(s) Oral every 8 hours PRN  dexAMETHasone  Injectable 4 milliGRAM(s) IV Push every 6 hours  gabapentin 400 milliGRAM(s) Oral two times a day  hydrochlorothiazide 25 milliGRAM(s) Oral daily  levETIRAcetam  IVPB 1000 milliGRAM(s) IV Intermittent every 12 hours  melatonin 3 milliGRAM(s) Oral at bedtime PRN  meperidine     Injectable 12.5 milliGRAM(s) IV Push every 10 minutes PRN  metoprolol succinate ER 50 milliGRAM(s) Oral daily  montelukast 10 milliGRAM(s) Oral daily  morphine  - Injectable 2 milliGRAM(s) IntraMuscular every 4 hours PRN  ondansetron Injectable 4 milliGRAM(s) IV Push once PRN  ondansetron Injectable 4 milliGRAM(s) IV Push every 8 hours PRN  oxycodone    5 mG/acetaminophen 325 mG 1 Tablet(s) Oral every 4 hours PRN  oxycodone    5 mG/acetaminophen 325 mG 2 Tablet(s) Oral every 6 hours PRN  pantoprazole    Tablet 40 milliGRAM(s) Oral before breakfast  sertraline 50 milliGRAM(s) Oral daily  sodium chloride 0.9%. 1000 milliLiter(s) (75 mL/Hr) IV Continuous <Continuous>      LABS:  Na: 137 (04-12 @ 23:25), 144 (04-11 @ 20:00), 140 (04-10 @ 04:30)  K: 3.4 (04-12 @ 23:25), 3.8 (04-11 @ 20:00), 4.1 (04-10 @ 04:30)  Cl: 100 (04-12 @ 23:25), 106 (04-11 @ 20:00), 100 (04-10 @ 04:30)  CO2: 28 (04-12 @ 23:25), 24 (04-11 @ 20:00), 28 (04-10 @ 04:30)  BUN: 10 (04-12 @ 23:25), 12 (04-11 @ 20:00), 14 (04-10 @ 04:30)  Cr: 0.6 (04-12 @ 23:25), 0.6 (04-11 @ 20:00), 0.6 (04-10 @ 04:30)  Glu: 124(04-12 @ 23:25), 104(04-11 @ 20:00), 105(04-10 @ 04:30)    Hgb: 12.8 (04-12 @ 23:25), 13.9 (04-11 @ 20:00), 14.2 (04-10 @ 04:30)  Hct: 39.1 (04-12 @ 23:25), 42.9 (04-11 @ 20:00), 42.8 (04-10 @ 04:30)  WBC: 9.50 (04-12 @ 23:25), 9.82 (04-11 @ 20:00), 7.38 (04-10 @ 04:30)  Plt: 301 (04-12 @ 23:25), 342 (04-11 @ 20:00), 308 (04-10 @ 04:30)    INR: 1.13 04-12-22 @ 21:49, 0.99 04-11-22 @ 20:00, 1.06 04-10-22 @ 04:30  PTT: 29.5 04-12-22 @ 21:49, 28.9 04-11-22 @ 20:00, 32.4 04-10-22 @ 04:30          LIVER FUNCTIONS - ( 11 Apr 2022 20:00 )  Alb: 4.4 g/dL / Pro: 6.8 g/dL / ALK PHOS: 101 U/L / ALT: 51 U/L / AST: 39 U/L / GGT: x                SUMMARY: HPI:  57 y.o. female with PMH of HTN, asthma, sleep apnea on cpap, migraines BIBA after she had a seizure. Patient has no history of seizures. As per , pt has been having URI symptoms for last few days. Started on Prednisone and zpack yesterday. Today, pt was complaining of headache consistent with her usual migraine HA at 1130. She took a nap and when she woke up, she vomited and had a seizure which lasted for about 1 min, witnessed by a . Stroke code called on arrival. Patient last known well 1130. Patient with tongue trauma. Patient out of the window for IV thrombolytics. CTH suggestive of stroke vs seizure in left occipital lobe and cerebral edema. Patient refused stat MRI brain due to claustrophobia and would require sedation. Currently no nursing available to monitor patient in MRI so plan for MRI in AM with sedation.    In ED vitals: /71, HR 96, RR 18, T 97.8, spo2 99% on RA. Patient got 2L IVF, reglan, zofran and keppra in ED. RVP positive for parainfluenza and rhinovirus. 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 No acute events intraop, EBL 10cc. Admit to NCCU for post-op management    REVIEW OF SYSTEMS: Patient endorses headache and transient B/L blurred vision. Patient denies nausea/vomiting, double vision, or dizziness. Otherwise, 10-point ROS is negative.     VITALS: [X] Reviewed    IMAGING/DATA: [X] Reviewed    IVF FLUIDS/MEDICATIONS: [X] Reviewed    ALLERGIES: Allergies    No Known Allergies    Intolerances    DEVICES:   [X] PIVs [X] hutchison     EXAMINATION:  General: No acute distress  HEENT: Anicteric sclerae  Cardiac: [id number]  Lungs: Clear  Abdomen: Soft, non-tender, +BS  Extremities: No c/c/e  Skin/Incision Site: Clean, dry and intact  Neurologic: Awake, alert, fully oriented, follows commands, PERRL, VFFtc, no dysconjugate gaze or gaze preference, EOMI, face symmetric, tongue midline, Able to lift B/L UE and LE AG, no drift, full strength 5/5,  5/5 bilaterally, sensation intact, no neglect, no dysmetria bilaterally FTN      ICU Vital Signs Last 24 Hrs  T(C): 36.1 (13 Apr 2022 00:00), Max: 37 (12 Apr 2022 05:25)  T(F): 97 (13 Apr 2022 00:00), Max: 98.6 (12 Apr 2022 05:25)  HR: 80 (13 Apr 2022 02:00) (79 - 95)  BP: 143/76 (13 Apr 2022 02:00) (119/58 - 150/80)  BP(mean): --  ABP: --  ABP(mean): --  RR: 18 (13 Apr 2022 02:00) (13 - 18)  SpO2: 95% (13 Apr 2022 02:00) (93% - 98%)      04-11-22 @ 07:01  -  04-12-22 @ 07:00  --------------------------------------------------------  IN: 240 mL / OUT: 0 mL / NET: 240 mL    04-12-22 @ 07:01  -  04-13-22 @ 02:22  --------------------------------------------------------  IN: 525 mL / OUT: 385 mL / NET: 140 mL            acetaminophen     Tablet .. 650 milliGRAM(s) Oral every 6 hours PRN  atorvastatin 80 milliGRAM(s) Oral at bedtime  benzonatate 100 milliGRAM(s) Oral every 8 hours PRN  ceFAZolin   IVPB 1000 milliGRAM(s) IV Intermittent every 8 hours  clonazePAM  Tablet 0.5 milliGRAM(s) Oral every 8 hours PRN  dexAMETHasone  Injectable 4 milliGRAM(s) IV Push every 6 hours  gabapentin 400 milliGRAM(s) Oral two times a day  hydrochlorothiazide 25 milliGRAM(s) Oral daily  levETIRAcetam  IVPB 1000 milliGRAM(s) IV Intermittent every 12 hours  melatonin 3 milliGRAM(s) Oral at bedtime PRN  meperidine     Injectable 12.5 milliGRAM(s) IV Push every 10 minutes PRN  metoprolol succinate ER 50 milliGRAM(s) Oral daily  montelukast 10 milliGRAM(s) Oral daily  morphine  - Injectable 2 milliGRAM(s) IntraMuscular every 4 hours PRN  ondansetron Injectable 4 milliGRAM(s) IV Push once PRN  ondansetron Injectable 4 milliGRAM(s) IV Push every 8 hours PRN  oxycodone    5 mG/acetaminophen 325 mG 1 Tablet(s) Oral every 4 hours PRN  oxycodone    5 mG/acetaminophen 325 mG 2 Tablet(s) Oral every 6 hours PRN  pantoprazole    Tablet 40 milliGRAM(s) Oral before breakfast  sertraline 50 milliGRAM(s) Oral daily  sodium chloride 0.9%. 1000 milliLiter(s) (75 mL/Hr) IV Continuous <Continuous>      LABS:  Na: 137 (04-12 @ 23:25), 144 (04-11 @ 20:00), 140 (04-10 @ 04:30)  K: 3.4 (04-12 @ 23:25), 3.8 (04-11 @ 20:00), 4.1 (04-10 @ 04:30)  Cl: 100 (04-12 @ 23:25), 106 (04-11 @ 20:00), 100 (04-10 @ 04:30)  CO2: 28 (04-12 @ 23:25), 24 (04-11 @ 20:00), 28 (04-10 @ 04:30)  BUN: 10 (04-12 @ 23:25), 12 (04-11 @ 20:00), 14 (04-10 @ 04:30)  Cr: 0.6 (04-12 @ 23:25), 0.6 (04-11 @ 20:00), 0.6 (04-10 @ 04:30)  Glu: 124(04-12 @ 23:25), 104(04-11 @ 20:00), 105(04-10 @ 04:30)    Hgb: 12.8 (04-12 @ 23:25), 13.9 (04-11 @ 20:00), 14.2 (04-10 @ 04:30)  Hct: 39.1 (04-12 @ 23:25), 42.9 (04-11 @ 20:00), 42.8 (04-10 @ 04:30)  WBC: 9.50 (04-12 @ 23:25), 9.82 (04-11 @ 20:00), 7.38 (04-10 @ 04:30)  Plt: 301 (04-12 @ 23:25), 342 (04-11 @ 20:00), 308 (04-10 @ 04:30)    INR: 1.13 04-12-22 @ 21:49, 0.99 04-11-22 @ 20:00, 1.06 04-10-22 @ 04:30  PTT: 29.5 04-12-22 @ 21:49, 28.9 04-11-22 @ 20:00, 32.4 04-10-22 @ 04:30          LIVER FUNCTIONS - ( 11 Apr 2022 20:00 )  Alb: 4.4 g/dL / Pro: 6.8 g/dL / ALK PHOS: 101 U/L / ALT: 51 U/L / AST: 39 U/L / GGT: x

## 2022-04-13 NOTE — PROGRESS NOTE ADULT - SUBJECTIVE AND OBJECTIVE BOX
Subjective: 56yFemale with a pmhx of SEIZURE; ABNORMAL HEAD CT; PARAINFLUENZA; RHINOVIRUS    ^SEIZURE; ABNORMAL HEAD CT; PARAINFLUENZA; RHINOVIRUS    No pertinent family history in first degree relatives    Handoff    MEWS Score    RAMAKRISHNA on CPAP    GERD (gastroesophageal reflux disease)    Asthmatic bronchitis    HTN (hypertension)    Migraines    Chronic neck pain    Seizure    Excision of supratentorial neoplasm of brain    H/O sinus surgery    Status post D&amp;C    H/O arthroscopy of shoulder    History of hernia repair    Abnormal head CT    Parainfluenza    Rhinovirus    SysAdmin_VisitLink    POD#1  s/p Left occipital brain biopsy for excision of supratentorial neoplasm of brain     Pt seen and examined at bedside with Dr Dumont.  Pt is sitting up in bed, awake, alert.   at bedside.  AAOx3, following commands.  c/o incisional pain.  denies dizziness.  c/o some blurry vision out of R eye similar to what she experiences with migraines.     Allergies    No Known Allergies    Intolerances        Imaging: < from: CT Head No Cont (04.12.22 @ 22:32) >  IMPRESSION:    1.  Status post left occipital craniectomy/cranioplasty with expected   postsurgical changes. Left occipital lobe gas-containing surgical cavity.    2.  Increased paranasal sinus inflammatory changes.    < end of copied text >      Vital Signs Last 24 Hrs  T(C): 36.6 (13 Apr 2022 04:10), Max: 36.9 (12 Apr 2022 15:10)  T(F): 97.8 (13 Apr 2022 04:10), Max: 98.5 (12 Apr 2022 15:10)  HR: 79 (13 Apr 2022 08:15) (77 - 95)  BP: 118/57 (13 Apr 2022 08:15) (118/57 - 146/75)  BP(mean): 81 (13 Apr 2022 08:15) (81 - 89)  RR: 15 (13 Apr 2022 08:15) (13 - 19)  SpO2: 96% (13 Apr 2022 08:15) (93% - 98%)      acetaminophen     Tablet .. 650 milliGRAM(s) Oral every 6 hours PRN  atorvastatin 80 milliGRAM(s) Oral at bedtime  benzonatate 100 milliGRAM(s) Oral every 8 hours PRN  ceFAZolin   IVPB 1000 milliGRAM(s) IV Intermittent every 8 hours  clonazePAM  Tablet 0.5 milliGRAM(s) Oral every 8 hours PRN  dexAMETHasone  Injectable 4 milliGRAM(s) IV Push every 6 hours  gabapentin 400 milliGRAM(s) Oral two times a day  heparin   Injectable 5000 Unit(s) SubCutaneous every 8 hours  hydrochlorothiazide 25 milliGRAM(s) Oral daily  levETIRAcetam  IVPB 1000 milliGRAM(s) IV Intermittent every 12 hours  magnesium sulfate  IVPB 2 Gram(s) IV Intermittent every 2 hours  melatonin 3 milliGRAM(s) Oral at bedtime PRN  meperidine     Injectable 12.5 milliGRAM(s) IV Push every 10 minutes PRN  metoprolol succinate ER 50 milliGRAM(s) Oral daily  montelukast 10 milliGRAM(s) Oral daily  morphine  - Injectable 2 milliGRAM(s) IntraMuscular every 4 hours PRN  ondansetron Injectable 4 milliGRAM(s) IV Push once PRN  ondansetron Injectable 4 milliGRAM(s) IV Push every 8 hours PRN  oxycodone    5 mG/acetaminophen 325 mG 1 Tablet(s) Oral every 4 hours PRN  oxycodone    5 mG/acetaminophen 325 mG 2 Tablet(s) Oral every 6 hours PRN  pantoprazole    Tablet 40 milliGRAM(s) Oral before breakfast  sertraline 50 milliGRAM(s) Oral daily        04-12-22 @ 07:01  -  04-13-22 @ 07:00  --------------------------------------------------------  IN: 1100 mL / OUT: 1075 mL / NET: 25 mL    04-13-22 @ 07:01  -  04-13-22 @ 09:48  --------------------------------------------------------  IN: 75 mL / OUT: 325 mL / NET: -250 mL        REVIEW OF SYSTEMS    [ x] A ten-point review of systems was otherwise negative except as noted.  [ ] Due to altered mental status/intubation, subjective information were not able to be obtained from the patient. History was obtained, to the extent possible, from review of the chart and collateral sources of information.      Neuro Exam:  AAOX3. Verbal function intact  following commands  tongue midline  facial motions symmetric  PERRL  tracking  no drift  Finger to Nose intact  Motor: MAEx4  motor strength equal b/l      Wound: incision covered    CBC Full  -  ( 13 Apr 2022 06:04 )  WBC Count : 9.85 K/uL  RBC Count : 4.87 M/uL  Hemoglobin : 13.6 g/dL  Hematocrit : 41.7 %  Platelet Count - Automated : 326 K/uL  Mean Cell Volume : 85.6 fL  Mean Cell Hemoglobin : 27.9 pg  Mean Cell Hemoglobin Concentration : 32.6 g/dL  Auto Neutrophil # : 8.70 K/uL  Auto Lymphocyte # : 1.00 K/uL  Auto Monocyte # : 0.09 K/uL  Auto Eosinophil # : 0.01 K/uL  Auto Basophil # : 0.01 K/uL  Auto Neutrophil % : 88.3 %  Auto Lymphocyte % : 10.2 %  Auto Monocyte % : 0.9 %  Auto Eosinophil % : 0.1 %  Auto Basophil % : 0.1 %    04-13    138  |  101  |  10  ----------------------------<  135<H>  4.2   |  24  |  0.5<L>    Ca    8.8      13 Apr 2022 06:04  Phos  3.8     04-13  Mg     1.7     04-13    TPro  6.9  /  Alb  4.1  /  TBili  0.5  /  DBili  x   /  AST  37  /  ALT  45<H>  /  AlkPhos  102  04-13    PT/INR - ( 12 Apr 2022 21:49 )   PT: 13.00 sec;   INR: 1.13 ratio         PTT - ( 12 Apr 2022 21:49 )  PTT:29.5 sec    I&O's Detail    12 Apr 2022 07:01  -  13 Apr 2022 07:00  --------------------------------------------------------  IN:    IV PiggyBack: 150 mL    Oral Fluid: 275 mL    sodium chloride 0.9%: 675 mL  Total IN: 1100 mL    OUT:    Indwelling Catheter - Urethral (mL): 1075 mL  Total OUT: 1075 mL    Total NET: 25 mL      13 Apr 2022 07:01  -  13 Apr 2022 09:48  --------------------------------------------------------  IN:    sodium chloride 0.9%: 75 mL  Total IN: 75 mL    OUT:    Indwelling Catheter - Urethral (mL): 325 mL  Total OUT: 325 mL    Total NET: -250 mL        I&O's Summary    12 Apr 2022 07:01  -  13 Apr 2022 07:00  --------------------------------------------------------  IN: 1100 mL / OUT: 1075 mL / NET: 25 mL    13 Apr 2022 07:01  -  13 Apr 2022 09:48  --------------------------------------------------------  IN: 75 mL / OUT: 325 mL / NET: -250 mL          Assessment/Plan:   CT with postop changes  d/c foster  d/c IV fluids  PT for ambulation  Wean Decadron off over 1 week  Keppra to continue indefinitely  above discussed with attg

## 2022-04-14 NOTE — DISCHARGE NOTE NURSING/CASE MANAGEMENT/SOCIAL WORK - NSDCVIVACCINE_GEN_ALL_CORE_FT
Tdap; 05-Apr-2022 19:28; Anahi Charles (RN); [employer]; [id number] (Exp. Date: 02-Feb-2024); IntraMuscular; Deltoid Left.; 0.5 milliLiter(s); VIS (VIS Published: 09-May-2013, VIS Presented: 05-Apr-2022);

## 2022-04-14 NOTE — PROGRESS NOTE ADULT - SUBJECTIVE AND OBJECTIVE BOX
HPI:  57 y.o. female with PMH of HTN, asthma, sleep apnea on cpap, migraines BIBA after she had a seizure. Patient has no history of seizures. As per , pt has been having URI symptoms for last few days. Started on Prednisone and zpack yesterday. Today, pt was complaining of headache consistent with her usual migraine HA at 1130. She took a nap and when she woke up, she vomited and had a seizure which lasted for about 1 min, witnessed by a . Stroke code called on arrival. Patient last known well 1130. Patient with tongue trauma. Patient out of the window for IV thrombolytics. CTH suggestive of stroke vs seizure in left occipital lobe and cerebral edema. Patient refused stat MRI brain due to claustrophobia and would require sedation. Currently no nursing available to monitor patient in MRI so plan for MRI in AM with sedation.    In ED vitals: /71, HR 96, RR 18, T 97.8, spo2 99% on RA. Patient got 2L IVF, reglan, zofran and keppra in ED. RVP positive for parainfluenza and rhinovirus. Labs significant for lactate of 7.2  (05 Apr 2022 23:11)          PAST MEDICAL & SURGICAL HISTORY:  RAMAKRISHNA on CPAP    GERD (gastroesophageal reflux disease)    Asthmatic bronchitis  MILD , USES VENTOLIN Q2-3M    HTN (hypertension)    Migraines    Chronic neck pain    H/O sinus surgery    Status post D&amp;C    H/O arthroscopy of shoulder  RT    History of hernia repair  2019          HEALTH ISSUES - PROBLEM Dx:          FAMILY HISTORY:      Allergies    No Known Allergies    Intolerances        REVIEW OF SYSTEMS : As listed in HPI  Head and Neck:   Cardio :   Pum :  GI:  Neuro:     MEDICATIONS  (STANDING):  atorvastatin 80 milliGRAM(s) Oral at bedtime  dexAMETHasone  Injectable   IV Push   dexAMETHasone  Injectable 4 milliGRAM(s) IV Push every 8 hours  gabapentin 400 milliGRAM(s) Oral two times a day  heparin   Injectable 5000 Unit(s) SubCutaneous every 8 hours  hydrochlorothiazide 25 milliGRAM(s) Oral daily  levETIRAcetam  IVPB 1000 milliGRAM(s) IV Intermittent every 12 hours  metoprolol succinate ER 50 milliGRAM(s) Oral daily  montelukast 10 milliGRAM(s) Oral daily  pantoprazole    Tablet 40 milliGRAM(s) Oral before breakfast  sertraline 50 milliGRAM(s) Oral daily    MEDICATIONS  (PRN):  acetaminophen     Tablet .. 650 milliGRAM(s) Oral every 6 hours PRN Temp greater or equal to 38C (100.4F), Mild Pain (1 - 3)  benzonatate 100 milliGRAM(s) Oral every 8 hours PRN Cough  clonazePAM  Tablet 0.5 milliGRAM(s) Oral every 8 hours PRN anxiety  melatonin 3 milliGRAM(s) Oral at bedtime PRN Insomnia  morphine  - Injectable 2 milliGRAM(s) IntraMuscular every 4 hours PRN Severe Pain (7 - 10)  ondansetron Injectable 4 milliGRAM(s) IV Push every 8 hours PRN Nausea and/or Vomiting  oxycodone    5 mG/acetaminophen 325 mG 1 Tablet(s) Oral every 4 hours PRN Mild Pain (1 - 3)  oxycodone    5 mG/acetaminophen 325 mG 2 Tablet(s) Oral every 6 hours PRN Moderate Pain (4 - 6)      Anticoagulation:  heparin   Injectable 5000 Unit(s) SubCutaneous every 8 hours      Vital Signs Last 24 Hrs  T(C): 36.7 (14 Apr 2022 05:01), Max: 36.8 (13 Apr 2022 10:14)  T(F): 98.1 (14 Apr 2022 05:01), Max: 98.3 (13 Apr 2022 10:14)  HR: 80 (14 Apr 2022 05:01) (72 - 88)  BP: 119/61 (14 Apr 2022 05:01) (117/71 - 134/70)  BP(mean): 84 (14 Apr 2022 05:01) (81 - 89)  RR: 18 (14 Apr 2022 05:01) (15 - 19)  SpO2: 97% (14 Apr 2022 05:10) (65% - 98%)    Physical Exam :  General :   A&O x 3   Tongue midline  Facial features symmetric, No droop  Speech clear and appropriate, no slur   Pt speaking in full sentences   Follows all commands   Occular :   PERRLA, EOMI   Intermittent blurriness to R eye   Motor :   MAEx4   No spinal point tenderness   Sensory :  Intact bilaterally     Pronator Drift : none     Wound : Scalp staples in place    LABS:                        13.6   9.85  )-----------( 326      ( 13 Apr 2022 06:04 )             41.7     04-13    138  |  101  |  10  ----------------------------<  135<H>  4.2   |  24  |  0.5<L>    Ca    8.8      13 Apr 2022 06:04  Phos  3.8     04-13  Mg     1.7     04-13    TPro  6.9  /  Alb  4.1  /  TBili  0.5  /  DBili  x   /  AST  37  /  ALT  45<H>  /  AlkPhos  102  04-13    PT/INR - ( 12 Apr 2022 21:49 )   PT: 13.00 sec;   INR: 1.13 ratio         PTT - ( 12 Apr 2022 21:49 )  PTT:29.5 sec    Culture - Fungal, CSF (collected 11 Apr 2022 08:51)  Source: .CSF CSF  Preliminary Report (12 Apr 2022 07:57):    Testing in progress    Culture - Acid Fast - CSF (collected 11 Apr 2022 08:51)  Source: .CSF CSF  Preliminary Report (13 Apr 2022 15:04):    Culture is being performed.    Culture - CSF with Gram Stain (collected 11 Apr 2022 08:51)  Source: .CSF CSF  Gram Stain (11 Apr 2022 19:43):    No polymorphonuclear leukocytes seen    No organisms seen    by cytocentrifuge  Preliminary Report (12 Apr 2022 17:22):    No growth to date.    CULTURES:  Culture Results:   No growth to date. (04-11 @ 08:51)  Culture Results:   Culture is being performed. (04-11 @ 08:51)  Culture Results:   Testing in progress (04-11 @ 08:51)    Assessment / Plan:   - S/p R [place] Bx of Intercranial Lesion, POD #1   - PT in good spirits, voided, pain is controlled, tollerating normal diet, ambulating independently with PT, wants to go home with home services  - Discussed with Dr. Dumont

## 2022-04-14 NOTE — DISCHARGE NOTE NURSING/CASE MANAGEMENT/SOCIAL WORK - PATIENT PORTAL LINK FT
You can access the FollowMyHealth Patient Portal offered by [facility] by registering at the following website: [email or web address]. By joining [facility] [employer]’s FollowMyHealth portal, you will also be able to view your health information using other applications (apps) compatible with our system.

## 2022-04-14 NOTE — DISCHARGE NOTE NURSING/CASE MANAGEMENT/SOCIAL WORK - NSDCPEFALRISK_GEN_ALL_CORE
For information on Fall & Injury Prevention, visit: [email or web address] OR  [email or web address] OR  https://www.cdc.gov/steadi/patient.html

## 2022-04-14 NOTE — PHYSICAL THERAPY INITIAL EVALUATION ADULT - GENERAL OBSERVATIONS, REHAB EVAL
10:45-11:14 Pt encountered seated in b/s chair in NAD. Agreeable for PT. Waiting for d/c home. Pt c/o of some blurry vision R eye .

## 2022-04-14 NOTE — PHYSICAL THERAPY INITIAL EVALUATION ADULT - SPECIFY REASON(S)
Pt is waiting for d/c home. Recommended for pt to f/u with neurosurgery and if visual deficit persists, brochure provided for outpt PT/OT services .

## 2022-04-14 NOTE — OCCUPATIONAL THERAPY INITIAL EVALUATION ADULT - SPECIFY REASON(S)
Attempted to see pt for OT evaluation, pt discharged from hospital and not in room.
Chart reviewed. Pt presently without activity orders, S/w x [phone] pt downgraded and off neuro crit service, called NS x [phone]  no answer. OT to f/u when activity orders provided. RN aware

## 2022-04-14 NOTE — PHYSICAL THERAPY INITIAL EVALUATION ADULT - PERTINENT HX OF CURRENT PROBLEM, REHAB EVAL
pt is a 57 y/o female admitted for 57 y.o. female with PMH of HTN, asthma, sleep apnea on cpap, migraines BIBA after she had a seizure. Patient has no history of seizures. As per , pt has been having URI symptoms for last few days. Started on Prednisone and zpack yesterday. Today, pt was complaining of headache consistent with her usual migraine HA at 1130. She took a nap and when she woke up, she vomited and had a seizure which lasted for about 1 min

## 2022-04-14 NOTE — PROGRESS NOTE ADULT - PROVIDER SPECIALTY LIST ADULT
Internal Medicine
Internal Medicine
Neurosurgery
Internal Medicine
Internal Medicine
Neurosurgery
Internal Medicine
Internal Medicine
Neurosurgery
Internal Medicine
Internal Medicine
Neurosurgery
Neurosurgery
Internal Medicine

## 2022-04-14 NOTE — PROGRESS NOTE ADULT - WEIGHT IN KG
105
Patient with one or more new problems requiring additional work-up/treatment.

## 2022-04-14 NOTE — PHYSICAL THERAPY INITIAL EVALUATION ADULT - GAIT DISTANCE, PT EVAL
pt hesitant 2* to R eye blurry vision. Pt educated on appropriate footwear upon d/c home - currently wearing shoes without backs/100 feet

## 2022-04-22 NOTE — DISCUSSION/SUMMARY
[FreeTextEntry1] : SEIZURES -- Likely to remain controlled on keppra [id number]. She knows she may not drive for now.\par \par VISUAL FIELD CUT -- This may not be so severe as to impair her driving. I recommend neuro-ophtho evaluation and her regular ophthalmologist has performed visual field testing in the past. I encouraged her to have that test repeated in about a month, to allow for decreased swelling. Vision may improve following furhter therapy and time.\par \par HIGH GRADE GLIOMA -- MGMT and NGS studies have been sent. I met with the neuropathologist this AM and there is a need for IDH and other mole path markers to be resulted before a diagnosis of AA or GBM can be ascertained. \par \par PT EDUCATION -- I reviewed the disease, it's treatments, risks and benefits of Stupp regimen, and cautioned that prognosis depends upon the mole path and response to therapy, but that -- although incurable -- patients her age with the worst possible glioma typically live 2 years. Other glioma subtypes can have more dramatic responses to therapies. All questions answered during this 30 minute conversation.\par \par MSW -- I asked our  to reach out to her  at [phone] as he will need assistance coordinating the [place] leave he requires to assist in her care.\par \par DISPO -- I reached out to rad onc to set her up to meet with them. I ordered 160mg of Temozolomide + zofran and cautioned her to call me when the medications arrive, but to wait on taking them for now. She will return for chemotherpay teaching and then monthly once treatment begins.

## 2022-04-22 NOTE — HISTORY OF PRESENT ILLNESS
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 She even had an MRI "where she used to work" at [facility] [employer] years ago. These headaches are often preceded by an aura of hemifield visual loss and even hemisensory changes. During a bad headache 2 weeks ago, she also had new trouble thinking, with slurred speech and "feeling like I was in a fog."\par \par She was also crying from the pain. She took NSAIDs and went to sleep for 2.5 hours and upon awakening, the headache was worse and she vomited. Vomiting is not characteristic of her usual migraines. As she was recovering from vomiting, her  reports she had a "blank stare" and then turned her head to the right before going into a GTC seizure. It stopped within 3 minutes, EMS brought her to [facility], an enhancing brain lesion was found with a necrotic center and she underwent resection on 4/12/2022 (MAYCOL).\par \par She was tapered off decadron, stopping yesterday from 1mg daily to none.\par \par She continues to have right sided visual changes, no headaches, no further vomiting, no MS changes, no trouble with language, no double vision and no weakness.No further events on Keppra [phone].\par \par

## 2022-04-22 NOTE — PHYSICAL EXAM
[General Appearance - Alert] : alert [General Appearance - In No Acute Distress] : in no acute distress [Oriented To Time, Place, And Person] : oriented to person, place, and time [Impaired Insight] : insight and judgment were intact [Affect] : the affect was normal [Person] : oriented to person [Place] : oriented to place [Time] : oriented to time [Concentration Intact] : normal concentrating ability [Visual Intact] : visual attention was ~T not ~L decreased [Naming Objects] : no difficulty naming common objects [Repeating Phrases] : no difficulty repeating a phrase [Writing A Sentence] : no difficulty writing a sentence [Fluency] : fluency intact [Comprehension] : comprehension intact [Reading] : reading intact [Past History] : adequate knowledge of personal past history [Cranial Nerves Oculomotor (III)] : extraocular motion intact [Cranial Nerves Trigeminal (V)] : facial sensation intact symmetrically [Cranial Nerves Facial (VII)] : face symmetrical [Cranial Nerves Vestibulocochlear (VIII)] : hearing was intact bilaterally [Cranial Nerves Accessory (XI - Cranial And Spinal)] : head turning and shoulder shrug symmetric [Cranial Nerves Glossopharyngeal (IX)] : tongue and palate midline [Cranial Nerves Hypoglossal (XII)] : there was no tongue deviation with protrusion [Motor Tone] : muscle tone was normal in all four extremities [Motor Strength] : muscle strength was normal in all four extremities [No Muscle Atrophy] : normal bulk in all four extremities [Motor Handedness Right-Handed] : the patient is right hand dominant [Paresis Pronator Drift Right-Sided] : no pronator drift on the right [Paresis Pronator Drift Left-Sided] : no pronator drift on the left [Motor Strength Upper Extremities Bilaterally] : strength was normal in both upper extremities [Motor Strength Lower Extremities Bilaterally] : strength was normal in both lower extremities [Sensation Tactile Decrease] : light touch was intact [Abnormal Walk] : normal gait [Balance] : balance was intact [Past-pointing] : there was no past-pointing [Tremor] : no tremor present [2+] : Ankle jerk left 2+ [Plantar Reflex Right Only] : normal on the right [Plantar Reflex Left Only] : normal on the left [FreeTextEntry5] : RIGHT sided superior quadrantanopisa.

## 2022-04-28 PROBLEM — Z09 POSTOP CHECK: Status: ACTIVE | Noted: 2022-01-01

## 2022-04-28 NOTE — HISTORY OF PRESENT ILLNESS
[FreeTextEntry1] : Ms. JHAVERI presented to the ED on 4/5/22 after she had a seizure. She was also experiencing headaches, vomiting, . MRI brain revealed Enhancing lesion within the left occipital lobe, smaller rim-enhancing lesion in the medial left periatrial region as well as leptomeningeal signal abnormality.  She had a diagnostic LP on 4/11/22 and a left occipital brain tumor biopsy. 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 \par \par Today, she presents for a postop visit. Incision healing well. No signs of erythema, infection, or discharge.Sutures removed. Bacitracin applied. Neuro intact with continued right sided visual changes. Denies headaches or weakness. Walking without assistance. She is on Keppra.

## 2022-04-28 NOTE — REASON FOR VISIT
[Family Member] : family member [de-identified] : Left Occipital Brain Tumor Biopsy  [de-identified] : 4/12/22

## 2022-04-28 NOTE — ASSESSMENT
[FreeTextEntry1] : We have had a thorough discussion regarding her current condition and findings. Ms. JHAVERI has consulted with Dr. Kennedy. She will be started on Temozolomide and Zofran. She is scheduled for a radiation oncology appointment on May 10.  I have recommended that she see an ophthalmologist for visual field testing. I will see her back 8 weeks. Will call barring any issues.\par \par

## 2022-05-10 PROBLEM — I10 HYPERTENSION: Status: RESOLVED | Noted: 2022-01-01 | Resolved: 2022-01-01

## 2022-05-10 PROBLEM — G43.909 MIGRAINES: Status: ACTIVE | Noted: 2022-01-01

## 2022-05-10 PROBLEM — Z86.018 HISTORY OF FIBROIDS: Status: RESOLVED | Noted: 2022-01-01 | Resolved: 2022-01-01

## 2022-05-10 NOTE — REVIEW OF SYSTEMS
[Visual Disturbances] : visual disturbances [Anxiety] : anxiety [Negative] : Integumentary [Patient Intake Form Reviewed] : Patient intake form was reviewed [Fever] : no fever [Chills] : no chills [Fatigue] : no fatigue [Recent Change In Weight] : ~T no recent weight change [Eye Pain] : no eye pain [Dysphagia] : no dysphagia [Chest Pain] : no chest pain [Palpitations] : no palpitations [Lower Ext Edema] : no lower extremity edema [Shortness Of Breath] : no shortness of breath [Wheezing] : no wheezing [Cough] : no cough [Confused] : no confusion [Dizziness] : no dizziness [Suicidal] : not suicidal [Insomnia] : no insomnia [Depression] : no depression

## 2022-05-10 NOTE — VITALS
[Maximal Pain Intensity: 10/10] : 10/10 [Least Pain Intensity: 2/10] : 2/10 [Pain Interferes with ADLs] : Pain interferes with activities of daily living. [80: Normal activity with effort; some signs or symptoms of disease.] : 80: Normal activity with effort; some signs or symptoms of disease.

## 2022-05-10 NOTE — LETTER CLOSING
[Consult Closing:] : Thank you for allowing me to participate in the care of this patient.  If you have any questions, please do not hesitate to contact me. [Sincerely yours,] : Sincerely yours, [FreeTextEntry3] : Lacy Szymanski M.D. \par \par Electronically proofread and signed by:  Lacy Szymanski MD\par Attending, Department of Radiation Medicine\par [facility]\par \par CC: Dr. Dumont

## 2022-05-10 NOTE — DISEASE MANAGEMENT
[Pathological] : TNM Stage: p [FreeTextEntry4] : Glioblastoma Multiforme, WHO Grade 4 [TTNM] : . [NTNM] : . [MTNM] : . [N/A] : Currently not applicable [de-identified] : Brain: left occipital lobe/héctor-atrial

## 2022-05-10 NOTE — PHYSICAL EXAM
[Obese] : obese [] : no respiratory distress [Respiration, Rhythm And Depth] : normal respiratory rhythm and effort [Exaggerated Use Of Accessory Muscles For Inspiration] : no accessory muscle use [Heart Rate And Rhythm] : heart rate and rhythm were normal [Hearing Threshold Finger Rub Not Villalba] : hearing was normal [Heart Sounds] : normal S1 and S2 [Murmurs] : no murmurs present [Edema] : no peripheral edema present [Abdomen Soft] : soft [Nondistended] : nondistended [Abdomen Tenderness] : non-tender [No Focal Deficits] : no focal deficits [Motor Exam] : the motor exam was normal [Sclera] : the sclera and conjunctiva were normal [Normal] : no palpable adenopathy

## 2022-05-20 NOTE — HISTORY OF PRESENT ILLNESS
[FreeTextEntry1] : 55 yo RH woman with left occipital lobe GBM, now with recent neurologic deterioration. \par \par She feels better on decadron 4mg twice daily (AM and lunchtime), but continues to suffer headaches. Headaches are not nocturnal and not associated with nausea nor vomiting.\par \par She also has increased anxiety and poor short term memory. She had her fitting for the RT mask on 5/17/22. Other medications include sertraline 50mg daily, gabapentin 400-400, omeprazole DR 40mg daily, HCTZ 25mg daily, metoprolol ER 50mg daily, atorvastatin 80mg daily, and dulcolax nightly.\par \par No seizures.\par \par Persistent field cut.\par \par ONCOLOGIC HISTORY\par long history of migraines with visual aura (hemianopsia)\par presented with HA + visual anopsia, then had seizure ("blank stare" and then GTC seizure) \par EMS brought to [facility], MRI showed diffuse nonenhancing disease crossing splenium with enhancing brain lesion\par 4/12/2022 - s/p resection STR (MAYCOL).\par PATH: high grade glioma, unmethylated MGMT, IDH wild type. PIK3CA + PIK3R1 mutations\par 4/22/2022 - met with neuro-oncology ([facility]) - already tapered off decadron\par 5/10/2022 - met with radiation oncology (IDALMIS)\par 5/15/2022 - she called with increased ICP, improved with decadron 24mg, then 4mg BID

## 2022-05-20 NOTE — DISCUSSION/SUMMARY
[FreeTextEntry1] : CEREBRAL EDEMA -- To continue on decadron 4mg twice daily for at least the first three weeks of chemoRT. I reviewed adverse effects and benefits.\par \par CHEMOTHERAPY TEACHING -- I reviewed her dose (140+64=860sq daily), how to take medication, adverse effects, and importance of weekly bloodwork to monitor for myelosuppression. She will also get >35g of fiber to avoid constipation and continue on the daily dulcolax. She has zofran 4mg orally disintegrating tablets\par \par SEIZURES -- To continue on keppra [id number] for now.\par \par DISPO -- To return midway through chemoRT and upon RT completion with brain MRI with contrast.

## 2022-05-31 NOTE — PHYSICAL EXAM
[Sclera] : the sclera and conjunctiva were normal [Hearing Threshold Finger Rub Not [place]] : hearing was normal [] : no respiratory distress [Exaggerated Use Of Accessory Muscles For Inspiration] : no accessory muscle use [Normal] : oriented to person, place and time, the affect was normal, the mood was normal and not anxious

## 2022-05-31 NOTE — HISTORY OF PRESENT ILLNESS
[FreeTextEntry1] : Radiation treatment #3. She has moderate headaches, relieved by Tylenol. She is taking Decadron 4mg twice daily. She denies N/V or changes in vision. Taking Temodar as prescribed.

## 2022-05-31 NOTE — DISEASE MANAGEMENT
[Pathological] : TNM Stage: p [N/A] : Currently not applicable [FreeTextEntry4] : Glioblastoma Multiforme, WHO Grade 4 [TTNM] : . [NTNM] : . [MTNM] : . [de-identified] : 600cGy [de-identified] : 6000cGy [de-identified] : Brain: left occipital lobe/héctor-atrial

## 2022-05-31 NOTE — REVIEW OF SYSTEMS
[Fatigue: Grade 1 - Fatigue relieved by rest] : Fatigue: Grade 1 - Fatigue relieved by rest [Nausea: Grade 0] : Nausea: Grade 0 [Vomiting: Grade 0] : Vomiting: Grade 0 [Cognitive Disturbance: Grade 0] : Cognitive Disturbance: Grade 0 [Concentration Impairment: Grade 1] : Concentration Impairment: Grade 1 - Mild inattention or decreased level of concentration [Dizziness: Grade 1 - Mild unsteadiness or sensation of movement] : Dizziness: Grade 1 - Mild unsteadiness or sensation of movement [Headache: Grade 2 - Moderate pain; limiting instrumental ADL] : Headache: Grade 2 - Moderate pain; limiting instrumental ADL [Lethargy: Grade 0] : Lethargy: Grade 0 [Somnolence: Grade 0] : Somnolence: Grade 0 [Alopecia: Grade 0] : Alopecia: Grade 0 [Pruritus: Grade 0] : Pruritus: Grade 0 [Dermatitis Radiation: Grade 0] : Dermatitis Radiation: Grade 0

## 2022-05-31 NOTE — VITALS
[Maximal Pain Intensity: 7/10] : 7/10 [Least Pain Intensity: 0/10] : 0/10 [Pain Location: ___] : Pain Location: [unfilled] [OTC] : OTC [70: Cares for self; unalbe to carry on normal activity or do active work.] : 70: Cares for self; unable to carry on normal activity or do active work.

## 2022-06-07 NOTE — PHYSICAL EXAM
[Hearing Threshold Finger Rub Not [place]] : hearing was normal [] : no respiratory distress [Exaggerated Use Of Accessory Muscles For Inspiration] : no accessory muscle use [Normal] : oriented to person, place and time, the affect was normal, the mood was normal and not anxious

## 2022-06-07 NOTE — HISTORY OF PRESENT ILLNESS
[FreeTextEntry1] : She reports doing well. She has moderate headaches and left sided head pressure when she stands up. Not taking anything for pain.  notes improvement in gait. She is taking Decadron 4mg twice daily. She denies N/V. Visual disturbance is unchanged since prior to treatment. Taking Temodar as prescribed.

## 2022-06-07 NOTE — DISEASE MANAGEMENT
[Pathological] : TNM Stage: p [N/A] : Currently not applicable [FreeTextEntry4] : Glioblastoma Multiforme, WHO Grade 4 [TTNM] : . [NTNM] : . [MTNM] : . [de-identified] : 3335cGy [de-identified] : 6000cGy [de-identified] : Brain: left occipital lobe/héctor-atrial

## 2022-06-07 NOTE — VITALS
[Maximal Pain Intensity: 8/10] : 8/10 [Least Pain Intensity: 0/10] : 0/10 [Pain Location: ___] : Pain Location: [unfilled] [70: Cares for self; unalbe to carry on normal activity or do active work.] : 70: Cares for self; unable to carry on normal activity or do active work.

## 2022-06-07 NOTE — REVIEW OF SYSTEMS
[Nausea: Grade 0] : Nausea: Grade 0 [Vomiting: Grade 0] : Vomiting: Grade 0 [Fatigue: Grade 1 - Fatigue relieved by rest] : Fatigue: Grade 1 - Fatigue relieved by rest [Cognitive Disturbance: Grade 0] : Cognitive Disturbance: Grade 0 [Concentration Impairment: Grade 1] : Concentration Impairment: Grade 1 - Mild inattention or decreased level of concentration [Dizziness: Grade 1 - Mild unsteadiness or sensation of movement] : Dizziness: Grade 1 - Mild unsteadiness or sensation of movement [Lethargy: Grade 0] : Lethargy: Grade 0 [Somnolence: Grade 0] : Somnolence: Grade 0 [Pruritus: Grade 0] : Pruritus: Grade 0 [Dermatitis Radiation: Grade 0] : Dermatitis Radiation: Grade 0 [Alopecia: Grade 1 - Hair loss of <50% of normal for that individual that is not obvious from a distance but only on close inspection; a different hair style may be required to cover the hair loss but it does not require a wig or hair piece to camouflage] : Alopecia: Grade 1 - Hair loss of <50% of normal for that individual that is not obvious from a distance but only on close inspection; a different hair style may be required to cover the hair loss but it does not require a wig or hair piece to camouflage [Headache: Grade 1 - Mild pain] : Headache: Grade 1 - Mild pain

## 2022-06-13 NOTE — DISEASE MANAGEMENT
[Pathological] : TNM Stage: p [N/A] : Currently not applicable [FreeTextEntry4] : Glioblastoma Multiforme, WHO Grade 4 [TTNM] : . [NTNM] : . [MTNM] : . [de-identified] : 2400cGy [de-identified] : 6000cGy [de-identified] : Brain: left occipital lobe/héctor-atrial
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
## 2022-06-13 NOTE — HISTORY OF PRESENT ILLNESS
[FreeTextEntry1] : She reports doing well. She has mild headaches/left sided head pressure that last for a few headaches. Not taking anything for pain. She is ambulating steady without assistance. Reviewed risks of falls. She is taking Decadron 4mg twice daily. She denies N/V. Visual disturbance is unchanged since prior to treatment. Taking Temodar as prescribed.

## 2022-06-13 NOTE — REVIEW OF SYSTEMS
[Nausea: Grade 0] : Nausea: Grade 0 [Vomiting: Grade 0] : Vomiting: Grade 0 [Fatigue: Grade 1 - Fatigue relieved by rest] : Fatigue: Grade 1 - Fatigue relieved by rest [Cognitive Disturbance: Grade 0] : Cognitive Disturbance: Grade 0 [Concentration Impairment: Grade 1] : Concentration Impairment: Grade 1 - Mild inattention or decreased level of concentration [Dizziness: Grade 1 - Mild unsteadiness or sensation of movement] : Dizziness: Grade 1 - Mild unsteadiness or sensation of movement [Headache: Grade 1 - Mild pain] : Headache: Grade 1 - Mild pain [Lethargy: Grade 0] : Lethargy: Grade 0 [Somnolence: Grade 0] : Somnolence: Grade 0 [Alopecia: Grade 1 - Hair loss of <50% of normal for that individual that is not obvious from a distance but only on close inspection; a different hair style may be required to cover the hair loss but it does not require a wig or hair piece to camouflage] : Alopecia: Grade 1 - Hair loss of <50% of normal for that individual that is not obvious from a distance but only on close inspection; a different hair style may be required to cover the hair loss but it does not require a wig or hair piece to camouflage [Pruritus: Grade 0] : Pruritus: Grade 0 [Dermatitis Radiation: Grade 0] : Dermatitis Radiation: Grade 0

## 2022-06-13 NOTE — REASON FOR VISIT
[Routine On-Treatment] : a routine on-treatment visit for [Brain Tumor] : brain tumor [Spouse] : spouse

## 2022-06-17 NOTE — PHYSICAL EXAM
[General Appearance - Alert] : alert [General Appearance - In No Acute Distress] : in no acute distress [Oriented To Time, Place, And Person] : oriented to person, place, and time [Impaired Insight] : insight and judgment were intact [Affect] : the affect was normal [Person] : oriented to person [Place] : oriented to place [Time] : oriented to time [Concentration Intact] : normal concentrating ability [Visual Intact] : visual attention was ~T not ~L decreased [Naming Objects] : no difficulty naming common objects [Repeating Phrases] : no difficulty repeating a phrase [Writing A Sentence] : no difficulty writing a sentence [Fluency] : fluency intact [Comprehension] : comprehension intact [Reading] : reading intact [Past History] : adequate knowledge of personal past history [Cranial Nerves Oculomotor (III)] : extraocular motion intact [Cranial Nerves Trigeminal (V)] : facial sensation intact symmetrically [Cranial Nerves Facial (VII)] : face symmetrical [Cranial Nerves Vestibulocochlear (VIII)] : hearing was intact bilaterally [Cranial Nerves Glossopharyngeal (IX)] : tongue and palate midline [Cranial Nerves Accessory (XI - Cranial And Spinal)] : head turning and shoulder shrug symmetric [Cranial Nerves Hypoglossal (XII)] : there was no tongue deviation with protrusion [Motor Tone] : muscle tone was normal in all four extremities [Motor Strength] : muscle strength was normal in all four extremities [No Muscle Atrophy] : normal bulk in all four extremities [Motor Handedness Right-Handed] : the patient is right hand dominant [Paresis Pronator Drift Right-Sided] : no pronator drift on the right [Paresis Pronator Drift Left-Sided] : no pronator drift on the left [Motor Strength Upper Extremities Bilaterally] : strength was normal in both upper extremities [Motor Strength Lower Extremities Bilaterally] : strength was normal in both lower extremities [Sensation Tactile Decrease] : light touch was intact [Abnormal Walk] : normal gait [Balance] : balance was intact [Past-pointing] : there was no past-pointing [Tremor] : no tremor present [2+] : Ankle jerk left 2+ [Plantar Reflex Right Only] : normal on the right [Plantar Reflex Left Only] : normal on the left [FreeTextEntry5] : RIGHT sided superior quadrantanopisa. [FreeTextEntry6] : There is no proximal myopathy.

## 2022-06-17 NOTE — DISCUSSION/SUMMARY
[FreeTextEntry1] : CHEMORADIATION -- I reinforced importance of having weekly bloodwork (CBC), best performed early in the week. She expressed good understanding.\par \par SEIZURES -- Controlled on Keppra [phone].\par \par CEREBRAL EDEMA -- No headaches. I tapered dexamethasone from 4-4 to 4-0. She will call my office if she worsens and I will probably go back up on the steroids. Given her intermittent headaches, we will defer further taper for now. I reviewed the risk of proximal myopathy with this drug.\par \par DISPO -- to return in one month with repeat enhanced brain MRI and for follow-up visit. I cautioned her that imaging may look worse, but that does not indicated tumor has not responded to therapy, it is a new baseline imaging study.

## 2022-06-17 NOTE — HISTORY OF PRESENT ILLNESS
[FreeTextEntry1] : 55 yo RH woman with left occipital GBM (unMGMT), here for follow-up during midcycle of chemoradiation therapy.\par \par Briefly, she remains on decadron 4mg twice daily and her headaches are mostly well controlled. She reports she is tolerating the radiation and chemotherapy well. She is experiencing some hair loss posteriorly, together with intermittent twinges of discomfort in the region where the surgery was performed. She also has persistent headaches, although these are not persistent through the day, they do occur most days. \par \par She is in a wheelchair today, but she can walk. She feels that her legs are sometimes "not strong" and she worries about ambulation.\par \par She denies nausea.vomiting, blurry vision or other symptoms suggesting increased intracranial pressure. There are no cranial nerve nor seizure-like complaints on Keppra [phone].\par \par She had a CBC on 6/16/2022 (Thursday) which showed no cytopenias.\par \par Daughter can be reached at [phone].\par \par ONCOLOGIC HISTORY\par long history of migraines with visual aura (hemianopsia)\par APRIL 2022 presented with HA + visual anopsia, then seizure ("blank stare" followed by GTC) and EMS brought to [facility], MRI showed diffuse nonenhancing disease crossing splenium with enhancing brain lesion\par 4/12/22 - s/p resection STR ([facility]YCOL).\par PATH: high grade glioma, unmethylated MGMT, IDH wild type. PIK3CA + PIK3R1 mutations\par 4/22/22 - met with neuro-oncology ([facility]) - already tapered off decadron on Keppra [phone]\par 5/10/22 - met with radiation oncology (IDALMIS)\par 5/15/22 - headaches (increased ICP), given decadron 24mg, then 4mg BID \par 5/27/22 - started Stupp regimen with 160mg TEMODAR dosing\par 6/13/22 - s/p 2400 of 6000cGy planned.\par \par

## 2022-06-21 NOTE — REASON FOR VISIT
[Routine On-Treatment] : a routine on-treatment visit for [Brain Tumor] : brain tumor [Spouse] : spouse [Friend] : friend

## 2022-06-21 NOTE — REVIEW OF SYSTEMS
[Nausea: Grade 0] : Nausea: Grade 0 [Vomiting: Grade 0] : Vomiting: Grade 0 [Fatigue: Grade 1 - Fatigue relieved by rest] : Fatigue: Grade 1 - Fatigue relieved by rest [Blurred Vision: Grade 2 - Symptomatic; limiting instrumental ADL] : Blurred Vision: Grade 2 - Symptomatic; limiting instrumental ADL [Cognitive Disturbance: Grade 2 - Moderate cognitive disability; interfering with work/school/life performance but capable of independent living; specialized resources on part time basis indicated] : Cognitive Disturbance: Grade 2 - Moderate cognitive disability; interfering with work/school/life performance but capable of independent living; specialized resources on part time basis indicated [Concentration Impairment: Grade 2] : Concentration Impairment: Grade 2 - Moderate impairment in attention or decreased level of concentration; limiting instrumental ADL [Dizziness: Grade 1 - Mild unsteadiness or sensation of movement] : Dizziness: Grade 1 - Mild unsteadiness or sensation of movement [Headache: Grade 1 - Mild pain] : Headache: Grade 1 - Mild pain [Alopecia: Grade 1 - Hair loss of <50% of normal for that individual that is not obvious from a distance but only on close inspection; a different hair style may be required to cover the hair loss but it does not require a wig or hair piece to camouflage] : Alopecia: Grade 1 - Hair loss of <50% of normal for that individual that is not obvious from a distance but only on close inspection; a different hair style may be required to cover the hair loss but it does not require a wig or hair piece to camouflage [Dermatitis Radiation: Grade 1 - Faint erythema or dry desquamation] : Dermatitis Radiation: Grade 1 - Faint erythema or dry desquamation

## 2022-06-21 NOTE — VITALS
[Maximal Pain Intensity: 5/10] : 5/10 [Least Pain Intensity: 0/10] : 0/10 [60: Requires occasional assistance, but is able to care for most of his/her needs] : 60: Requires occasional assistance, but is able to care for most of his/her needs

## 2022-06-22 NOTE — PHYSICAL EXAM
[General Appearance - Well Nourished] : well nourished [General Appearance - Alert] : alert [General Appearance - In No Acute Distress] : in no acute distress [Hearing Threshold Finger Rub Not [place]] : hearing was normal [] : no respiratory distress [Respiration, Rhythm And Depth] : normal respiratory rhythm and effort [Exaggerated Use Of Accessory Muscles For Inspiration] : no accessory muscle use [Heart Rate And Rhythm] : heart rate and rhythm were normal [Nail Clubbing] : no clubbing  or cyanosis of the fingernails [Motor Tone] : muscle strength and tone were normal [Motor Exam] : the motor exam was normal [Affect] : the affect was normal [Mood] : the mood was normal [Not Anxious] : not anxious [Person] : oriented to person [Appropriate] : appropriate [Naming Objects] : difficulty naming common objects

## 2022-06-22 NOTE — DISEASE MANAGEMENT
[Pathological] : TNM Stage: p [N/A] : Currently not applicable [FreeTextEntry4] : Glioblastoma Multiforme, WHO Grade 4 [TTNM] : . [NTNM] : . [MTNM] : . [de-identified] : 3245cGy [de-identified] : 6000cGy [de-identified] : Brain: left occipital lobe/héctor-atrial

## 2022-06-22 NOTE — HISTORY OF PRESENT ILLNESS
[FreeTextEntry1] : Patient's spouse reports, pt doing "a lot better", right side is stronger, less tremors.  She continues to have blurry vision and intermittent headaches without changes.  Also, Dr. Kennedy decreased her Decadron to 4 mg Q 24 hrs (from Q 12 hrs) on 6/17/20222.     Denies N/V, managed with anti medics.  CBC to be sent today.

## 2022-06-27 PROBLEM — R68.89 FORGETFULNESS: Status: ACTIVE | Noted: 2022-01-01

## 2022-06-27 NOTE — HISTORY OF PRESENT ILLNESS
[FreeTextEntry1] : Patient and pt's spouse report, she is more forgetful, which is causing her to get agitated.  No other changes, continues with blurry vision and intermittent headaches.  Eating well, no N/V.  Continues with Decadron 4 mg Q 24 hrs.  CBC sent today.

## 2022-06-27 NOTE — DISEASE MANAGEMENT
[Pathological] : TNM Stage: p [N/A] : Currently not applicable [FreeTextEntry4] : Glioblastoma Multiforme, WHO Grade 4 [TTNM] : . [NTNM] : . [MTNM] : . [de-identified] : 2829cGy [de-identified] : 6000cGy [de-identified] : Brain: left occipital lobe/héctor-atrial

## 2022-06-27 NOTE — REVIEW OF SYSTEMS
[Nausea: Grade 0] : Nausea: Grade 0 [Vomiting: Grade 0] : Vomiting: Grade 0 [Fatigue: Grade 1 - Fatigue relieved by rest] : Fatigue: Grade 1 - Fatigue relieved by rest [Blurred Vision: Grade 2 - Symptomatic; limiting instrumental ADL] : Blurred Vision: Grade 2 - Symptomatic; limiting instrumental ADL [Cognitive Disturbance: Grade 2 - Moderate cognitive disability; interfering with work/school/life performance but capable of independent living; specialized resources on part time basis indicated] : Cognitive Disturbance: Grade 2 - Moderate cognitive disability; interfering with work/school/life performance but capable of independent living; specialized resources on part time basis indicated [Headache: Grade 1 - Mild pain] : Headache: Grade 1 - Mild pain [Pruritus: Grade 0] : Pruritus: Grade 0 [Dermatitis Radiation: Grade 1 - Faint erythema or dry desquamation] : Dermatitis Radiation: Grade 1 - Faint erythema or dry desquamation

## 2022-06-27 NOTE — PHYSICAL EXAM
[General Appearance - Well Developed] : well developed [General Appearance - Well Nourished] : well nourished [General Appearance - Alert] : alert [General Appearance - In No Acute Distress] : in no acute distress [Sclera] : the sclera and conjunctiva were normal [Hearing Threshold Finger Rub Not [place]] : hearing was normal [] : no respiratory distress [Respiration, Rhythm And Depth] : normal respiratory rhythm and effort [Exaggerated Use Of Accessory Muscles For Inspiration] : no accessory muscle use [Heart Rate And Rhythm] : heart rate and rhythm were normal [Nail Clubbing] : no clubbing  or cyanosis of the fingernails [Motor Tone] : muscle strength and tone were normal [Skin Color & Pigmentation] : normal skin color and pigmentation [Motor Exam] : the motor exam was normal [Affect] : the affect was normal [Mood] : the mood was normal [Not Anxious] : not anxious

## 2022-07-01 NOTE — HISTORY OF PRESENT ILLNESS
[FreeTextEntry1] : Multifocal GBM\par s/p excisional biopsy of occipital lesion\par s/p XRT\par chemo tx\par \par Doing well\par only complaint is visual field deficit\par continue tx\par MRI mid July scheduled\par will follow

## 2022-07-05 PROBLEM — H53.9 VISION CHANGES: Status: ACTIVE | Noted: 2022-01-01

## 2022-07-05 NOTE — DISEASE MANAGEMENT
[Pathological] : TNM Stage: p [N/A] : Currently not applicable [FreeTextEntry4] : Glioblastoma Multiforme, WHO Grade 4 [TTNM] : . [NTNM] : . [MTNM] : . [de-identified] : 6290cGy [de-identified] : 6000cGy [de-identified] : Brain: left occipital lobe/héctor-atrial

## 2022-07-05 NOTE — PHYSICAL EXAM
[Normal] : well developed, well nourished, in no acute distress [Sclera] : the sclera and conjunctiva were normal [Hearing Threshold Finger Rub Not [place]] : hearing was normal [] : no respiratory distress [Respiration, Rhythm And Depth] : normal respiratory rhythm and effort [Exaggerated Use Of Accessory Muscles For Inspiration] : no accessory muscle use [Heart Rate And Rhythm] : heart rate and rhythm were normal [Motor Tone] : muscle strength and tone were normal [Skin Color & Pigmentation] : normal skin color and pigmentation [No Focal Deficits] : no focal deficits [Motor Exam] : the motor exam was normal [Affect] : the affect was normal [Mood] : the mood was normal [Not Anxious] : not anxious

## 2022-07-05 NOTE — REVIEW OF SYSTEMS
[Nausea: Grade 0] : Nausea: Grade 0 [Vomiting: Grade 0] : Vomiting: Grade 0 [Fatigue: Grade 1 - Fatigue relieved by rest] : Fatigue: Grade 1 - Fatigue relieved by rest [Dizziness: Grade 0] : Dizziness: Grade 0  [Headache: Grade 0] : Headache: Grade 0 [Alopecia: Grade 1 - Hair loss of <50% of normal for that individual that is not obvious from a distance but only on close inspection; a different hair style may be required to cover the hair loss but it does not require a wig or hair piece to camouflage] : Alopecia: Grade 1 - Hair loss of <50% of normal for that individual that is not obvious from a distance but only on close inspection; a different hair style may be required to cover the hair loss but it does not require a wig or hair piece to camouflage [Pruritus: Grade 0] : Pruritus: Grade 0 [Dermatitis Radiation: Grade 1 - Faint erythema or dry desquamation] : Dermatitis Radiation: Grade 1 - Faint erythema or dry desquamation

## 2022-07-05 NOTE — HISTORY OF PRESENT ILLNESS
[FreeTextEntry1] : Patient reports doing "okay", blurry vision on the right the same and left has intermittent blurry vision, headaches the same - not worsening. No n/v. Pt is accompanied by spouse.  Taking  Temodar - last dose on Thursday. \par  XRT 26/30 today.

## 2022-07-15 NOTE — PHYSICAL EXAM
[General Appearance - Alert] : alert [General Appearance - In No Acute Distress] : in no acute distress [Oriented To Time, Place, And Person] : oriented to person, place, and time [Impaired Insight] : insight and judgment were intact [Affect] : the affect was normal [Person] : oriented to person [Place] : oriented to place [Time] : oriented to time [Concentration Intact] : normal concentrating ability [Visual Intact] : visual attention was ~T not ~L decreased [Naming Objects] : no difficulty naming common objects [Repeating Phrases] : no difficulty repeating a phrase [Writing A Sentence] : no difficulty writing a sentence [Fluency] : fluency intact [Comprehension] : comprehension intact [Reading] : reading intact [Past History] : adequate knowledge of personal past history [Cranial Nerves Oculomotor (III)] : extraocular motion intact [Cranial Nerves Trigeminal (V)] : facial sensation intact symmetrically [Cranial Nerves Facial (VII)] : face symmetrical [Cranial Nerves Vestibulocochlear (VIII)] : hearing was intact bilaterally [Cranial Nerves Glossopharyngeal (IX)] : tongue and palate midline [Cranial Nerves Accessory (XI - Cranial And Spinal)] : head turning and shoulder shrug symmetric [Cranial Nerves Hypoglossal (XII)] : there was no tongue deviation with protrusion [Motor Tone] : muscle tone was normal in all four extremities [Motor Strength] : muscle strength was normal in all four extremities [No Muscle Atrophy] : normal bulk in all four extremities [Motor Handedness Right-Handed] : the patient is right hand dominant [Sensation Tactile Decrease] : light touch was intact [Abnormal Walk] : normal gait [Balance] : balance was intact [2+] : Ankle jerk left 2+ [Paresis Pronator Drift Right-Sided] : no pronator drift on the right [Paresis Pronator Drift Left-Sided] : no pronator drift on the left [Motor Strength Upper Extremities Bilaterally] : strength was normal in both upper extremities [Motor Strength Lower Extremities Bilaterally] : strength was normal in both lower extremities [Past-pointing] : there was no past-pointing [Tremor] : no tremor present [Plantar Reflex Right Only] : normal on the right [Plantar Reflex Left Only] : normal on the left [FreeTextEntry5] : dense right homonymous hemianopsia [FreeTextEntry6] : no proximal myopathy. Full strength throughout.

## 2022-07-15 NOTE — HISTORY OF PRESENT ILLNESS
[FreeTextEntry1] : 57 yo RH woman with left occipital GBM (unMGMT), here for follow-up upon completion of chemoradiation therapy.\par \par At her last visit, she remained on decadron 4mg twice daily, which I decreased to once daily and further tapered after several telephone conversations with her and her daughter. She remains on 1mg daily and has notice some weight gain. She denies headaches, nausea.vomiting, blurry vision or other symptoms suggesting increased intracranial pressure. There are no cranial nerve nor seizure-like complaints on Keppra [id number].\par \par At her last visit, she arrived in a wheelchair, but walked well on formal evaluation. She was concerned about her legs feeling weak occasionally despite the excellent strength on direct examination. Today, she arrives having walked from the parking lot to the office, no wheelchair.\par \par Her CBC's, including one on 6/16/2022 (Thursday) show no cytopenias.\par \par Daughter can be reached at [phone].\par \par ONCOLOGIC HISTORY\par long history of migraines with visual aura (hemianopsia)\par APRIL 2022 presented with HA + visual anopsia, then seizure ("blank stare" followed by GTC) and EMS brought to [facility], MRI showed diffuse nonenhancing disease crossing splenium with enhancing brain lesion\par 4/12/22 - s/p resection STR ([facility]).\par PATH: high grade glioma, unmethylated MGMT, IDH wild type. PIK3CA + PIK3R1 mutations\par 4/22/22 - met with neuro-oncology (CAROLINA) - already tapered off decadron on Keppra [id number]\par 5/10/22 - met with radiation oncology (IDALMIS)\par 5/15/22 - headaches (increased ICP), given decadron 24mg, then 4mg BID \par 5/27/22 - started Stupp regimen with 160mg TEMODAR dosing\par 6/13/22 - 7/11/22 - s/p chemoradiation 6000cGy \par 7/15/22 - NEURO-ONC followup, MRI shows expected changes.\par

## 2022-07-15 NOTE — DISCUSSION/SUMMARY
[FreeTextEntry1] : MALIGNANT BRAIN TUMOR -- She has completed chemoradiation therapy and we have an MRI for future evaluations. She will continue on Temozolomide monotherapy, starting at 150mg/M2 (320mg=one 140mg capsule + one 180mg capsule) for five consecutive days each month, starting on 8/8/2022. If her CBC continues to be robust (she had no cytopenias during chemoRT), then will increase the dose to 200mg/M2 (400mg) for cycles 2 (9/12/22) and beyond.\par \par CHEMOTHERAPY MONITORING -- She will continue to have CBC bloodwork on weeks 3 and 4 of each cycle, weeks 8/22/22 and 8/29/22. She knows where the lab is located to have her bloods drawn.\par \par CEREBRAL EDEMA -- She is doing well. Today she will come off the steroids.\par \par DISPO -- To return for routine follow-up the first week of August and for contrast-enhanced MRI and oncological follow-up the first week of September.

## 2022-07-15 NOTE — DATA REVIEWED
[de-identified] : I personally reviewed MR imaging dated\par 9/28/2020	4/6/2022	5/13/2022	7/13/2022\par \par In reviewing these images, I find MOSTLY STABLE SPLENIAL AND POSTERIOR LEFT MEDIAL HEMISPHERIC HYPERINTENSITY on the T2 weighted images, with signal change likely representing an admixture of treatment effects, cerebral edema, or RESIDUAL neoplasm. The official report comments on an increase in the splenial disease. Given the differences in slice acquisition technique (most recent imaging angled more superiorly/obliquely), I am not so sure.\par DWI sequences disclose no restriction.\par On the contrast enhanced images, there is considerable residual enhancement within the left occipital lobe.\par Overall I find the images to be c/w expected treatment-related effects and residual neoplasm. \par Selected imaging was provided to the patient, along with a detailed verbal explanation of the issues at hand as outlined above.\par

## 2022-08-04 NOTE — ED PROVIDER NOTE - CLINICAL SUMMARY MEDICAL DECISION MAKING FREE TEXT BOX
57-year-old female presents to ED for headache getting to use back to status which has improved.  CT head demonstrates worsening vasogenic edema neurosurgery was consulted.  They recommended admission for further evaluation management.

## 2022-08-04 NOTE — ED PROVIDER NOTE - PHYSICAL EXAMINATION
CONST: NAD  EYES: PERRL, EOMI, Sclera and conjunctiva clear.   ENT: No nasal discharge. Oropharynx normal appearing, no erythema or exudates. No abscess or swelling. Uvula midline.   NECK: Non-tender, no meningeal signs. normal ROM. supple   CARD: S1 S2; No jvd  RESP: Equal BS B/L, No wheezes, rhonchi or rales. No distress  GI: Soft, non-tender, non-distended. normal BS  MS: Normal ROM in all extremities. pulses 2 +. no calf tenderness or swelling  SKIN: Warm, dry, no acute rashes. Good turgor  NEURO: A&Ox3, No focal deficits. Strength 5/5 with no sensory deficits. Steady gait. Finger to nose intact. Negative pronator drift

## 2022-08-04 NOTE — ED PROVIDER NOTE - NS ED ROS FT
Constitutional: (-) fever  Eyes/ENT: (-) blurry vision, (-) epistaxis  Cardiovascular: (-) chest pain, (-) syncope  Respiratory: (-) cough, (-) shortness of breath  Gastrointestinal: (-) vomiting, (-) diarrhea  Musculoskeletal: (-) neck pain, (-) back pain, (-) joint pain  Integumentary: (-) rash, (-) edema  Neurological: (+) headache, (+) altered mental status  Psychiatric: (-) hallucinations  Allergic/Immunologic: (-) pruritus

## 2022-08-04 NOTE — ED ADULT TRIAGE NOTE - BP NONINVASIVE DIASTOLIC (MM HG)
Nurse returned call to pt' numbers listed in epic, no answer, nurse need to notify pt Dr Bowles is in palliative care now and no longer works in primary care, however, she can be referred to see one of the primary care provides when she calls the office back.   76

## 2022-08-04 NOTE — ED PROVIDER NOTE - OBJECTIVE STATEMENT
57y F pmh HTN, HLD, Glioblastoma on chemo presents for eval. As per daughter pt was taking decadron 4mg but her oncologist has been lower the dose over the past wk to 10mg of prednisone. Since having her steroid decreased pt has been experiencing increased headaches and episodes of confusion, improved when given decadron. Now presents at baseline mental status with mild headache.

## 2022-08-04 NOTE — ED PROVIDER NOTE - ATTENDING APP SHARED VISIT CONTRIBUTION OF CARE
57-year-old female past medical history of GBM status post-resection April 22 presents to ED for episode of headache and confusion.  Patient's family states that her oncologist has been weaning down her Decadron to 2 mg a day–this is when she developed the headaches.  Daughter gave her additional dose of Decadron and she states that patient is feeling improved.  Patient oncologist was worried that there might be worsening CT head and sent her to the emergency department for further imaging.  Patient feels improved no nausea no vomiting no fever no chills.    Const: NAD  Eyes: PERRL, no conjunctival injection  HENT:  Neck supple without meningismus   CV: RRR, Warm, well-perfused extremities  RESP: CTA B/L, no tachypnea   GI: soft, non-tender, non-distended  MSK: No gross deformities appreciated  Skin: Warm, dry. No rashes  Neuro: Alert, CNs II-XII grossly intact. Sensation and motor function of extremities grossly intact.  Psych: Anxious     will do labs and CT

## 2022-08-04 NOTE — ED PROVIDER NOTE - NS ED ATTENDING STATEMENT MOD
This was a shared visit with the SHIRA. I reviewed and verified the documentation and independently performed the documented:

## 2022-08-05 NOTE — H&P ADULT - ASSESSMENT
IMP: 59 y/o F with hx of GBM presents with rebound cerebral edema        PLAN; Admit to Neurosurgery service             Start decadron 4m q 8H            Continue GI coverage           Obtain MRI of brain w/wo          frequent Neuo checks          follow up Neurooncologist in AM

## 2022-08-05 NOTE — H&P ADULT - NSHPLABSRESULTS_GEN_ALL_CORE
13.8   5.79  )-----------( 181      ( 04 Aug 2022 23:45 )             37.6   08-04    137  |  97<L>  |  10  ----------------------------<  205<H>  3.7   |  26  |  0.5<L>    Ca    9.5      04 Aug 2022 23:45    TPro  6.7  /  Alb  4.4  /  TBili  1.1  /  DBili  x   /  AST  35  /  ALT  48<H>  /  AlkPhos  82  08-04

## 2022-08-05 NOTE — H&P ADULT - HISTORY OF PRESENT ILLNESS
This is a 56 y/o F known to the service for GBM s/p resection 4/22 with Tim and RXT/Chem thereafter with Dr Sandoval. Pt presents to ER at the urging of daughter and oncologist with c/o headache, lethargy and confusion. Family states pt has been experiencing these symptpms since weaning/ tapering of decadron. Pt was down to 2mg daily then switch to prednisone and pt was unable to tolerate it. Pt recieved a 8 mg dose this am and symptoms tonight have improved back to baseline. HCt shows residual edema in the tumor bed of left occipital  and temporal area.

## 2022-08-05 NOTE — CHART NOTE - NSCHARTNOTEFT_GEN_A_CORE
NEURO-ONCOLOGY ATTENDING:  CAROLINA [phone]  	  CC: GLIOBLASTOMA, CEREBRAL EDEMA, HEADACHES, WERNICKE'S APHASIA, HOMONYMOUS HEMIANOPSIA.    PATIENT SEEN AND EXAMINED IN ED ON 8/5/2022  SUNRISE RECORDS REVIEWED   ALLSCRIPTS RECORDS REVIEWED  LABS REVIEWED  IMAGING REVIEWED  DISCUSSED WITH DAUGHTER AT BEDSIDE    55 yo old RH woman with left posterior temporal GBM (unMGMT) presents to ED with severe headaches and cognitive decline in the setting of steroid taper.    She underwent resection of the left occipital and posterior temporal disease in APRIL 2022 and completed chemoradiation in July. She has tolerated a steroid taper poorly, with increasing headaches in the setting of dose lowering, requiring increased steroids. After speaking with neuro-onc team several times, she came to the ED for further workup. She feels much better since receiving additional decadron in the ED. Her headache is now a 1/10, where it had been a 9/10. Her daughter is at bedside and concurs. Her ability to find the right word and to understand what is said remains significantly impaired, better following steroids, but still worse than 1-2 weeks ago.    She denies nausea/vomiting or other sign of increased ICP. There have been no seizures nor seizure-like events. She denies double vision, trouble swallowing or other cranial nerve deficit.    ONCOLOGIC HISTORY  long history of migraines with visual aura (hemianopsia)  APRIL 2022 presented with HA + visual anopsia, then seizure ("blank stare" followed by GTC) and EMS brought to [facility], MRI showed diffuse nonenhancing disease crossing splenium with enhancing brain lesion  4/12/22 - s/p resection STR ([facility]YCOL).  PATH: high grade glioma, unmethylated MGMT, IDH wild type. PIK3CA + PIK3R1 mutations  4/22/22 - met with neuro-oncology (CAROLINA) - already tapered off decadron on Keppra [id number]  5/10/22 - met with radiation oncology (IDALMIS)  5/15/22 - headaches (increased ICP), given decadron 24mg, then 4mg BID   5/27/22 or 6/13/22 - 7/11/22 - s/p chemoradiation 6000cGy + TMZ 160mg  7/15/22 - MRI with expected changes, tapered off decadron  7/15/22 – no decadron  7/16/22 – severe HA, tried Tylenol + caffeine without relief  7/17/22 – dexamethasone 4mg once, without relief + 8mg later, with relief  7/18/22 – dexamethasone 8mg once, with control  7/19/22 – dexamethasone 4mg once, with control  7/20/22 – dexamethasone 4 mg  7/21/22 – dexamethasone 4 mg  7/22/22 – dexamethasone 2 mg  7/23/22 – dexamethasone 2 mg  7/24/22 – dexamethasone 2 mg  7/25/22 – dexamethasone 2 mg  7/27/22 – dexamethasone 2 mg  7/28/22 – dexamethasone 2mg with early AM headaches. spoke with Neuro-Onc, advised to continue 2mg through weekend.  8/1/22 - tapered from decadron 2mg daily to Prednisone 10mg daily  8/3/22 - called with headaches 3/10 during day and 5/10 at night    8/8/22 - Temozolomide cycle #1 planned (at 150mg/M2 or 320mg)    EXAMINATION  AOx2, with frequent word substitutions without awareness of new words, impaired naming and impaired comprehension.  Dense right homonymous hemianopsia.  Mild right drift  no facial asymmetry.    IMAGING  I personally reviewed MR imaging dated 7/13/22, 5/13/22, AND 4/6/22, together with CT imaging performed on 8/4/22.  In reviewing these images, I find somewhat increased splenial HYPERINTENSITY on the T2 weighted images, with additional signal change involving the left occipital and posterior temporal white matter. There is expected increaed enhancement c/w treatment-related effects. The CT imaging discloses no bleed, no new masses, and persistent hypodensity involving the left occipital and posterior temporal regions c/w residual cerebral edema.    LABS  5.8\13.8/181  137|97|10/205  3.7|26|0.5\    IMPRESSION/PLAN  CEREBRAL EDEMA -- Unable to tolerate steroid taper. Would continue with decadron 4mg twice daily for now, despite adverse effects, given aphasia and headaches. Likley to restart steroid taper if BEVACIZUMAB is administered.    BRAIN TUMOR -- She is due to start maintenance temozolomide, to which I recommend the addition of Avastin 10mg/kg every two weeks. I spoke with oncology team and we will coordinate this care.    DISPO -- No objection to discharge planning.    TOTAL TIME SPENT WAS 90 MINUTES, OF WHICH HALF IN COORDINATION AND COUNSELLING OF CARE.

## 2022-08-05 NOTE — PROGRESS NOTE ADULT - SUBJECTIVE AND OBJECTIVE BOX
Hx of GBM     pt seen and examined at bedside pt is alert , follows commands       Vital Signs Last 24 Hrs  T(C): 36.8 (05 Aug 2022 08:15), Max: 36.8 (05 Aug 2022 08:15)  T(F): 98.2 (05 Aug 2022 08:15), Max: 98.2 (05 Aug 2022 08:15)  HR: 78 (05 Aug 2022 08:15) (78 - 95)  BP: 122/71 (05 Aug 2022 08:15) (120/68 - 122/71)  BP(mean): --  RR: 18 (05 Aug 2022 08:15) (18 - 18)  SpO2: 97% (05 Aug 2022 08:15) (95% - 97%)    Parameters below as of 05 Aug 2022 08:15  Patient On (Oxygen Delivery Method): room air        PHYSICAL EXAM:    Awake, follows commands  A&OX3, PERRL   EOM  (  I )   MAEX4   MS bilateral UE's 5/5        bilateral LE's 5/5   no drift         MEDICATIONS:  Antibiotics:    Neuro:  gabapentin 400 milliGRAM(s) Oral two times a day  levETIRAcetam 1000 milliGRAM(s) Oral two times a day  LORazepam     Tablet 2 milliGRAM(s) Oral once  oxycodone    5 mG/acetaminophen 325 mG 1 Tablet(s) Oral every 4 hours PRN  sertraline 50 milliGRAM(s) Oral daily    Anticoagulation:    OTHER:  atorvastatin 80 milliGRAM(s) Oral at bedtime  dexAMETHasone  Injectable 4 milliGRAM(s) IV Push every 8 hours  hydrochlorothiazide 25 milliGRAM(s) Oral daily  metoprolol succinate ER 50 milliGRAM(s) Oral daily  montelukast 10 milliGRAM(s) Oral daily  pantoprazole    Tablet 40 milliGRAM(s) Oral before breakfast    IVF:        LABS:                        13.8   5.79  )-----------( 181      ( 04 Aug 2022 23:45 )             37.6     08-04    137  |  97<L>  |  10  ----------------------------<  205<H>  3.7   |  26  |  0.5<L>    Ca    9.5      04 Aug 2022 23:45    TPro  6.7  /  Alb  4.4  /  TBili  1.1  /  DBili  x   /  AST  35  /  ALT  48<H>  /  AlkPhos  82  08-04        A/p                      S/P GBM                           continue decadron                           will start avastin                           MRI +/- gado brain

## 2022-08-05 NOTE — CONSULT NOTE ADULT - SUBJECTIVE AND OBJECTIVE BOX
(HPI from N/S)    This is a 58 y/o F known to the service for GBM s/p resection 4/22 with Tim and RXT/Chem thereafter with Dr Sandoval. Pt presents to ER at the urging of daughter and oncologist with c/o headache, lethargy and confusion. Family states pt has been experiencing these symptpms since weaning/ tapering of decadron. Pt was down to 2mg daily then switch to prednisone and pt was unable to tolerate it. Pt recieved a 8 mg dose this am and symptoms tonight have improved back to baseline. HCT shows residual edema in the tumor bed of left occipital  and temporal area.    Patient seen today, resting, discussed with daughter at bedside, mildly improved; N/S and Neuro-onc notes reviewed.    MRI and hopeful d/c home tonight.    PAST MEDICAL & SURGICAL HISTORY:  RAMAKRISHNA on CPAP  GERD (gastroesophageal reflux disease)  Asthmatic bronchitis  MILD , USES VENTOLIN Q2-3M  HTN (hypertension)  Migraines  Chronic neck pain  H/O sinus surgery  Status post D&amp;C  H/O arthroscopy of shoulder  RT  History of hernia repair  2019    MEDICATIONS:  MEDICATIONS  (STANDING):  atorvastatin 80 milliGRAM(s) Oral at bedtime  dexAMETHasone  Injectable 4 milliGRAM(s) IV Push every 8 hours  gabapentin 400 milliGRAM(s) Oral two times a day  hydrochlorothiazide 25 milliGRAM(s) Oral daily  levETIRAcetam 1000 milliGRAM(s) Oral two times a day  LORazepam     Tablet 2 milliGRAM(s) Oral once  metoprolol succinate ER 50 milliGRAM(s) Oral daily  montelukast 10 milliGRAM(s) Oral daily  pantoprazole    Tablet 40 milliGRAM(s) Oral before breakfast  sertraline 50 milliGRAM(s) Oral daily    MEDICATIONS  (PRN):  oxycodone    5 mG/acetaminophen 325 mG 1 Tablet(s) Oral every 4 hours PRN Moderate Pain (4 - 6)    Allergy: No Known Allergies    VITALS:  T(F): 98.2 (08-05-22 @ 08:15), Max: 98.2 (08-05-22 @ 08:15)  HR: 78 (08-05-22 @ 08:15)  BP: 122/71 (08-05-22 @ 08:15) (120/68 - 122/71)  RR: 18 (08-05-22 @ 08:15)  SpO2: 97% (08-05-22 @ 08:15)    TESTS & MEASUREMENTS:  Height (cm): 165.1 (08-04-22 @ 22:43)  Weight (kg): 99.8 (08-04-22 @ 22:43)  BMI (kg/m2): 36.6 (08-04-22 @ 22:43)    PULM: CTA B/L  CV: RRR S1S2  GI: +BS, SOFT, NT, ND  MSK: MOON FACIES                            13.8   5.79  )-----------( 181      ( 04 Aug 2022 23:45 )             37.6       08-04    137  |  97<L>  |  10  ----------------------------<  205<H>  3.7   |  26  |  0.5<L>    Ca    9.5      04 Aug 2022 23:45    TPro  6.7  /  Alb  4.4  /  TBili  1.1  /  DBili  x   /  AST  35  /  ALT  48<H>  /  AlkPhos  82  08-04    LIVER FUNCTIONS - ( 04 Aug 2022 23:45 )  Alb: 4.4 g/dL / Pro: 6.7 g/dL / ALK PHOS: 82 U/L / ALT: 48 U/L / AST: 35 U/L / GGT: x           A/P    1. GBM  2. VASOGENIC EDEMA  3. RAMAKRISHNA  4. GERD  5. ASTHMA  6. HTN  7. HYPERGLYCEMIA  8. MIGRAINE PAYNE  9. MORBID OBESITY (BMI 36.6 PLUS HTN)  -CONTINUE ALL MEDS AS LISTED  -HYPERGLYCEMIA LIKELY SECONDARY TO STEROIDS BUT AT RISK FOR UNDERLYING DM  -PLAN PER NEURO ONC AND N/S  -d/w N/S PA

## 2022-08-06 NOTE — DISCHARGE NOTE PROVIDER - NSDCMRMEDTOKEN_GEN_ALL_CORE_FT
atorvastatin 80 mg oral tablet: 1 tab(s) orally once a day  dexamethasone 4 mg oral tablet: 1 tab(s) orally 2 times a day MDD:2  gabapentin 400 mg oral capsule: 1 cap(s) orally 2 times a day  hydroCHLOROthiazide 25 mg oral tablet: 1 tab(s) orally once a day  Keppra 1000 mg oral tablet: 1 tab(s) orally every 12 hours for seizure ppx  metoprolol succinate 50 mg oral tablet, extended release: 1 tab(s) orally once a day  montelukast 10 mg oral tablet: 1 tab(s) orally once a day  oxycodone-acetaminophen 5 mg-325 mg oral tablet: 1 tab(s) orally every 6 hours, As Needed  -for moderate pain MDD:4   pantoprazole 40 mg oral delayed release tablet: 1 tab(s) orally once a day MDD:1  sertraline 50 mg oral tablet: 1 tab(s) orally once a day

## 2022-08-06 NOTE — DISCHARGE NOTE NURSING/CASE MANAGEMENT/SOCIAL WORK - NSDCPEFALRISK_GEN_ALL_CORE
For information on Fall & Injury Prevention, visit: [email or web address] OR  [email or web address] OR  https://www.cdc.gov/steadi/patient.html

## 2022-08-06 NOTE — ED ADULT NURSE NOTE - CHIEF COMPLAINT QUOTE
Pt sent in by oncologist for head ct for headaches. Pt hx of glioblastoma
normal mouth and gums/moist

## 2022-08-06 NOTE — DISCHARGE NOTE NURSING/CASE MANAGEMENT/SOCIAL WORK - NSDCVIVACCINE_GEN_ALL_CORE_FT
Tdap; 05-Apr-2022 19:28; Anahi Charles (RN); [employer]; [id number] (Exp. Date: 02-Feb-2024); IntraMuscular; Deltoid Left.; 0.5 milliLiter(s); VIS (VIS Published: 09-May-2013, VIS Presented: 05-Apr-2022);

## 2022-08-06 NOTE — DISCHARGE NOTE PROVIDER - NSDCCPCAREPLAN_GEN_ALL_CORE_FT
PRINCIPAL DISCHARGE DIAGNOSIS  Diagnosis: Glioblastoma multiforme  Assessment and Plan of Treatment:

## 2022-08-06 NOTE — DISCHARGE NOTE PROVIDER - NSDCFUSCHEDAPPT_GEN_ALL_CORE_FT
Lacy Szymanski  [facility] Physician [facility]  RADMED  [address]  Scheduled Appointment: 08/10/2022    Dayday Kennedy  [facility]  NEUROLOGY [address]  Scheduled Appointment: 08/12/2022    Christian Dao  [facility]  HEMONC [address]  Scheduled Appointment: 08/22/2022    Allison Dumont  [facility] Physician [facility]  NEUROSURG [address]  Scheduled Appointment: 08/29/2022    Kemar Chan  [facility] Physician [facility]  PULMED [address]  Scheduled Appointment: 10/06/2022

## 2022-08-06 NOTE — DISCHARGE NOTE PROVIDER - CARE PROVIDER_API CALL
Allison Dumont)  Neurosurgery  [address], [address]  [place] [address]  Phone: [phone]  Fax: [phone]  Follow Up Time:

## 2022-08-06 NOTE — DISCHARGE NOTE PROVIDER - NSDCFUADDINST_GEN_ALL_CORE_FT
Do not drive or drink alcohol while taking pain medications or anti seizure medications   Follow up with Dr Dumont next week the office  will call for an appointment

## 2022-08-06 NOTE — DISCHARGE NOTE PROVIDER - HOSPITAL COURSE
57 year old female with a hx of GBM s/p biopsy in april , pt admitted for edema on CT , pts steroids were increased .  pt did better , she was seen by Dr Hinkle and had an MRI +/- gado , pt ws discharged home on8/6 with decadron 2 mg q 12 per Dr Hinkle

## 2022-08-06 NOTE — DISCHARGE NOTE NURSING/CASE MANAGEMENT/SOCIAL WORK - PATIENT PORTAL LINK FT
You can access the FollowMyHealth Patient Portal offered by [facility] by registering at the following website: [email or web address]. By joining [employer]’s FollowMyHealth portal, you will also be able to view your health information using other applications (apps) compatible with our system.

## 2022-08-08 NOTE — DISCHARGE NOTE PROVIDER - PROVIDER TOKENS
norvasc daily -monitor bp    BP Readings from Last 3 Encounters:   08/08/22 (!) 165/77   08/04/22 (!) 156/75   08/02/22 130/77          PROVIDER:[TOKEN:[[id number]]]

## 2022-08-12 NOTE — PHYSICAL EXAM
no fever and no chills. [FreeTextEntry1] : AOx3.\par some word finding difficulties\par dense right hemianopsia\par no drift\par walks well\par increased weight since last visit.

## 2022-08-12 NOTE — HISTORY OF PRESENT ILLNESS
[FreeTextEntry1] : 55 yo RH woman with left occipital GBM (unMGMT), here for coordination of chemotherapy.\par \par Her CBC's, including 7/5/22 show no cytopenias.\par \par Daughter can be reached at [phone].\par \par ONCOLOGIC HISTORY\par long history of migraines with visual aura (hemianopsia)\par APRIL 2022 presented with HA + visual anopsia, then seizure ("blank stare" followed by GTC) and EMS brought to [facility], MRI showed diffuse nonenhancing disease crossing splenium with enhancing brain lesion\par 4/12/22 - s/p resection STR ([facility]).\par PATH: high grade glioma, unmethylated MGMT, IDH wild type. PIK3CA + PIK3R1 mutations\par 4/22/22 - met with neuro-oncology ([facility]) - already tapered off decadron on Keppra [id number]\par 5/10/22 - met with radiation oncology (IDALMIS)\par 5/15/22 - headaches (increased ICP), given decadron 24mg, then 4mg BID \par 5/27/22 or 6/13/22 - 7/11/22 - s/p chemoradiation 6000cGy  + TMZ 160mg\par 7/15/22 - MRI with expected changes, tapered off decadron\par 7/15/22 – no decadron\par 7/16/22 – severe HA, tried Tylenol + caffeine without relief\par 7/17/22 – 4mg once, without relief + 8mg later, with relief\par 7/18/22 – 8mg once, with control\par 7/19/22 – 4mg once, with control\par 7/20/22 – 4 mg\par 7/21/22 – 4 mg\par 7/22/22 – 2 mg\par 7/23/22 – 2 mg\par 7/24/22 – 2 mg\par 7/25/22 – 2 mg\par 7/27/22 – 2 mg\par 7/28/22 – 2mg with early AM headaches. spoke with Neuro-Onc, advised to continue 2mg through weekend.\par 8/1/22 - tapered from decadron 2mg daily to Prednisone 10mg daily\par 8/3/22 - called with headaches 3/10 during day and 5/10 at night\par 8/5/22 - came to ED for headaches, MR showed increased cerebral edema, headaches improved with decadron 4mg twice daily\par 8/8/22 - Temozolomide cycle #1 planned (at 150mg/M2 or 320mg), not yet given\par 8/12/22 - follow-up from ED visit. Sustained improved with decadron, but GI discomfort (taking dex 6am and 6pm)\par

## 2022-08-12 NOTE — DISCUSSION/SUMMARY
[FreeTextEntry1] : BRAIN CANCER -- She will likely benefit from bevacizumab 10mg/kg every 2 weeks. Would ALSO start temozolomide 320mg (150mg/M2) PO for 5 consecutive days with bevacizumab. She understands her care will be transferred to medical oncology and is already in contact with them. If no myelosuppression, then can go to Temodar 400mg daily (200mg/m2/day) for five consecutive days, starting on the day of bevacizumab infusions thereafter.\par \par CEREBRAL EDEMA -- Continue on decadron 4mg twice daily for now, likely to tolerate decrease to 4mg in AM only after 2 cycles of Bevacizumab and further taper while on bevacizumab is usually well tolerated. Given her upset stomach, I encouraged TUMS and to take decadron with breakfast and lunch. Although decadron has a 6 hour half life in the blood, it has a physiologic half life of >24 hours, so dosing at breakfast (with food) and lunch (with food) will be efficacious.\par \par DISPO -- She will not return to see me, but will be well taken care of by heme onc.\par \par

## 2022-08-12 NOTE — DATA REVIEWED
[de-identified] : I personally reviewed and interpreted neuroimaging dated 4/6/22, 5/13/22, 7/13/22, and 8/5/22. There has been interval progression of the enhancing component and this may represent tumor progression or radiation effects ("pseudoprogression"). There was improvement of cerebral edema on the July imaging, then worsening on the August study.

## 2022-08-22 PROBLEM — Z87.891 FORMER SMOKER: Status: ACTIVE | Noted: 2019-03-14

## 2022-08-22 PROBLEM — Z00.00 ENCOUNTER FOR PREVENTIVE HEALTH EXAMINATION: Status: ACTIVE | Noted: 2019-03-08

## 2022-08-22 PROBLEM — Z80.0 FAMILY HISTORY OF THROAT CANCER: Status: ACTIVE | Noted: 2019-03-14

## 2022-08-22 NOTE — PHYSICAL EXAM
[Normal] : affect appropriate [de-identified] : Limited vision on right eye [de-identified] : some muscle weakness to bilateral lower ext

## 2022-08-22 NOTE — ASSESSMENT
[FreeTextEntry1] : 58 yo F with PMHx of HTN and Anxiety who presents for evaluation of left occipital GBM, unmethylated MGMT, IDH wild type. PIK3CA + PIK3R1 mutations. \par \par She is s/p chemoRT with concurrent Temodar, with worsening symptoms 2/2 to recurrent edema. She was restarted on Decadron 4mg BID with improvement. \par \par Her latest MRI on 8/5/22 showed possible interval progression vs pseduo-progression (from RT)?\par \par She was referred to medical oncology  to transfer her care since Dr. Kennedy is leaving and to start Avastin at 10mg/kg with Temodar (150mg/m2, total 320mg) to be taken for 5 consecutive days every 28 days. We discussed the potentia side effects of  Avastin including proteinuria, HTN, risk of stroke, effect on kidney, liver, intestinal perforation, DVT, bleeding etc. \par \par If she does not experience significant myelosuppression, we will consider increasing her Temodar to 400mg (200mg/m2) after 2 cycles of Avastin. \par \par Hopefully, if her symptoms improve, we will try to taper her Decadron down to 4mg daily \par \par If no new issues will check MR brain again in about 2 months.\par \par RTC in 2 weeks with CBC, CMP, UA

## 2022-08-22 NOTE — END OF VISIT
[] : Fellow [FreeTextEntry3] : She is here to establish care with us. She is doing well on dexamethasone 4 mg BID except for her visual issues and balance witch is chronic and stable.  She will start Avastin today and  temodar as above. She will see us in 2 weeks  with CBC.

## 2022-08-22 NOTE — REVIEW OF SYSTEMS
[Fatigue] : fatigue [Vision Problems] : vision problems [Muscle Weakness] : muscle weakness [Skin Rash] : skin rash [Difficulty Walking] : difficulty walking [Negative] : Allergic/Immunologic

## 2022-08-26 NOTE — HISTORY OF PRESENT ILLNESS
[FreeTextEntry1] : Ms. Solomon is now under the care of Dr. Bautista.\par \par She has been on high doses of steroids due to edema and is having stigmata of chronic steroids.\par \par She was started on avastin Monday 8/22.  She is tolerating it well.\par \par MRI ordered for 8 weeks from now.\par \par Return after MRI

## 2022-09-09 NOTE — REVIEW OF SYSTEMS
[Fatigue] : fatigue [Vision Problems] : vision problems [Muscle Weakness] : muscle weakness [Skin Rash] : skin rash [Difficulty Walking] : difficulty walking [Negative] : Allergic/Immunologic [FreeTextEntry4] : dry mouth

## 2022-09-09 NOTE — HISTORY OF PRESENT ILLNESS
[de-identified] : This is a 58 yo F with PMHx of HTN and Anxiety who presents for evaluation of left occipital GBM, unmethylated MGMT, IDH wild type. PIK3CA + PIK3R1 mutations. She was previously being followed by neuro-oncology, Dr. Kennedy. Her history dates back to April 2022 when she presented to [facility] with seizure. MRI showed Enhancing cystic/necrotic lesion within the left occipital lobe measuring 2.2 cm. Smaller rim-enhancing lesion in the medial left periatrial region / splenium measuring measuring 7 mm. Findings are most compatible with neoplastic etiology (primary multifocal versus metastatic). Expansile nonenhancing FLAIR signal abnormality involving the splenium of the corpus callosum with extension along the occipital and temporal horns greater on the left. Additional faint diffuse white matter signal abnormality. Findings are also likely neoplastic.\par Leptomeningeal signal abnormality (FLAIR hyperintense, faintly hyperenhancing) within the left frontal and parietal sulci suspicious for leptomeningeal involvement.\par \par She underwent a biopsy with neurosurgery and pathology showed high grade glioma, unmethylated MGMT, IDH wild type. PIK3CA + PIK3R1 mutations. She was evaluated by radiation oncology and was started on chemoRT with concurrent Temodar which was completed 7/11/22. \par \par MRI from 7/13/22 showed  The irregular enhancing necrotic masses within the left occipital and posterior temporal lobes with extension across the corpus callosum have not significantly changed in size and extent. The associated FLAIR signal abnormality has slightly increased in extent within the left temporal lobe and right aspect of the splenium.\par New punctate focus of enhancement within the right parietal white matter (ser 9 im 17). \par \par During the course of her treatment, she was intermittently on Decadron with attempts to taper off. However, she began to experience headaches and repeat MRI done on 8/5/22 showed: the irregular enhancing necrotic masses within the left occipital and posterior temporal lobes with extension across the corpus callosum have not significantly changed in size and extent. The associated FLAIR signal abnormality has slightly increased in extent within the left temporal lobe and right aspect of the splenium. She was restarted on Decadron 4mg BID with improvement in her symptoms. \par \par To date, she still endorses some intermittent vision loss in her right eye. She also reports lack of peripheral vision on the same side. She notes her vision is better in the morning and starts to progressively worsen throughout the day.  [de-identified] : 09/07/2022 accompanied by her ; Reports feeling better, chronic headaches "disappear" after the last treatment 2 weeks ago; agitation and muscle on dexamethasone; no BP spikes; increasing dryness of her mouth.

## 2022-09-09 NOTE — END OF VISIT
[FreeTextEntry3] : I was physically present for the key portions of the evaluation and management service provided.  I agree with the history and physical, and plan which I have reviewed and edited where appropriate.\par \par She returns for follow-up for her GBM.  She is to get a second dose of Avastin today and she completed her first 5 days of Temodar.  Neurologically her symptoms have improved and we decided to decrease dexamethasone to 4 mg daily starting tomorrow.\par She will see us back in 2 weeks CBC from today was stable and that there is no cytopenias we will increase the Temodar dose with cycle 2 of adjuvant Temodar in 2 weeks.

## 2022-09-09 NOTE — ASSESSMENT
[FreeTextEntry1] : 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 \par 06/30/2022 S/P chemoRT with concurrent Temodar, with worsening symptoms 2/2 to recurrent edema. She was restarted on Decadron 4 mg BID with improvement. \par 08/05/2022 MRI showed possible interval progression vs pseduo-progression (from RT)?\par 08/22/2022 transfer her care since Dr. Kennedy is leaving and started Avastin at 10 mg/kg with Temodar (150 mg/m2, total 320 mg) to be taken for 5 consecutive days every 28 days. \par \par 09/07/2022 reports headaches completely resolved S/P Avastin.\par \par PLAN:\par \par - proceed with Avastin 10 mg/kg Q2W - C2 GIVE TODAY.\par \par - If she does not experience significant myelosuppression, we will consider increasing her Temodar to 400 mg (200 mg/m2) after 2 cycles of Avastin. \par \par - taper Decadron down to 4 mg daily starting tomorrow 09/08/2022\par \par If no new issues will check MR brain again in about 2 months - 10/2022.\par \par F/U with Dr. Dumont.\par \par RTC in 2 weeks with CBC, CMP, UA

## 2022-09-09 NOTE — PHYSICAL EXAM
[Normal] : affect appropriate [Ambulatory and capable of all self care but unable to carry out any work activities] : Status 2- Ambulatory and capable of all self care but unable to carry out any work activities. Up and about more than 50% of waking hours [de-identified] : moon face on steroids [de-identified] : Limited vision on right eye [de-identified] : some muscle weakness to bilateral lower ext

## 2022-09-21 NOTE — HISTORY OF PRESENT ILLNESS
[de-identified] : This is a 58 yo F with PMHx of HTN and Anxiety who presents for evaluation of left occipital GBM, unmethylated MGMT, IDH wild type. PIK3CA + PIK3R1 mutations. She was previously being followed by neuro-oncology, Dr. Kennedy. Her history dates back to April 2022 when she presented to [facility] with seizure. MRI showed Enhancing cystic/necrotic lesion within the left occipital lobe measuring 2.2 cm. Smaller rim-enhancing lesion in the medial left periatrial region / splenium measuring measuring 7 mm. Findings are most compatible with neoplastic etiology (primary multifocal versus metastatic). Expansile nonenhancing FLAIR signal abnormality involving the splenium of the corpus callosum with extension along the occipital and temporal horns greater on the left. Additional faint diffuse white matter signal abnormality. Findings are also likely neoplastic.\par Leptomeningeal signal abnormality (FLAIR hyperintense, faintly hyperenhancing) within the left frontal and parietal sulci suspicious for leptomeningeal involvement.\par \par She underwent a biopsy with neurosurgery and pathology showed high grade glioma, unmethylated MGMT, IDH wild type. PIK3CA + PIK3R1 mutations. She was evaluated by radiation oncology and was started on chemoRT with concurrent Temodar which was completed 7/11/22. \par \par MRI from 7/13/22 showed  The irregular enhancing necrotic masses within the left occipital and posterior temporal lobes with extension across the corpus callosum have not significantly changed in size and extent. The associated FLAIR signal abnormality has slightly increased in extent within the left temporal lobe and right aspect of the splenium.\par New punctate focus of enhancement within the right parietal white matter (ser 9 im 17). \par \par During the course of her treatment, she was intermittently on Decadron with attempts to taper off. However, she began to experience headaches and repeat MRI done on 8/5/22 showed: the irregular enhancing necrotic masses within the left occipital and posterior temporal lobes with extension across the corpus callosum have not significantly changed in size and extent. The associated FLAIR signal abnormality has slightly increased in extent within the left temporal lobe and right aspect of the splenium. She was restarted on Decadron 4mg BID with improvement in her symptoms. \par \par To date, she still endorses some intermittent vision loss in her right eye. She also reports lack of peripheral vision on the same side. She notes her vision is better in the morning and starts to progressively worsen throughout the day.  [de-identified] : 09/07/2022 accompanied by her ; Reports feeling better, chronic headaches "disappear" after the last treatment 2 weeks ago; agitation and muscle on dexamethasone; no BP spikes; increasing dryness of her mouth.\par \par 9/21/22\par Patient is here for a follow-up visit for GBM, accompanied by family member.  She is feeling well with no new complaints.  Reviewed most recent CBC, which is stable and WNL.  Patient denies fever, chills, nausea, vomiting, persistent diarrhea, more frequent headaches, dyspnea or bleeding.  She is s/p second dose of Avastin today and she completed her first 5 days of Temodar.  Neurologically her symptoms have essentially been stable following taper of dexamethasone to 4 mg daily.  Since no cytopenias we will increase the Temodar dose with cycle 2 of adjuvant Temodar.

## 2022-09-21 NOTE — PHYSICAL EXAM
[de-identified] : moon face on steroids [de-identified] : Limited vision on right eye [de-identified] : some muscle weakness to bilateral lower ext

## 2022-09-21 NOTE — ASSESSMENT
This is the Subsequent Medicare Annual Wellness Exam, performed 12 months or more after the Initial AWV or the last Subsequent AWV    I have reviewed the patient's medical history in detail and updated the computerized patient record. History     Past Medical History:   Diagnosis Date    Anemia     Anxiety     Breast cancer ([address] 2005    right lumpectomy    Chronic pain     neck pain    Cough     Depression     Hemorrhoid     Menopause 2005    Muscle pain     Neck problem     Pneumonia     sepis    S/P radiation therapy 2005    Sleep trouble     Thyroid disease     Urine troubles     unable to controll      Past Surgical History:   Procedure Laterality Date    HX BREAST LUMPECTOMY Right 2005    HX  SECTION      HX THYROIDECTOMY      IMPLANT BREAST SILICONE/EQ Bilateral 3736     Current Outpatient Medications   Medication Sig Dispense Refill    levothyroxine (SYNTHROID) 100 mcg tablet TAKE 1 TABLET BY MOUTH EVERY DAY BEFORE BREAKFAST 90 Tab 3    traZODone (DESYREL) 150 mg tablet TAKE 1 TABLET EVERY NIGHT 90 Tab 3    methylphenidate HCl (RITALIN) 10 mg tablet TAKE 1 TABLET BY MOUTH TWICE A DAY  0    LORazepam (ATIVAN) 1 mg tablet TAKE 1 TABLET BY MOUTH TWICE A DAY AS NEEDED 60 Tab 1    ARIPiprazole (ABILIFY) 5 mg tablet       sertraline (ZOLOFT) 100 mg tablet TAKE  1 1/2 TABLET BY MOUTH EVERY DAY  1    promethazine (PHENERGAN) 25 mg tablet Take 1/2-1 tablet every 6 hours as needed for nausea. 30 Tab 2    ondansetron (ZOFRAN ODT) 4 mg disintegrating tablet Take 1 Tab by mouth every eight (8) hours as needed for Nausea. 30 Tab 2    colestipol (COLESTID) 1 gram tablet Take 1 Tab by mouth two (2) times a day. 60 Tab 3    levothyroxine (SYNTHROID) 100 mcg tablet Take 1 Tab by mouth Daily (before breakfast). 90 Tab 3    Bacillus coagulans (PROBIOTIC, B. COAGULANS, PO) Take  by mouth.       FLUZONE HIGH-DOSE 2017-18, PF, syrg injection FLU  0    PREVNAR 13, PF, 0.5 mL syrg [FreeTextEntry1] : 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 \par 06/30/2022 S/P chemoRT with concurrent Temodar, with worsening symptoms 2/2 to recurrent edema. She was restarted on Decadron 4 mg BID with improvement. \par 08/05/2022 MRI showed possible interval progression vs pseduo-progression (from RT)?\par 08/22/2022 transfer her care since Dr. Kennedy is leaving and started Avastin at 10 mg/kg with Temodar (150 mg/m2, total 320 mg) to be taken for 5 consecutive days every 28 days. \par \par PLAN:\par - c/w cycle 3 of Bevacizumab (10 mg/kg) every 2 weeks on 9/21\par - since no significant myelosuppression, we will increase Temodar to 400 mg (200 mg/m2) for next cycle , Rx sent + side effects discussed again\par - c/w Decadron 4 mg daily since 09/08/2022 , no refills needed\par - will follow MR brain ordered by [facility], Dr. Dumont for ~10/2022 ; scheduled for 10/3\par - followup with [facility]. [facility], as scheduled \par - Labwork today: CBC, CMP, UA\par \par RTC in 2 weeks with CBC, CMP, UA \par \par Case reviewed and patient examined with Dr. Mccauley, who agrees with plan of care.\par \par seen/examined w/ NP Radhames ; note reviewed; case discussed\par agree w/ increasing temodar to 200mg/m2\par continue avastin; \par continue same dose of steroids and taper next visit\par MRI brain pending injection TO BE ADMINISTERED BY THE PHARMACIST  0    naproxen sodium (ALEVE) 220 mg cap Take  by mouth. No Known Allergies  Family History   Problem Relation Age of Onset    Cancer Mother      Social History     Tobacco Use    Smoking status: Former Smoker     Last attempt to quit: 2010     Years since quittin.7    Smokeless tobacco: Never Used   Substance Use Topics    Alcohol use: Yes     Alcohol/week: 14.0 standard drinks     Types: 14 Shots of liquor per week     Patient Active Problem List   Diagnosis Code    Insomnia G47.00    Hypothyroidism E03.9    Anxiety and depression F41.9, F32.9    Thyroid activity decreased E03.9    Cervical disc disease M50.90    Family history of melanoma Z80.8       Depression Risk Factor Screening:     3 most recent PHQ Screens 3/13/2019   PHQ Not Done -   Little interest or pleasure in doing things Nearly every day   Feeling down, depressed, irritable, or hopeless Nearly every day   Total Score PHQ 2 6     Alcohol Risk Factor Screening: On any occasion in the past three months you have had more than 3 drinks containing alcohol. Functional Ability and Level of Safety:   Hearing Loss  Hearing is good. Activities of Daily Living  The home contains: no safety equipment. Patient does total self care    Fall Risk  Fall Risk Assessment, last 12 mths 3/13/2019   Able to walk? Yes   Fall in past 12 months?  No   Fall with injury? -   Number of falls in past 12 months -   Fall Risk Score -       Abuse Screen  Patient is not abused    Cognitive Screening   Evaluation of Cognitive Function:  Has your family/caregiver stated any concerns about your memory: no  Normal    Patient Care Team   Patient Care Team:  Bell Rangel MD as PCP - General (Internal Medicine)  Sarah Juárez MD as Consulting Provider (Gastroenterology)    Assessment/Plan   Education and counseling provided:  Are appropriate based on today's review and evaluation    Diagnoses and all orders for this visit:    1.  Medicare annual wellness visit, subsequent        Health Maintenance Due   Topic Date Due    DTaP/Tdap/Td series (1 - Tdap) 09/24/1973    Shingrix Vaccine Age 50> (1 of 2) 09/24/2002    GLAUCOMA SCREENING Q2Y  09/24/2017    Pneumococcal 65+ years (2 of 2 - PPSV23) 10/01/2018    MEDICARE YEARLY EXAM  06/15/2019    Influenza Age 9 to Adult  08/01/2019

## 2022-10-04 NOTE — CDI QUERY NOTE - NSCDIOTHERTXTBX_GEN_ALL_CORE_HH
CLINICAL INDICATORS:  ED 8/4- Since having her steroid decreased pt has been experiencing increased headaches and episodes of confusion, improved when given Decadron.  ROS: Neurological: (+) headache, (+) altered mental status  CT head-there is increased vasogenic edema   in the white matter of the left occipital/parietal regions as well as   across the splenium of the corpus callosum. There is mild mass effect   with mild effacement of the left lateral ventricle.  Principal Discharge DX: Vasogenic edema.    Neurosurgery 8/5-· This patient has the following condition(s)/diagnoses on this admission: Brain Compression / Herniation  A/P: S/P GBM. continue Decadron, will start Avastin, MRI +/- [facility] brain     DCS-57 year old female with a hx of GBM s/p biopsy in April , pt admitted for edema on CT , pts steroids were increased .  pt did better  PRINCIPAL DISCHARGE DIAGNOSIS: Glioblastoma multiforme    MRI head 8/5-IMPRESSION:  In comparison to the previous brain MRI dated 7/13/2022:  1.  The irregular enhancing necrotic masses within the left occipital and   posterior temporal lobes with extension across the corpus callosum appear   slightly more pronounced.  2.  The associated FLAIR signal abnormality also appears slightly more   pronounced.  3.  No new enhancing foci.  No evidence of acute infarct.    MAR:   Decadron 6mg IVP once 8/5  Decadron 4mg IVP Q8 8/5-6  Decadron 4mg PO BID 8/6-7  Neurontin 400mg PO BID 8/5-7  Keppra 1000mg PO BID 8/5-7            Based on the above clinical indicators, and your professional judgment, can the diagnosis of brain compression/herniation be clarified?    Brain herniation/compression was clinically significant, and underlying cause was treated during admission    Brain herniation/compression was not clinically significant    Other, please specify:    Clinically unable to determine      Thank you so much,  Yamel Nagy RN  Cell: [phone]

## 2022-10-05 NOTE — REVIEW OF SYSTEMS
[Fatigue] : fatigue [Vision Problems] : vision problems [Muscle Weakness] : muscle weakness [Skin Rash] : skin rash [Difficulty Walking] : difficulty walking [Negative] : Allergic/Immunologic [FreeTextEntry4] : dry mouth [FreeTextEntry7] : GERD

## 2022-10-05 NOTE — DISCUSSION/SUMMARY
[FreeTextEntry1] : Spoke to daughter. Please decrease dexamathasone to 2 mg daily once a day starting tomorrow for one weeks and if she is doing well we can decrease it to 1 mg daily in one week until she sees us. for follow up in 2 weeks

## 2022-10-05 NOTE — HISTORY OF PRESENT ILLNESS
[de-identified] : This is a 56 yo F with PMHx of HTN and Anxiety who presents for evaluation of left occipital GBM, unmethylated MGMT, IDH wild type. PIK3CA + PIK3R1 mutations. She was previously being followed by neuro-oncology, Dr. Kennedy. Her history dates back to April 2022 when she presented to [facility] with seizure. MRI showed Enhancing cystic/necrotic lesion within the left occipital lobe measuring 2.2 cm. Smaller rim-enhancing lesion in the medial left periatrial region / splenium measuring measuring 7 mm. Findings are most compatible with neoplastic etiology (primary multifocal versus metastatic). Expansile nonenhancing FLAIR signal abnormality involving the splenium of the corpus callosum with extension along the occipital and temporal horns greater on the left. Additional faint diffuse white matter signal abnormality. Findings are also likely neoplastic.\par Leptomeningeal signal abnormality (FLAIR hyperintense, faintly hyperenhancing) within the left frontal and parietal sulci suspicious for leptomeningeal involvement.\par \par She underwent a biopsy with neurosurgery and pathology showed high grade glioma, unmethylated MGMT, IDH wild type. PIK3CA + PIK3R1 mutations. She was evaluated by radiation oncology and was started on chemoRT with concurrent Temodar which was completed 7/11/22. \par \par MRI from 7/13/22 showed  The irregular enhancing necrotic masses within the left occipital and posterior temporal lobes with extension across the corpus callosum have not significantly changed in size and extent. The associated FLAIR signal abnormality has slightly increased in extent within the left temporal lobe and right aspect of the splenium.\par New punctate focus of enhancement within the right parietal white matter (ser 9 im 17). \par \par During the course of her treatment, she was intermittently on Decadron with attempts to taper off. However, she began to experience headaches and repeat MRI done on 8/5/22 showed: the irregular enhancing necrotic masses within the left occipital and posterior temporal lobes with extension across the corpus callosum have not significantly changed in size and extent. The associated FLAIR signal abnormality has slightly increased in extent within the left temporal lobe and right aspect of the splenium. She was restarted on Decadron 4mg BID with improvement in her symptoms. \par \par To date, she still endorses some intermittent vision loss in her right eye. She also reports lack of peripheral vision on the same side. She notes her vision is better in the morning and starts to progressively worsen throughout the day.  [de-identified] : 09/07/2022 accompanied by her ; Reports feeling better, chronic headaches "disappear" after the last treatment 2 weeks ago; agitation and muscle on dexamethasone; no BP spikes; increasing dryness of her mouth.\par \par 9/21/22\par Patient is here for a follow-up visit for GBM, accompanied by family member.  She is feeling well with no new complaints.  Reviewed most recent CBC, which is stable and WNL.  Patient denies fever, chills, nausea, vomiting, persistent diarrhea, more frequent headaches, dyspnea or bleeding.  She is s/p second dose of Avastin today and she completed her first 5 days of Temodar.  Neurologically her symptoms have essentially been stable following taper of dexamethasone to 4 mg daily.  Since no cytopenias we will increase the Temodar dose with cycle 2 of adjuvant Temodar.  \par \par 10/05/2022 \par accompanied by ; Reports feeling relatively well, no changes in chronic conditions or new complaints since the last visit.\par

## 2022-10-05 NOTE — PHYSICAL EXAM
[Ambulatory and capable of all self care but unable to carry out any work activities] : Status 2- Ambulatory and capable of all self care but unable to carry out any work activities. Up and about more than 50% of waking hours [Normal] : affect appropriate [de-identified] : moon face on steroids [de-identified] : Limited vision on right eye [de-identified] : some muscle weakness to bilateral lower ext

## 2022-10-05 NOTE — ASSESSMENT
[FreeTextEntry1] : 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 \par 06/30/2022 S/P chemoRT with concurrent Temodar, with worsening symptoms 2/2 to recurrent edema. She was restarted on Decadron 4 mg BID with improvement. \par 08/05/2022 MRI showed possible interval progression vs pseduo-progression (from RT)?\par 08/22/2022 transfer her care since Dr. Kennedy is leaving and started Avastin at 10 mg/kg with Temodar (150 mg/m2, total 320 mg) to be taken for 5 consecutive days every 28 days. \par 10/03/2022 MR brain ordered by [facility]. Dumont - final results pending.\par \par PLAN:\par -Continue Bevacizumab 10 mg/kg every 2 weeks - GIVE TODAY.\par - since no significant myelosuppression, increase Temodar to 400 mg (200 mg/m2).\par - decrease Decadron to 2 mg daily for 1 week then if she is feeling well and additional decrease to 1 mg daily for 1 week.\par \par - followup with [facility]. [facility], as scheduled.\par \par - Labwork prior to the next treatment CBC, CMP, UA.\par \par RTC in 2 weeks

## 2022-10-13 NOTE — REASON FOR VISIT
[Follow-Up: _____] : a [unfilled] follow-up visit [Spouse] : spouse [FreeTextEntry1] : Ms. JHAVERI returns for a revisit neurosurgical encounter. As a review, she presented to the ED on 4/5/22 after she had a seizure. She was also experiencing headaches, vomiting, . MRI brain revealed Enhancing lesion within the left occipital lobe, smaller rim-enhancing lesion in the medial left periatrial region as well as leptomeningeal signal abnormality. She had a diagnostic LP on 4/11/22 and a left occipital brain tumor biopsy. 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 \par \par MR Head w w/o IV contrast reviewed with patient and spouse at this time. They are aware of the slight improvements/decrease in tumor size. She notes occasional headaches, typically with rising from a seated position. (-) dizziness, lightheadedness, nausea, vomiting. Short term memory loss remains troublesome. Patient  continues Bevacizumab 10 mg/kg every 2 weeks, Temodar to 400 mg (200 mg/m2).\par with a recent Decadron decrease to 2mg.

## 2022-10-13 NOTE — ASSESSMENT
[FreeTextEntry1] : Ms. Solomon is a 58 yo F who presents for a neurosurgical revisit encounter with regards to multifocal GDB s/p excisional biopsy of occipital lesion. She continues to respond well to treatment as outlined above with recent films reviewed which reveal a mild decrease in tumor size in comparison to August 2022 studies.\par \par Repeat MR Head w w/o IV contrast recommended within 2 months time; I will revisit with her once this has been completed and she will contact me with any questions or concerns in the interim.\par \par Kala Cole PA-C\par Allison Dumont MD

## 2022-10-13 NOTE — RESULTS/DATA
[FreeTextEntry1] : In comparison to the previous brain MRI dated 8/5/2022:\par \par 1.  The irregular enhancing necrotic masses within the left occipital and posterior temporal lobes with extension across the corpus callosum appear mildly decreased.\par \par 2.  The associated FLAIR signal abnormality and mass effect also appear mildly decreased.\par \par 3.  White matter signal changes in the right parietal periventricular region appear mildly progressed and there is new slight ventricular prominence. Findings may reflect posttreatment changes. Recommend attention on follow-up imaging.

## 2022-10-19 NOTE — HISTORY OF PRESENT ILLNESS
[de-identified] : This is a 56 yo F with PMHx of HTN and Anxiety who presents for evaluation of left occipital GBM, unmethylated MGMT, IDH wild type. PIK3CA + PIK3R1 mutations. She was previously being followed by neuro-oncology, Dr. Kennedy. Her history dates back to April 2022 when she presented to [facility] with seizure. MRI showed Enhancing cystic/necrotic lesion within the left occipital lobe measuring 2.2 cm. Smaller rim-enhancing lesion in the medial left periatrial region / splenium measuring measuring 7 mm. Findings are most compatible with neoplastic etiology (primary multifocal versus metastatic). Expansile nonenhancing FLAIR signal abnormality involving the splenium of the corpus callosum with extension along the occipital and temporal horns greater on the left. Additional faint diffuse white matter signal abnormality. Findings are also likely neoplastic.\par Leptomeningeal signal abnormality (FLAIR hyperintense, faintly hyperenhancing) within the left frontal and parietal sulci suspicious for leptomeningeal involvement.\par \par She underwent a biopsy with neurosurgery and pathology showed high grade glioma, unmethylated MGMT, IDH wild type. PIK3CA + PIK3R1 mutations. She was evaluated by radiation oncology and was started on chemoRT with concurrent Temodar which was completed 7/11/22. \par \par MRI from 7/13/22 showed  The irregular enhancing necrotic masses within the left occipital and posterior temporal lobes with extension across the corpus callosum have not significantly changed in size and extent. The associated FLAIR signal abnormality has slightly increased in extent within the left temporal lobe and right aspect of the splenium.\par New punctate focus of enhancement within the right parietal white matter (ser 9 im 17). \par \par During the course of her treatment, she was intermittently on Decadron with attempts to taper off. However, she began to experience headaches and repeat MRI done on 8/5/22 showed: the irregular enhancing necrotic masses within the left occipital and posterior temporal lobes with extension across the corpus callosum have not significantly changed in size and extent. The associated FLAIR signal abnormality has slightly increased in extent within the left temporal lobe and right aspect of the splenium. She was restarted on Decadron 4mg BID with improvement in her symptoms. \par \par To date, she still endorses some intermittent vision loss in her right eye. She also reports lack of peripheral vision on the same side. She notes her vision is better in the morning and starts to progressively worsen throughout the day.  [de-identified] : 09/07/2022 accompanied by her ; Reports feeling better, chronic headaches "disappear" after the last treatment 2 weeks ago; agitation and muscle on dexamethasone; no BP spikes; increasing dryness of her mouth.\par \par 9/21/22\par Patient is here for a follow-up visit for GBM, accompanied by family member.  She is feeling well with no new complaints.  Reviewed most recent CBC, which is stable and WNL.  Patient denies fever, chills, nausea, vomiting, persistent diarrhea, more frequent headaches, dyspnea or bleeding.  She is s/p second dose of Avastin today and she completed her first 5 days of Temodar.  Neurologically her symptoms have essentially been stable following taper of dexamethasone to 4 mg daily.  Since no cytopenias we will increase the Temodar dose with cycle 2 of adjuvant Temodar.  \par \par 10/05/2022 \par accompanied by ; Reports feeling relatively well, no changes in chronic conditions or new complaints since the last visit.\par \par 10/19/2022\par She is here for follow up. She  is due for Avastin today  5th dose. She saw Dr. Dumont on 10/12/2022 and MR brain was ordered for 12/2022. She is on 2 mg of  dexa daily. She had a cold and had amoxicilin for sinus.  ODing well otherwise. BP is a bit high systolic maybe from Avastin but  states it normalizes at home. She is on  HCT and metoprolol at home.\par

## 2022-10-19 NOTE — REVIEW OF SYSTEMS
[Fatigue] : fatigue [Vision Problems] : vision problems [Muscle Weakness] : muscle weakness [Skin Rash] : skin rash [Difficulty Walking] : difficulty walking [Negative] : Constitutional [FreeTextEntry4] : dry mouth [FreeTextEntry7] : GERD

## 2022-10-19 NOTE — ASSESSMENT
[FreeTextEntry1] : 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 \par 06/30/2022 S/P chemoRT with concurrent Temodar, with worsening symptoms 2/2 to recurrent edema. She was restarted on Decadron 4 mg BID with improvement. \par 08/05/2022 MRI showed possible interval progression vs pseduo-progression (from RT)?\par 08/22/2022 transfer her care since Dr. Kennedy is leaving and started Avastin at 10 mg/kg with Temodar (150 mg/m2, total 320 mg) to be taken for 5 consecutive days every 28 days. \par 10/03/2022 MR brain ordered by [facility], Dr. Dumont - final results pending.\par \par PLAN:\par -Continue Bevacizumab 10 mg/kg every 2 weeks - GIVE TODAY.\par - since no significant myelosuppression,dose of temodar was  increased   to 400 mg (200 mg/m2) this was with cycle 2 and started it on 9/25-9/29 and due for next cycle 3 on 10/23/22\par -plts today are  74, asked them to come back in one week for a CBC if >100,000 will restart at same dose of temodar \par - on  Decadron to 2 mg daily  will decrease to 1 mg starting tomorrow\par -of BP remais high will start Lisinopril or Losratn to keep PB <150/90\par \par -MR brain ordered for 12/0222 by Dr. Dumont \par \par - followup with [facility], Dr. Dumont, as scheduled.\par \par - Labwork prior to the next treatment CBC, CMP, UA.\par \par She will RTC in 2 weeks \par \par RTC in 2 weeks

## 2022-10-19 NOTE — PHYSICAL EXAM
[Ambulatory and capable of all self care but unable to carry out any work activities] : Status 2- Ambulatory and capable of all self care but unable to carry out any work activities. Up and about more than 50% of waking hours [Normal] : affect appropriate [de-identified] : moon face on steroids [de-identified] : Limited vision on right eye [de-identified] : some muscle weakness to bilateral lower ext

## 2022-11-03 NOTE — ASSESSMENT
[FreeTextEntry1] : 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 \par 06/30/2022 S/P chemoRT with concurrent Temodar, with worsening symptoms 2/2 to recurrent edema. She was restarted on Decadron 4 mg BID with improvement. \par 08/05/2022 MRI showed possible interval progression vs pseduo-progression (from RT)?\par 08/22/2022 transfer her care since Dr. Kennedy is leaving and started Avastin at 10 mg/kg with Temodar (150 mg/m2, total 320 mg) to be taken for 5 consecutive days every 28 days. \par 10/03/2022 MR brain ordered by [facility], Dr. Dumont - final results pending.\par \par 11/03/2022 remains clinically stable to continue current management.\par \par PLAN:\par \par - Continue Bevacizumab 10 mg/kg every 2 weeks - GIVE TOMORROW.\par \par - since no significant myelosuppression, 10/26-10/30/2022 temodar was increased to 400 mg (200 mg/m2) with cycle 2 and due for next cycle 4 on 11/23/2022.\par \par - decrease Decadron 1 mg to QOD.\par \par - continue Lisinopril or Losartan to keep PB <150/90 for HTN.\par \par - MR brain ordered for 12/0222 by Dr. Dumont.\par \par - F/U with [facility], Dr. Dumont, as scheduled.\par \par - Lab work prior to the next treatment CBC, CMP, UA.\par \par RTC in 2 weeks

## 2022-11-03 NOTE — HISTORY OF PRESENT ILLNESS
[de-identified] : This is a 58 yo F with PMHx of HTN and Anxiety who presents for evaluation of left occipital GBM, unmethylated MGMT, IDH wild type. PIK3CA + PIK3R1 mutations. She was previously being followed by neuro-oncology, Dr. Kennedy. Her history dates back to April 2022 when she presented to [facility] with seizure. MRI showed Enhancing cystic/necrotic lesion within the left occipital lobe measuring 2.2 cm. Smaller rim-enhancing lesion in the medial left periatrial region / splenium measuring measuring 7 mm. Findings are most compatible with neoplastic etiology (primary multifocal versus metastatic). Expansile nonenhancing FLAIR signal abnormality involving the splenium of the corpus callosum with extension along the occipital and temporal horns greater on the left. Additional faint diffuse white matter signal abnormality. Findings are also likely neoplastic.\par Leptomeningeal signal abnormality (FLAIR hyperintense, faintly hyperenhancing) within the left frontal and parietal sulci suspicious for leptomeningeal involvement.\par \par She underwent a biopsy with neurosurgery and pathology showed high grade glioma, unmethylated MGMT, IDH wild type. PIK3CA + PIK3R1 mutations. She was evaluated by radiation oncology and was started on chemoRT with concurrent Temodar which was completed 7/11/22. \par \par MRI from 7/13/22 showed  The irregular enhancing necrotic masses within the left occipital and posterior temporal lobes with extension across the corpus callosum have not significantly changed in size and extent. The associated FLAIR signal abnormality has slightly increased in extent within the left temporal lobe and right aspect of the splenium.\par New punctate focus of enhancement within the right parietal white matter (ser 9 im 17). \par \par During the course of her treatment, she was intermittently on Decadron with attempts to taper off. However, she began to experience headaches and repeat MRI done on 8/5/22 showed: the irregular enhancing necrotic masses within the left occipital and posterior temporal lobes with extension across the corpus callosum have not significantly changed in size and extent. The associated FLAIR signal abnormality has slightly increased in extent within the left temporal lobe and right aspect of the splenium. She was restarted on Decadron 4mg BID with improvement in her symptoms. \par \par To date, she still endorses some intermittent vision loss in her right eye. She also reports lack of peripheral vision on the same side. She notes her vision is better in the morning and starts to progressively worsen throughout the day.  [de-identified] : 09/07/2022 accompanied by her ; Reports feeling better, chronic headaches "disappear" after the last treatment 2 weeks ago; agitation and muscle on dexamethasone; no BP spikes; increasing dryness of her mouth.\par \par 9/21/22\par Patient is here for a follow-up visit for GBM, accompanied by family member.  She is feeling well with no new complaints.  Reviewed most recent CBC, which is stable and WNL.  Patient denies fever, chills, nausea, vomiting, persistent diarrhea, more frequent headaches, dyspnea or bleeding.  She is s/p second dose of Avastin today and she completed her first 5 days of Temodar.  Neurologically her symptoms have essentially been stable following taper of dexamethasone to 4 mg daily.  Since no cytopenias we will increase the Temodar dose with cycle 2 of adjuvant Temodar.  \par \par 10/05/2022 \par accompanied by ; Reports feeling relatively well, no changes in chronic conditions or new complaints since the last visit.\par \par 10/19/2022\par She is here for follow up. She  is due for Avastin today  5th dose. She saw Dr. Dumont on 10/12/2022 and MR brain was ordered for 12/2022. She is on 2 mg of  dexa daily. She had a cold and had amoxicilin for sinus.  ODing well otherwise. BP is a bit high systolic maybe from Avastin but  states it normalizes at home. She is on  HCT and metoprolol at home.\par \par 11/02/2022\par accompanied by ; Reports feeling well, motor functions are better; no changes in chronic conditions or new complaints since the last visit Just finished her temodar 5 day course.  CBC  no neutroeia or low plateles. UA is fine. BP is well controlled. \par

## 2022-11-03 NOTE — PHYSICAL EXAM
[Ambulatory and capable of all self care but unable to carry out any work activities] : Status 2- Ambulatory and capable of all self care but unable to carry out any work activities. Up and about more than 50% of waking hours [Normal] : affect appropriate [de-identified] : moon face on steroids [de-identified] : Limited vision on right eye [de-identified] : some muscle weakness to bilateral lower ext

## 2022-11-03 NOTE — END OF VISIT
[FreeTextEntry3] : I was physically present for the key portions of the evaluation and management service provided.  I agree with the history and physical, and plan which I have reviewed and edited where appropriate.\par \par She is feeling overall better and we decided to decrease her steroids to 1 mg every other day.  Just completed her course of Temodar.  Due for Avastin today which is every 2 weeks.  Blood pressures well controlled.  She has MRI brain pending in December and will see us back in 2 weeks we will possibly decrease her steroids even further depending how she is doing clinically.

## 2022-12-04 NOTE — HISTORY OF PRESENT ILLNESS
[de-identified] : This is a 58 yo F with PMHx of HTN and Anxiety who presents for evaluation of left occipital GBM, unmethylated MGMT, IDH wild type. PIK3CA + PIK3R1 mutations. She was previously being followed by neuro-oncology, Dr. Kennedy. Her history dates back to April 2022 when she presented to [facility] with seizure. MRI showed Enhancing cystic/necrotic lesion within the left occipital lobe measuring 2.2 cm. Smaller rim-enhancing lesion in the medial left periatrial region / splenium measuring measuring 7 mm. Findings are most compatible with neoplastic etiology (primary multifocal versus metastatic). Expansile nonenhancing FLAIR signal abnormality involving the splenium of the corpus callosum with extension along the occipital and temporal horns greater on the left. Additional faint diffuse white matter signal abnormality. Findings are also likely neoplastic.\par Leptomeningeal signal abnormality (FLAIR hyperintense, faintly hyperenhancing) within the left frontal and parietal sulci suspicious for leptomeningeal involvement.\par \par She underwent a biopsy with neurosurgery and pathology showed high grade glioma, unmethylated MGMT, IDH wild type. PIK3CA + PIK3R1 mutations. She was evaluated by radiation oncology and was started on chemoRT with concurrent Temodar which was completed 7/11/22. \par \par MRI from 7/13/22 showed  The irregular enhancing necrotic masses within the left occipital and posterior temporal lobes with extension across the corpus callosum have not significantly changed in size and extent. The associated FLAIR signal abnormality has slightly increased in extent within the left temporal lobe and right aspect of the splenium.\par New punctate focus of enhancement within the right parietal white matter (ser 9 im 17). \par \par During the course of her treatment, she was intermittently on Decadron with attempts to taper off. However, she began to experience headaches and repeat MRI done on 8/5/22 showed: the irregular enhancing necrotic masses within the left occipital and posterior temporal lobes with extension across the corpus callosum have not significantly changed in size and extent. The associated FLAIR signal abnormality has slightly increased in extent within the left temporal lobe and right aspect of the splenium. She was restarted on Decadron 4mg BID with improvement in her symptoms. \par \par To date, she still endorses some intermittent vision loss in her right eye. She also reports lack of peripheral vision on the same side. She notes her vision is better in the morning and starts to progressively worsen throughout the day.  [de-identified] : 09/07/2022 accompanied by her ; Reports feeling better, chronic headaches "disappear" after the last treatment 2 weeks ago; agitation and muscle on dexamethasone; no BP spikes; increasing dryness of her mouth.\par \par 9/21/22\par Patient is here for a follow-up visit for GBM, accompanied by family member.  She is feeling well with no new complaints.  Reviewed most recent CBC, which is stable and WNL.  Patient denies fever, chills, nausea, vomiting, persistent diarrhea, more frequent headaches, dyspnea or bleeding.  She is s/p second dose of Avastin today and she completed her first 5 days of Temodar.  Neurologically her symptoms have essentially been stable following taper of dexamethasone to 4 mg daily.  Since no cytopenias we will increase the Temodar dose with cycle 2 of adjuvant Temodar.  \par \par 10/05/2022 \par accompanied by ; Reports feeling relatively well, no changes in chronic conditions or new complaints since the last visit.\par \par 10/19/2022\par She is here for follow up. She  is due for Avastin today  5th dose. She saw Dr. Dumont on 10/12/2022 and MR brain was ordered for 12/2022. She is on 2 mg of  dexa daily. She had a cold and had amoxicilin for sinus.  ODing well otherwise. BP is a bit high systolic maybe from Avastin but  states it normalizes at home. She is on  HCT and metoprolol at home.\par \par 11/02/2022\par accompanied by ; Reports feeling well, motor functions are better; no changes in chronic conditions or new complaints since the last visit Just finished her temodar 5 day course.  CBC  no neutroeia or low plateles. UA is fine. BP is well controlled. \par \par 11/30/202\par She is here for follow up. She is now on dexamethasone 0.5 mg daily and reports no significant changes - she is stable.\par

## 2022-12-04 NOTE — PHYSICAL EXAM
[Ambulatory and capable of all self care but unable to carry out any work activities] : Status 2- Ambulatory and capable of all self care but unable to carry out any work activities. Up and about more than 50% of waking hours [Normal] : affect appropriate [de-identified] : moon face on steroids [de-identified] : Limited vision on right eye [de-identified] : some muscle weakness to bilateral lower ext

## 2022-12-04 NOTE — ASSESSMENT
[FreeTextEntry1] : #Lleft occipital GBM, unmethylated MGMT, IDH wild type. PIK3CA + PIK3R1 mutations. \par 06/30/2022 S/P chemoRT with concurrent Temodar, with worsening symptoms 2/2 to recurrent edema. She was restarted on Decadron 4 mg BID with improvement. \par 08/05/2022 MRI showed possible interval progression vs pseduo-progression (from RT)?\par 08/22/2022 transfer her care since Dr. Kennedy is leaving and started Avastin at 10 mg/kg with Temodar (150 mg/m2, total 320 mg) to be taken for 5 consecutive days every 28 days.\par 10/03/2022 MR brain ordered by [facility], Dr. Dumont - final results pending.\par - since no significant myelosuppression, 10/26-10/30/2022 Temodar was increased to 400 mg (200 mg/m2) with cycle 2.\par - 11/16/2022 started dexamethasone 0.5 mg PO QD - tapered without issues.\par - 11/23/2022 delayed Temodar 400 mg (200 mg/m2) PO QD D1-5 every 28 days.\par \par 11/30/2022 remains clinically stable to continue current management.\par \par PLAN:\par \par - Continue Bevacizumab 10 mg/kg every 2 weeks - GIVE TOMORROW.\par \par - continue Temodar 400 mg (200 mg/m2) PO QD D1-5 cycle 4 as soon as gets Rx.\par \par - continue Decadron 0.5 mg to QD.\par \par - continue Lisinopril or Losartan to keep PB <150/90 for HTN.\par \par - MR brain ordered for 12/0222 by Dr. Dumont.\par \par - F/U with [facility], Dr. Dumont, as scheduled.\par \par - Lab work prior to the next treatment CBC, CMP, UA.\par \par RTC in 4 weeks CBC, CMP, UA and treatment every 2 weeks.

## 2022-12-04 NOTE — END OF VISIT
[FreeTextEntry3] : I was physically present for the key portions of the evaluation and management service provided.  I agree with the history and physical, and plan which I have reviewed and edited where appropriate.\par \par She is here for follow-up for her GBM she is doing well and will continue with bevacizumab and Temodar.  She is due for bevacizumab today and Temodar as soon as she received a new prescription.  She is doing very well on Decadron 0.5 mg every other day.  Blood work UA done and ordered and patient will see us back in 4 weeks she is pending an MRI of the brain this month which is pending and we will call her with results once I see it

## 2022-12-15 NOTE — REASON FOR VISIT
[Follow-Up: _____] : a [unfilled] follow-up visit [Spouse] : spouse [FreeTextEntry1] : Ms. JHAVERI returns for a revisit neurosurgical encounter. As a review, she presented to the ED on 4/5/22 after she had a seizure. She was also experiencing headaches, vomiting, . MRI brain revealed Enhancing lesion within the left occipital lobe, smaller rim-enhancing lesion in the medial left periatrial region as well as leptomeningeal signal abnormality. She had a diagnostic LP on 4/11/22 and a left occipital brain tumor biopsy. 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 \par \par TODAY, she presents for a revisit encounter accompanied by her .  She notes continued word finding difficulty is as well as a change in appetite worse over the previous month.  She continues with Zofran during her chemotherapy treatments but notes lingering nausea over the month.  Her  at times voices that he believes this is likely secondary to underlying depression and not necessarily due to her chemotherapy medication.  At times, she will have a good appetite at other times she will consume 1 protein drink per day.  We have discussed various means of increasing her appetite as well as introducing foods that are well-tolerated and she is largely agreeable to trial my recommendations.  With regards to complaints of word finding difficulties and memory changes she notes that she has not been able to read or write in quite some time due to her visual changes, most notable a black dot with blurriness that can appear to the right eye. I have suggested that she consider listening to an audible book series and trial journaling to aid with sentence formation, though processes, etc. She is agreeable to trial our recommendations. Patient  continues Bevacizumab 10 mg/kg every 2 weeks, Temodar to 400 mg (200 mg/m2). Decadron decrease to 0.5mg.\par \par With regards to her MR brain w w/o contrast- study reviewed today with appreciation of slight improvement in comparison to prior heterogeneous enhancement.

## 2022-12-15 NOTE — ASSESSMENT
[FreeTextEntry1] : Ms. Solomon is a 56 yo F who presents for a neurosurgical revisit encounter with regards to multifocal GDB s/p excisional biopsy of occipital lesion. She continues to respond well to treatment as outlined above with recent films reviewed which reveal a slight decrease in tumor size in comparison to prior studies.\par \par Repeat MR Head w w/o IV contrast recommended within 3 months time; I will revisit with her once this has been completed and she will contact me with any questions or concerns in the interim.\par \par Kala Cole PA-C\par Allison Dumont MD

## 2022-12-28 PROBLEM — B35.9 DERMATOPHYTOSIS: Status: ACTIVE | Noted: 2022-01-01

## 2023-01-01 ENCOUNTER — APPOINTMENT (OUTPATIENT)
Dept: INFUSION THERAPY | Facility: CLINIC | Age: 58
End: 2023-01-01

## 2023-01-01 ENCOUNTER — APPOINTMENT (OUTPATIENT)
Dept: NEUROLOGY | Facility: CLINIC | Age: 58
End: 2023-01-01
Payer: COMMERCIAL

## 2023-01-01 ENCOUNTER — TRANSCRIPTION ENCOUNTER (OUTPATIENT)
Age: 58
End: 2023-01-01

## 2023-01-01 ENCOUNTER — APPOINTMENT (OUTPATIENT)
Dept: NEUROSURGERY | Facility: CLINIC | Age: 58
End: 2023-01-01

## 2023-01-01 ENCOUNTER — NON-APPOINTMENT (OUTPATIENT)
Age: 58
End: 2023-01-01

## 2023-01-01 ENCOUNTER — LABORATORY RESULT (OUTPATIENT)
Age: 58
End: 2023-01-01

## 2023-01-01 ENCOUNTER — OUTPATIENT (OUTPATIENT)
Dept: OUTPATIENT SERVICES | Facility: HOSPITAL | Age: 58
LOS: 1 days | End: 2023-01-01

## 2023-01-01 ENCOUNTER — APPOINTMENT (OUTPATIENT)
Dept: HEMATOLOGY ONCOLOGY | Facility: CLINIC | Age: 58
End: 2023-01-01

## 2023-01-01 ENCOUNTER — INPATIENT (INPATIENT)
Facility: HOSPITAL | Age: 58
LOS: 8 days | DRG: 54 | End: 2023-02-09
Attending: PSYCHIATRY & NEUROLOGY | Admitting: INTERNAL MEDICINE
Payer: COMMERCIAL

## 2023-01-01 ENCOUNTER — APPOINTMENT (OUTPATIENT)
Dept: HEMATOLOGY ONCOLOGY | Facility: CLINIC | Age: 58
End: 2023-01-01
Payer: COMMERCIAL

## 2023-01-01 ENCOUNTER — INPATIENT (INPATIENT)
Facility: HOSPITAL | Age: 58
LOS: 5 days | Discharge: HOME HEALTH | End: 2023-01-08
Attending: INTERNAL MEDICINE | Admitting: INTERNAL MEDICINE
Payer: COMMERCIAL

## 2023-01-01 VITALS
HEART RATE: 80 BPM | TEMPERATURE: 97 F | SYSTOLIC BLOOD PRESSURE: 129 MMHG | DIASTOLIC BLOOD PRESSURE: 78 MMHG | RESPIRATION RATE: 18 BRPM

## 2023-01-01 VITALS
HEART RATE: 99 BPM | DIASTOLIC BLOOD PRESSURE: 89 MMHG | TEMPERATURE: 99 F | SYSTOLIC BLOOD PRESSURE: 163 MMHG | OXYGEN SATURATION: 98 % | RESPIRATION RATE: 18 BRPM

## 2023-01-01 VITALS
DIASTOLIC BLOOD PRESSURE: 84 MMHG | WEIGHT: 197.98 LBS | HEIGHT: 64 IN | HEART RATE: 105 BPM | RESPIRATION RATE: 20 BRPM | OXYGEN SATURATION: 96 % | SYSTOLIC BLOOD PRESSURE: 123 MMHG

## 2023-01-01 VITALS
SYSTOLIC BLOOD PRESSURE: 166 MMHG | DIASTOLIC BLOOD PRESSURE: 115 MMHG | HEART RATE: 102 BPM | TEMPERATURE: 98 F | RESPIRATION RATE: 22 BRPM | OXYGEN SATURATION: 96 %

## 2023-01-01 VITALS
WEIGHT: 200 LBS | DIASTOLIC BLOOD PRESSURE: 81 MMHG | TEMPERATURE: 97.8 F | HEART RATE: 84 BPM | BODY MASS INDEX: 32.14 KG/M2 | SYSTOLIC BLOOD PRESSURE: 113 MMHG | HEIGHT: 66 IN | OXYGEN SATURATION: 98 %

## 2023-01-01 VITALS
WEIGHT: 204 LBS | HEIGHT: 66 IN | DIASTOLIC BLOOD PRESSURE: 90 MMHG | SYSTOLIC BLOOD PRESSURE: 116 MMHG | TEMPERATURE: 98.3 F | HEART RATE: 96 BPM | BODY MASS INDEX: 32.78 KG/M2

## 2023-01-01 DIAGNOSIS — Z98.890 OTHER SPECIFIED POSTPROCEDURAL STATES: Chronic | ICD-10-CM

## 2023-01-01 DIAGNOSIS — K21.9 GASTRO-ESOPHAGEAL REFLUX DISEASE WITHOUT ESOPHAGITIS: ICD-10-CM

## 2023-01-01 DIAGNOSIS — R47.01 APHASIA: ICD-10-CM

## 2023-01-01 DIAGNOSIS — I10 ESSENTIAL (PRIMARY) HYPERTENSION: ICD-10-CM

## 2023-01-01 DIAGNOSIS — R56.9 UNSPECIFIED CONVULSIONS: ICD-10-CM

## 2023-01-01 DIAGNOSIS — R29.705 NIHSS SCORE 5: ICD-10-CM

## 2023-01-01 DIAGNOSIS — I63.89 OTHER CEREBRAL INFARCTION: ICD-10-CM

## 2023-01-01 DIAGNOSIS — D69.6 THROMBOCYTOPENIA, UNSPECIFIED: ICD-10-CM

## 2023-01-01 DIAGNOSIS — Z79.52 LONG TERM (CURRENT) USE OF SYSTEMIC STEROIDS: ICD-10-CM

## 2023-01-01 DIAGNOSIS — G47.33 OBSTRUCTIVE SLEEP APNEA (ADULT) (PEDIATRIC): ICD-10-CM

## 2023-01-01 DIAGNOSIS — R06.00 DYSPNEA, UNSPECIFIED: ICD-10-CM

## 2023-01-01 DIAGNOSIS — Z71.89 OTHER SPECIFIED COUNSELING: ICD-10-CM

## 2023-01-01 DIAGNOSIS — Z51.81 ENCOUNTER FOR THERAPEUTIC DRUG LEVEL MONITORING: ICD-10-CM

## 2023-01-01 DIAGNOSIS — E87.6 HYPOKALEMIA: ICD-10-CM

## 2023-01-01 DIAGNOSIS — F44.5 CONVERSION DISORDER WITH SEIZURES OR CONVULSIONS: ICD-10-CM

## 2023-01-01 DIAGNOSIS — Z51.11 ENCOUNTER FOR ANTINEOPLASTIC CHEMOTHERAPY: ICD-10-CM

## 2023-01-01 DIAGNOSIS — E87.8 OTHER DISORDERS OF ELECTROLYTE AND FLUID BALANCE, NOT ELSEWHERE CLASSIFIED: ICD-10-CM

## 2023-01-01 DIAGNOSIS — Z51.5 ENCOUNTER FOR PALLIATIVE CARE: ICD-10-CM

## 2023-01-01 DIAGNOSIS — E66.9 OBESITY, UNSPECIFIED: ICD-10-CM

## 2023-01-01 DIAGNOSIS — Z85.841 PERSONAL HISTORY OF MALIGNANT NEOPLASM OF BRAIN: ICD-10-CM

## 2023-01-01 DIAGNOSIS — J44.9 CHRONIC OBSTRUCTIVE PULMONARY DISEASE, UNSPECIFIED: ICD-10-CM

## 2023-01-01 DIAGNOSIS — Z79.630 ENCOUNTER FOR THERAPEUTIC DRUG LVL MONITORING: ICD-10-CM

## 2023-01-01 DIAGNOSIS — R11.2 NAUSEA WITH VOMITING, UNSPECIFIED: ICD-10-CM

## 2023-01-01 DIAGNOSIS — C71.9 MALIGNANT NEOPLASM OF BRAIN, UNSPECIFIED: ICD-10-CM

## 2023-01-01 DIAGNOSIS — I63.9 CEREBRAL INFARCTION, UNSPECIFIED: ICD-10-CM

## 2023-01-01 DIAGNOSIS — C71.8 MALIGNANT NEOPLASM OF OVERLAPPING SITES OF BRAIN: ICD-10-CM
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
## 2023-01-01 DIAGNOSIS — B35.9 DERMATOPHYTOSIS, UNSPECIFIED: ICD-10-CM

## 2023-01-01 DIAGNOSIS — E78.5 HYPERLIPIDEMIA, UNSPECIFIED: ICD-10-CM

## 2023-01-01 DIAGNOSIS — T45.1X5A ADVERSE EFFECT OF ANTINEOPLASTIC AND IMMUNOSUPPRESSIVE DRUGS, INITIAL ENCOUNTER: ICD-10-CM

## 2023-01-01 DIAGNOSIS — I16.0 HYPERTENSIVE URGENCY: ICD-10-CM

## 2023-01-01 DIAGNOSIS — R41.82 ALTERED MENTAL STATUS, UNSPECIFIED: ICD-10-CM

## 2023-01-01 DIAGNOSIS — Z51.81 ENCOUNTER FOR THERAPEUTIC DRUG LVL MONITORING: ICD-10-CM

## 2023-01-01 DIAGNOSIS — T38.0X5A ADVERSE EFFECT OF GLUCOCORTICOIDS AND SYNTHETIC ANALOGUES, INITIAL ENCOUNTER: ICD-10-CM

## 2023-01-01 DIAGNOSIS — Z99.89 DEPENDENCE ON OTHER ENABLING MACHINES AND DEVICES: ICD-10-CM

## 2023-01-01 DIAGNOSIS — I15.8 OTHER SECONDARY HYPERTENSION: ICD-10-CM

## 2023-01-01 LAB
ALBUMIN SERPL ELPH-MCNC: 3.9 G/DL — SIGNIFICANT CHANGE UP (ref 3.5–5.2)
ALBUMIN SERPL ELPH-MCNC: 4 G/DL — SIGNIFICANT CHANGE UP (ref 3.5–5.2)
ALBUMIN SERPL ELPH-MCNC: 4.1 G/DL — SIGNIFICANT CHANGE UP (ref 3.5–5.2)
ALBUMIN SERPL ELPH-MCNC: 4.1 G/DL — SIGNIFICANT CHANGE UP (ref 3.5–5.2)
ALBUMIN SERPL ELPH-MCNC: 4.2 G/DL — SIGNIFICANT CHANGE UP (ref 3.5–5.2)
ALBUMIN SERPL ELPH-MCNC: 4.3 G/DL
ALBUMIN SERPL ELPH-MCNC: 4.3 G/DL — SIGNIFICANT CHANGE UP (ref 3.5–5.2)
ALBUMIN SERPL ELPH-MCNC: 4.3 G/DL — SIGNIFICANT CHANGE UP (ref 3.5–5.2)
ALP BLD-CCNC: 80 U/L
ALP SERPL-CCNC: 58 U/L — SIGNIFICANT CHANGE UP (ref 30–115)
ALP SERPL-CCNC: 61 U/L — SIGNIFICANT CHANGE UP (ref 30–115)
ALP SERPL-CCNC: 64 U/L — SIGNIFICANT CHANGE UP (ref 30–115)
ALP SERPL-CCNC: 64 U/L — SIGNIFICANT CHANGE UP (ref 30–115)
ALP SERPL-CCNC: 66 U/L — SIGNIFICANT CHANGE UP (ref 30–115)
ALP SERPL-CCNC: 67 U/L — SIGNIFICANT CHANGE UP (ref 30–115)
ALP SERPL-CCNC: 69 U/L — SIGNIFICANT CHANGE UP (ref 30–115)
ALP SERPL-CCNC: 69 U/L — SIGNIFICANT CHANGE UP (ref 30–115)
ALP SERPL-CCNC: 70 U/L — SIGNIFICANT CHANGE UP (ref 30–115)
ALP SERPL-CCNC: 71 U/L — SIGNIFICANT CHANGE UP (ref 30–115)
ALP SERPL-CCNC: 72 U/L — SIGNIFICANT CHANGE UP (ref 30–115)
ALP SERPL-CCNC: 80 U/L — SIGNIFICANT CHANGE UP (ref 30–115)
ALT FLD-CCNC: 22 U/L — SIGNIFICANT CHANGE UP (ref 0–41)
ALT FLD-CCNC: 27 U/L — SIGNIFICANT CHANGE UP (ref 0–41)
ALT FLD-CCNC: 27 U/L — SIGNIFICANT CHANGE UP (ref 0–41)
ALT FLD-CCNC: 29 U/L — SIGNIFICANT CHANGE UP (ref 0–41)
ALT FLD-CCNC: 30 U/L — SIGNIFICANT CHANGE UP (ref 0–41)
ALT FLD-CCNC: 40 U/L — SIGNIFICANT CHANGE UP (ref 0–41)
ALT FLD-CCNC: 41 U/L — SIGNIFICANT CHANGE UP (ref 0–41)
ALT FLD-CCNC: 50 U/L — HIGH (ref 0–41)
ALT FLD-CCNC: 53 U/L — HIGH (ref 0–41)
ALT FLD-CCNC: 55 U/L — HIGH (ref 0–41)
ALT FLD-CCNC: 63 U/L — HIGH (ref 0–41)
ALT FLD-CCNC: 84 U/L — HIGH (ref 0–41)
ALT SERPL-CCNC: 24 U/L
ANION GAP SERPL CALC-SCNC: 10 MMOL/L — SIGNIFICANT CHANGE UP (ref 7–14)
ANION GAP SERPL CALC-SCNC: 11 MMOL/L — SIGNIFICANT CHANGE UP (ref 7–14)
ANION GAP SERPL CALC-SCNC: 12 MMOL/L — SIGNIFICANT CHANGE UP (ref 7–14)
ANION GAP SERPL CALC-SCNC: 12 MMOL/L — SIGNIFICANT CHANGE UP (ref 7–14)
ANION GAP SERPL CALC-SCNC: 13 MMOL/L — SIGNIFICANT CHANGE UP (ref 7–14)
ANION GAP SERPL CALC-SCNC: 15 MMOL/L — HIGH (ref 7–14)
ANION GAP SERPL CALC-SCNC: 16 MMOL/L — HIGH (ref 7–14)
ANION GAP SERPL CALC-SCNC: 17 MMOL/L
ANION GAP SERPL CALC-SCNC: 17 MMOL/L — HIGH (ref 7–14)
ANION GAP SERPL CALC-SCNC: 18 MMOL/L — HIGH (ref 7–14)
ANION GAP SERPL CALC-SCNC: 9 MMOL/L — SIGNIFICANT CHANGE UP (ref 7–14)
APPEARANCE UR: CLEAR — SIGNIFICANT CHANGE UP
APTT BLD: 30.5 SEC — SIGNIFICANT CHANGE UP (ref 27–39.2)
APTT BLD: 32.1 SEC — SIGNIFICANT CHANGE UP (ref 27–39.2)
AST SERPL-CCNC: 29 U/L
AST SERPL-CCNC: 35 U/L — SIGNIFICANT CHANGE UP (ref 0–41)
AST SERPL-CCNC: 36 U/L — SIGNIFICANT CHANGE UP (ref 0–41)
AST SERPL-CCNC: 40 U/L — SIGNIFICANT CHANGE UP (ref 0–41)
AST SERPL-CCNC: 43 U/L — HIGH (ref 0–41)
AST SERPL-CCNC: 47 U/L — HIGH (ref 0–41)
AST SERPL-CCNC: 47 U/L — HIGH (ref 0–41)
AST SERPL-CCNC: 50 U/L — HIGH (ref 0–41)
AST SERPL-CCNC: 61 U/L — HIGH (ref 0–41)
AST SERPL-CCNC: 78 U/L — HIGH (ref 0–41)
AST SERPL-CCNC: 79 U/L — HIGH (ref 0–41)
AST SERPL-CCNC: 82 U/L — HIGH (ref 0–41)
AST SERPL-CCNC: 82 U/L — HIGH (ref 0–41)
BASOPHILS # BLD AUTO: 0 K/UL — SIGNIFICANT CHANGE UP (ref 0–0.2)
BASOPHILS # BLD AUTO: 0.01 K/UL — SIGNIFICANT CHANGE UP (ref 0–0.2)
BASOPHILS NFR BLD AUTO: 0 % — SIGNIFICANT CHANGE UP (ref 0–1)
BASOPHILS NFR BLD AUTO: 0.1 % — SIGNIFICANT CHANGE UP (ref 0–1)
BASOPHILS NFR BLD AUTO: 0.2 % — SIGNIFICANT CHANGE UP (ref 0–1)
BILIRUB SERPL-MCNC: 0.7 MG/DL
BILIRUB SERPL-MCNC: 0.7 MG/DL — SIGNIFICANT CHANGE UP (ref 0.2–1.2)
BILIRUB SERPL-MCNC: 0.8 MG/DL — SIGNIFICANT CHANGE UP (ref 0.2–1.2)
BILIRUB SERPL-MCNC: 0.9 MG/DL — SIGNIFICANT CHANGE UP (ref 0.2–1.2)
BILIRUB SERPL-MCNC: 1 MG/DL — SIGNIFICANT CHANGE UP (ref 0.2–1.2)
BILIRUB SERPL-MCNC: 1 MG/DL — SIGNIFICANT CHANGE UP (ref 0.2–1.2)
BILIRUB SERPL-MCNC: 1.1 MG/DL — SIGNIFICANT CHANGE UP (ref 0.2–1.2)
BILIRUB UR-MCNC: NEGATIVE — SIGNIFICANT CHANGE UP
BUN SERPL-MCNC: 10 MG/DL — SIGNIFICANT CHANGE UP (ref 10–20)
BUN SERPL-MCNC: 11 MG/DL — SIGNIFICANT CHANGE UP (ref 10–20)
BUN SERPL-MCNC: 13 MG/DL — SIGNIFICANT CHANGE UP (ref 10–20)
BUN SERPL-MCNC: 16 MG/DL — SIGNIFICANT CHANGE UP (ref 10–20)
BUN SERPL-MCNC: 20 MG/DL — SIGNIFICANT CHANGE UP (ref 10–20)
BUN SERPL-MCNC: 24 MG/DL — HIGH (ref 10–20)
BUN SERPL-MCNC: 26 MG/DL — HIGH (ref 10–20)
BUN SERPL-MCNC: 3 MG/DL — LOW (ref 10–20)
BUN SERPL-MCNC: 3 MG/DL — LOW (ref 10–20)
BUN SERPL-MCNC: 5 MG/DL
BUN SERPL-MCNC: 8 MG/DL — LOW (ref 10–20)
BUN SERPL-MCNC: 8 MG/DL — LOW (ref 10–20)
BUN SERPL-MCNC: 9 MG/DL — LOW (ref 10–20)
BUN SERPL-MCNC: 9 MG/DL — LOW (ref 10–20)
CALCIUM SERPL-MCNC: 8 MG/DL — LOW (ref 8.4–10.4)
CALCIUM SERPL-MCNC: 8.3 MG/DL — LOW (ref 8.4–10.5)
CALCIUM SERPL-MCNC: 8.5 MG/DL — SIGNIFICANT CHANGE UP (ref 8.4–10.5)
CALCIUM SERPL-MCNC: 8.6 MG/DL — SIGNIFICANT CHANGE UP (ref 8.4–10.4)
CALCIUM SERPL-MCNC: 8.7 MG/DL — SIGNIFICANT CHANGE UP (ref 8.4–10.4)
CALCIUM SERPL-MCNC: 9.1 MG/DL — SIGNIFICANT CHANGE UP (ref 8.4–10.5)
CALCIUM SERPL-MCNC: 9.2 MG/DL — SIGNIFICANT CHANGE UP (ref 8.4–10.4)
CALCIUM SERPL-MCNC: 9.2 MG/DL — SIGNIFICANT CHANGE UP (ref 8.4–10.5)
CALCIUM SERPL-MCNC: 9.3 MG/DL — SIGNIFICANT CHANGE UP (ref 8.4–10.5)
CALCIUM SERPL-MCNC: 9.3 MG/DL — SIGNIFICANT CHANGE UP (ref 8.4–10.5)
CALCIUM SERPL-MCNC: 9.4 MG/DL
CALCIUM SERPL-MCNC: 9.4 MG/DL — SIGNIFICANT CHANGE UP (ref 8.4–10.4)
CALCIUM SERPL-MCNC: 9.4 MG/DL — SIGNIFICANT CHANGE UP (ref 8.4–10.5)
CALCIUM SERPL-MCNC: 9.5 MG/DL — SIGNIFICANT CHANGE UP (ref 8.4–10.4)
CALCIUM SERPL-MCNC: 9.6 MG/DL — SIGNIFICANT CHANGE UP (ref 8.4–10.5)
CALCIUM SERPL-MCNC: 9.6 MG/DL — SIGNIFICANT CHANGE UP (ref 8.4–10.5)
CHLORIDE SERPL-SCNC: 100 MMOL/L — SIGNIFICANT CHANGE UP (ref 98–110)
CHLORIDE SERPL-SCNC: 101 MMOL/L — SIGNIFICANT CHANGE UP (ref 98–110)
CHLORIDE SERPL-SCNC: 101 MMOL/L — SIGNIFICANT CHANGE UP (ref 98–110)
CHLORIDE SERPL-SCNC: 102 MMOL/L — SIGNIFICANT CHANGE UP (ref 98–110)
CHLORIDE SERPL-SCNC: 103 MMOL/L — SIGNIFICANT CHANGE UP (ref 98–110)
CHLORIDE SERPL-SCNC: 104 MMOL/L — SIGNIFICANT CHANGE UP (ref 98–110)
CHLORIDE SERPL-SCNC: 104 MMOL/L — SIGNIFICANT CHANGE UP (ref 98–110)
CHLORIDE SERPL-SCNC: 106 MMOL/L — SIGNIFICANT CHANGE UP (ref 98–110)
CHLORIDE SERPL-SCNC: 106 MMOL/L — SIGNIFICANT CHANGE UP (ref 98–110)
CHLORIDE SERPL-SCNC: 107 MMOL/L — SIGNIFICANT CHANGE UP (ref 98–110)
CHLORIDE SERPL-SCNC: 108 MMOL/L — SIGNIFICANT CHANGE UP (ref 98–110)
CHLORIDE SERPL-SCNC: 108 MMOL/L — SIGNIFICANT CHANGE UP (ref 98–110)
CHLORIDE SERPL-SCNC: 92 MMOL/L — LOW (ref 98–110)
CHLORIDE SERPL-SCNC: 99 MMOL/L
CO2 SERPL-SCNC: 24 MMOL/L — SIGNIFICANT CHANGE UP (ref 17–32)
CO2 SERPL-SCNC: 25 MMOL/L — SIGNIFICANT CHANGE UP (ref 17–32)
CO2 SERPL-SCNC: 25 MMOL/L — SIGNIFICANT CHANGE UP (ref 17–32)
CO2 SERPL-SCNC: 26 MMOL/L — SIGNIFICANT CHANGE UP (ref 17–32)
CO2 SERPL-SCNC: 27 MMOL/L
CO2 SERPL-SCNC: 27 MMOL/L — SIGNIFICANT CHANGE UP (ref 17–32)
CO2 SERPL-SCNC: 28 MMOL/L — SIGNIFICANT CHANGE UP (ref 17–32)
CO2 SERPL-SCNC: 31 MMOL/L — SIGNIFICANT CHANGE UP (ref 17–32)
CO2 SERPL-SCNC: 34 MMOL/L — HIGH (ref 17–32)
COLOR SPEC: YELLOW — SIGNIFICANT CHANGE UP
CREAT SERPL-MCNC: 0.5 MG/DL — LOW (ref 0.7–1.5)
CREAT SERPL-MCNC: 0.6 MG/DL
CREAT SERPL-MCNC: 0.6 MG/DL — LOW (ref 0.7–1.5)
CREAT SERPL-MCNC: <0.5 MG/DL — LOW (ref 0.7–1.5)
CULTURE RESULTS: NO GROWTH — SIGNIFICANT CHANGE UP
CULTURE RESULTS: SIGNIFICANT CHANGE UP
CULTURE RESULTS: SIGNIFICANT CHANGE UP
DIFF PNL FLD: NEGATIVE — SIGNIFICANT CHANGE UP
EGFR: 106 ML/MIN/1.73M2
EGFR: 106 ML/MIN/1.73M2 — SIGNIFICANT CHANGE UP
EGFR: 111 ML/MIN/1.73M2 — SIGNIFICANT CHANGE UP
EGFR: 117 ML/MIN/1.73M2 — SIGNIFICANT CHANGE UP
EOSINOPHIL # BLD AUTO: 0 K/UL — SIGNIFICANT CHANGE UP (ref 0–0.7)
EOSINOPHIL # BLD AUTO: 0.01 K/UL — SIGNIFICANT CHANGE UP (ref 0–0.7)
EOSINOPHIL # BLD AUTO: 0.02 K/UL — SIGNIFICANT CHANGE UP (ref 0–0.7)
EOSINOPHIL # BLD AUTO: 0.03 K/UL — SIGNIFICANT CHANGE UP (ref 0–0.7)
EOSINOPHIL # BLD AUTO: 0.05 K/UL — SIGNIFICANT CHANGE UP (ref 0–0.7)
EOSINOPHIL # BLD AUTO: 0.06 K/UL — SIGNIFICANT CHANGE UP (ref 0–0.7)
EOSINOPHIL NFR BLD AUTO: 0 % — SIGNIFICANT CHANGE UP (ref 0–8)
EOSINOPHIL NFR BLD AUTO: 0.2 % — SIGNIFICANT CHANGE UP (ref 0–8)
EOSINOPHIL NFR BLD AUTO: 0.3 % — SIGNIFICANT CHANGE UP (ref 0–8)
EOSINOPHIL NFR BLD AUTO: 0.6 % — SIGNIFICANT CHANGE UP (ref 0–8)
EOSINOPHIL NFR BLD AUTO: 1.2 % — SIGNIFICANT CHANGE UP (ref 0–8)
EOSINOPHIL NFR BLD AUTO: 1.3 % — SIGNIFICANT CHANGE UP (ref 0–8)
GLUCOSE SERPL-MCNC: 102 MG/DL — HIGH (ref 70–99)
GLUCOSE SERPL-MCNC: 120 MG/DL — HIGH (ref 70–99)
GLUCOSE SERPL-MCNC: 123 MG/DL — HIGH (ref 70–99)
GLUCOSE SERPL-MCNC: 125 MG/DL — HIGH (ref 70–99)
GLUCOSE SERPL-MCNC: 128 MG/DL — HIGH (ref 70–99)
GLUCOSE SERPL-MCNC: 128 MG/DL — HIGH (ref 70–99)
GLUCOSE SERPL-MCNC: 129 MG/DL — HIGH (ref 70–99)
GLUCOSE SERPL-MCNC: 130 MG/DL — HIGH (ref 70–99)
GLUCOSE SERPL-MCNC: 130 MG/DL — HIGH (ref 70–99)
GLUCOSE SERPL-MCNC: 133 MG/DL — HIGH (ref 70–99)
GLUCOSE SERPL-MCNC: 134 MG/DL — HIGH (ref 70–99)
GLUCOSE SERPL-MCNC: 134 MG/DL — HIGH (ref 70–99)
GLUCOSE SERPL-MCNC: 148 MG/DL
GLUCOSE SERPL-MCNC: 149 MG/DL — HIGH (ref 70–99)
GLUCOSE SERPL-MCNC: 164 MG/DL — HIGH (ref 70–99)
GLUCOSE SERPL-MCNC: 99 MG/DL — SIGNIFICANT CHANGE UP (ref 70–99)
GLUCOSE UR QL: NEGATIVE — SIGNIFICANT CHANGE UP
HCT VFR BLD CALC: 29.8 % — LOW (ref 37–47)
HCT VFR BLD CALC: 30 % — LOW (ref 37–47)
HCT VFR BLD CALC: 30.4 % — LOW (ref 37–47)
HCT VFR BLD CALC: 30.9 % — LOW (ref 37–47)
HCT VFR BLD CALC: 31.4 % — LOW (ref 37–47)
HCT VFR BLD CALC: 31.6 % — LOW (ref 37–47)
HCT VFR BLD CALC: 31.9 % — LOW (ref 37–47)
HCT VFR BLD CALC: 33.2 % — LOW (ref 37–47)
HCT VFR BLD CALC: 33.3 % — LOW (ref 37–47)
HCT VFR BLD CALC: 33.3 % — LOW (ref 37–47)
HCT VFR BLD CALC: 33.7 %
HCT VFR BLD CALC: 35.3 % — LOW (ref 37–47)
HCT VFR BLD CALC: 35.4 % — LOW (ref 37–47)
HCT VFR BLD CALC: 35.6 % — LOW (ref 37–47)
HCT VFR BLD CALC: 35.6 % — LOW (ref 37–47)
HCT VFR BLD CALC: 35.7 %
HCT VFR BLD CALC: 38 % — SIGNIFICANT CHANGE UP (ref 37–47)
HGB BLD-MCNC: 10.1 G/DL — LOW (ref 12–16)
HGB BLD-MCNC: 10.4 G/DL — LOW (ref 12–16)
HGB BLD-MCNC: 11 G/DL — LOW (ref 12–16)
HGB BLD-MCNC: 11.2 G/DL — LOW (ref 12–16)
HGB BLD-MCNC: 11.2 G/DL — LOW (ref 12–16)
HGB BLD-MCNC: 11.3 G/DL
HGB BLD-MCNC: 11.6 G/DL — LOW (ref 12–16)
HGB BLD-MCNC: 11.8 G/DL — LOW (ref 12–16)
HGB BLD-MCNC: 12.1 G/DL — SIGNIFICANT CHANGE UP (ref 12–16)
HGB BLD-MCNC: 12.1 G/DL — SIGNIFICANT CHANGE UP (ref 12–16)
HGB BLD-MCNC: 12.3 G/DL
HGB BLD-MCNC: 12.4 G/DL — SIGNIFICANT CHANGE UP (ref 12–16)
HGB BLD-MCNC: 12.4 G/DL — SIGNIFICANT CHANGE UP (ref 12–16)
HGB BLD-MCNC: 13 G/DL — SIGNIFICANT CHANGE UP (ref 12–16)
HGB BLD-MCNC: 9.9 G/DL — LOW (ref 12–16)
IMM GRANULOCYTES NFR BLD AUTO: 0.2 % — SIGNIFICANT CHANGE UP (ref 0.1–0.3)
IMM GRANULOCYTES NFR BLD AUTO: 0.3 % — SIGNIFICANT CHANGE UP (ref 0.1–0.3)
IMM GRANULOCYTES NFR BLD AUTO: 0.4 % — HIGH (ref 0.1–0.3)
IMM GRANULOCYTES NFR BLD AUTO: 0.4 % — HIGH (ref 0.1–0.3)
IMM GRANULOCYTES NFR BLD AUTO: 0.7 % — HIGH (ref 0.1–0.3)
IMM GRANULOCYTES NFR BLD AUTO: 1.5 % — HIGH (ref 0.1–0.3)
IMM GRANULOCYTES NFR BLD AUTO: 1.9 % — HIGH (ref 0.1–0.3)
INR BLD: 1.04 RATIO — SIGNIFICANT CHANGE UP (ref 0.65–1.3)
INR BLD: 1.08 RATIO — SIGNIFICANT CHANGE UP (ref 0.65–1.3)
KETONES UR-MCNC: NEGATIVE — SIGNIFICANT CHANGE UP
LACTATE SERPL-SCNC: 1.7 MMOL/L — SIGNIFICANT CHANGE UP (ref 0.7–2)
LEUKOCYTE ESTERASE UR-ACNC: NEGATIVE — SIGNIFICANT CHANGE UP
LYMPHOCYTES # BLD AUTO: 0.4 K/UL — LOW (ref 1.2–3.4)
LYMPHOCYTES # BLD AUTO: 0.4 K/UL — LOW (ref 1.2–3.4)
LYMPHOCYTES # BLD AUTO: 0.41 K/UL — LOW (ref 1.2–3.4)
LYMPHOCYTES # BLD AUTO: 0.45 K/UL — LOW (ref 1.2–3.4)
LYMPHOCYTES # BLD AUTO: 0.45 K/UL — LOW (ref 1.2–3.4)
LYMPHOCYTES # BLD AUTO: 0.54 K/UL — LOW (ref 1.2–3.4)
LYMPHOCYTES # BLD AUTO: 0.58 K/UL — LOW (ref 1.2–3.4)
LYMPHOCYTES # BLD AUTO: 0.6 K/UL — LOW (ref 1.2–3.4)
LYMPHOCYTES # BLD AUTO: 0.66 K/UL — LOW (ref 1.2–3.4)
LYMPHOCYTES # BLD AUTO: 0.67 K/UL — LOW (ref 1.2–3.4)
LYMPHOCYTES # BLD AUTO: 0.76 K/UL — LOW (ref 1.2–3.4)
LYMPHOCYTES # BLD AUTO: 0.9 K/UL — LOW (ref 1.2–3.4)
LYMPHOCYTES # BLD AUTO: 1 K/UL — LOW (ref 1.2–3.4)
LYMPHOCYTES # BLD AUTO: 10.1 % — LOW (ref 20.5–51.1)
LYMPHOCYTES # BLD AUTO: 11 % — LOW (ref 20.5–51.1)
LYMPHOCYTES # BLD AUTO: 12.2 % — LOW (ref 20.5–51.1)
LYMPHOCYTES # BLD AUTO: 14.2 % — LOW (ref 20.5–51.1)
LYMPHOCYTES # BLD AUTO: 15.7 % — LOW (ref 20.5–51.1)
LYMPHOCYTES # BLD AUTO: 15.8 % — LOW (ref 20.5–51.1)
LYMPHOCYTES # BLD AUTO: 21 % — SIGNIFICANT CHANGE UP (ref 20.5–51.1)
LYMPHOCYTES # BLD AUTO: 6.4 % — LOW (ref 20.5–51.1)
LYMPHOCYTES # BLD AUTO: 7 % — LOW (ref 20.5–51.1)
LYMPHOCYTES # BLD AUTO: 7.4 % — LOW (ref 20.5–51.1)
LYMPHOCYTES # BLD AUTO: 7.7 % — LOW (ref 20.5–51.1)
LYMPHOCYTES # BLD AUTO: 8.7 % — LOW (ref 20.5–51.1)
LYMPHOCYTES # BLD AUTO: 9.4 % — LOW (ref 20.5–51.1)
MAGNESIUM SERPL-MCNC: 1.4 MG/DL — LOW (ref 1.8–2.4)
MAGNESIUM SERPL-MCNC: 1.4 MG/DL — LOW (ref 1.8–2.4)
MAGNESIUM SERPL-MCNC: 1.7 MG/DL — LOW (ref 1.8–2.4)
MAGNESIUM SERPL-MCNC: 1.9 MG/DL — SIGNIFICANT CHANGE UP (ref 1.8–2.4)
MAGNESIUM SERPL-MCNC: 2 MG/DL — SIGNIFICANT CHANGE UP (ref 1.8–2.4)
MAGNESIUM SERPL-MCNC: 2.1 MG/DL — SIGNIFICANT CHANGE UP (ref 1.8–2.4)
MAGNESIUM SERPL-MCNC: 2.2 MG/DL — SIGNIFICANT CHANGE UP (ref 1.8–2.4)
MAGNESIUM SERPL-MCNC: 2.3 MG/DL — SIGNIFICANT CHANGE UP (ref 1.8–2.4)
MAGNESIUM SERPL-MCNC: 2.3 MG/DL — SIGNIFICANT CHANGE UP (ref 1.8–2.4)
MAGNESIUM SERPL-MCNC: 2.4 MG/DL — SIGNIFICANT CHANGE UP (ref 1.8–2.4)
MAGNESIUM SERPL-MCNC: 2.4 MG/DL — SIGNIFICANT CHANGE UP (ref 1.8–2.4)
MCHC RBC-ENTMCNC: 32.6 G/DL — SIGNIFICANT CHANGE UP (ref 32–37)
MCHC RBC-ENTMCNC: 33 G/DL — SIGNIFICANT CHANGE UP (ref 32–37)
MCHC RBC-ENTMCNC: 33.1 PG
MCHC RBC-ENTMCNC: 33.1 PG — HIGH (ref 27–31)
MCHC RBC-ENTMCNC: 33.2 G/DL — SIGNIFICANT CHANGE UP (ref 32–37)
MCHC RBC-ENTMCNC: 33.5 G/DL
MCHC RBC-ENTMCNC: 33.7 G/DL — SIGNIFICANT CHANGE UP (ref 32–37)
MCHC RBC-ENTMCNC: 33.7 G/DL — SIGNIFICANT CHANGE UP (ref 32–37)
MCHC RBC-ENTMCNC: 33.7 PG
MCHC RBC-ENTMCNC: 33.7 PG — HIGH (ref 27–31)
MCHC RBC-ENTMCNC: 33.8 PG — HIGH (ref 27–31)
MCHC RBC-ENTMCNC: 33.8 PG — HIGH (ref 27–31)
MCHC RBC-ENTMCNC: 33.9 PG — HIGH (ref 27–31)
MCHC RBC-ENTMCNC: 34 G/DL — SIGNIFICANT CHANGE UP (ref 32–37)
MCHC RBC-ENTMCNC: 34 PG — HIGH (ref 27–31)
MCHC RBC-ENTMCNC: 34.1 PG — HIGH (ref 27–31)
MCHC RBC-ENTMCNC: 34.2 G/DL — SIGNIFICANT CHANGE UP (ref 32–37)
MCHC RBC-ENTMCNC: 34.2 G/DL — SIGNIFICANT CHANGE UP (ref 32–37)
MCHC RBC-ENTMCNC: 34.3 G/DL — SIGNIFICANT CHANGE UP (ref 32–37)
MCHC RBC-ENTMCNC: 34.4 PG — HIGH (ref 27–31)
MCHC RBC-ENTMCNC: 34.4 PG — HIGH (ref 27–31)
MCHC RBC-ENTMCNC: 34.5 G/DL
MCHC RBC-ENTMCNC: 34.5 PG — HIGH (ref 27–31)
MCHC RBC-ENTMCNC: 34.6 PG — HIGH (ref 27–31)
MCHC RBC-ENTMCNC: 34.7 PG — HIGH (ref 27–31)
MCHC RBC-ENTMCNC: 34.7 PG — HIGH (ref 27–31)
MCHC RBC-ENTMCNC: 34.8 G/DL — SIGNIFICANT CHANGE UP (ref 32–37)
MCHC RBC-ENTMCNC: 34.9 G/DL — SIGNIFICANT CHANGE UP (ref 32–37)
MCHC RBC-ENTMCNC: 35 G/DL — SIGNIFICANT CHANGE UP (ref 32–37)
MCHC RBC-ENTMCNC: 35.4 G/DL — SIGNIFICANT CHANGE UP (ref 32–37)
MCHC RBC-ENTMCNC: 35.4 G/DL — SIGNIFICANT CHANGE UP (ref 32–37)
MCHC RBC-ENTMCNC: 35.7 G/DL — SIGNIFICANT CHANGE UP (ref 32–37)
MCV RBC AUTO: 100.3 FL — HIGH (ref 81–99)
MCV RBC AUTO: 100.5 FL — HIGH (ref 81–99)
MCV RBC AUTO: 100.6 FL — HIGH (ref 81–99)
MCV RBC AUTO: 101.1 FL — HIGH (ref 81–99)
MCV RBC AUTO: 101.4 FL — HIGH (ref 81–99)
MCV RBC AUTO: 103.9 FL — HIGH (ref 81–99)
MCV RBC AUTO: 95.4 FL — SIGNIFICANT CHANGE UP (ref 81–99)
MCV RBC AUTO: 96.3 FL — SIGNIFICANT CHANGE UP (ref 81–99)
MCV RBC AUTO: 97.6 FL — SIGNIFICANT CHANGE UP (ref 81–99)
MCV RBC AUTO: 97.8 FL
MCV RBC AUTO: 97.9 FL — SIGNIFICANT CHANGE UP (ref 81–99)
MCV RBC AUTO: 98.8 FL
MCV RBC AUTO: 98.9 FL — SIGNIFICANT CHANGE UP (ref 81–99)
MCV RBC AUTO: 99.7 FL — HIGH (ref 81–99)
MCV RBC AUTO: 99.7 FL — HIGH (ref 81–99)
MONOCYTES # BLD AUTO: 0.08 K/UL — LOW (ref 0.1–0.6)
MONOCYTES # BLD AUTO: 0.13 K/UL — SIGNIFICANT CHANGE UP (ref 0.1–0.6)
MONOCYTES # BLD AUTO: 0.15 K/UL — SIGNIFICANT CHANGE UP (ref 0.1–0.6)
MONOCYTES # BLD AUTO: 0.15 K/UL — SIGNIFICANT CHANGE UP (ref 0.1–0.6)
MONOCYTES # BLD AUTO: 0.2 K/UL — SIGNIFICANT CHANGE UP (ref 0.1–0.6)
MONOCYTES # BLD AUTO: 0.21 K/UL — SIGNIFICANT CHANGE UP (ref 0.1–0.6)
MONOCYTES # BLD AUTO: 0.22 K/UL — SIGNIFICANT CHANGE UP (ref 0.1–0.6)
MONOCYTES # BLD AUTO: 0.25 K/UL — SIGNIFICANT CHANGE UP (ref 0.1–0.6)
MONOCYTES # BLD AUTO: 0.31 K/UL — SIGNIFICANT CHANGE UP (ref 0.1–0.6)
MONOCYTES # BLD AUTO: 0.31 K/UL — SIGNIFICANT CHANGE UP (ref 0.1–0.6)
MONOCYTES # BLD AUTO: 0.32 K/UL — SIGNIFICANT CHANGE UP (ref 0.1–0.6)
MONOCYTES # BLD AUTO: 0.36 K/UL — SIGNIFICANT CHANGE UP (ref 0.1–0.6)
MONOCYTES # BLD AUTO: 0.49 K/UL — SIGNIFICANT CHANGE UP (ref 0.1–0.6)
MONOCYTES NFR BLD AUTO: 1.4 % — LOW (ref 1.7–9.3)
MONOCYTES NFR BLD AUTO: 2.5 % — SIGNIFICANT CHANGE UP (ref 1.7–9.3)
MONOCYTES NFR BLD AUTO: 2.7 % — SIGNIFICANT CHANGE UP (ref 1.7–9.3)
MONOCYTES NFR BLD AUTO: 3.1 % — SIGNIFICANT CHANGE UP (ref 1.7–9.3)
MONOCYTES NFR BLD AUTO: 3.4 % — SIGNIFICANT CHANGE UP (ref 1.7–9.3)
MONOCYTES NFR BLD AUTO: 3.7 % — SIGNIFICANT CHANGE UP (ref 1.7–9.3)
MONOCYTES NFR BLD AUTO: 3.7 % — SIGNIFICANT CHANGE UP (ref 1.7–9.3)
MONOCYTES NFR BLD AUTO: 4.1 % — SIGNIFICANT CHANGE UP (ref 1.7–9.3)
MONOCYTES NFR BLD AUTO: 5.2 % — SIGNIFICANT CHANGE UP (ref 1.7–9.3)
MONOCYTES NFR BLD AUTO: 6.6 % — SIGNIFICANT CHANGE UP (ref 1.7–9.3)
MONOCYTES NFR BLD AUTO: 7 % — SIGNIFICANT CHANGE UP (ref 1.7–9.3)
MONOCYTES NFR BLD AUTO: 7.7 % — SIGNIFICANT CHANGE UP (ref 1.7–9.3)
MONOCYTES NFR BLD AUTO: 8.2 % — SIGNIFICANT CHANGE UP (ref 1.7–9.3)
NEUTROPHILS # BLD AUTO: 2.83 K/UL — SIGNIFICANT CHANGE UP (ref 1.4–6.5)
NEUTROPHILS # BLD AUTO: 3.48 K/UL — SIGNIFICANT CHANGE UP (ref 1.4–6.5)
NEUTROPHILS # BLD AUTO: 3.48 K/UL — SIGNIFICANT CHANGE UP (ref 1.4–6.5)
NEUTROPHILS # BLD AUTO: 3.65 K/UL — SIGNIFICANT CHANGE UP (ref 1.4–6.5)
NEUTROPHILS # BLD AUTO: 3.79 K/UL — SIGNIFICANT CHANGE UP (ref 1.4–6.5)
NEUTROPHILS # BLD AUTO: 3.8 K/UL — SIGNIFICANT CHANGE UP (ref 1.4–6.5)
NEUTROPHILS # BLD AUTO: 4.4 K/UL — SIGNIFICANT CHANGE UP (ref 1.4–6.5)
NEUTROPHILS # BLD AUTO: 4.91 K/UL — SIGNIFICANT CHANGE UP (ref 1.4–6.5)
NEUTROPHILS # BLD AUTO: 5.07 K/UL — SIGNIFICANT CHANGE UP (ref 1.4–6.5)
NEUTROPHILS # BLD AUTO: 5.29 K/UL — SIGNIFICANT CHANGE UP (ref 1.4–6.5)
NEUTROPHILS # BLD AUTO: 5.47 K/UL — SIGNIFICANT CHANGE UP (ref 1.4–6.5)
NEUTROPHILS # BLD AUTO: 6.87 K/UL — HIGH (ref 1.4–6.5)
NEUTROPHILS # BLD AUTO: 8.15 K/UL — HIGH (ref 1.4–6.5)
NEUTROPHILS NFR BLD AUTO: 73 % — SIGNIFICANT CHANGE UP (ref 42.2–75.2)
NEUTROPHILS NFR BLD AUTO: 74.4 % — SIGNIFICANT CHANGE UP (ref 42.2–75.2)
NEUTROPHILS NFR BLD AUTO: 77.4 % — HIGH (ref 42.2–75.2)
NEUTROPHILS NFR BLD AUTO: 78.6 % — HIGH (ref 42.2–75.2)
NEUTROPHILS NFR BLD AUTO: 80.4 % — HIGH (ref 42.2–75.2)
NEUTROPHILS NFR BLD AUTO: 82.6 % — HIGH (ref 42.2–75.2)
NEUTROPHILS NFR BLD AUTO: 84.7 % — HIGH (ref 42.2–75.2)
NEUTROPHILS NFR BLD AUTO: 85.5 % — HIGH (ref 42.2–75.2)
NEUTROPHILS NFR BLD AUTO: 85.6 % — HIGH (ref 42.2–75.2)
NEUTROPHILS NFR BLD AUTO: 89.4 % — HIGH (ref 42.2–75.2)
NEUTROPHILS NFR BLD AUTO: 89.5 % — HIGH (ref 42.2–75.2)
NEUTROPHILS NFR BLD AUTO: 89.9 % — HIGH (ref 42.2–75.2)
NEUTROPHILS NFR BLD AUTO: 90.2 % — HIGH (ref 42.2–75.2)
NITRITE UR-MCNC: NEGATIVE — SIGNIFICANT CHANGE UP
NRBC # BLD: 0 /100 WBCS — SIGNIFICANT CHANGE UP (ref 0–0)
NRBC # BLD: 1 /100 WBCS — HIGH (ref 0–0)
PH UR: 6.5 — SIGNIFICANT CHANGE UP (ref 5–8)
PHOSPHATE SERPL-MCNC: 2.2 MG/DL — SIGNIFICANT CHANGE UP (ref 2.1–4.9)
PHOSPHATE SERPL-MCNC: 2.5 MG/DL — SIGNIFICANT CHANGE UP (ref 2.1–4.9)
PHOSPHATE SERPL-MCNC: 2.8 MG/DL — SIGNIFICANT CHANGE UP (ref 2.1–4.9)
PHOSPHATE SERPL-MCNC: 3.1 MG/DL — SIGNIFICANT CHANGE UP (ref 2.1–4.9)
PHOSPHATE SERPL-MCNC: 3.3 MG/DL — SIGNIFICANT CHANGE UP (ref 2.1–4.9)
PHOSPHATE SERPL-MCNC: 3.5 MG/DL — SIGNIFICANT CHANGE UP (ref 2.1–4.9)
PHOSPHATE SERPL-MCNC: 4.2 MG/DL — SIGNIFICANT CHANGE UP (ref 2.1–4.9)
PLATELET # BLD AUTO: 105 K/UL — LOW (ref 130–400)
PLATELET # BLD AUTO: 105 K/UL — LOW (ref 130–400)
PLATELET # BLD AUTO: 129 K/UL
PLATELET # BLD AUTO: 143 K/UL — SIGNIFICANT CHANGE UP (ref 130–400)
PLATELET # BLD AUTO: 196 K/UL
PLATELET # BLD AUTO: 61 K/UL — LOW (ref 130–400)
PLATELET # BLD AUTO: 61 K/UL — LOW (ref 130–400)
PLATELET # BLD AUTO: 62 K/UL — LOW (ref 130–400)
PLATELET # BLD AUTO: 70 K/UL — LOW (ref 130–400)
PLATELET # BLD AUTO: 73 K/UL — LOW (ref 130–400)
PLATELET # BLD AUTO: 75 K/UL — LOW (ref 130–400)
PLATELET # BLD AUTO: 77 K/UL — LOW (ref 130–400)
PLATELET # BLD AUTO: 78 K/UL — LOW (ref 130–400)
PLATELET # BLD AUTO: 85 K/UL — LOW (ref 130–400)
PLATELET # BLD AUTO: 94 K/UL — LOW (ref 130–400)
PLATELET # BLD AUTO: 94 K/UL — LOW (ref 130–400)
PLATELET # BLD AUTO: 97 K/UL — LOW (ref 130–400)
PMV BLD: 11.3 FL
PMV BLD: 9.9 FL
POTASSIUM SERPL-MCNC: 2.5 MMOL/L — CRITICAL LOW (ref 3.5–5)
POTASSIUM SERPL-MCNC: 2.9 MMOL/L — LOW (ref 3.5–5)
POTASSIUM SERPL-MCNC: 2.9 MMOL/L — LOW (ref 3.5–5)
POTASSIUM SERPL-MCNC: 3 MMOL/L — LOW (ref 3.5–5)
POTASSIUM SERPL-MCNC: 3.1 MMOL/L — LOW (ref 3.5–5)
POTASSIUM SERPL-MCNC: 3.3 MMOL/L — LOW (ref 3.5–5)
POTASSIUM SERPL-MCNC: 3.4 MMOL/L — LOW (ref 3.5–5)
POTASSIUM SERPL-MCNC: 3.5 MMOL/L — SIGNIFICANT CHANGE UP (ref 3.5–5)
POTASSIUM SERPL-MCNC: 3.8 MMOL/L — SIGNIFICANT CHANGE UP (ref 3.5–5)
POTASSIUM SERPL-MCNC: 3.8 MMOL/L — SIGNIFICANT CHANGE UP (ref 3.5–5)
POTASSIUM SERPL-MCNC: 3.9 MMOL/L — SIGNIFICANT CHANGE UP (ref 3.5–5)
POTASSIUM SERPL-MCNC: 4 MMOL/L — SIGNIFICANT CHANGE UP (ref 3.5–5)
POTASSIUM SERPL-MCNC: 5.4 MMOL/L — HIGH (ref 3.5–5)
POTASSIUM SERPL-SCNC: 2.5 MMOL/L — CRITICAL LOW (ref 3.5–5)
POTASSIUM SERPL-SCNC: 2.9 MMOL/L — LOW (ref 3.5–5)
POTASSIUM SERPL-SCNC: 2.9 MMOL/L — LOW (ref 3.5–5)
POTASSIUM SERPL-SCNC: 3 MMOL/L — LOW (ref 3.5–5)
POTASSIUM SERPL-SCNC: 3.1 MMOL/L — LOW (ref 3.5–5)
POTASSIUM SERPL-SCNC: 3.2 MMOL/L
POTASSIUM SERPL-SCNC: 3.3 MMOL/L — LOW (ref 3.5–5)
POTASSIUM SERPL-SCNC: 3.4 MMOL/L — LOW (ref 3.5–5)
POTASSIUM SERPL-SCNC: 3.5 MMOL/L — SIGNIFICANT CHANGE UP (ref 3.5–5)
POTASSIUM SERPL-SCNC: 3.8 MMOL/L — SIGNIFICANT CHANGE UP (ref 3.5–5)
POTASSIUM SERPL-SCNC: 3.8 MMOL/L — SIGNIFICANT CHANGE UP (ref 3.5–5)
POTASSIUM SERPL-SCNC: 3.9 MMOL/L — SIGNIFICANT CHANGE UP (ref 3.5–5)
POTASSIUM SERPL-SCNC: 4 MMOL/L — SIGNIFICANT CHANGE UP (ref 3.5–5)
POTASSIUM SERPL-SCNC: 5.4 MMOL/L — HIGH (ref 3.5–5)
PROT SERPL-MCNC: 6.1 G/DL — SIGNIFICANT CHANGE UP (ref 6–8)
PROT SERPL-MCNC: 6.3 G/DL — SIGNIFICANT CHANGE UP (ref 6–8)
PROT SERPL-MCNC: 6.3 G/DL — SIGNIFICANT CHANGE UP (ref 6–8)
PROT SERPL-MCNC: 6.4 G/DL — SIGNIFICANT CHANGE UP (ref 6–8)
PROT SERPL-MCNC: 6.5 G/DL — SIGNIFICANT CHANGE UP (ref 6–8)
PROT SERPL-MCNC: 6.5 G/DL — SIGNIFICANT CHANGE UP (ref 6–8)
PROT SERPL-MCNC: 6.6 G/DL
PROT SERPL-MCNC: 6.9 G/DL — SIGNIFICANT CHANGE UP (ref 6–8)
PROT UR-MCNC: SIGNIFICANT CHANGE UP
PROTHROM AB SERPL-ACNC: 11.9 SEC — SIGNIFICANT CHANGE UP (ref 9.95–12.87)
PROTHROM AB SERPL-ACNC: 12.4 SEC — SIGNIFICANT CHANGE UP (ref 9.95–12.87)
RAPID RVP RESULT: SIGNIFICANT CHANGE UP
RBC # BLD: 2.94 M/UL — LOW (ref 4.2–5.4)
RBC # BLD: 2.99 M/UL — LOW (ref 4.2–5.4)
RBC # BLD: 3.05 M/UL — LOW (ref 4.2–5.4)
RBC # BLD: 3.07 M/UL — LOW (ref 4.2–5.4)
RBC # BLD: 3.08 M/UL — LOW (ref 4.2–5.4)
RBC # BLD: 3.28 M/UL — LOW (ref 4.2–5.4)
RBC # BLD: 3.29 M/UL — LOW (ref 4.2–5.4)
RBC # BLD: 3.32 M/UL — LOW (ref 4.2–5.4)
RBC # BLD: 3.4 M/UL — LOW (ref 4.2–5.4)
RBC # BLD: 3.4 M/UL — LOW (ref 4.2–5.4)
RBC # BLD: 3.41 M/UL
RBC # BLD: 3.51 M/UL — LOW (ref 4.2–5.4)
RBC # BLD: 3.52 M/UL — LOW (ref 4.2–5.4)
RBC # BLD: 3.57 M/UL — LOW (ref 4.2–5.4)
RBC # BLD: 3.58 M/UL — LOW (ref 4.2–5.4)
RBC # BLD: 3.65 M/UL
RBC # BLD: 3.78 M/UL — LOW (ref 4.2–5.4)
RBC # FLD: 13.3 % — SIGNIFICANT CHANGE UP (ref 11.5–14.5)
RBC # FLD: 13.7 % — SIGNIFICANT CHANGE UP (ref 11.5–14.5)
RBC # FLD: 13.8 % — SIGNIFICANT CHANGE UP (ref 11.5–14.5)
RBC # FLD: 13.9 % — SIGNIFICANT CHANGE UP (ref 11.5–14.5)
RBC # FLD: 14.1 %
RBC # FLD: 14.3 % — SIGNIFICANT CHANGE UP (ref 11.5–14.5)
RBC # FLD: 14.4 % — SIGNIFICANT CHANGE UP (ref 11.5–14.5)
RBC # FLD: 14.5 %
RBC # FLD: 14.5 % — SIGNIFICANT CHANGE UP (ref 11.5–14.5)
RBC # FLD: 14.5 % — SIGNIFICANT CHANGE UP (ref 11.5–14.5)
RBC # FLD: 14.6 % — HIGH (ref 11.5–14.5)
RBC # FLD: 14.8 % — HIGH (ref 11.5–14.5)
RBC # FLD: 14.8 % — HIGH (ref 11.5–14.5)
RBC # FLD: 15 % — HIGH (ref 11.5–14.5)
RBC # FLD: 15.4 % — HIGH (ref 11.5–14.5)
SARS-COV-2 RNA SPEC QL NAA+PROBE: SIGNIFICANT CHANGE UP
SODIUM SERPL-SCNC: 136 MMOL/L — SIGNIFICANT CHANGE UP (ref 135–146)
SODIUM SERPL-SCNC: 139 MMOL/L — SIGNIFICANT CHANGE UP (ref 135–146)
SODIUM SERPL-SCNC: 140 MMOL/L — SIGNIFICANT CHANGE UP (ref 135–146)
SODIUM SERPL-SCNC: 140 MMOL/L — SIGNIFICANT CHANGE UP (ref 135–146)
SODIUM SERPL-SCNC: 141 MMOL/L — SIGNIFICANT CHANGE UP (ref 135–146)
SODIUM SERPL-SCNC: 141 MMOL/L — SIGNIFICANT CHANGE UP (ref 135–146)
SODIUM SERPL-SCNC: 142 MMOL/L — SIGNIFICANT CHANGE UP (ref 135–146)
SODIUM SERPL-SCNC: 143 MMOL/L
SODIUM SERPL-SCNC: 143 MMOL/L — SIGNIFICANT CHANGE UP (ref 135–146)
SODIUM SERPL-SCNC: 144 MMOL/L — SIGNIFICANT CHANGE UP (ref 135–146)
SODIUM SERPL-SCNC: 144 MMOL/L — SIGNIFICANT CHANGE UP (ref 135–146)
SODIUM SERPL-SCNC: 145 MMOL/L — SIGNIFICANT CHANGE UP (ref 135–146)
SP GR SPEC: 1.02 — SIGNIFICANT CHANGE UP (ref 1.01–1.03)
SPECIMEN SOURCE: SIGNIFICANT CHANGE UP
TROPONIN T SERPL-MCNC: <0.01 NG/ML — SIGNIFICANT CHANGE UP
TROPONIN T SERPL-MCNC: <0.01 NG/ML — SIGNIFICANT CHANGE UP
UROBILINOGEN FLD QL: SIGNIFICANT CHANGE UP
WBC # BLD: 3.8 K/UL — LOW (ref 4.8–10.8)
WBC # BLD: 4.04 K/UL — LOW (ref 4.8–10.8)
WBC # BLD: 4.07 K/UL — LOW (ref 4.8–10.8)
WBC # BLD: 4.26 K/UL — LOW (ref 4.8–10.8)
WBC # BLD: 4.4 K/UL — LOW (ref 4.8–10.8)
WBC # BLD: 4.6 K/UL — LOW (ref 4.8–10.8)
WBC # BLD: 4.77 K/UL — LOW (ref 4.8–10.8)
WBC # BLD: 4.83 K/UL — SIGNIFICANT CHANGE UP (ref 4.8–10.8)
WBC # BLD: 5.47 K/UL — SIGNIFICANT CHANGE UP (ref 4.8–10.8)
WBC # BLD: 5.86 K/UL — SIGNIFICANT CHANGE UP (ref 4.8–10.8)
WBC # BLD: 5.98 K/UL — SIGNIFICANT CHANGE UP (ref 4.8–10.8)
WBC # BLD: 6.11 K/UL — SIGNIFICANT CHANGE UP (ref 4.8–10.8)
WBC # BLD: 6.34 K/UL — SIGNIFICANT CHANGE UP (ref 4.8–10.8)
WBC # BLD: 7.68 K/UL — SIGNIFICANT CHANGE UP (ref 4.8–10.8)
WBC # BLD: 9.07 K/UL — SIGNIFICANT CHANGE UP (ref 4.8–10.8)
WBC # FLD AUTO: 3.11 K/UL
WBC # FLD AUTO: 3.8 K/UL — LOW (ref 4.8–10.8)
WBC # FLD AUTO: 4.04 K/UL — LOW (ref 4.8–10.8)
WBC # FLD AUTO: 4.07 K/UL — LOW (ref 4.8–10.8)
WBC # FLD AUTO: 4.26 K/UL — LOW (ref 4.8–10.8)
WBC # FLD AUTO: 4.39 K/UL
WBC # FLD AUTO: 4.4 K/UL — LOW (ref 4.8–10.8)
WBC # FLD AUTO: 4.6 K/UL — LOW (ref 4.8–10.8)
WBC # FLD AUTO: 4.77 K/UL — LOW (ref 4.8–10.8)
WBC # FLD AUTO: 4.83 K/UL — SIGNIFICANT CHANGE UP (ref 4.8–10.8)
WBC # FLD AUTO: 5.47 K/UL — SIGNIFICANT CHANGE UP (ref 4.8–10.8)
WBC # FLD AUTO: 5.86 K/UL — SIGNIFICANT CHANGE UP (ref 4.8–10.8)
WBC # FLD AUTO: 5.98 K/UL — SIGNIFICANT CHANGE UP (ref 4.8–10.8)
WBC # FLD AUTO: 6.11 K/UL — SIGNIFICANT CHANGE UP (ref 4.8–10.8)
WBC # FLD AUTO: 6.34 K/UL — SIGNIFICANT CHANGE UP (ref 4.8–10.8)
WBC # FLD AUTO: 7.68 K/UL — SIGNIFICANT CHANGE UP (ref 4.8–10.8)
WBC # FLD AUTO: 9.07 K/UL — SIGNIFICANT CHANGE UP (ref 4.8–10.8)

## 2023-01-01 PROCEDURE — 99285 EMERGENCY DEPT VISIT HI MDM: CPT

## 2023-01-01 PROCEDURE — 99232 SBSQ HOSP IP/OBS MODERATE 35: CPT

## 2023-01-01 PROCEDURE — 95720 EEG PHY/QHP EA INCR W/VEEG: CPT

## 2023-01-01 PROCEDURE — 95711 VEEG 2-12 HR UNMONITORED: CPT

## 2023-01-01 PROCEDURE — C9113: CPT

## 2023-01-01 PROCEDURE — 99222 1ST HOSP IP/OBS MODERATE 55: CPT

## 2023-01-01 PROCEDURE — 84484 ASSAY OF TROPONIN QUANT: CPT

## 2023-01-01 PROCEDURE — 93005 ELECTROCARDIOGRAM TRACING: CPT

## 2023-01-01 PROCEDURE — 95714 VEEG EA 12-26 HR UNMNTR: CPT

## 2023-01-01 PROCEDURE — 83605 ASSAY OF LACTIC ACID: CPT

## 2023-01-01 PROCEDURE — 99223 1ST HOSP IP/OBS HIGH 75: CPT

## 2023-01-01 PROCEDURE — 70551 MRI BRAIN STEM W/O DYE: CPT | Mod: 26

## 2023-01-01 PROCEDURE — 81003 URINALYSIS AUTO W/O SCOPE: CPT

## 2023-01-01 PROCEDURE — 71045 X-RAY EXAM CHEST 1 VIEW: CPT

## 2023-01-01 PROCEDURE — 99233 SBSQ HOSP IP/OBS HIGH 50: CPT

## 2023-01-01 PROCEDURE — 36415 COLL VENOUS BLD VENIPUNCTURE: CPT

## 2023-01-01 PROCEDURE — 93880 EXTRACRANIAL BILAT STUDY: CPT | Mod: 26

## 2023-01-01 PROCEDURE — 70551 MRI BRAIN STEM W/O DYE: CPT

## 2023-01-01 PROCEDURE — 93010 ELECTROCARDIOGRAM REPORT: CPT

## 2023-01-01 PROCEDURE — 80053 COMPREHEN METABOLIC PANEL: CPT

## 2023-01-01 PROCEDURE — 92610 EVALUATE SWALLOWING FUNCTION: CPT | Mod: GN

## 2023-01-01 PROCEDURE — 0042T: CPT | Mod: MA

## 2023-01-01 PROCEDURE — 70496 CT ANGIOGRAPHY HEAD: CPT

## 2023-01-01 PROCEDURE — 0225U NFCT DS DNA&RNA 21 SARSCOV2: CPT

## 2023-01-01 PROCEDURE — 70450 CT HEAD/BRAIN W/O DYE: CPT | Mod: 26,MA

## 2023-01-01 PROCEDURE — 70553 MRI BRAIN STEM W/O & W/DYE: CPT | Mod: 26

## 2023-01-01 PROCEDURE — C9254: CPT

## 2023-01-01 PROCEDURE — 96376 TX/PRO/DX INJ SAME DRUG ADON: CPT

## 2023-01-01 PROCEDURE — 83735 ASSAY OF MAGNESIUM: CPT

## 2023-01-01 PROCEDURE — 95700 EEG CONT REC W/VID EEG TECH: CPT

## 2023-01-01 PROCEDURE — 94660 CPAP INITIATION&MGMT: CPT

## 2023-01-01 PROCEDURE — 87040 BLOOD CULTURE FOR BACTERIA: CPT

## 2023-01-01 PROCEDURE — 70498 CT ANGIOGRAPHY NECK: CPT | Mod: 26,MA

## 2023-01-01 PROCEDURE — 70496 CT ANGIOGRAPHY HEAD: CPT | Mod: 26,MA

## 2023-01-01 PROCEDURE — 85025 COMPLETE CBC W/AUTO DIFF WBC: CPT

## 2023-01-01 PROCEDURE — 99221 1ST HOSP IP/OBS SF/LOW 40: CPT

## 2023-01-01 PROCEDURE — 70498 CT ANGIOGRAPHY NECK: CPT

## 2023-01-01 PROCEDURE — 96374 THER/PROPH/DIAG INJ IV PUSH: CPT | Mod: XU

## 2023-01-01 PROCEDURE — 84100 ASSAY OF PHOSPHORUS: CPT

## 2023-01-01 PROCEDURE — 85027 COMPLETE CBC AUTOMATED: CPT

## 2023-01-01 PROCEDURE — 71045 X-RAY EXAM CHEST 1 VIEW: CPT | Mod: 26

## 2023-01-01 PROCEDURE — 0042T: CPT

## 2023-01-01 PROCEDURE — 87086 URINE CULTURE/COLONY COUNT: CPT

## 2023-01-01 PROCEDURE — 70450 CT HEAD/BRAIN W/O DYE: CPT | Mod: 26,MA,59

## 2023-01-01 PROCEDURE — 99239 HOSP IP/OBS DSCHRG MGMT >30: CPT

## 2023-01-01 PROCEDURE — 99215 OFFICE O/P EST HI 40 MIN: CPT

## 2023-01-01 PROCEDURE — 85610 PROTHROMBIN TIME: CPT

## 2023-01-01 PROCEDURE — 85730 THROMBOPLASTIN TIME PARTIAL: CPT

## 2023-01-01 PROCEDURE — 99214 OFFICE O/P EST MOD 30 MIN: CPT

## 2023-01-01 PROCEDURE — 97163 PT EVAL HIGH COMPLEX 45 MIN: CPT | Mod: GP

## 2023-01-01 PROCEDURE — 70450 CT HEAD/BRAIN W/O DYE: CPT

## 2023-01-01 PROCEDURE — 95816 EEG AWAKE AND DROWSY: CPT | Mod: 26

## 2023-01-01 PROCEDURE — 96375 TX/PRO/DX INJ NEW DRUG ADDON: CPT

## 2023-01-01 PROCEDURE — 80048 BASIC METABOLIC PNL TOTAL CA: CPT

## 2023-01-01 RX ORDER — LEVETIRACETAM 250 MG/1
2 TABLET, FILM COATED ORAL
Qty: 180 | Refills: 0
Start: 2023-01-01 | End: 2023-02-20

## 2023-01-01 RX ORDER — MIDAZOLAM HYDROCHLORIDE 1 MG/ML
1 INJECTION, SOLUTION INTRAMUSCULAR; INTRAVENOUS ONCE
Refills: 0 | Status: DISCONTINUED | OUTPATIENT
Start: 2023-01-01 | End: 2023-01-01

## 2023-01-01 RX ORDER — SPIRONOLACTONE 25 MG/1
50 TABLET, FILM COATED ORAL DAILY
Refills: 0 | Status: DISCONTINUED | OUTPATIENT
Start: 2023-01-01 | End: 2023-01-01

## 2023-01-01 RX ORDER — GABAPENTIN 400 MG/1
400 CAPSULE ORAL
Refills: 0 | Status: DISCONTINUED | OUTPATIENT
Start: 2023-01-01 | End: 2023-01-01

## 2023-01-01 RX ORDER — LACOSAMIDE 50 MG/1
150 TABLET ORAL EVERY 12 HOURS
Refills: 0 | Status: DISCONTINUED | OUTPATIENT
Start: 2023-01-01 | End: 2023-01-01

## 2023-01-01 RX ORDER — DEXAMETHASONE 0.5 MG/5ML
10 ELIXIR ORAL ONCE
Refills: 0 | Status: COMPLETED | OUTPATIENT
Start: 2023-01-01 | End: 2023-01-01

## 2023-01-01 RX ORDER — MAGNESIUM SULFATE 500 MG/ML
2 VIAL (ML) INJECTION ONCE
Refills: 0 | Status: COMPLETED | OUTPATIENT
Start: 2023-01-01 | End: 2023-01-01

## 2023-01-01 RX ORDER — FUROSEMIDE 40 MG
1 TABLET ORAL
Qty: 45 | Refills: 0
Start: 2023-01-01 | End: 2023-02-20

## 2023-01-01 RX ORDER — POTASSIUM CHLORIDE 20 MEQ
40 PACKET (EA) ORAL EVERY 4 HOURS
Refills: 0 | Status: COMPLETED | OUTPATIENT
Start: 2023-01-01 | End: 2023-01-01

## 2023-01-01 RX ORDER — MAGNESIUM SULFATE 500 MG/ML
2 VIAL (ML) INJECTION ONCE
Refills: 0 | Status: DISCONTINUED | OUTPATIENT
Start: 2023-01-01 | End: 2023-01-01

## 2023-01-01 RX ORDER — POTASSIUM CHLORIDE 20 MEQ
20 PACKET (EA) ORAL ONCE
Refills: 0 | Status: COMPLETED | OUTPATIENT
Start: 2023-01-01 | End: 2023-01-01

## 2023-01-01 RX ORDER — ASPIRIN/CALCIUM CARB/MAGNESIUM 324 MG
81 TABLET ORAL DAILY
Refills: 0 | Status: DISCONTINUED | OUTPATIENT
Start: 2023-01-01 | End: 2023-01-01

## 2023-01-01 RX ORDER — HEPARIN SODIUM 5000 [USP'U]/ML
5000 INJECTION INTRAVENOUS; SUBCUTANEOUS EVERY 8 HOURS
Refills: 0 | Status: DISCONTINUED | OUTPATIENT
Start: 2023-01-01 | End: 2023-01-01

## 2023-01-01 RX ORDER — LABETALOL HCL 100 MG
100 TABLET ORAL EVERY 8 HOURS
Refills: 0 | Status: DISCONTINUED | OUTPATIENT
Start: 2023-01-01 | End: 2023-01-01

## 2023-01-01 RX ORDER — FUROSEMIDE 40 MG/1
40 TABLET ORAL
Refills: 0 | Status: ACTIVE | COMMUNITY

## 2023-01-01 RX ORDER — AMLODIPINE BESYLATE 2.5 MG/1
5 TABLET ORAL ONCE
Refills: 0 | Status: DISCONTINUED | OUTPATIENT
Start: 2023-01-01 | End: 2023-01-01

## 2023-01-01 RX ORDER — INFLUENZA VIRUS VACCINE 15; 15; 15; 15 UG/.5ML; UG/.5ML; UG/.5ML; UG/.5ML
0.5 SUSPENSION INTRAMUSCULAR ONCE
Refills: 0 | Status: DISCONTINUED | OUTPATIENT
Start: 2023-01-01 | End: 2023-01-01

## 2023-01-01 RX ORDER — METOPROLOL TARTRATE 50 MG
50 TABLET ORAL AT BEDTIME
Refills: 0 | Status: DISCONTINUED | OUTPATIENT
Start: 2023-01-01 | End: 2023-01-01

## 2023-01-01 RX ORDER — LACOSAMIDE 50 MG/1
200 TABLET ORAL ONCE
Refills: 0 | Status: DISCONTINUED | OUTPATIENT
Start: 2023-01-01 | End: 2023-01-01

## 2023-01-01 RX ORDER — DEXAMETHASONE 0.5 MG/5ML
1 ELIXIR ORAL EVERY 12 HOURS
Refills: 0 | Status: DISCONTINUED | OUTPATIENT
Start: 2023-01-01 | End: 2023-01-01

## 2023-01-01 RX ORDER — POTASSIUM CHLORIDE 20 MEQ
40 PACKET (EA) ORAL
Refills: 0 | Status: COMPLETED | OUTPATIENT
Start: 2023-01-01 | End: 2023-01-01

## 2023-01-01 RX ORDER — MAGNESIUM SULFATE 500 MG/ML
1 VIAL (ML) INJECTION ONCE
Refills: 0 | Status: COMPLETED | OUTPATIENT
Start: 2023-01-01 | End: 2023-01-01

## 2023-01-01 RX ORDER — LANOLIN ALCOHOL/MO/W.PET/CERES
5 CREAM (GRAM) TOPICAL AT BEDTIME
Refills: 0 | Status: DISCONTINUED | OUTPATIENT
Start: 2023-01-01 | End: 2023-01-01

## 2023-01-01 RX ORDER — ASPIRIN/CALCIUM CARB/MAGNESIUM 324 MG
1 TABLET ORAL
Qty: 45 | Refills: 0
Start: 2023-01-01 | End: 2023-02-20

## 2023-01-01 RX ORDER — DEXAMETHASONE 0.5 MG/5ML
4 ELIXIR ORAL EVERY 6 HOURS
Refills: 0 | Status: DISCONTINUED | OUTPATIENT
Start: 2023-01-01 | End: 2023-01-01

## 2023-01-01 RX ORDER — SPIRONOLACTONE 25 MG/1
1 TABLET, FILM COATED ORAL
Qty: 0 | Refills: 0 | DISCHARGE

## 2023-01-01 RX ORDER — POTASSIUM CHLORIDE 20 MEQ
20 PACKET (EA) ORAL ONCE
Refills: 0 | Status: DISCONTINUED | OUTPATIENT
Start: 2023-01-01 | End: 2023-01-01

## 2023-01-01 RX ORDER — SODIUM CHLORIDE 9 MG/ML
2800 INJECTION, SOLUTION INTRAVENOUS ONCE
Refills: 0 | Status: DISCONTINUED | OUTPATIENT
Start: 2023-01-01 | End: 2023-01-01

## 2023-01-01 RX ORDER — ACETAMINOPHEN 500 MG
650 TABLET ORAL EVERY 6 HOURS
Refills: 0 | Status: DISCONTINUED | OUTPATIENT
Start: 2023-01-01 | End: 2023-01-01

## 2023-01-01 RX ORDER — HYDROCHLOROTHIAZIDE 25 MG
1 TABLET ORAL
Qty: 0 | Refills: 0 | DISCHARGE

## 2023-01-01 RX ORDER — ONDANSETRON 8 MG/1
4 TABLET, FILM COATED ORAL ONCE
Refills: 0 | Status: COMPLETED | OUTPATIENT
Start: 2023-01-01 | End: 2023-01-01

## 2023-01-01 RX ORDER — HALOPERIDOL DECANOATE 100 MG/ML
1 INJECTION INTRAMUSCULAR ONCE
Refills: 0 | Status: COMPLETED | OUTPATIENT
Start: 2023-01-01 | End: 2023-01-01

## 2023-01-01 RX ORDER — LEVETIRACETAM 250 MG/1
1500 TABLET, FILM COATED ORAL EVERY 12 HOURS
Refills: 0 | Status: DISCONTINUED | OUTPATIENT
Start: 2023-01-01 | End: 2023-01-01

## 2023-01-01 RX ORDER — MORPHINE SULFATE 50 MG/1
2 CAPSULE, EXTENDED RELEASE ORAL
Refills: 0 | Status: DISCONTINUED | OUTPATIENT
Start: 2023-01-01 | End: 2023-01-01

## 2023-01-01 RX ORDER — POTASSIUM CHLORIDE 20 MEQ
40 PACKET (EA) ORAL ONCE
Refills: 0 | Status: COMPLETED | OUTPATIENT
Start: 2023-01-01 | End: 2023-01-01

## 2023-01-01 RX ORDER — ATORVASTATIN CALCIUM 80 MG/1
40 TABLET, FILM COATED ORAL AT BEDTIME
Refills: 0 | Status: DISCONTINUED | OUTPATIENT
Start: 2023-01-01 | End: 2023-01-01

## 2023-01-01 RX ORDER — CEFEPIME 1 G/1
2000 INJECTION, POWDER, FOR SOLUTION INTRAMUSCULAR; INTRAVENOUS ONCE
Refills: 0 | Status: COMPLETED | OUTPATIENT
Start: 2023-01-01 | End: 2023-01-01

## 2023-01-01 RX ORDER — LABETALOL HCL 100 MG
100 TABLET ORAL THREE TIMES A DAY
Refills: 0 | Status: DISCONTINUED | OUTPATIENT
Start: 2023-01-01 | End: 2023-01-01

## 2023-01-01 RX ORDER — OMEPRAZOLE 10 MG/1
1 CAPSULE, DELAYED RELEASE ORAL
Qty: 0 | Refills: 0 | DISCHARGE

## 2023-01-01 RX ORDER — DEXAMETHASONE 0.5 MG/5ML
4 ELIXIR ORAL EVERY 8 HOURS
Refills: 0 | Status: DISCONTINUED | OUTPATIENT
Start: 2023-01-01 | End: 2023-01-01

## 2023-01-01 RX ORDER — METOPROLOL TARTRATE 50 MG
50 TABLET ORAL EVERY 12 HOURS
Refills: 0 | Status: DISCONTINUED | OUTPATIENT
Start: 2023-01-01 | End: 2023-01-01

## 2023-01-01 RX ORDER — FUROSEMIDE 40 MG
20 TABLET ORAL DAILY
Refills: 0 | Status: DISCONTINUED | OUTPATIENT
Start: 2023-01-01 | End: 2023-01-01

## 2023-01-01 RX ORDER — POTASSIUM CHLORIDE 20 MEQ
40 PACKET (EA) ORAL
Refills: 0 | Status: DISCONTINUED | OUTPATIENT
Start: 2023-01-01 | End: 2023-01-01

## 2023-01-01 RX ORDER — FUROSEMIDE 40 MG
40 TABLET ORAL DAILY
Refills: 0 | Status: DISCONTINUED | OUTPATIENT
Start: 2023-01-01 | End: 2023-01-01

## 2023-01-01 RX ORDER — VANCOMYCIN HCL 1 G
1000 VIAL (EA) INTRAVENOUS ONCE
Refills: 0 | Status: COMPLETED | OUTPATIENT
Start: 2023-01-01 | End: 2023-01-01

## 2023-01-01 RX ORDER — SERTRALINE 25 MG/1
50 TABLET, FILM COATED ORAL DAILY
Refills: 0 | Status: DISCONTINUED | OUTPATIENT
Start: 2023-01-01 | End: 2023-01-01

## 2023-01-01 RX ORDER — LABETALOL HCL 100 MG
5 TABLET ORAL ONCE
Refills: 0 | Status: COMPLETED | OUTPATIENT
Start: 2023-01-01 | End: 2023-01-01

## 2023-01-01 RX ORDER — MORPHINE SULFATE 50 MG/1
8 CAPSULE, EXTENDED RELEASE ORAL
Refills: 0 | Status: DISCONTINUED | OUTPATIENT
Start: 2023-01-01 | End: 2023-01-01

## 2023-01-01 RX ORDER — POTASSIUM CHLORIDE 20 MEQ
10 PACKET (EA) ORAL
Refills: 0 | Status: DISCONTINUED | OUTPATIENT
Start: 2023-01-01 | End: 2023-01-01

## 2023-01-01 RX ORDER — PANTOPRAZOLE SODIUM 20 MG/1
40 TABLET, DELAYED RELEASE ORAL
Refills: 0 | Status: DISCONTINUED | OUTPATIENT
Start: 2023-01-01 | End: 2023-01-01

## 2023-01-01 RX ORDER — SODIUM CHLORIDE 9 MG/ML
2500 INJECTION, SOLUTION INTRAVENOUS ONCE
Refills: 0 | Status: COMPLETED | OUTPATIENT
Start: 2023-01-01 | End: 2023-01-01

## 2023-01-01 RX ORDER — LEVETIRACETAM 500 MG/1
500 TABLET, FILM COATED ORAL TWICE DAILY
Refills: 0 | Status: ACTIVE | COMMUNITY

## 2023-01-01 RX ORDER — LACOSAMIDE 50 MG/1
100 TABLET ORAL EVERY 12 HOURS
Refills: 0 | Status: DISCONTINUED | OUTPATIENT
Start: 2023-01-01 | End: 2023-01-01

## 2023-01-01 RX ORDER — LEVETIRACETAM 250 MG/1
1000 TABLET, FILM COATED ORAL ONCE
Refills: 0 | Status: COMPLETED | OUTPATIENT
Start: 2023-01-01 | End: 2023-01-01

## 2023-01-01 RX ORDER — MORPHINE SULFATE 50 MG/1
4 CAPSULE, EXTENDED RELEASE ORAL
Qty: 100 | Refills: 0 | Status: DISCONTINUED | OUTPATIENT
Start: 2023-01-01 | End: 2023-01-01

## 2023-01-01 RX ORDER — DEXAMETHASONE 0.5 MG/5ML
2 ELIXIR ORAL DAILY
Refills: 0 | Status: DISCONTINUED | OUTPATIENT
Start: 2023-01-01 | End: 2023-01-01

## 2023-01-01 RX ORDER — DEXAMETHASONE 0.5 MG/5ML
4 ELIXIR ORAL
Qty: 56 | Refills: 0
Start: 2023-01-01 | End: 2023-01-01

## 2023-01-01 RX ORDER — ATORVASTATIN CALCIUM 80 MG/1
80 TABLET, FILM COATED ORAL AT BEDTIME
Refills: 0 | Status: DISCONTINUED | OUTPATIENT
Start: 2023-01-01 | End: 2023-01-01

## 2023-01-01 RX ORDER — METOPROLOL TARTRATE 50 MG
1 TABLET ORAL
Qty: 0 | Refills: 0 | DISCHARGE

## 2023-01-01 RX ORDER — DEXAMETHASONE 0.5 MG/5ML
4 ELIXIR ORAL DAILY
Refills: 0 | Status: DISCONTINUED | OUTPATIENT
Start: 2023-01-01 | End: 2023-01-01

## 2023-01-01 RX ORDER — LEVETIRACETAM 250 MG/1
1500 TABLET, FILM COATED ORAL ONCE
Refills: 0 | Status: COMPLETED | OUTPATIENT
Start: 2023-01-01 | End: 2023-01-01

## 2023-01-01 RX ORDER — SPIRONOLACTONE 25 MG/1
25 TABLET, FILM COATED ORAL DAILY
Refills: 0 | Status: DISCONTINUED | OUTPATIENT
Start: 2023-01-01 | End: 2023-01-01

## 2023-01-01 RX ORDER — DEXAMETHASONE 0.5 MG/5ML
0.5 ELIXIR ORAL DAILY
Refills: 0 | Status: DISCONTINUED | OUTPATIENT
Start: 2023-01-01 | End: 2023-01-01

## 2023-01-01 RX ORDER — PANTOPRAZOLE SODIUM 20 MG/1
40 TABLET, DELAYED RELEASE ORAL DAILY
Refills: 0 | Status: DISCONTINUED | OUTPATIENT
Start: 2023-01-01 | End: 2023-01-01

## 2023-01-01 RX ORDER — ROBINUL 0.2 MG/ML
0.2 INJECTION INTRAMUSCULAR; INTRAVENOUS EVERY 6 HOURS
Refills: 0 | Status: DISCONTINUED | OUTPATIENT
Start: 2023-01-01 | End: 2023-01-01

## 2023-01-01 RX ORDER — LACOSAMIDE 50 MG/1
200 TABLET ORAL EVERY 12 HOURS
Refills: 0 | Status: DISCONTINUED | OUTPATIENT
Start: 2023-01-01 | End: 2023-01-01

## 2023-01-01 RX ORDER — SPIRONOLACTONE 25 MG/1
2 TABLET, FILM COATED ORAL
Qty: 90 | Refills: 0
Start: 2023-01-01 | End: 2023-02-20

## 2023-01-01 RX ORDER — MORPHINE SULFATE 50 MG/1
4 CAPSULE, EXTENDED RELEASE ORAL
Refills: 0 | Status: DISCONTINUED | OUTPATIENT
Start: 2023-01-01 | End: 2023-01-01

## 2023-01-01 RX ORDER — MONTELUKAST 4 MG/1
1 TABLET, CHEWABLE ORAL
Qty: 0 | Refills: 0 | DISCHARGE

## 2023-01-01 RX ORDER — POTASSIUM CHLORIDE 20 MEQ
15 PACKET (EA) ORAL
Qty: 0 | Refills: 0 | DISCHARGE

## 2023-01-01 RX ORDER — ONDANSETRON 8 MG/1
4 TABLET, FILM COATED ORAL EVERY 6 HOURS
Refills: 0 | Status: DISCONTINUED | OUTPATIENT
Start: 2023-01-01 | End: 2023-01-01

## 2023-01-01 RX ORDER — LABETALOL HCL 100 MG
1 TABLET ORAL
Qty: 135 | Refills: 0
Start: 2023-01-01 | End: 2023-02-20

## 2023-01-01 RX ADMIN — MORPHINE SULFATE 2 MILLIGRAM(S): 50 CAPSULE, EXTENDED RELEASE ORAL at 11:43

## 2023-01-01 RX ADMIN — Medication 5 MILLIGRAM(S): at 00:01

## 2023-01-01 RX ADMIN — PANTOPRAZOLE SODIUM 40 MILLIGRAM(S): 20 TABLET, DELAYED RELEASE ORAL at 11:53

## 2023-01-01 RX ADMIN — ATORVASTATIN CALCIUM 80 MILLIGRAM(S): 80 TABLET, FILM COATED ORAL at 22:14

## 2023-01-01 RX ADMIN — PANTOPRAZOLE SODIUM 40 MILLIGRAM(S): 20 TABLET, DELAYED RELEASE ORAL at 06:08

## 2023-01-01 RX ADMIN — ATORVASTATIN CALCIUM 40 MILLIGRAM(S): 80 TABLET, FILM COATED ORAL at 21:23

## 2023-01-01 RX ADMIN — Medication 0.5 MILLIGRAM(S): at 05:39

## 2023-01-01 RX ADMIN — Medication 40 MILLIGRAM(S): at 05:32

## 2023-01-01 RX ADMIN — SODIUM CHLORIDE 1000 MILLILITER(S): 9 INJECTION, SOLUTION INTRAVENOUS at 07:25

## 2023-01-01 RX ADMIN — LACOSAMIDE 140 MILLIGRAM(S): 50 TABLET ORAL at 21:36

## 2023-01-01 RX ADMIN — LEVETIRACETAM 400 MILLIGRAM(S): 250 TABLET, FILM COATED ORAL at 05:04

## 2023-01-01 RX ADMIN — SPIRONOLACTONE 50 MILLIGRAM(S): 25 TABLET, FILM COATED ORAL at 06:07

## 2023-01-01 RX ADMIN — Medication 50 MILLIEQUIVALENT(S): at 17:25

## 2023-01-01 RX ADMIN — Medication 40 MILLIEQUIVALENT(S): at 11:47

## 2023-01-01 RX ADMIN — GABAPENTIN 400 MILLIGRAM(S): 400 CAPSULE ORAL at 05:32

## 2023-01-01 RX ADMIN — Medication 0.5 MILLIGRAM(S): at 18:15

## 2023-01-01 RX ADMIN — GABAPENTIN 400 MILLIGRAM(S): 400 CAPSULE ORAL at 05:31

## 2023-01-01 RX ADMIN — Medication 4 MILLIGRAM(S): at 17:29

## 2023-01-01 RX ADMIN — HEPARIN SODIUM 5000 UNIT(S): 5000 INJECTION INTRAVENOUS; SUBCUTANEOUS at 05:12

## 2023-01-01 RX ADMIN — Medication 100 MILLIGRAM(S): at 18:25

## 2023-01-01 RX ADMIN — LEVETIRACETAM 400 MILLIGRAM(S): 250 TABLET, FILM COATED ORAL at 17:22

## 2023-01-01 RX ADMIN — PANTOPRAZOLE SODIUM 40 MILLIGRAM(S): 20 TABLET, DELAYED RELEASE ORAL at 12:06

## 2023-01-01 RX ADMIN — MIDAZOLAM HYDROCHLORIDE 1 MILLIGRAM(S): 1 INJECTION, SOLUTION INTRAMUSCULAR; INTRAVENOUS at 06:34

## 2023-01-01 RX ADMIN — LACOSAMIDE 140 MILLIGRAM(S): 50 TABLET ORAL at 09:36

## 2023-01-01 RX ADMIN — Medication 50 MILLIEQUIVALENT(S): at 07:27

## 2023-01-01 RX ADMIN — LACOSAMIDE 140 MILLIGRAM(S): 50 TABLET ORAL at 22:06

## 2023-01-01 RX ADMIN — Medication 50 MILLIEQUIVALENT(S): at 11:29

## 2023-01-01 RX ADMIN — Medication 81 MILLIGRAM(S): at 11:01

## 2023-01-01 RX ADMIN — ATORVASTATIN CALCIUM 80 MILLIGRAM(S): 80 TABLET, FILM COATED ORAL at 22:03

## 2023-01-01 RX ADMIN — Medication 1 MILLIGRAM(S): at 09:46

## 2023-01-01 RX ADMIN — ATORVASTATIN CALCIUM 80 MILLIGRAM(S): 80 TABLET, FILM COATED ORAL at 22:25

## 2023-01-01 RX ADMIN — Medication 4 MILLIGRAM(S): at 05:20

## 2023-01-01 RX ADMIN — GABAPENTIN 400 MILLIGRAM(S): 400 CAPSULE ORAL at 05:16

## 2023-01-01 RX ADMIN — SPIRONOLACTONE 25 MILLIGRAM(S): 25 TABLET, FILM COATED ORAL at 05:16

## 2023-01-01 RX ADMIN — Medication 50 MILLIGRAM(S): at 10:49

## 2023-01-01 RX ADMIN — Medication 40 MILLIGRAM(S): at 06:07

## 2023-01-01 RX ADMIN — Medication 4 MILLIGRAM(S): at 12:09

## 2023-01-01 RX ADMIN — Medication 2 MILLIGRAM(S): at 11:01

## 2023-01-01 RX ADMIN — LEVETIRACETAM 400 MILLIGRAM(S): 250 TABLET, FILM COATED ORAL at 17:04

## 2023-01-01 RX ADMIN — Medication 100 MILLIGRAM(S): at 05:12

## 2023-01-01 RX ADMIN — SPIRONOLACTONE 25 MILLIGRAM(S): 25 TABLET, FILM COATED ORAL at 05:30

## 2023-01-01 RX ADMIN — Medication 4 MILLIGRAM(S): at 18:02

## 2023-01-01 RX ADMIN — LEVETIRACETAM 400 MILLIGRAM(S): 250 TABLET, FILM COATED ORAL at 17:31

## 2023-01-01 RX ADMIN — Medication 81 MILLIGRAM(S): at 11:53

## 2023-01-01 RX ADMIN — Medication 50 MILLIGRAM(S): at 22:24

## 2023-01-01 RX ADMIN — Medication 100 MILLIGRAM(S): at 05:05

## 2023-01-01 RX ADMIN — GABAPENTIN 400 MILLIGRAM(S): 400 CAPSULE ORAL at 18:02

## 2023-01-01 RX ADMIN — ATORVASTATIN CALCIUM 40 MILLIGRAM(S): 80 TABLET, FILM COATED ORAL at 21:17

## 2023-01-01 RX ADMIN — Medication 100 MILLIGRAM(S): at 16:41

## 2023-01-01 RX ADMIN — Medication 4 MILLIGRAM(S): at 12:17

## 2023-01-01 RX ADMIN — LEVETIRACETAM 1500 MILLIGRAM(S): 250 TABLET, FILM COATED ORAL at 17:26

## 2023-01-01 RX ADMIN — Medication 100 MILLIGRAM(S): at 05:38

## 2023-01-01 RX ADMIN — Medication 50 MILLIGRAM(S): at 21:53

## 2023-01-01 RX ADMIN — Medication 4 MILLIGRAM(S): at 18:14

## 2023-01-01 RX ADMIN — HEPARIN SODIUM 5000 UNIT(S): 5000 INJECTION INTRAVENOUS; SUBCUTANEOUS at 13:05

## 2023-01-01 RX ADMIN — SPIRONOLACTONE 25 MILLIGRAM(S): 25 TABLET, FILM COATED ORAL at 05:49

## 2023-01-01 RX ADMIN — HEPARIN SODIUM 5000 UNIT(S): 5000 INJECTION INTRAVENOUS; SUBCUTANEOUS at 05:38

## 2023-01-01 RX ADMIN — GABAPENTIN 400 MILLIGRAM(S): 400 CAPSULE ORAL at 17:38

## 2023-01-01 RX ADMIN — Medication 4 MILLIGRAM(S): at 05:31

## 2023-01-01 RX ADMIN — LACOSAMIDE 140 MILLIGRAM(S): 50 TABLET ORAL at 21:17

## 2023-01-01 RX ADMIN — Medication 25 GRAM(S): at 15:00

## 2023-01-01 RX ADMIN — SPIRONOLACTONE 25 MILLIGRAM(S): 25 TABLET, FILM COATED ORAL at 06:23

## 2023-01-01 RX ADMIN — HEPARIN SODIUM 5000 UNIT(S): 5000 INJECTION INTRAVENOUS; SUBCUTANEOUS at 05:20

## 2023-01-01 RX ADMIN — Medication 40 MILLIEQUIVALENT(S): at 14:51

## 2023-01-01 RX ADMIN — Medication 20 MILLIGRAM(S): at 05:05

## 2023-01-01 RX ADMIN — LACOSAMIDE 140 MILLIGRAM(S): 50 TABLET ORAL at 13:56

## 2023-01-01 RX ADMIN — PANTOPRAZOLE SODIUM 40 MILLIGRAM(S): 20 TABLET, DELAYED RELEASE ORAL at 05:54

## 2023-01-01 RX ADMIN — LACOSAMIDE 140 MILLIGRAM(S): 50 TABLET ORAL at 21:54

## 2023-01-01 RX ADMIN — PANTOPRAZOLE SODIUM 40 MILLIGRAM(S): 20 TABLET, DELAYED RELEASE ORAL at 11:01

## 2023-01-01 RX ADMIN — Medication 81 MILLIGRAM(S): at 11:36

## 2023-01-01 RX ADMIN — Medication 4 MILLIGRAM(S): at 13:49

## 2023-01-01 RX ADMIN — GABAPENTIN 400 MILLIGRAM(S): 400 CAPSULE ORAL at 17:47

## 2023-01-01 RX ADMIN — LEVETIRACETAM 400 MILLIGRAM(S): 250 TABLET, FILM COATED ORAL at 05:07

## 2023-01-01 RX ADMIN — Medication 20 MILLIGRAM(S): at 05:23

## 2023-01-01 RX ADMIN — LEVETIRACETAM 400 MILLIGRAM(S): 250 TABLET, FILM COATED ORAL at 05:12

## 2023-01-01 RX ADMIN — HEPARIN SODIUM 5000 UNIT(S): 5000 INJECTION INTRAVENOUS; SUBCUTANEOUS at 05:14

## 2023-01-01 RX ADMIN — PANTOPRAZOLE SODIUM 40 MILLIGRAM(S): 20 TABLET, DELAYED RELEASE ORAL at 12:55

## 2023-01-01 RX ADMIN — Medication 40 MILLIEQUIVALENT(S): at 12:02

## 2023-01-01 RX ADMIN — ROBINUL 0.2 MILLIGRAM(S): 0.2 INJECTION INTRAMUSCULAR; INTRAVENOUS at 04:44

## 2023-01-01 RX ADMIN — PANTOPRAZOLE SODIUM 40 MILLIGRAM(S): 20 TABLET, DELAYED RELEASE ORAL at 11:36

## 2023-01-01 RX ADMIN — Medication 4 MILLIGRAM(S): at 05:15

## 2023-01-01 RX ADMIN — MORPHINE SULFATE 4 MILLIGRAM(S): 50 CAPSULE, EXTENDED RELEASE ORAL at 12:46

## 2023-01-01 RX ADMIN — SPIRONOLACTONE 50 MILLIGRAM(S): 25 TABLET, FILM COATED ORAL at 05:05

## 2023-01-01 RX ADMIN — Medication 102 MILLIGRAM(S): at 22:40

## 2023-01-01 RX ADMIN — LEVETIRACETAM 1500 MILLIGRAM(S): 250 TABLET, FILM COATED ORAL at 00:22

## 2023-01-01 RX ADMIN — MORPHINE SULFATE 4 MILLIGRAM(S): 50 CAPSULE, EXTENDED RELEASE ORAL at 15:53

## 2023-01-01 RX ADMIN — Medication 20 MILLIGRAM(S): at 07:06

## 2023-01-01 RX ADMIN — SERTRALINE 50 MILLIGRAM(S): 25 TABLET, FILM COATED ORAL at 12:00

## 2023-01-01 RX ADMIN — SERTRALINE 50 MILLIGRAM(S): 25 TABLET, FILM COATED ORAL at 13:56

## 2023-01-01 RX ADMIN — HEPARIN SODIUM 5000 UNIT(S): 5000 INJECTION INTRAVENOUS; SUBCUTANEOUS at 05:23

## 2023-01-01 RX ADMIN — Medication 4 MILLIGRAM(S): at 00:23

## 2023-01-01 RX ADMIN — PANTOPRAZOLE SODIUM 40 MILLIGRAM(S): 20 TABLET, DELAYED RELEASE ORAL at 05:16

## 2023-01-01 RX ADMIN — Medication 100 GRAM(S): at 09:45

## 2023-01-01 RX ADMIN — Medication 20 MILLIGRAM(S): at 05:38

## 2023-01-01 RX ADMIN — Medication 10 MILLIGRAM(S): at 08:23

## 2023-01-01 RX ADMIN — SPIRONOLACTONE 50 MILLIGRAM(S): 25 TABLET, FILM COATED ORAL at 05:39

## 2023-01-01 RX ADMIN — LACOSAMIDE 140 MILLIGRAM(S): 50 TABLET ORAL at 10:39

## 2023-01-01 RX ADMIN — Medication 4 MILLIGRAM(S): at 14:16

## 2023-01-01 RX ADMIN — HEPARIN SODIUM 5000 UNIT(S): 5000 INJECTION INTRAVENOUS; SUBCUTANEOUS at 14:27

## 2023-01-01 RX ADMIN — Medication 81 MILLIGRAM(S): at 12:18

## 2023-01-01 RX ADMIN — Medication 100 MILLIGRAM(S): at 21:57

## 2023-01-01 RX ADMIN — Medication 1 MILLIGRAM(S): at 08:22

## 2023-01-01 RX ADMIN — LEVETIRACETAM 400 MILLIGRAM(S): 250 TABLET, FILM COATED ORAL at 17:47

## 2023-01-01 RX ADMIN — LEVETIRACETAM 400 MILLIGRAM(S): 250 TABLET, FILM COATED ORAL at 06:07

## 2023-01-01 RX ADMIN — MORPHINE SULFATE 4 MG/HR: 50 CAPSULE, EXTENDED RELEASE ORAL at 19:53

## 2023-01-01 RX ADMIN — Medication 4 MILLIGRAM(S): at 17:25

## 2023-01-01 RX ADMIN — Medication 4 MILLIGRAM(S): at 21:52

## 2023-01-01 RX ADMIN — LEVETIRACETAM 1500 MILLIGRAM(S): 250 TABLET, FILM COATED ORAL at 09:06

## 2023-01-01 RX ADMIN — LEVETIRACETAM 400 MILLIGRAM(S): 250 TABLET, FILM COATED ORAL at 05:20

## 2023-01-01 RX ADMIN — LACOSAMIDE 100 MILLIGRAM(S): 50 TABLET ORAL at 05:22

## 2023-01-01 RX ADMIN — HEPARIN SODIUM 5000 UNIT(S): 5000 INJECTION INTRAVENOUS; SUBCUTANEOUS at 21:24

## 2023-01-01 RX ADMIN — Medication 4 MILLIGRAM(S): at 06:07

## 2023-01-01 RX ADMIN — Medication 40 MILLIEQUIVALENT(S): at 20:12

## 2023-01-01 RX ADMIN — Medication 250 MILLIGRAM(S): at 07:27

## 2023-01-01 RX ADMIN — Medication 4 MILLIGRAM(S): at 23:27

## 2023-01-01 RX ADMIN — LEVETIRACETAM 400 MILLIGRAM(S): 250 TABLET, FILM COATED ORAL at 17:25

## 2023-01-01 RX ADMIN — Medication 4 MILLIGRAM(S): at 17:48

## 2023-01-01 RX ADMIN — Medication 81 MILLIGRAM(S): at 13:55

## 2023-01-01 RX ADMIN — Medication 0.5 MILLIGRAM(S): at 20:55

## 2023-01-01 RX ADMIN — LEVETIRACETAM 400 MILLIGRAM(S): 250 TABLET, FILM COATED ORAL at 17:28

## 2023-01-01 RX ADMIN — Medication 4 MILLIGRAM(S): at 23:21

## 2023-01-01 RX ADMIN — GABAPENTIN 400 MILLIGRAM(S): 400 CAPSULE ORAL at 06:06

## 2023-01-01 RX ADMIN — Medication 40 MILLIEQUIVALENT(S): at 09:47

## 2023-01-01 RX ADMIN — Medication 4 MILLIGRAM(S): at 00:34

## 2023-01-01 RX ADMIN — PANTOPRAZOLE SODIUM 40 MILLIGRAM(S): 20 TABLET, DELAYED RELEASE ORAL at 05:30

## 2023-01-01 RX ADMIN — Medication 4 MILLIGRAM(S): at 05:49

## 2023-01-01 RX ADMIN — Medication 0.5 MILLIGRAM(S): at 05:23

## 2023-01-01 RX ADMIN — Medication 40 MILLIEQUIVALENT(S): at 12:08

## 2023-01-01 RX ADMIN — Medication 1 MILLIGRAM(S): at 20:51

## 2023-01-01 RX ADMIN — Medication 40 MILLIEQUIVALENT(S): at 09:07

## 2023-01-01 RX ADMIN — HEPARIN SODIUM 5000 UNIT(S): 5000 INJECTION INTRAVENOUS; SUBCUTANEOUS at 05:04

## 2023-01-01 RX ADMIN — Medication 50 MILLIEQUIVALENT(S): at 15:07

## 2023-01-01 RX ADMIN — HEPARIN SODIUM 5000 UNIT(S): 5000 INJECTION INTRAVENOUS; SUBCUTANEOUS at 21:58

## 2023-01-01 RX ADMIN — LEVETIRACETAM 1500 MILLIGRAM(S): 250 TABLET, FILM COATED ORAL at 06:07

## 2023-01-01 RX ADMIN — Medication 100 MILLIGRAM(S): at 05:06

## 2023-01-01 RX ADMIN — ONDANSETRON 4 MILLIGRAM(S): 8 TABLET, FILM COATED ORAL at 20:51

## 2023-01-01 RX ADMIN — ATORVASTATIN CALCIUM 80 MILLIGRAM(S): 80 TABLET, FILM COATED ORAL at 21:52

## 2023-01-01 RX ADMIN — LEVETIRACETAM 1500 MILLIGRAM(S): 250 TABLET, FILM COATED ORAL at 05:31

## 2023-01-01 RX ADMIN — SERTRALINE 50 MILLIGRAM(S): 25 TABLET, FILM COATED ORAL at 12:39

## 2023-01-01 RX ADMIN — MORPHINE SULFATE 4 MILLIGRAM(S): 50 CAPSULE, EXTENDED RELEASE ORAL at 17:21

## 2023-01-01 RX ADMIN — Medication 100 MILLIGRAM(S): at 22:14

## 2023-01-01 RX ADMIN — HEPARIN SODIUM 5000 UNIT(S): 5000 INJECTION INTRAVENOUS; SUBCUTANEOUS at 21:37

## 2023-01-01 RX ADMIN — Medication 5 MILLIGRAM(S): at 20:49

## 2023-01-01 RX ADMIN — MORPHINE SULFATE 2 MILLIGRAM(S): 50 CAPSULE, EXTENDED RELEASE ORAL at 10:33

## 2023-01-01 RX ADMIN — LEVETIRACETAM 400 MILLIGRAM(S): 250 TABLET, FILM COATED ORAL at 05:29

## 2023-01-01 RX ADMIN — LACOSAMIDE 140 MILLIGRAM(S): 50 TABLET ORAL at 12:55

## 2023-01-01 RX ADMIN — LEVETIRACETAM 1500 MILLIGRAM(S): 250 TABLET, FILM COATED ORAL at 05:15

## 2023-01-01 RX ADMIN — LACOSAMIDE 140 MILLIGRAM(S): 50 TABLET ORAL at 11:53

## 2023-01-01 RX ADMIN — LEVETIRACETAM 1500 MILLIGRAM(S): 250 TABLET, FILM COATED ORAL at 17:27

## 2023-01-01 RX ADMIN — Medication 100 MILLIGRAM(S): at 21:17

## 2023-01-01 RX ADMIN — ATORVASTATIN CALCIUM 80 MILLIGRAM(S): 80 TABLET, FILM COATED ORAL at 20:49

## 2023-01-01 RX ADMIN — Medication 4 MILLIGRAM(S): at 18:29

## 2023-01-01 RX ADMIN — LEVETIRACETAM 400 MILLIGRAM(S): 250 TABLET, FILM COATED ORAL at 05:30

## 2023-01-01 RX ADMIN — Medication 5 MILLIGRAM(S): at 22:24

## 2023-01-01 RX ADMIN — ATORVASTATIN CALCIUM 40 MILLIGRAM(S): 80 TABLET, FILM COATED ORAL at 21:59

## 2023-01-01 RX ADMIN — Medication 4 MILLIGRAM(S): at 11:54

## 2023-01-01 RX ADMIN — Medication 25 GRAM(S): at 11:01

## 2023-01-01 RX ADMIN — HEPARIN SODIUM 5000 UNIT(S): 5000 INJECTION INTRAVENOUS; SUBCUTANEOUS at 05:05

## 2023-01-01 RX ADMIN — Medication 4 MILLIGRAM(S): at 11:36

## 2023-01-01 RX ADMIN — Medication 4 MILLIGRAM(S): at 05:29

## 2023-01-01 RX ADMIN — GABAPENTIN 400 MILLIGRAM(S): 400 CAPSULE ORAL at 06:07

## 2023-01-01 RX ADMIN — SPIRONOLACTONE 50 MILLIGRAM(S): 25 TABLET, FILM COATED ORAL at 05:35

## 2023-01-01 RX ADMIN — SPIRONOLACTONE 50 MILLIGRAM(S): 25 TABLET, FILM COATED ORAL at 05:07

## 2023-01-01 RX ADMIN — LEVETIRACETAM 400 MILLIGRAM(S): 250 TABLET, FILM COATED ORAL at 05:39

## 2023-01-01 RX ADMIN — Medication 650 MILLIGRAM(S): at 15:33

## 2023-01-01 RX ADMIN — Medication 50 MILLIGRAM(S): at 17:37

## 2023-01-01 RX ADMIN — Medication 4 MILLIGRAM(S): at 00:14

## 2023-01-01 RX ADMIN — GABAPENTIN 400 MILLIGRAM(S): 400 CAPSULE ORAL at 17:21

## 2023-01-01 RX ADMIN — PANTOPRAZOLE SODIUM 40 MILLIGRAM(S): 20 TABLET, DELAYED RELEASE ORAL at 08:25

## 2023-01-01 RX ADMIN — Medication 100 MILLIGRAM(S): at 05:19

## 2023-01-01 RX ADMIN — PANTOPRAZOLE SODIUM 40 MILLIGRAM(S): 20 TABLET, DELAYED RELEASE ORAL at 12:10

## 2023-01-01 RX ADMIN — Medication 4 MILLIGRAM(S): at 05:04

## 2023-01-01 RX ADMIN — GABAPENTIN 400 MILLIGRAM(S): 400 CAPSULE ORAL at 17:27

## 2023-01-01 RX ADMIN — Medication 4 MILLIGRAM(S): at 11:45

## 2023-01-01 RX ADMIN — Medication 81 MILLIGRAM(S): at 11:59

## 2023-01-01 RX ADMIN — Medication 4 MILLIGRAM(S): at 05:14

## 2023-01-01 RX ADMIN — CEFEPIME 100 MILLIGRAM(S): 1 INJECTION, POWDER, FOR SOLUTION INTRAMUSCULAR; INTRAVENOUS at 07:25

## 2023-01-01 RX ADMIN — LACOSAMIDE 140 MILLIGRAM(S): 50 TABLET ORAL at 21:30

## 2023-01-01 RX ADMIN — Medication 4 MILLIGRAM(S): at 05:03

## 2023-01-01 RX ADMIN — Medication 25 GRAM(S): at 20:12

## 2023-01-01 RX ADMIN — Medication 100 MILLIGRAM(S): at 15:10

## 2023-01-01 RX ADMIN — GABAPENTIN 400 MILLIGRAM(S): 400 CAPSULE ORAL at 05:49

## 2023-01-01 RX ADMIN — Medication 5 MILLIGRAM(S): at 22:26

## 2023-01-01 RX ADMIN — Medication 2 MILLIGRAM(S): at 06:33

## 2023-01-01 RX ADMIN — LEVETIRACETAM 400 MILLIGRAM(S): 250 TABLET, FILM COATED ORAL at 20:12

## 2023-01-01 RX ADMIN — HEPARIN SODIUM 5000 UNIT(S): 5000 INJECTION INTRAVENOUS; SUBCUTANEOUS at 13:49

## 2023-01-01 RX ADMIN — HEPARIN SODIUM 5000 UNIT(S): 5000 INJECTION INTRAVENOUS; SUBCUTANEOUS at 21:17

## 2023-01-01 RX ADMIN — Medication 4 MILLIGRAM(S): at 17:04

## 2023-01-01 RX ADMIN — LEVETIRACETAM 400 MILLIGRAM(S): 250 TABLET, FILM COATED ORAL at 18:30

## 2023-01-01 RX ADMIN — Medication 4 MILLIGRAM(S): at 23:14

## 2023-01-01 RX ADMIN — SERTRALINE 50 MILLIGRAM(S): 25 TABLET, FILM COATED ORAL at 11:12

## 2023-01-01 RX ADMIN — Medication 50 MILLIGRAM(S): at 05:16

## 2023-01-01 RX ADMIN — Medication 81 MILLIGRAM(S): at 12:39

## 2023-01-01 RX ADMIN — ROBINUL 0.2 MILLIGRAM(S): 0.2 INJECTION INTRAMUSCULAR; INTRAVENOUS at 15:58

## 2023-01-01 RX ADMIN — Medication 100 MILLIGRAM(S): at 05:32

## 2023-01-01 RX ADMIN — Medication 4 MILLIGRAM(S): at 05:11

## 2023-01-01 RX ADMIN — Medication 4 MILLIGRAM(S): at 14:24

## 2023-01-01 RX ADMIN — Medication 100 MILLIGRAM(S): at 21:23

## 2023-01-01 RX ADMIN — HEPARIN SODIUM 5000 UNIT(S): 5000 INJECTION INTRAVENOUS; SUBCUTANEOUS at 13:25

## 2023-01-01 RX ADMIN — ROBINUL 0.2 MILLIGRAM(S): 0.2 INJECTION INTRAMUSCULAR; INTRAVENOUS at 21:51

## 2023-01-01 RX ADMIN — HEPARIN SODIUM 5000 UNIT(S): 5000 INJECTION INTRAVENOUS; SUBCUTANEOUS at 15:07

## 2023-01-01 RX ADMIN — LACOSAMIDE 100 MILLIGRAM(S): 50 TABLET ORAL at 20:10

## 2023-01-01 RX ADMIN — Medication 4 MILLIGRAM(S): at 05:30

## 2023-01-01 RX ADMIN — ONDANSETRON 4 MILLIGRAM(S): 8 TABLET, FILM COATED ORAL at 19:00

## 2023-01-01 RX ADMIN — Medication 100 MILLIGRAM(S): at 13:51

## 2023-01-01 RX ADMIN — Medication 4 MILLIGRAM(S): at 12:55

## 2023-01-01 RX ADMIN — Medication 40 MILLIEQUIVALENT(S): at 17:18

## 2023-01-01 RX ADMIN — Medication 4 MILLIGRAM(S): at 12:08

## 2023-01-01 RX ADMIN — LEVETIRACETAM 400 MILLIGRAM(S): 250 TABLET, FILM COATED ORAL at 07:25

## 2023-01-01 RX ADMIN — LEVETIRACETAM 400 MILLIGRAM(S): 250 TABLET, FILM COATED ORAL at 18:15

## 2023-01-01 RX ADMIN — Medication 20 MILLIEQUIVALENT(S): at 13:05

## 2023-01-01 RX ADMIN — Medication 650 MILLIGRAM(S): at 18:38

## 2023-01-01 RX ADMIN — Medication 100 MILLIGRAM(S): at 20:49

## 2023-01-01 RX ADMIN — LACOSAMIDE 100 MILLIGRAM(S): 50 TABLET ORAL at 18:07

## 2023-01-01 RX ADMIN — Medication 4 MILLIGRAM(S): at 21:02

## 2023-01-01 RX ADMIN — PANTOPRAZOLE SODIUM 40 MILLIGRAM(S): 20 TABLET, DELAYED RELEASE ORAL at 05:49

## 2023-01-01 RX ADMIN — LEVETIRACETAM 400 MILLIGRAM(S): 250 TABLET, FILM COATED ORAL at 18:03

## 2023-01-01 RX ADMIN — MORPHINE SULFATE 4 MILLIGRAM(S): 50 CAPSULE, EXTENDED RELEASE ORAL at 18:15

## 2023-01-01 RX ADMIN — Medication 100 MILLIGRAM(S): at 13:49

## 2023-01-01 RX ADMIN — Medication 2 MILLIGRAM(S): at 06:06

## 2023-01-01 RX ADMIN — LEVETIRACETAM 400 MILLIGRAM(S): 250 TABLET, FILM COATED ORAL at 05:22

## 2023-01-01 RX ADMIN — LACOSAMIDE 100 MILLIGRAM(S): 50 TABLET ORAL at 09:47

## 2023-01-01 RX ADMIN — Medication 100 MILLIGRAM(S): at 06:07

## 2023-01-01 RX ADMIN — Medication 20 MILLIGRAM(S): at 05:12

## 2023-01-01 RX ADMIN — SERTRALINE 50 MILLIGRAM(S): 25 TABLET, FILM COATED ORAL at 11:47

## 2023-01-01 RX ADMIN — LEVETIRACETAM 400 MILLIGRAM(S): 250 TABLET, FILM COATED ORAL at 13:57

## 2023-01-01 RX ADMIN — MORPHINE SULFATE 4 MILLIGRAM(S): 50 CAPSULE, EXTENDED RELEASE ORAL at 14:28

## 2023-01-01 RX ADMIN — Medication 100 MILLIGRAM(S): at 14:26

## 2023-01-01 RX ADMIN — LEVETIRACETAM 400 MILLIGRAM(S): 250 TABLET, FILM COATED ORAL at 18:14

## 2023-01-01 RX ADMIN — LACOSAMIDE 140 MILLIGRAM(S): 50 TABLET ORAL at 20:59

## 2023-01-01 RX ADMIN — Medication 4 MILLIGRAM(S): at 01:17

## 2023-01-01 RX ADMIN — LACOSAMIDE 140 MILLIGRAM(S): 50 TABLET ORAL at 09:41

## 2023-01-01 RX ADMIN — Medication 1 MILLIGRAM(S): at 04:46

## 2023-01-01 RX ADMIN — HEPARIN SODIUM 5000 UNIT(S): 5000 INJECTION INTRAVENOUS; SUBCUTANEOUS at 21:01

## 2023-01-01 RX ADMIN — Medication 50 MILLIEQUIVALENT(S): at 22:40

## 2023-01-01 RX ADMIN — LEVETIRACETAM 400 MILLIGRAM(S): 250 TABLET, FILM COATED ORAL at 07:38

## 2023-01-01 RX ADMIN — SPIRONOLACTONE 50 MILLIGRAM(S): 25 TABLET, FILM COATED ORAL at 05:19

## 2023-01-01 RX ADMIN — Medication 81 MILLIGRAM(S): at 12:09

## 2023-01-01 RX ADMIN — Medication 25 GRAM(S): at 09:08

## 2023-01-01 RX ADMIN — SPIRONOLACTONE 50 MILLIGRAM(S): 25 TABLET, FILM COATED ORAL at 05:13

## 2023-01-01 RX ADMIN — HEPARIN SODIUM 5000 UNIT(S): 5000 INJECTION INTRAVENOUS; SUBCUTANEOUS at 13:51

## 2023-01-01 RX ADMIN — Medication 0.5 MILLIGRAM(S): at 11:43

## 2023-01-01 RX ADMIN — LACOSAMIDE 140 MILLIGRAM(S): 50 TABLET ORAL at 21:31

## 2023-01-01 RX ADMIN — HEPARIN SODIUM 5000 UNIT(S): 5000 INJECTION INTRAVENOUS; SUBCUTANEOUS at 22:13

## 2023-01-01 RX ADMIN — LACOSAMIDE 140 MILLIGRAM(S): 50 TABLET ORAL at 12:50

## 2023-01-01 RX ADMIN — PANTOPRAZOLE SODIUM 40 MILLIGRAM(S): 20 TABLET, DELAYED RELEASE ORAL at 12:17

## 2023-01-01 RX ADMIN — LEVETIRACETAM 400 MILLIGRAM(S): 250 TABLET, FILM COATED ORAL at 05:14

## 2023-01-01 RX ADMIN — SERTRALINE 50 MILLIGRAM(S): 25 TABLET, FILM COATED ORAL at 12:09

## 2023-01-01 RX ADMIN — Medication 20 MILLIGRAM(S): at 05:19

## 2023-01-01 RX ADMIN — Medication 81 MILLIGRAM(S): at 12:54

## 2023-01-02 NOTE — ED PROVIDER NOTE - ATTENDING CONTRIBUTION TO CARE
57-year-old with high-grade multifocal glioblastoma on chemotherapy, followed by hematology oncology and neurosurgery, on seizure medications, now presents accompanied by her daughter and her  for increased vomiting since this morning and not speaking.  Intermittent episodes of speaking. CELESTINE 8:45am. Communicated with patient's cardiology who told him to come to the emergency department.  Stroke code called at triage. 57-year-old with high-grade multifocal glioblastoma on chemotherapy, followed by hematology oncology and neurosurgery, on seizure medications, now presents accompanied by her daughter and her  for increased vomiting since this morning and not speaking.  Intermittent episodes of speaking. LKW 8:45am. Communicated with patient's cardiology who told him to come to the emergency department.  Stroke code called at triage.  Patient is awake.  Chronically ill-appearing.  Cushingoid features.  Alopecia.  Neck is supple.  Awake and alert.  Slightly aphasic.  No facial droop.  NIH score 6.

## 2023-01-02 NOTE — CONSULT NOTE ADULT - SUBJECTIVE AND OBJECTIVE BOX
HPI: 57y Female with known hx of GBM s/p rxn April ' 22, follows Neuro-Oncologist  Dr. Quintanilla outpatient presents as code stroke w/ concern for expressive aphasia NIH5. CTA obtained in ED negative for LVO, NI cancelled. CTH shows  "new calcification and heterogeneity within the region of the neoplasm compatible with posttreatment changes which were also described   on MR of the head 12/6/2022.    -Per outpatient documentation, patient has experienced progressive decline in mental status over the past 6months likely due to disease progression. Patient seen by Dr. Dumont in outpatient office 12.15.22 and reported progressive word finding difficulties and migraines at the time of encounter. A 3 month follow up visit was scheduled.     PAST MEDICAL & SURGICAL HISTORY:  RAMAKRISHNA on CPAP    GERD (gastroesophageal reflux disease)    Asthmatic bronchitis  MILD , USES VENTOLIN Q2-3M    HTN (hypertension)    Migraines    Chronic neck pain      H/O sinus surgery    Status post D&amp;C    H/O arthroscopy of shoulder  RT    History of hernia repair  2019    HEALTH ISSUES - PROBLEM Dx:      FAMILY HISTORY:    Allergies    No Known Allergies    Intolerances    REVIEW OF SYSTEMS : As listed in HPI  Head and Neck:   Cardio :   Pum :  GI:  Neuro:     MEDICATIONS  (STANDING):    MEDICATIONS  (PRN):    Anticoagulation:    Vital Signs Last 24 Hrs  T(C): 36.8 (02 Jan 2023 18:21), Max: 36.8 (02 Jan 2023 18:17)  T(F): 98.3 (02 Jan 2023 18:21), Max: 98.3 (02 Jan 2023 18:17)  HR: 97 (02 Jan 2023 19:16) (90 - 102)  BP: 154/75 (02 Jan 2023 19:16) (134/74 - 166/115)  BP(mean): --  RR: 22 (02 Jan 2023 19:16) (18 - 22)  SpO2: 97% (02 Jan 2023 19:16) (96% - 98%)    Parameters below as of 02 Jan 2023 19:16  Patient On (Oxygen Delivery Method): room air    Physical Exam :  General :   A&O x  Tongue midline  Facial features symmetric, No droop  Speech clear and appropriate, no slur   Pt speaking in full sentences   Follows all commands   Occular :   PERRLA, EOMI   Motor :   MAEx4   No spinal point tenderness   Sensory :  Intact bilaterally     Pronator Drift :     Drains / Devices :    LABS:                        11.8   5.98  )-----------( 62       ( 02 Jan 2023 18:54 )             33.3     01-02    143  |  103  |  3<L>  ----------------------------<  129<H>  2.9<L>   |  28  |  <0.5<L>    Ca    8.0<L>      02 Jan 2023 18:54    TPro  6.1  /  Alb  4.0  /  TBili  1.0  /  DBili  x   /  AST  47<H>  /  ALT  27  /  AlkPhos  70  01-02    PT/INR - ( 02 Jan 2023 18:54 )   PT: 12.40 sec;   INR: 1.08 ratio        PTT - ( 02 Jan 2023 18:54 )  PTT:30.5 sec    CULTURES:    RADIOLOGY & ADDITIONAL STUDIES:  < from: CT Brain Stroke Protocol (01.02.23 @ 19:14) >  IMPRESSION:    No evidence of new territorial infarct, midline shift, hydrocephalus, or   extra axial fluid collection.    Again seen is neoplasm involving the splenium of the corpus callosum and   extending into the left occipital/parietal region. As compared with prior   CT there is new calcification and heterogeneity within the region of the   neoplasm compatible with posttreatment changes which were also described   on MR of the head 12/6/2022.    Radiology resident Leanna De La Cruzdiscussed preliminary findings with Amelia Lockhart at the time of dictation at approximately 7:11 PM on 1/2/2023.    --- End of Report ---      LEANNA DE LA CRUZ MD; Resident Radiologist  This document has been electronically signed.  GUY BROWN MD; Attending Radiologist  Thisdocument has been electronically signed. Jan 2 2023  7:42PM    < end of copied text >  < from: CT Angio Brain Stroke Protocol  w/ IV Cont (01.02.23 @ 19:09) >    IMPRESSION:    PERFUSION:  Apparent 7 cc mismatch volume within the left temporal lobe which may be   artifactual and is unlikely to be eligible for neuro intervention as   detailed above.    CTA HEAD:  No evidence of vessel occlusion or aneurysm.    Increased vascularity within the left PCA territory compatible with known   tumor within this region.    CTA NECK:  No evidence of greater than 50% carotid or vertebral artery stenosis.    ******PRELIMINARY REPORT******      ******PRELIMINARY REPORT******     LEANNA DE LA CRUZ MD; Resident Radiologist  This document is a PRELIMINARY interpretation and is pending final   attending approval. Jan 2 2023  7:38PM    < end of copied text >    Assessment / Plan: 57y F pmhx GBM s/p resection April 2022 by Dr. Dumont, seen in office 12.15.22 noted to have chronic expressive aphasia, HA and declining mental status likely as part of disease progression presents as code stroke w/ aphasia. NI cancelled CTA negative for LVO.   - No neurosurgical intervention indicated   - Keppra   - Dex 10mg x1 then  4mg q6, to be further managed by oncology  - Follow up outpatient in office w/ Dr. Dumont      HPI: 57y Female with known hx of GBM s/p rxn April ' 22, follows Neuro-Oncologist  Dr. Quintanilla outpatient presents as code stroke w/ concern for expressive aphasia NIH5. CTA obtained in ED negative for LVO, NI cancelled. CTH shows  "new calcification and heterogeneity within the region of the neoplasm compatible with posttreatment changes which were also described   on MR of the head 12/6/2022.    -Per outpatient documentation, patient has experienced progressive decline in mental status over the past 6months likely due to disease progression. Patient seen by Dr. Dumont in outpatient office 12.15.22 and reported progressive word finding difficulties and migraines at the time of encounter. A 3 month follow up visit was scheduled.     PAST MEDICAL & SURGICAL HISTORY:  RAMAKRISHNA on CPAP    GERD (gastroesophageal reflux disease)    Asthmatic bronchitis  MILD , USES VENTOLIN Q2-3M    HTN (hypertension)    Migraines    Chronic neck pain      H/O sinus surgery    Status post D&amp;C    H/O arthroscopy of shoulder  RT    History of hernia repair  2019    HEALTH ISSUES - PROBLEM Dx:      FAMILY HISTORY:    Allergies    No Known Allergies    Intolerances    REVIEW OF SYSTEMS : As listed in HPI  Head and Neck:   Cardio :   Pum :  GI:  Neuro:     MEDICATIONS  (STANDING):    MEDICATIONS  (PRN):    Anticoagulation:    Vital Signs Last 24 Hrs  T(C): 36.8 (02 Jan 2023 18:21), Max: 36.8 (02 Jan 2023 18:17)  T(F): 98.3 (02 Jan 2023 18:21), Max: 98.3 (02 Jan 2023 18:17)  HR: 97 (02 Jan 2023 19:16) (90 - 102)  BP: 154/75 (02 Jan 2023 19:16) (134/74 - 166/115)  BP(mean): --  RR: 22 (02 Jan 2023 19:16) (18 - 22)  SpO2: 97% (02 Jan 2023 19:16) (96% - 98%)    Parameters below as of 02 Jan 2023 19:16  Patient On (Oxygen Delivery Method): room air    Physical Exam :  General :   Awake to arousal   Tongue midline  Facial features symmetric, No droop  Severe aphasia   Intermittently follows commands   Occular :   PERRLA, EOMI   Motor :   MAEx4   Sensory :  Intact bilaterally     Pronator Drift : no drift     LABS:                        11.8   5.98  )-----------( 62       ( 02 Jan 2023 18:54 )             33.3     01-02    143  |  103  |  3<L>  ----------------------------<  129<H>  2.9<L>   |  28  |  <0.5<L>    Ca    8.0<L>      02 Jan 2023 18:54    TPro  6.1  /  Alb  4.0  /  TBili  1.0  /  DBili  x   /  AST  47<H>  /  ALT  27  /  AlkPhos  70  01-02    PT/INR - ( 02 Jan 2023 18:54 )   PT: 12.40 sec;   INR: 1.08 ratio        PTT - ( 02 Jan 2023 18:54 )  PTT:30.5 sec    CULTURES:    RADIOLOGY & ADDITIONAL STUDIES:  < from: CT Brain Stroke Protocol (01.02.23 @ 19:14) >  IMPRESSION:    No evidence of new territorial infarct, midline shift, hydrocephalus, or   extra axial fluid collection.    Again seen is neoplasm involving the splenium of the corpus callosum and   extending into the left occipital/parietal region. As compared with prior   CT there is new calcification and heterogeneity within the region of the   neoplasm compatible with posttreatment changes which were also described   on MR of the head 12/6/2022.    Radiology resident Leanna De La Cruzdiscussed preliminary findings with Amelia Lockhart at the time of dictation at approximately 7:11 PM on 1/2/2023.    --- End of Report ---      LEANNA DE LA CRUZ MD; Resident Radiologist  This document has been electronically signed.  GUY BROWN MD; Attending Radiologist  Thisdocument has been electronically signed. Jan 2 2023  7:42PM    < end of copied text >  < from: CT Angio Brain Stroke Protocol  w/ IV Cont (01.02.23 @ 19:09) >    IMPRESSION:    PERFUSION:  Apparent 7 cc mismatch volume within the left temporal lobe which may be   artifactual and is unlikely to be eligible for neuro intervention as   detailed above.    CTA HEAD:  No evidence of vessel occlusion or aneurysm.    Increased vascularity within the left PCA territory compatible with known   tumor within this region.    CTA NECK:  No evidence of greater than 50% carotid or vertebral artery stenosis.    ******PRELIMINARY REPORT******      ******PRELIMINARY REPORT******     LEANNA DE LA CRUZ MD; Resident Radiologist  This document is a PRELIMINARY interpretation and is pending final   attending approval. Jan 2 2023  7:38PM    < end of copied text >    Assessment / Plan: 57y F pmhx GBM s/p resection April 2022 by Dr. Dumont, seen in office 12.15.22 noted to have chronic expressive aphasia, HA and declining mental status likely as part of disease progression presents as code stroke w/ aphasia, nausea, vomiting. NI cancelled CTA negative for LVO. PT noted to be on home steroid taper.    - No neurosurgical intervention indicated   - Keppra   - Zofran for nausea   - Agree with Rad/Onc Cx & Hospitalist   - Dex 10mg x1 then  4mg q6, to be further managed by oncology  - Follow up outpatient in office w/ Dr. Dumont

## 2023-01-02 NOTE — CONSULT NOTE ADULT - TIME-BASED BILLING (NON-CRITICAL CARE)
[Spirometry] : Spirometry [Normal Spirometry] : spirometry normal Time-based billing (NON-critical care)

## 2023-01-02 NOTE — ED PROVIDER NOTE - CLINICAL SUMMARY MEDICAL DECISION MAKING FREE TEXT BOX
Vomiting.  Expressive aphasia.  Known history of high-grade glioblastoma.  Neurology evaluation.  Stroke alert on arrival.    Labs and EKG were ordered and reviewed by me.  Imaging was ordered and reviewed by me.  Appropriate medications for patient's presenting complaints were ordered and effects were reassessed.  Patient's records (prior hospital visit, ED visit, prior EKG, prior imaging) were reviewed.  Additional history was obtained from family, Patient requires inpatient hospitalization - monitored setting.

## 2023-01-02 NOTE — STROKE CODE NOTE - NIH STROKE SCALE: 1A. LEVEL OF CONSCIOUSNESS, QM
(1) Not alert; but arousable by minor stimulation to obey, answer, or respond O-T Plasty Text: The defect edges were debeveled with a #15 scalpel blade.  Given the location of the defect, shape of the defect and the proximity to free margins an O-T plasty was deemed most appropriate.  Using a sterile surgical marker, an appropriate O-T plasty was drawn incorporating the defect and placing the expected incisions within the relaxed skin tension lines where possible.    The area thus outlined was incised deep to adipose tissue with a #15 scalpel blade.  The skin margins were undermined to an appropriate distance in all directions utilizing iris scissors.

## 2023-01-02 NOTE — CHART NOTE - NSCHARTNOTEFT_GEN_A_CORE
Code NI alert activated per stroke team with concerned for expressive aphasia. LKW 0840.   NIHSS 5 for LOC1, LOCQ1, LOCC1, severe aphasia.     Upon attending review, no LVO was seen on CTA. NI was cancelled as a result.   Patient is known to Dr. Dumont for GBM, alerted neurosurgery regarding patient's presentation.   Care per stroke team.     < from: CT Brain Stroke Protocol (01.02.23 @ 19:14) >  IMPRESSION:  No evidence of new territorial infarct, midline shift, hydrocephalus, or   extra axial fluid collection.  Again seen is neoplasm involving the splenium of the corpus callosum and   extending into the left occipital/parietal region. As compared with prior   CT there is new calcification and heterogeneity within the region of the   neoplasm compatible with posttreatment changes which were also described   on MR of the head 12/6/2022.  < end of copied text >  < from: CT Angio Brain Stroke Protocol  w/ IV Cont (01.02.23 @ 19:09) >  IMPRESSION:  PERFUSION:  Apparent 7 cc mismatch volume within the left temporal lobe which may be   artifactual and is unlikely to be eligible for neuro intervention as   detailed above.  CTA HEAD:  No evidence of vessel occlusion or aneurysm.  Increased vascularity within the left PCA territory compatible with known   tumor within this region.  CTA NECK:  No evidence of greater than 50% carotid or vertebral artery stenosis.  < end of copied text >

## 2023-01-02 NOTE — ED PROVIDER NOTE - OBJECTIVE STATEMENT
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 Pt also has expressive aphasia with intermittent episodes of speaking. LKW 8:45am. Stroke code initiated in triage. Of note, pt is on decadron currently being tapered down and is on daily 0.5 mg dose. Denies recent f/c, cp, sob, abd pain.

## 2023-01-02 NOTE — CONSULT NOTE ADULT - SUBJECTIVE AND OBJECTIVE BOX
HPI:   57y Female with known hx of COPD, HTN, HLD Aniexty , complex migranes, seizures, GBM s/p rxn April ' 22, follows Neuro-Oncologist  Dr. Quintanilla outpatient. Collateral from daughter states Patient BP elevated 158/114 and not making sense spoke with cardiologist suggested patient come to ED. Patient LKW 8:40 am. Patient received meds at 5:30 Am and 8:40 am was normal. Patient is on chemo medication Temorizide every 28 days has not taken it due to low platelet count. S/p tumor removal by Dr. Villafana 4/2022.  Upon presentation stroke code activated and NI alert activated  for expressive aphasia  on exam NIHss 5 (1) alert arousable, answers questions (1), performs task correctly (1) Language (2).  NIH5. CTA obtained in ED negative for LVO, NI cancelled. CTH shows  "new calcification and heterogeneity within the region of the neoplasm compatible with posttreatment changes which were also described   on MR of the head 12/6/2022.          PAST MEDICAL & SURGICAL HISTORY:  RAMAKRISHNA on CPAP      GERD (gastroesophageal reflux disease)      Asthmatic bronchitis  MILD , USES VENTOLIN Q2-3M      HTN (hypertension)      Migraines      Chronic neck pain      H/O sinus surgery      Status post D&amp;C      H/O arthroscopy of shoulder  RT      History of hernia repair  2019          FAMILY HISTORY:      Social History: (-) x 3    Allergies    No Known Allergies    Intolerances        MEDICATIONS  (STANDING):  dexAMETHasone  IVPB 10 milliGRAM(s) IV Intermittent Once  potassium chloride  20 mEq/100 mL IVPB 20 milliEquivalent(s) IV Intermittent once    MEDICATIONS  (PRN):          Vital Signs Last 24 Hrs  T(C): 36.8 (02 Jan 2023 18:21), Max: 36.8 (02 Jan 2023 18:17)  T(F): 98.3 (02 Jan 2023 18:21), Max: 98.3 (02 Jan 2023 18:17)  HR: 99 (02 Jan 2023 20:00) (90 - 102)  BP: 145/95 (02 Jan 2023 20:00) (134/74 - 166/115)  BP(mean): --  RR: 18 (02 Jan 2023 20:00) (18 - 22)  SpO2: 97% (02 Jan 2023 20:00) (96% - 98%)    Parameters below as of 02 Jan 2023 20:00  Patient On (Oxygen Delivery Method): room air        Examination:  General:  Appearance is consistent with chronologic age.  No abnormal facies.  Gross skin survey within normal limits.    Cognitive/Language:  Expressive aphasia     Nondysarthric.    Eyes:    EOMI intact,  No weakness of eyelid closure.    Face:  Facial sensation normal V1 - 3, no facial asymmetry.    Ears/Nose/Throat:  Hearing grossly intact b/l  Tongue and uvula midline.   Motor examination:   Normal tone, bulk and range of motion.  No tenderness, twitching, tremors or involuntary movements.  Formal Muscle Strength Testing: (MRC grade R/L) 5/5 UE; 5/5 LE.  No observable drift.    Sensory examination:   Intact to noxious stimuli   Cerebellum:   FTN/HKS intact with normal ELISE in all limbs.     Gait: deferred      Labs:   CBC Full  -  ( 02 Jan 2023 18:54 )  WBC Count : 5.98 K/uL  RBC Count : 3.40 M/uL  Hemoglobin : 11.8 g/dL  Hematocrit : 33.3 %  Platelet Count - Automated : 62 K/uL  Mean Cell Volume : 97.9 fL  Mean Cell Hemoglobin : 34.7 pg  Mean Cell Hemoglobin Concentration : 35.4 g/dL  Auto Neutrophil # : 5.07 K/uL  Auto Lymphocyte # : 0.66 K/uL  Auto Monocyte # : 0.22 K/uL  Auto Eosinophil # : 0.01 K/uL  Auto Basophil # : 0.01 K/uL  Auto Neutrophil % : 84.7 %  Auto Lymphocyte % : 11.0 %  Auto Monocyte % : 3.7 %  Auto Eosinophil % : 0.2 %  Auto Basophil % : 0.2 %    01-02    143  |  103  |  3<L>  ----------------------------<  129<H>  2.9<L>   |  28  |  <0.5<L>    Ca    8.0<L>      02 Jan 2023 18:54    TPro  6.1  /  Alb  4.0  /  TBili  1.0  /  DBili  x   /  AST  47<H>  /  ALT  27  /  AlkPhos  70  01-02    LIVER FUNCTIONS - ( 02 Jan 2023 18:54 )  Alb: 4.0 g/dL / Pro: 6.1 g/dL / ALK PHOS: 70 U/L / ALT: 27 U/L / AST: 47 U/L / GGT: x           PT/INR - ( 02 Jan 2023 18:54 )   PT: 12.40 sec;   INR: 1.08 ratio         PTT - ( 02 Jan 2023 18:54 )  PTT:30.5 sec        Neuroimaging:  NCHCT:   < from:   < from: CT Brain Stroke Protocol (01.02.23 @ 19:14) >  IMPRESSION:    No evidence of new territorial infarct, midline shift, hydrocephalus, or   extra axial fluid collection.    Again seen is neoplasm involving the splenium of the corpus callosum and   extending into the left occipital/parietal region. As compared with prior   CT there is new calcification and heterogeneity within the region of the   neoplasm compatible with posttreatment changes which were also described   on MR of the head 12/6/2022.    < end of copied text >  < from: CT Angio Brain Stroke Protocol  w/ IV Cont (01.02.23 @ 19:09) >  PERFUSION:    Significant amount of patient motion degrades images and limits   diagnostic utility.    CBF less than 30% volume: 0 cc  Tmax greater than 6 seconds: 7 cc  Mismatch volume: 7 cc  Mismatch ratio: Infinite.    The CT perfusion study demonstrates an apparent mismatch defect within   the left temporal lobe/middle cranial fossa which may be artifactual due   to patient motion or proximity to the skull base. The entire 7 cc are   noted to have greater than 10 seconds delay perfusion, and are also   negligible overall size and unlikely to be eligible for neuro   intervention.    HEAD CTA:    Scattered atherosclerotic plaque within the bilateral carotid siphons     < end of copied text >  < from: CT Angio Neck Stroke Protocol w/ IV Cont (01.02.23 @ 19:08) >  IMPRESSION:    PERFUSION:  Apparent 7 cc mismatch volume within the left temporal lobe which may be   artifactual and is unlikely to be eligible for neuro intervention as   detailed above.    CTA HEAD:  No evidence of vessel occlusion or aneurysm.    Increased vascularity within the left PCA territory compatible with known   tumor within this region.    CTA NECK:  No evidence of greater than 50% carotid or vertebral artery stenosis.      < end of copied text >  < from: CT Brain Perfusion Maps Stroke (01.02.23 @ 18:52) >  IMPRESSION:    PERFUSION:  Apparent 7 cc mismatch volume within the left temporal lobe which may be   artifactual and is unlikely to be eligible for neuro intervention as   detailed above.    CTA HEAD:  No evidence of vessel occlusion or aneurysm.    Increased vascularity within the left PCA territory compatible with known   tumor within this region.    CTA NECK:  No evidence of greater than 50% carotid or vertebral artery stenosis.      < end of copied text >    < end of copied text >      01-02-23 @ 21:14

## 2023-01-02 NOTE — CONSULT NOTE ADULT - ASSESSMENT
57y Female with known hx of COPD, HTN, HLD Aniexty , complex migranes, seizures, GBM s/p rxn April ' 22, follows Neuro-Oncologist  Dr. Quintanilla outpatient. Collateral from daughter states Patient BP elevated 158/114 and not making sense spoke with cardiologist suggested patient come to ED. Patient LKW 8:40 am. Patient received meds at 5:30 Am and 8:40 am was normal. Patient is on chemo medication Temorizide every 28 days has not taken it due to low platelet count. S/p tumor removal by Dr. Villafana 4/2022. Per outpatient documentation, patient has experienced progressive decline in mental status over the past 6months likely due to disease progression. P Upon presentation stroke code activated and NI alert activated for expressive aphasia  on exam NIHss 5 (1) alert arousable, answers questions (1), performs task correctly (1) Language (2).  NIH5. CTA obtained in ED negative for LVO, NI cancelled. CT Brain Stroke Protocol shows no evidence of new territorial infarct, midline shift, hydrocephalus, or extra axial fluid collection. Again seen is neoplasm involving the splenium of the corpus callosum and   extending into the left occipital/parietal region. As compared with prior CT there is new calcification and heterogeneity within the region of the neoplasm compatible with posttreatment changes which were also described on MR of the head 12/6/2022. New calcification and heterogeneity within the region of the neoplasm compatible with posttreatment changes which were also described on MR of the head 12/6/2022. CT Angio Brain Stroke Protocol  w/ IV Cont  CTA HEAD shows no evidence of vessel occlusion or aneurysm increased vascularity within the left PCA territory compatible with known tumor within this region. CTA NECK shows no evidence of greater than 50% carotid or vertebral artery stenosis. CT Perfusion shows apparent 7 cc mismatch volume within the left temporal lobe which may be artifactual and is unlikely to be eligible for neuro intervention as detailed above. Patient is not a tPA candidate for IV thrombolytics no LVO not a candidate for IA NI stroke alert cancelled.  Etiology of symptoms may be due to progression of mass vs epileptiform cause.     Recommendation:  MRI with and without Jim  Increase keppra to 1500mg bid IV  Decadron 4mg IV every 6 h  EEG  Normal BP   Routine vials  SLP   Admit to Medicine  Case discussed with Attending Dr. Cleary and Telestroke ROBERTH Hatch                                      presents as code stroke w/ concern for expressive aphasia NIH5. CTA obtained in ED negative for LVO, NI cancelled. CTH shows  "new calcification and heterogeneity within the region of the neoplasm compatible with posttreatment changes which were also described   on MR of the head 12/6/2022.    -atient seen by Dr. Dumont in outpatient office 12.15.22 and reported progressive word finding difficulties and migraines at the time of encounter. A 3 month follow up visit was scheduled.        57y Female with known hx of COPD, HTN, HLD Aniexty , complex migranes, seizures, GBM s/p rxn April ' 22, follows Neuro-Oncologist  Dr. Quintanilla outpatient. Collateral from daughter states Patient BP elevated 158/114 and not making sense spoke with cardiologist suggested patient come to ED. Patient LKW 8:40 am. Patient received meds at 5:30 Am and 8:40 am was normal. Patient is on chemo medication Temorizide every 28 days has not taken it due to low platelet count. S/p tumor removal by Dr. Villafana 4/2022. Per outpatient documentation, patient has experienced progressive decline in mental status over the past 6months likely due to disease progression. P Upon presentation stroke code activated and NI alert activated for expressive aphasia  on exam NIHss 5 (1) alert arousable, answers questions (1), performs task correctly (1) Language (2).  NIH5. CTA obtained in ED negative for LVO, NI cancelled. CT Brain Stroke Protocol shows no evidence of new territorial infarct, midline shift, hydrocephalus, or extra axial fluid collection. Again seen is neoplasm involving the splenium of the corpus callosum and   extending into the left occipital/parietal region. As compared with prior CT there is new calcification and heterogeneity within the region of the neoplasm compatible with posttreatment changes which were also described on MR of the head 12/6/2022. New calcification and heterogeneity within the region of the neoplasm compatible with posttreatment changes which were also described on MR of the head 12/6/2022. CT Angio Brain Stroke Protocol  w/ IV Cont  CTA HEAD shows no evidence of vessel occlusion or aneurysm increased vascularity within the left PCA territory compatible with known tumor within this region. CTA NECK shows no evidence of greater than 50% carotid or vertebral artery stenosis. CT Perfusion shows apparent 7 cc mismatch volume within the left temporal lobe which may be artifactual and is unlikely to be eligible for neuro intervention as detailed above. Patient is not a tPA candidate for IV thrombolytics no LVO not a candidate for IA NI stroke alert cancelled.  Etiology of symptoms may be due to progression of mass vs epileptiform cause.     Recommendation:  MRI with and without Jim  Increase keppra to 1500mg bid IV  Decadron 4mg IV every 6 h  EEG  Normal BP   Routine vials  SLP   Admit to Medicine  Case discussed with Attending Dr. Cleary and Telestroke ROBERTH Hatch

## 2023-01-02 NOTE — ED PROVIDER NOTE - PROGRESS NOTE DETAILS
Note authored by Dr. Peñaloza: Case discussed with Dr. Quinones, telestroke neurologist.  Agrees with management.  Patient currently in CT scan. Note authored by Dr. Peñaloza: Patient seen by me soon after arrival.At 1824 Spoke to neurosurgery, Dr. Dumont, who states PT is not a neurosurgical candidate, recommends speaking with PT's oncologist to discuss possibility of new medication side effect, Dr. Dao; will also consult neurosurgery team -CD Spoke to neurosurgery, Dr. Dumont, who states PT is not a neurosurgical candidate, recommends speaking with PT's oncologist to discuss possibility of new medication side effect, Dr. Dao, left message with answering service; neurosurgery team consulted to see patient as well-CD onc consulted -CD Note authored by Dr. Peñaloza: Discussed with Dr. Angel agrees with management.  Recommends steroids.  Admit to hospitalist service will evaluate during hospitalization Note authored by Dr. Peñaloza: Patient reassessed.  Very anxious.  Spoke to daughter.  Patient took lorazepam in the past.  Will order Patient does not want to drink potassium.  Will order IV potassium.

## 2023-01-02 NOTE — CONSULT NOTE ADULT - NS ATTEND AMEND GEN_ALL_CORE FT
I have personally seen and examined this patient.  I have fully participated in the care of this patient.  I have reviewed all pertinent clinical information, including history, physical exam, plan and note.  57 year old woman with history of GBM and on Keppra 1 mg BID presented with high blood pressure and confusion. This morning I assessed the patient at the bedside. Overnight her acute stroke was managed by CTS attending. Per son she has at her baseline. She is alert and follows commands but only oriented to herself. no focal weakness. Agree w higher dose of Keppra. Brain MRI w/wo to r/o expansion to tumor. No further work up if Brain MRI shows no expansion to tumor.  I have reviewed all pertinent clinical information and reviewed all relevant imaging and diagnostic studies personally.  Recommendations as above.  Agree with above assessment except as noted.

## 2023-01-02 NOTE — ED PROVIDER NOTE - PHYSICAL EXAMINATION
VITAL SIGNS: I have reviewed nursing notes and confirm.  CONSTITUTIONAL: non-toxic, well appearing, + chronically ill appearing   SKIN: no rash, no petechiae.  EYES: pink conjunctiva, anicteric  ENT: tongue midline, no exudates  NECK: Supple; no meningismus  CARD: RRR, no murmurs, equal radial pulses bilaterally 2+  RESP: CTAB, no respiratory distress  NEURO: Awake, Alert, no focal deficits, motor strength and sensation grossly intact, no facial droop, + difficulty speaking due to nausea

## 2023-01-03 NOTE — SPEECH LANGUAGE PATHOLOGY EVALUATION - DESCRIBE:
severe deficits, 20% accuracy Unable to respond questions 2' severe expressive language deficits. Pt. followed 5/10 1-step directions

## 2023-01-03 NOTE — H&P ADULT - HISTORY OF PRESENT ILLNESS
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 Pt also has expressive aphasia with intermittent episodes of speaking. Patient received meds at 5:30 Am and 8:40 am was normal. Patient is on chemo medication Temorizide every 28 days has not taken it due to low platelet count. S/p tumor removal by Dr. Villafana 4/2022.  Last known well 8:45am. Stroke code activated in the ED. NIH score 5.     ED vitals:   /115, , Temp 98.3F, satting 96% on room air  Labs K 2.9  CTA obtained in ED negative for LVO, NI cancelled. CTH shows  "new calcification and heterogeneity within the region of the neoplasm compatible with posttreatment changes which were also described on MR of the head 12/6/2022.    Admitted for aphasia.

## 2023-01-03 NOTE — SWALLOW BEDSIDE ASSESSMENT ADULT - SLP PERTINENT HISTORY OF CURRENT PROBLEM
58 yo Female with known hx of COPD, HTN, HLD, anxiety, complex migraines, seizures, GBM s/p rxn April ' 22, follows Neuro-Oncologist Dr. Quintanilla. Admitted to ED 2' AMS. Patient LKW 8:40 am. Patient is on chemo medication Temorizide every 28 days has not taken it due to low platelet count. S/p tumor removal by Dr. Villafana 4/2022. Per outpatient documentation, patient has experienced progressive decline in mental status over the past 6 months likely due to disease progression. Stroke code in ED for expressive aphasia. CTA obtained in ED negative for LVO, NI cancelled. CT Brain negative for CVA. Again seen is neoplasm involving the splenium of the corpus callosum extending into the left occipital/parietal region. 56 yo Female with known hx of high-grade glioblastoma multiforme, s/p tumor resection (April 2022) by Dr. Villafana, on chemotherapy, COPD, HTN, HLD, anxiety, complex migraines, seizures. Admitted to ED 2' new onset of aphasia. Patient LKW 8:40 am. Per outpatient documentation, patient has experienced progressive decline in mental status over the past 6 months likely due to disease progression. Stroke code in ED for expressive aphasia. CTA obtained in ED negative for LVO, NI cancelled. CTH indicates new calcification within the region of the neoplasm involving the splenium of the corpus callosum extending into the left occipital/parietal region.

## 2023-01-03 NOTE — PATIENT PROFILE ADULT - FALL HARM RISK - HARM RISK INTERVENTIONS
Assistance with ambulation/Assistance OOB with selected safe patient handling equipment/Communicate Risk of Fall with Harm to all staff/Discuss with provider need for PT consult/Monitor gait and stability/Reinforce activity limits and safety measures with patient and family/Tailored Fall Risk Interventions/Visual Cue: Yellow wristband and red socks/Bed in lowest position, wheels locked, appropriate side rails in place/Call bell, personal items and telephone in reach/Instruct patient to call for assistance before getting out of bed or chair/Non-slip footwear when patient is out of bed/[place] to call system/Physically safe environment - no spills, clutter or unnecessary equipment/Purposeful Proactive Rounding/Room/bathroom lighting operational, light cord in reach

## 2023-01-03 NOTE — ED ADULT NURSE NOTE - CHIEF COMPLAINT
The patient is a 57y Female complaining of code: stroke. 55 YO M s/p CABGx3+MVR at [facility]. Was placed on V-A ECMO. Pt w/ h/o oral bleed and epistaxis, now s/p tracheostomy placement yesterday. ENT reconsulted for oral bleeding. Oral cavity thoroughly examined. No source of bleeding seen in the oral cavity. Pooling of secretion/blood seen in the pharynx. Laryngoscopy done at bedside, no epistaxis observed, no bleeding in the nasopharynx. Unable to visualize larynx as pooling of secretions/blood.

## 2023-01-03 NOTE — H&P ADULT - ATTENDING COMMENTS
patient seen and examined , agree with pgy 3 assesment and plan except as indicated above,      57y male presented with complaints episodes of aphasia in the setting of glioblastoma multiforme     GEN Lying in no acute distress  HEENT Pupils equal and reactive to light and accommodationSupple Neck  PULM Clear to auscultation bilaterally  CV s1s2 regular rate and rhythm  GI + bowel sounds nontnender  EXT no cyanosis or edema  PSYCH awake alert and oriented x 2   INTEG No Lesions  NEURO Moves all extremities    #Class 2 obesity BMI 36 patient needs to see dieitian outpatient for further evaluation     PROGRESS NOTE HANDOFF    Pending: MRI , EEG     Family discussion: linda barkley bedside aware of plan of care     Disposition: HOme

## 2023-01-03 NOTE — SPEECH LANGUAGE PATHOLOGY EVALUATION - SLP PERTINENT HISTORY OF CURRENT PROBLEM
58 yo Female with known hx of high-grade glioblastoma multiforme, s/p tumor resection (April 2022) by Dr. Villafana, on chemotherapy, COPD, HTN, HLD, anxiety, complex migraines, seizures. Admitted to ED 2' new onset of aphasia. Patient LKW 8:40 am. Per outpatient documentation, patient has experienced progressive decline in mental status over the past 6 months likely due to disease progression. Stroke code in ED for expressive aphasia. CTA obtained in ED negative for LVO, NI cancelled. CTH indicates new calcification within the region of the neoplasm involving the splenium of the corpus callosum extending into the left occipital/parietal region.

## 2023-01-03 NOTE — SPEECH LANGUAGE PATHOLOGY EVALUATION - SLP DIAGNOSIS
Pt. presents with severe expressive and receptive aphasia characterized by severe anomia, neologistic speech and delayed language processing affecting patient's ability to effectively communicate wants/needs.

## 2023-01-03 NOTE — ED ADULT NURSE REASSESSMENT NOTE - NS ED NURSE REASSESS COMMENT FT1
Patient hand off report received. Patient has a bed on 4B-7/B. Patient hand off report already given by previous shift. Patient awaiting transport upstairs.

## 2023-01-03 NOTE — SWALLOW BEDSIDE ASSESSMENT ADULT - ORAL PHASE
w/ regular solids/Decreased anterior-posterior movement of the bolus/Delayed oral transit time/Lingual stasis

## 2023-01-03 NOTE — SWALLOW BEDSIDE ASSESSMENT ADULT - SWALLOW EVAL: DIAGNOSIS
Mild oral dysphagia with regular solids likely negatively impacted by cognition. + tolerance for soft & bite sized solids and thin liquids without overt s/s of penetration/ aspiration.

## 2023-01-03 NOTE — H&P ADULT - ASSESSMENT
57-year-old F with PMHx of high-grade glioblastoma multiforme s/p resection April 2022 on chemotherapy, followed by hematology oncology and neurosurgery, on seizure medications and HTN now presents accompanied by her daughter and her  for expressive aphasia and increased nausea and vomiting since this morning. Stroke Code called in the ED, NIH score 5, CTA neg for LVO, NI cancelled. Admitted for aphasia.    #Aphasia, 2/2 progression of GBM mass vs epileptiform   #hx of GBM s/p resection April 2022 on chemotherapy  - stroke code in the ED, NIH score 5  - CTA negative for LVO  - CTH new calcification and heterogeneity within the region of the neoplasm compatible with posttreatment changes which were also described on MR of the head 12/6/2022  - Keppra 1500mg IV BID  - Decadron 4mg IV q6hrs  - EEG  - MRI head w/ and w/o cont - pt reports having a device in her brain that is MRI compatible, had previous MRIs previously   - c/w gabapentin 400mg BID    #HTN  - c/w spironolactone 25mg daily, Toprol 50mg daily    #HLD   - c/w lipitor 80 bedtime    #Hypokalemia  - repleted  - monitor BMP    Misc:  DVT ppx: none  GI ppx: PPI  Activity: IAT  Diet: S/S eval  Code: Full Code  Dispo: Acute, floor

## 2023-01-03 NOTE — ED ADULT NURSE REASSESSMENT NOTE - NS ED NURSE REASSESS COMMENT FT1
As per bed management, patient does not have an assigned admitting bed upstairs anymore. ER Charge CARTER Roque aware.

## 2023-01-03 NOTE — SWALLOW BEDSIDE ASSESSMENT ADULT - COMMENTS
Pt's son reports occasional word finding deficits but able to communicate wants/needs and independent in ADLs.

## 2023-01-03 NOTE — SPEECH LANGUAGE PATHOLOGY EVALUATION - COMMENTS
Did not test Evaluation revealed difficulty with confrontation naming, verbal repetition, verbal fluency, min verbal output with variable content characterized by anomia and neologistic speech. WFL Unable to assess 2' severe language deficits Severe language processing deficits, Pt. benefits from repetition cues. Pt's son at bedside reports occasional word finding deficits prior to admission but able to effectively communicate wants/needs.

## 2023-01-03 NOTE — SWALLOW BEDSIDE ASSESSMENT ADULT - SLP GENERAL OBSERVATIONS
Pt. received awake, confused, ox1 self (not to ), +aphasic, clear speech, min command following, room air. Accompanied by son at bedside.

## 2023-01-04 NOTE — PROGRESS NOTE ADULT - ASSESSMENT
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 Pt also has expressive aphasia with intermittent episodes of speaking. Patient received meds at 5:30 Am and 8:40 am was normal. Patient is on chemo medication Temorizide every 28 days has not taken it due to low platelet count. S/p tumor removal by Dr. Villafana 4/2022.     # Expressive aphasia-resolved  # H/o GBM s/p resection April 2022 on chemotherapy  -  CT Brain Stroke Protocol (01.02.23 @ 19:14) >No evidence of new territorial infarct, midline shift, hydrocephalus, or extra axial fluid collection. Again seen is neoplasm involving the splenium of the corpus callosum and extending into the left occipital/parietal region. As compared with prior CT there is new calcification and heterogeneity within the region of the neoplasm compatible with posttreatment changes  -  EEG (01.03.23 @ 21:40) >Epileptiform sharp waves  - c/w  Keppra   - c/w decadron  - F/u  MR brain  -  hold chemo sec to  low platelets  -  oncology eval in AM    # Hypertension  - c/w toprol, aldactone    # Dyslipidemia  - c/w statin    #Hypokalemia  - replete k    # Thrombocytopenia  - monitor platelets    # DVT prophylaxis  - scd    # Full code    # Pending -MRI brain,  onc eval in AM  # Discussed plan of care with patient and family  # Disposition: Home when stable

## 2023-01-04 NOTE — PROGRESS NOTE ADULT - ASSESSMENT
57-year-old F with PMHx of high-grade glioblastoma multiforme s/p resection April 2022 on chemotherapy, followed by hematology oncology and neurosurgery, on seizure medications and HTN now presents accompanied by her daughter and her  for expressive aphasia and increased nausea and vomiting since this morning. Stroke Code called in the ED, NIH score 5, CTA neg for LVO, NI cancelled. Admitted for aphasia.    #Aphasia, 2/2 progression of GBM mass vs epileptiform   #hx of GBM s/p resection April 2022 on chemotherapy  - stroke code in the ED, NIH score 5  - CTA negative for LVO  - CTH new calcification and heterogeneity within the region of the neoplasm compatible with posttreatment changes which were also described on MR of the head 12/6/2022  - Keppra 1500mg IV BID  - Decadron 4mg IV q6hrs  - EEG epileptiform activity  - Pending MR brain  - Pending Neuro f/u    #hx of GBM s/p resection April 2022 on chemotherapy  - follows with Dr. Dao  - Continue to hold chemo i/v/o low platelets  - Onco to eval patient tomorrow as per discussion with fellow    #HTN  - c/w spironolactone 25mg daily, Toprol 50mg daily    #HLD   - c/w lipitor 80 bedtime    #Hypokalemia  - repleted  - monitor BMP    Misc:  DVT ppx: none  GI ppx: PPI  Activity: IAT  Diet: S/S eval  Code: Full Code  Dispo: Acute, floor     Pending - Neuro Follow up, MR brain, Onco Follow up tomorrow

## 2023-01-05 NOTE — CHART NOTE - NSCHARTNOTEFT_GEN_A_CORE
Neurovascular:  MRI brain suggestive of acute stroke.     < from: MR Head w/wo IV Cont (01.04.23 @ 21:21) >    IMPRESSION:    New small cortical/subcortical acute infarct in the right parietal lobe.    Progressively increasing confluent signal abnormality in bilateral   periventricular white matter, with redemonstrated restricted diffusion in   bilateral inferior frontal lobe and rostrum of the corpus callosum. No   associated enhancement. The finding is nonspecific but likely reflects   post radiation changes. Recommend attention on follow-up.    Further decrease in heterogeneous enhancements associated with the   persistently expansile left occipital mass compared to the prior brain   MRI from 12/6/2020 which may reflect posttreatment change. Increased   scattered calcifications and trace petechial hemorrhage as demonstrated   on the prior head CT from 1/20/2023 likely representing posttreatment   change. Recommend continued close follow-up MR brain with perfusion.    Unchanged extension of the mass into the splenium of the corpus callosum   and contiguous expansile FLAIR signal abnormality in bilateral periatrial   white matter extending to the left temporal lobe.    < end of copied text >    -Reviewed imaging and discussed case with Dr. Pelaez at length.   -Will review images with neuroradiology.   -From a neurovascular perspective patient is thrombocytopenic, would need Hem/onc clearance for aspirin 81 mg daily.   -Continue statin if medically able.

## 2023-01-05 NOTE — PHYSICAL THERAPY INITIAL EVALUATION ADULT - LEVEL OF INDEPENDENCE: STAIR NEGOTIATION, REHAB EVAL
very unsafe to attempt at this time 2/2 decreased balance, cognition, inability to consistently follow (especially multi-step commands)/unable to perform

## 2023-01-05 NOTE — PROGRESS NOTE ADULT - ASSESSMENT
Impression:  57y Female with known hx of COPD, HTN, HLD Aniexty , complex migranes, seizures, GBM s/p rxn April ' 22, follows Neuro-Oncologist  Dr. Quintanilla outpatient. Patient is on chemo medication Temorizide every 28 days has not taken it due to low platelet count. S/p tumor removal by Dr. Villafana 4/2022. Presented with aphasia. MRI brain with suggestion of new small cortical/subcortical acute infarct in the right parietal lobe. EEG with left sharp waves. Etiology of stroke may be due to hypercoagulability due to neoplasm however Dr. Pelaez will review MRI with neuroradiology.       Recommendation:  Continue Keppra 1500mg bid IV  Keep magnesium >2  Seizure precautions  Speech therapy  Continue statin   Continue ASA if cleared by hem/onc       Impression:  57y Female with known hx of COPD, HTN, HLD Aniexty , complex migranes, seizures, GBM s/p rxn April ' 22, follows Neuro-Oncologist  Dr. Quintanilla outpatient. Patient is on chemo medication Temorizide every 28 days has not taken it due to low platelet count. S/p tumor removal by Dr. Villafana 4/2022. Presented with aphasia. MRI brain with suggestion of new small cortical/subcortical acute infarct in the right parietal lobe. EEG with left sharp waves. Etiology of stroke may be due to hypercoagulability due to neoplasm however Dr. Pelaez will review MRI with neuroradiology.       Recommendation:  Continue Keppra 1500mg bid IV  Keep magnesium >2  Aspirin 81mg QD  Seizure precautions  Speech therapy  Continue statin   Continue ASA if cleared by hem/onc

## 2023-01-05 NOTE — CONSULT NOTE ADULT - SUBJECTIVE AND OBJECTIVE BOX
Patient is a 57y old  Female who presents with a chief complaint of aphasia (04 Jan 2023 18:51)      HPI:  57-year-old F with PMHx of high-grade glioblastoma multiforme s/p resection April 2022 on chemotherapy, followed by hematology oncology and neurosurgery, on seizure medications of keppra 1g BID, and HTN on metoprolol and spironolactone, now presents accompanied by her daughter and her  for increased nausea and vomiting since this morning. Pt also has expressive aphasia with intermittent episodes of speaking. Patient received meds at 5:30 Am and 8:40 am was normal. Patient is on chemo medication Temorizide every 28 days has not taken it due to low platelet count. S/p tumor removal by Dr. Villafana 4/2022.  Last known well 8:45am. Stroke code activated in the ED. NIH score 5.     ED vitals:   /115, , Temp 98.3F, satting 96% on room air  Labs K 2.9  CTA obtained in ED negative for LVO, NI cancelled. CTH shows  "new calcification and heterogeneity within the region of the neoplasm compatible with posttreatment changes which were also described on MR of the head 12/6/2022.    Admitted for aphasia.     (03 Jan 2023 02:38)       ROS:  Negative except for:    PAST MEDICAL & SURGICAL HISTORY:  RAMAKRISHNA on CPAP      GERD (gastroesophageal reflux disease)      Asthmatic bronchitis  MILD , USES VENTOLIN Q2-3M      HTN (hypertension)      Migraines      Chronic neck pain      H/O sinus surgery      Status post D&amp;C      H/O arthroscopy of shoulder  RT      History of hernia repair  2019          SOCIAL HISTORY:    FAMILY HISTORY:      MEDICATIONS  (STANDING):  atorvastatin 80 milliGRAM(s) Oral at bedtime  dexAMETHasone  Injectable 4 milliGRAM(s) IV Push every 8 hours  gabapentin 400 milliGRAM(s) Oral two times a day  influenza   Vaccine 0.5 milliLiter(s) IntraMuscular once  levETIRAcetam  IVPB 1500 milliGRAM(s) IV Intermittent every 12 hours  metoprolol succinate ER 50 milliGRAM(s) Oral every 12 hours  pantoprazole    Tablet 40 milliGRAM(s) Oral before breakfast  sertraline 50 milliGRAM(s) Oral daily  spironolactone 25 milliGRAM(s) Oral daily    MEDICATIONS  (PRN):  acetaminophen     Tablet .. 650 milliGRAM(s) Oral every 6 hours PRN Moderate Pain (4 - 6), Severe Pain (7 - 10)  acetaminophen     Tablet .. 650 milliGRAM(s) Oral every 6 hours PRN Temp greater or equal to 38C (100.4F), Mild Pain (1 - 3)  aluminum hydroxide/magnesium hydroxide/simethicone Suspension 30 milliLiter(s) Oral every 4 hours PRN Dyspepsia  melatonin 5 milliGRAM(s) Oral at bedtime PRN Insomnia  ondansetron Injectable 4 milliGRAM(s) IV Push every 6 hours PRN Nausea and/or Vomiting      Allergies    No Known Allergies    Intolerances        Vital Signs Last 24 Hrs  T(C): 36.4 (05 Jan 2023 00:07), Max: 36.4 (05 Jan 2023 00:07)  T(F): 97.6 (05 Jan 2023 00:07), Max: 97.6 (05 Jan 2023 00:07)  HR: 87 (05 Jan 2023 00:07) (71 - 87)  BP: 178/98 (05 Jan 2023 00:07) (159/87 - 178/98)  BP(mean): --  RR: 18 (05 Jan 2023 00:07) (18 - 18)  SpO2: 96% (05 Jan 2023 00:07) (96% - 96%)    Parameters below as of 05 Jan 2023 00:07  Patient On (Oxygen Delivery Method): room air        PHYSICAL EXAM  General: adult in NAD  HEENT: clear oropharynx, anicteric sclera, pink conjunctiva  Neck: supple  CV: normal S1/S2 with no murmur rubs or gallops  Lungs: positive air movement b/l ant lungs,clear to auscultation, no wheezes, no rales  Abdomen: soft non-tender non-distended, no hepatosplenomegaly  Ext: no clubbing cyanosis or edema  Skin: no rashes and no petechiae  Neuro: alert and oriented X 4, no focal deficits      LABS:                          12.4   6.11  )-----------( 73       ( 05 Jan 2023 07:07 )             35.6         Mean Cell Volume : 99.7 fL  Mean Cell Hemoglobin : 34.7 pg  Mean Cell Hemoglobin Concentration : 34.8 g/dL  Auto Neutrophil # : 5.47 K/uL  Auto Lymphocyte # : 0.45 K/uL  Auto Monocyte # : 0.15 K/uL  Auto Eosinophil # : 0.01 K/uL  Auto Basophil # : 0.01 K/uL  Auto Neutrophil % : 89.4 %  Auto Lymphocyte % : 7.4 %  Auto Monocyte % : 2.5 %  Auto Eosinophil % : 0.2 %  Auto Basophil % : 0.2 %      Serial CBC's  01-05 @ 07:07  Hct-35.6 / Hgb-12.4 / Plat-73 / RBC-3.57 / WBC-6.11  Serial CBC's  01-04 @ 06:10  Hct-35.3 / Hgb-12.1 / Plat-75 / RBC-3.51 / WBC-7.68  Serial CBC's  01-03 @ 07:20  Hct-33.2 / Hgb-11.6 / Plat-61 / RBC-3.40 / WBC-5.86  Serial CBC's  01-02 @ 18:54  Hct-33.3 / Hgb-11.8 / Plat-62 / RBC-3.40 / WBC-5.98      01-04    142  |  101  |  9<L>  ----------------------------<  130<H>  3.3<L>   |  28  |  <0.5<L>    Ca    8.5      04 Jan 2023 06:10  Mg     2.2     01-04    TPro  6.1  /  Alb  3.9  /  TBili  0.9  /  DBili  x   /  AST  61<H>  /  ALT  30  /  AlkPhos  67  01-04                      BLOOD SMEAR INTERPRETATION:       RADIOLOGY & ADDITIONAL STUDIES:     Patient is a 57y old  Female who presents with a chief complaint of aphasia (04 Jan 2023 18:51)    HPI:  57-year-old F with PMHx of high-grade glioblastoma multiforme s/p resection April 2022 on chemotherapy, followed by hematology oncology and neurosurgery, on seizure medications of keppra 1g BID, and HTN on metoprolol and spironolactone, now presents accompanied by her daughter and her  for increased nausea and vomiting since this morning. Pt also has expressive aphasia with intermittent episodes of speaking. Patient received meds at 5:30 Am and 8:40 am was normal. Patient is on chemo medication Temorizide every 28 days has not taken it due to low platelet count. S/p tumor removal by Dr. Villafana 4/2022.  Last known well 8:45am. Stroke code activated in the ED. NIH score 5.     ED vitals:   /115, , Temp 98.3F, satting 96% on room air  Labs K 2.9  CTA obtained in ED negative for LVO, NI cancelled. CTH shows  "new calcification and heterogeneity within the region of the neoplasm compatible with posttreatment changes which were also described on MR of the head 12/6/2022.    Admitted for aphasia.   (03 Jan 2023 02:38)       ROS:  Negative except for: above.    PAST MEDICAL & SURGICAL HISTORY:  RAMAKRISHNA on CPAP      GERD (gastroesophageal reflux disease)      Asthmatic bronchitis  MILD , USES VENTOLIN Q2-3M      HTN (hypertension)      Migraines      Chronic neck pain      H/O sinus surgery      Status post D&amp;C      H/O arthroscopy of shoulder  RT      History of hernia repair  2019          SOCIAL HISTORY: Denies alcohol or tobacco use.    FAMILY HISTORY: No pertinent family hx.      MEDICATIONS  (STANDING):  atorvastatin 80 milliGRAM(s) Oral at bedtime  dexAMETHasone  Injectable 4 milliGRAM(s) IV Push every 8 hours  gabapentin 400 milliGRAM(s) Oral two times a day  influenza   Vaccine 0.5 milliLiter(s) IntraMuscular once  levETIRAcetam  IVPB 1500 milliGRAM(s) IV Intermittent every 12 hours  metoprolol succinate ER 50 milliGRAM(s) Oral every 12 hours  pantoprazole    Tablet 40 milliGRAM(s) Oral before breakfast  sertraline 50 milliGRAM(s) Oral daily  spironolactone 25 milliGRAM(s) Oral daily    MEDICATIONS  (PRN):  acetaminophen     Tablet .. 650 milliGRAM(s) Oral every 6 hours PRN Moderate Pain (4 - 6), Severe Pain (7 - 10)  acetaminophen     Tablet .. 650 milliGRAM(s) Oral every 6 hours PRN Temp greater or equal to 38C (100.4F), Mild Pain (1 - 3)  aluminum hydroxide/magnesium hydroxide/simethicone Suspension 30 milliLiter(s) Oral every 4 hours PRN Dyspepsia  melatonin 5 milliGRAM(s) Oral at bedtime PRN Insomnia  ondansetron Injectable 4 milliGRAM(s) IV Push every 6 hours PRN Nausea and/or Vomiting      Allergies    No Known Allergies    Intolerances        Vital Signs Last 24 Hrs  T(C): 36.4 (05 Jan 2023 00:07), Max: 36.4 (05 Jan 2023 00:07)  T(F): 97.6 (05 Jan 2023 00:07), Max: 97.6 (05 Jan 2023 00:07)  HR: 87 (05 Jan 2023 00:07) (71 - 87)  BP: 178/98 (05 Jan 2023 00:07) (159/87 - 178/98)  BP(mean): --  RR: 18 (05 Jan 2023 00:07) (18 - 18)  SpO2: 96% (05 Jan 2023 00:07) (96% - 96%)    Parameters below as of 05 Jan 2023 00:07  Patient On (Oxygen Delivery Method): room air        PHYSICAL EXAM  General: adult in NAD  HEENT: clear oropharynx, anicteric sclera, pink conjunctiva  Neck: supple  CV: normal S1/S2 with no murmur rubs or gallops  Lungs: positive air movement b/l ant lungs,clear to auscultation, no wheezes, no rales  Abdomen: soft non-tender non-distended, no hepatosplenomegaly  Ext: no clubbing cyanosis or edema  Skin: no rashes and no petechiae  Neuro: alert and oriented X 2, no focal deficits      LABS:                          12.4   6.11  )-----------( 73       ( 05 Jan 2023 07:07 )             35.6         Mean Cell Volume : 99.7 fL  Mean Cell Hemoglobin : 34.7 pg  Mean Cell Hemoglobin Concentration : 34.8 g/dL  Auto Neutrophil # : 5.47 K/uL  Auto Lymphocyte # : 0.45 K/uL  Auto Monocyte # : 0.15 K/uL  Auto Eosinophil # : 0.01 K/uL  Auto Basophil # : 0.01 K/uL  Auto Neutrophil % : 89.4 %  Auto Lymphocyte % : 7.4 %  Auto Monocyte % : 2.5 %  Auto Eosinophil % : 0.2 %  Auto Basophil % : 0.2 %      Serial CBC's  01-05 @ 07:07  Hct-35.6 / Hgb-12.4 / Plat-73 / RBC-3.57 / WBC-6.11  Serial CBC's  01-04 @ 06:10  Hct-35.3 / Hgb-12.1 / Plat-75 / RBC-3.51 / WBC-7.68  Serial CBC's  01-03 @ 07:20  Hct-33.2 / Hgb-11.6 / Plat-61 / RBC-3.40 / WBC-5.86  Serial CBC's  01-02 @ 18:54  Hct-33.3 / Hgb-11.8 / Plat-62 / RBC-3.40 / WBC-5.98      01-04    142  |  101  |  9<L>  ----------------------------<  130<H>  3.3<L>   |  28  |  <0.5<L>    Ca    8.5      04 Jan 2023 06:10  Mg     2.2     01-04    TPro  6.1  /  Alb  3.9  /  TBili  0.9  /  DBili  x   /  AST  61<H>  /  ALT  30  /  AlkPhos  67  01-04                      BLOOD SMEAR INTERPRETATION:       RADIOLOGY & ADDITIONAL STUDIES:  < from: MR Head w/wo IV Cont (01.04.23 @ 21:21) >  IMPRESSION:    New small cortical/subcortical acute infarct in the right parietal lobe.    Progressively increasing confluent signal abnormality in bilateral   periventricular white matter, with redemonstrated restricted diffusion in   bilateral inferior frontal lobe and rostrum of the corpus callosum. No   associated enhancement. The finding is nonspecific but likely reflects   post radiation changes. Recommend attention on follow-up.    Further decrease in heterogeneous enhancements associated with the   persistently expansile left occipital mass compared to the prior brain   MRI from 12/6/2020 which may reflect posttreatment change. Increased   scattered calcifications and trace petechial hemorrhage as demonstrated   on the prior head CT from 1/20/2023 likely representing posttreatment   change. Recommend continued close follow-up MR brain with perfusion.    Unchanged extension of the mass into the splenium of the corpus callosum   and contiguous expansile FLAIR signal abnormality in bilateral periatrial   white matter extending to the left temporal lobe.    Communication: The summary of above findings were discussed with readback   confirmation with Dr. Jimenez by radiologist Dr. Cherry on 1/5/2023 at 10:43   AM.    --- End of Report ---    < end of copied text >

## 2023-01-05 NOTE — PHYSICAL THERAPY INITIAL EVALUATION ADULT - ADDITIONAL COMMENTS
PTA: pt lives in [place]. Was IND c ADL's and was amb IND c supervision s AD, but ocassional use of RW.

## 2023-01-05 NOTE — PHYSICAL THERAPY INITIAL EVALUATION ADULT - PERTINENT HX OF CURRENT PROBLEM, REHAB EVAL
57-year-old F with PMHx of high-grade glioblastoma multiforme s/p resection April 2022 on chemotherapy, followed by hematology oncology and neurosurgery, on seizure medications and HTN now presents accompanied by her daughter and her  for expressive aphasia and increased nausea and vomiting since this morning. Stroke Code called in the ED, NIH score 5, CTA neg for LVO, NI cancelled. Admitted for aphasia. New acute infarct right parietal lobe on MRI.

## 2023-01-05 NOTE — PHYSICAL THERAPY INITIAL EVALUATION ADULT - GENERAL OBSERVATIONS, REHAB EVAL
Pt encountered sitting in b/s recliner in, agitated. RN Jacklyn and son Anselmo present. + IV (disconnected).

## 2023-01-05 NOTE — PROGRESS NOTE ADULT - ASSESSMENT
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 Pt also has expressive aphasia with intermittent episodes of speaking. Patient received meds at 5:30 Am and 8:40 am was normal. Patient is on chemo medication Temorizide every 28 days has not taken it due to low platelet count. S/p tumor removal by Dr. Villafana 4/2022.     # Cortical/subcortical acute infarct in right parietal lobe  # Expressive aphasia-resolved  # H/o GBM s/p resection April 2022 on chemotherapy  -  CT Brain Stroke Protocol (01.02.23 @ 19:14) >No evidence of new territorial infarct, midline shift, hydrocephalus, or extra axial fluid collection. Again seen is neoplasm involving the splenium of the corpus callosum and extending into the left occipital/parietal region. As compared with prior CT there is new calcification and heterogeneity within the region of the neoplasm compatible with posttreatment changes  -  EEG (01.03.23 @ 21:40) >Epileptiform sharp waves  - c/w  Keppra   - taper decadron  -  MR Head w/wo IV Cont (01.04.23 @ 21:21) >New small cortical/subcortical acute infarct in the right parietal lobe.Progressively increasing confluent signal abnormality in bilateral periventricular white matter, with redemonstrated restricted diffusion in bilateral inferior frontal lobe and rostrum of the corpus callosum. No associated enhancement. The finding is nonspecific but likely reflects post radiation changes. Recommend attention on follow-up.Further decrease in heterogeneous enhancements associated with the persistently expansile left occipital mass compared to the prior brain MRI from 12/6/2020 which may reflect posttreatment change. Increased scattered calcifications and trace petechial hemorrhage as demonstrated on the prior head CT from 1/20/2023 likely representing posttreatment change. Unchanged extension of the mass into the splenium of the corpus callosum and contiguous expansile FLAIR signal abnormality in bilateral periatrial   white matter extending to the left temporal lobe.  -  hold chemo sec to  low platelets  - evaluated by  oncology outpt F/u  - Start ASA, c/w statin    # Hypertension- uncontrolled sec to steroids  - increase toprol,   - c/w aldactone  - monitor BP    # Dyslipidemia  - c/w statin    #Hypokalemia-resolved    # Thrombocytopenia  - monitor platelets    # DVT prophylaxis  - scd    # Full code    # Pending - monitor BP, anticipate for discharge in AM  # Discussed plan of care with patient and family  # Disposition: Home when stable

## 2023-01-05 NOTE — PROVIDER CONTACT NOTE (OTHER) - SITUATION
Pt requires PIV w/ US, RN x2 unsuccessful. Pt edematous, ecchymotic to b/l UE and resistant to PIV sticks.

## 2023-01-05 NOTE — PHYSICAL THERAPY INITIAL EVALUATION ADULT - LEVEL OF INDEPENDENCE: SCOOT/BRIDGE, REHAB EVAL
Pre-Operative Diagnosis            1. ?Right breast cancer          2. ?Status post right mastectomy and implant reconstruction          3. ?Status post radiation to right chest wall          4. ?Unstable soft tissue over the inferior pole of the reconstructed breast                  Post-Operative Diagnosis            Same                  Performed by            Emmanuel Mcdonnell MD                  Assistant            None                  Anesthetic Used            General                  Procedures Performed            1. ?Inferiorly based fasciocutaneous advancement flap of the lower pole of the right reconstructed breast, total length of the incision was 15 cm                  Description of Procedure            72-year-old female with history of right breast cancer who underwent a previous nipple sparing right mastectomy followed by tissue expander reconstruction. ?She subsequently had radiation to the right chest wall and then implant exchange. ?She has had unstable soft tissue over the inframammary incision on the right side. ?On a previous occasion this?incision was revised. ?However most recently she developed an eschar over the right midportion of the inframammary incision. ?Because of the unstable soft tissue in this area I recommended either a fascial cutaneous advancement flap from the upper abdominal tissue or latissimus dorsi flap. ?Based on the patients desires that she would like to proceed with the fascia cutaneous flap. ?I discussed the risks, benefits, and alternatives of the procedure with risks, including but not limited to bleeding, infection, unbearable scarring, unfavorable cosmetic result, implant failure, implant malposition, infection requiring implant removal, need for a latissimus dorsi flap, and need for additional revisional procedures, and she agreed to proceed.           The patient received perioperative antibiotics to take the appendectomy room after anesthesia  she was prepped and draped in usual sterile fashion. ?Approximately 5 cc of half percent Marcaine with epinephrine to be treated into the area the inframammary fold. ?Incision was then made in the inframammary fold and this was dissected down to the anterior abdominal fascia. ?Dissection proceeded approximately 10-12 cm inferiorly on top of the fascia. ?This created the inferiorly based fascia cutaneous advancement flap. ?Incision was then made superior to the area of the radiation changes to the inferior pole of the right reconstructed breast which included the eschar. ?This was done in elliptical fashion and the skin was then de-epithelialized. ?This was pulse irrigated with 3 L of bacitracin solution. ?Hemostasis obtained by electrocautery. ?The flap was then advanced and 2-0 Vicryl sutures were used to advance the flap up to the inframammary fold. ?The incision was then closed with 3-0 Vicryl deep dermal sutures followed by?4-0 nylon vertical mattress sutures. ?Flap appeared viable. ?Wound was then cleaned off an antibiotic and was placed along with Xeroform gauze 4 x 4s and ABD pads. ?The patient tolerated procedure well and instrument sponge and needle counts were correct ?2.                     Findings                   Specimens Removed            None                  Estimated Blood Loss            10 cc                  Blood Administered (Yes/No)            No                  Complications            None                  Grafts/Implants            No                       Electronically Signed On 01/02/2017 10:53  __________________________________________________   WESLEY WILSON     see above

## 2023-01-05 NOTE — CONSULT NOTE ADULT - ASSESSMENT
58 yo F with PMH of GBM s/p resection 4/2022 and chemoRT and HTN presented to hospital due to expressive aphasia and increased nausea and vomiting. Stroke Code was called in the ED, NIH score 5, CTA neg for LVO, NI cancelled. Currently, she is admitted for aphasia. Oncology consulted for management of pt's GBM.    # Left occipital GBM, unmethylated MGMT, IDH wild type, PIK3CA + PIK3R1 mutations  - underwent GBM resection in 4/2022   - on 6/30/2022, S/P chemoRT with concurrent Temodar, with worsening symptoms secondary to recurrent edema and was restarted on Decadron 4 mg BID  - Repeat MRI on 8/05/2022 showed possible interval progression vs pseduo-progression (from RT)?  - On 8/22/2022, pt was started Avastin at 10 mg/kg with Temodar (150 mg/m2, total 320 mg) to be taken for 5 consecutive days every 28 days.  - On 10/03/2022, MR brain showed response   - since no significant myelosuppression, Temodar was increased to 400 mg (200 mg/m2) with cycle 2 in Oct 2022.  - 11/16/2022 started dexamethasone 0.5 mg PO QD - tapered without issues.  - 11/23/2022 delayed Temodar 400 mg (200 mg/m2) PO QD D1-5 every 28 days due to thrombocytopenia.  - thrombocytopenia again on 12/29/22 and temodar placed on hold     PLAN:  - s/p Bevacizumab 10 mg/kg every 2 weeks (last given on 12/29/2022 and next cycle due on 1/12/22 which can be given outpatient)  - continue to hold Temodar 400 mg (200 mg/m2) PO QD D1-5 cycle 4 for now due to thrombocytopenia, can restart as outpatient after seeing Dr. Dao   - f/u MR brain  56 yo F with PMH of GBM s/p resection 4/2022 and chemoRT and HTN presented to hospital due to expressive aphasia and increased nausea and vomiting. Stroke Code was called in the ED, NIH score 5, CTA neg for LVO, NI cancelled. Currently, she is admitted for aphasia. Oncology consulted for management of pt's GBM.    # Left occipital GBM, unmethylated MGMT, IDH wild type, PIK3CA + PIK3R1 mutations  - underwent GBM resection in 4/2022   - on 6/30/2022, S/P chemoRT with concurrent Temodar, with worsening symptoms secondary to recurrent edema and was restarted on Decadron 4 mg BID  - Repeat MRI on 8/05/2022 showed possible interval progression vs pseduo-progression (from RT)?  - On 8/22/2022, pt was started Avastin at 10 mg/kg with Temodar (150 mg/m2, total 320 mg) to be taken for 5 consecutive days every 28 days.  - On 10/03/2022, MR brain showed response   - since no significant myelosuppression, Temodar was increased to 400 mg (200 mg/m2) with cycle 2 in Oct 2022.  - 11/16/2022 started dexamethasone 0.5 mg PO QD - tapered without issues.  - 11/23/2022 delayed Temodar 400 mg (200 mg/m2) PO QD D1-5 every 28 days due to thrombocytopenia.  - thrombocytopenia again on 12/29/22 and temodar placed on hold     PLAN:  - s/p Bevacizumab 10 mg/kg every 2 weeks (last given on 12/29/2022 and next cycle due on 1/12/22 which can be given outpatient)  - continue to hold Temodar 400 mg (200 mg/m2) PO QD D1-5 cycle 4 for now due to thrombocytopenia, can restart as outpatient after seeing Dr. Dao   - MR brain result noted showing posttreatment effect but no sign of progression    # Small cortical/subcortical acute infarct in R parietal lobe  - no contraindication from oncology perspective to start aspirin    Rest of management per primary team. 56 yo F with PMH of GBM s/p resection 4/2022 and chemoRT and HTN presented to hospital due to expressive aphasia and increased nausea and vomiting. Stroke Code was called in the ED, NIH score 5, CTA neg for LVO, NI cancelled. Currently, she is admitted for aphasia. Oncology consulted for management of pt's GBM.    # Left occipital GBM, unmethylated MGMT, IDH wild type, PIK3CA + PIK3R1 mutations  - underwent GBM resection in 4/2022   - on 6/30/2022, S/P chemoRT with concurrent Temodar, with worsening symptoms secondary to recurrent edema and was restarted on Decadron 4 mg BID  - Repeat MRI on 8/05/2022 showed possible interval progression vs pseduo-progression (from RT)?  - On 8/22/2022, pt was started Avastin at 10 mg/kg with Temodar (150 mg/m2, total 320 mg) to be taken for 5 consecutive days every 28 days.  - On 10/03/2022, MR brain showed response   - since no significant myelosuppression, Temodar was increased to 400 mg (200 mg/m2) with cycle 2 in Oct 2022.  - 11/16/2022 started dexamethasone 0.5 mg PO QD - tapered without issues.  - 11/23/2022 delayed Temodar 400 mg (200 mg/m2) PO QD D1-5 every 28 days due to thrombocytopenia.  - thrombocytopenia again on 12/29/22 and temodar placed on hold     PLAN:  - s/p Bevacizumab 10 mg/kg every 2 weeks (last given on 12/29/2022 and next cycle due on 1/12/22 which can be given outpatient)  - continue to hold Temodar 400 mg (200 mg/m2) PO QD D1-5 cycle 4 for now due to thrombocytopenia, can restart as outpatient after seeing Dr. Dao   - MR brain result noted showing posttreatment effect but no sign of progression    # Small cortical/subcortical acute infarct in R parietal lobe  - no contraindication from oncology perspective to start aspirin    # Hypertensive urgency   - c/w anti-HTN meds  - consider adding additional anti-HTN med?    Rest of management per primary team.

## 2023-01-05 NOTE — PROGRESS NOTE ADULT - ASSESSMENT
57-year-old F with PMHx of high-grade glioblastoma multiforme s/p resection April 2022 on chemotherapy, followed by hematology oncology and neurosurgery, on seizure medications and HTN now presents accompanied by her daughter and her  for expressive aphasia and increased nausea and vomiting since this morning. Stroke Code called in the ED, NIH score 5, CTA neg for LVO, NI cancelled. Admitted for aphasia.    #Aphasia, 2/2 progression of GBM mass vs epileptiform   #hx of GBM s/p resection April 2022 on chemotherapy  - stroke code in the ED, NIH score 5  - CTA negative for LVO  - CTH new calcification and heterogeneity within the region of the neoplasm compatible with posttreatment changes which were also described on MR of the head 12/6/2022  - Keppra 1500mg IV BID  - Decadron 4mg IV q6hrs  - EEG epileptiform activity - continue with keppra 1500mg BID  - MR brain read done - new acute infarct right parietal - okay to start on asa as per neuro and hemeonc - neuro to discuss findings with neuroradio before final recs tomorrow  - MR brain findings noted - stable in terms of tumor    #hx of GBM s/p resection April 2022 on chemotherapy  - follows with Dr. Dao  - Continue to hold chemo i/v/o low platelets  - Onco to eval patient tomorrow as per discussion with fellow    #HTN  - c/w spironolactone 25mg daily, Toprol 50mg daily    #HLD   - c/w lipitor 80 bedtime    #Hypokalemia  - repleted  - monitor BMP    Misc:  DVT ppx: none  GI ppx: PPI  Activity: IAT  Diet: S/S eval  Code: Full Code  Dispo: Acute, floor     Pending - Pending final neuro recs. Heme-onc Follow up, PT/OT       57-year-old F with PMHx of high-grade glioblastoma multiforme s/p resection April 2022 on chemotherapy, followed by hematology oncology and neurosurgery, on seizure medications and HTN now presents accompanied by her daughter and her  for expressive aphasia and increased nausea and vomiting since this morning. Stroke Code called in the ED, NIH score 5, CTA neg for LVO, NI cancelled. Admitted for aphasia.    #Aphasia, 2/2 progression of GBM mass vs epileptiform   #hx of GBM s/p resection April 2022 on chemotherapy  - stroke code in the ED, NIH score 5  - CTA negative for LVO  - CTH new calcification and heterogeneity within the region of the neoplasm compatible with posttreatment changes which were also described on MR of the head 12/6/2022  - Keppra 1500mg IV BID  - Decadron 4mg IV q6hrs  - EEG epileptiform activity - continue with keppra 1500mg BID  - MR brain read done - new acute infarct right parietal - okay to start on asa as per neuro and hemeonc - neuro to discuss findings with neuroradio before final recs tomorrow  - MR brain findings noted - stable in terms of tumor  - Taper decadron to home dose 0.5mg qd as per hemeonc OP note and conversation with fellow    #hx of GBM s/p resection April 2022 on chemotherapy  - follows with Dr. Dao  - Continue to hold chemo i/v/o low platelets  - Onco to eval patient tomorrow as per discussion with fellow    #HTN  - c/w spironolactone 25mg daily, Toprol 50mg daily    #HLD   - c/w lipitor 80 bedtime    #Hypokalemia  - repleted  - monitor BMP    Misc:  DVT ppx: none  GI ppx: PPI  Activity: IAT  Diet: S/S eval  Code: Full Code  Dispo: Acute, floor     Pending - Pending final neuro recs. Heme-onc Follow up, PT/OT

## 2023-01-05 NOTE — PROGRESS NOTE ADULT - NS ATTEND AMEND GEN_ALL_CORE FT
Patient seen and examined and agree with above except as noted.  Patients history, notes ,labs, imaging, vitals and meds reviewed personally.  Patient with new infarct seen on MRI as well as possible chemo/radiation changes.  Would need to stay on aspirin if benefits out weigh risks    Plan as above (including carotid dopplers b/l)

## 2023-01-05 NOTE — CONSULT NOTE ADULT - SUBJECTIVE AND OBJECTIVE BOX
HPI:  57-year-old F with PMHx of high-grade glioblastoma multiforme s/p resection April 2022 on chemotherapy, followed by hematology oncology and neurosurgery, on seizure medications of keppra 1g BID, and HTN on metoprolol and spironolactone, now presents accompanied by her daughter and her  for increased nausea and vomiting since this morning. Pt also has expressive aphasia with intermittent episodes of speaking. Patient received meds at 5:30 Am and 8:40 am was normal. Patient is on chemo medication Temorizide every 28 days has not taken it due to low platelet count. S/p tumor removal by Dr. Villafana 4/2022.  Last known well 8:45am. Stroke code activated in the ED. NIH score 5.     ED vitals:   /115, , Temp 98.3F, satting 96% on room air  Labs K 2.9  CTA obtained in ED negative for LVO, NI cancelled. CTH shows  "new calcification and heterogeneity within the region of the neoplasm compatible with posttreatment changes which were also described on MR of the head 12/6/2022.    Admitted for aphasia.    -  CT Brain Stroke Protocol (01.02.23 @ 19:14) >No evidence of new territorial infarct, midline shift, hydrocephalus, or extra axial fluid collection. Again seen is neoplasm involving the splenium of the corpus callosum and extending into the left occipital/parietal region. As compared with prior CT there is new calcification and heterogeneity within the region of the neoplasm compatible with posttreatment changes    -  MR Head w/wo IV Cont (01.04.23 @ 21:21) >New small cortical/subcortical acute infarct in the right parietal lobe. Progressively increasing confluent signal abnormality in bilateral periventricular white matter, with re demonstrated restricted diffusion in bilateral inferior frontal lobe and rostrum of the corpus callosum. No associated enhancement. The finding is nonspecific but likely reflects post radiation changes. Recommend attention on follow-up. Further decrease in heterogeneous enhancements associated with the persistently expansile left occipital mass compared to the prior brain MRI from 12/6/2020 which may reflect posttreatment change. Increased scattered calcifications and trace petechial hemorrhage as demonstrated on the prior head CT from 1/20/2023 likely representing posttreatment change. Unchanged extension of the mass into the splenium of the corpus callosum and contiguous expansile FLAIR signal abnormality in bilateral periatrial   white matter extending to the left temporal lobe.    medical charts / labs / imaging studies reviewed     PAST MEDICAL & SURGICAL HISTORY:  RAMAKRISHNA on CPAP    GERD (gastroesophageal reflux disease)      Asthmatic bronchitis  MILD , USES VENTOLIN Q2-3M      HTN (hypertension)      Migraines      Chronic neck pain      H/O sinus surgery      Status post D&amp;C      H/O arthroscopy of shoulder  RT      History of hernia repair  2019          Hospital Course:    TODAY'S SUBJECTIVE & REVIEW OF SYMPTOMS:     Constitutional WNL   Cardio WNL   Resp WNL   GI WNL  Heme WNL  Endo WNL  Skin WNL  MSK WNL  Neuro aphasia   Cognitive WNL  Psych WNL      MEDICATIONS  (STANDING):  aspirin  chewable 81 milliGRAM(s) Oral daily  atorvastatin 80 milliGRAM(s) Oral at bedtime  gabapentin 400 milliGRAM(s) Oral two times a day  haloperidol     Tablet 1 milliGRAM(s) Oral once  influenza   Vaccine 0.5 milliLiter(s) IntraMuscular once  levETIRAcetam  IVPB 1500 milliGRAM(s) IV Intermittent every 12 hours  metoprolol succinate ER 50 milliGRAM(s) Oral every 12 hours  pantoprazole    Tablet 40 milliGRAM(s) Oral before breakfast  sertraline 50 milliGRAM(s) Oral daily  spironolactone 25 milliGRAM(s) Oral daily    MEDICATIONS  (PRN):  acetaminophen     Tablet .. 650 milliGRAM(s) Oral every 6 hours PRN Moderate Pain (4 - 6), Severe Pain (7 - 10)  acetaminophen     Tablet .. 650 milliGRAM(s) Oral every 6 hours PRN Temp greater or equal to 38C (100.4F), Mild Pain (1 - 3)  aluminum hydroxide/magnesium hydroxide/simethicone Suspension 30 milliLiter(s) Oral every 4 hours PRN Dyspepsia  melatonin 5 milliGRAM(s) Oral at bedtime PRN Insomnia  ondansetron Injectable 4 milliGRAM(s) IV Push every 6 hours PRN Nausea and/or Vomiting      FAMILY HISTORY:      Allergies    No Known Allergies    Intolerances        SOCIAL HISTORY:    [  ] Etoh  [  ] Smoking  [  ] Substance abuse     Home Environment:  [   ] Home Alone  [ x  ] Lives with Family  [   ] Home Health Aid    Dwelling:  [   ] Apartment  [ x  ] Private House  [   ] Adult Home  [   ] Skilled Nursing Facility      [   ] Short Term  [   ] Long Term  [  x ] Stairs       Elevator [   ]    FUNCTIONAL STATUS PTA: (Check all that apply)  Ambulation: [  x  ]Independent    [   ] Dependent     [   ] Non-Ambulatory  Assistive Device: [   ] SA Cane  [   ]  Q Cane  [   ] Walker  [   ]  Wheelchair  ADL : [ x  ] Independent  [    ]  Dependent       Vital Signs Last 24 Hrs  T(C): 36.3 (05 Jan 2023 08:00), Max: 36.4 (05 Jan 2023 00:07)  T(F): 97.4 (05 Jan 2023 08:00), Max: 97.6 (05 Jan 2023 00:07)  HR: 89 (05 Jan 2023 08:00) (80 - 89)  BP: 181/99 (05 Jan 2023 08:00) (159/87 - 181/99)  BP(mean): --  RR: 18 (05 Jan 2023 08:00) (18 - 18)  SpO2: 99% (05 Jan 2023 08:00) (96% - 99%)    Parameters below as of 05 Jan 2023 08:00  Patient On (Oxygen Delivery Method): room air          PHYSICAL EXAM: Awake & confused / aphasia   GENERAL: NAD  HEAD:  Normocephalic  CHEST/LUNG: Clear   HEART: S1S2+  ABDOMEN: Soft, Nontender  EXTREMITIES:  no calf tenderness    NERVOUS SYSTEM:  Cranial Nerves 2-12 intact [   ] Abnormal  [   ]  ROM: WFL all extremities [ x  ]  Abnormal [   ]  Motor Strength: WFL all extremities  [  x ]  Abnormal [   ]  Sensation: intact to light touch [ x  ] Abnormal [   ]    FUNCTIONAL STATUS:  Bed Mobility: Independent [   ]  Supervision [   ]  Needs Assistance [x   ]  N/A [   ]  Transfers: Independent [   ]  Supervision [   ]  Needs Assistance [ x  ]  N/A [   ]   Ambulation: Independent [   ]  Supervision [   ]  Needs Assistance [ x  ]  N/A [   ]  ADL: Independent [   ] Requires Assistance [   ] N/A [   ]      LABS:                        12.4   6.11  )-----------( 73       ( 05 Jan 2023 07:07 )             35.6     01-05    139  |  104  |  8<L>  ----------------------------<  134<H>  3.9   |  25  |  <0.5<L>    Ca    8.7      05 Jan 2023 07:07  Mg     2.2     01-05    TPro  6.5  /  Alb  4.2  /  TBili  0.8  /  DBili  x   /  AST  82<H>  /  ALT  41  /  AlkPhos  64  01-05          RADIOLOGY & ADDITIONAL STUDIES:

## 2023-01-05 NOTE — CONSULT NOTE ADULT - ASSESSMENT
IMPRESSION: Rehab of acute right parietal stroke / aphasia, gait ataxia / GBM, s/p resection and chemo, RAMAKRISHNA, asthma, HTN                      Spoke with  at bedside that she is more confused and aphasic, not at her baseline     PRECAUTIONS: [   ] Cardiac  [   ] Respiratory  [   ] Seizures [   ] Contact Isolation  [   ] Droplet Isolation  [   ] Other    Weight Bearing Status:     RECOMMENDATION:    Out of Bed to Chair     DVT/Decubiti Prophylaxis    REHAB PLAN:     [ x   ] Bedside P/T 3-5 times a week   [   x ]   Bedside O/T  2-3 times a week             [  x  ] Speech Therapy               [    ]  No Rehab Therapy Indicated   Conditioning/ROM                                    ADL  Bed Mobility                                               Conditioning/ROM  Transfers                                                     Bed Mobility  Sitting /Standing Balance                         Transfers                                        Gait Training                                               Sitting/Standing Balance  Stair Training [   ]Applicable                    Home equipment Eval                                                                        Splinting  [   ] Only      GOALS:   ADL   [  x  ]   Independent                    Transfers  [  x  ] Independent                          Ambulation  [  x  ] Independent     [  x   ] With device                            [    ]  CG                                                         [    ]  CG                                                                  [    ] CG                            [    ] Min A                                                   [    ] Min A                                                              [    ] Min  A          DISCHARGE PLAN:   [    ]  Good candidate for Intensive Rehabilitation/Hospital based                                             Will tolerate 3hrs Intensive Rehab Daily                                       [     ]  Short Term Rehab in Skilled Nursing Facility                                       [     ]  Home with Outpatient or VN services                                         [  x   ]  Possible Candidate for Intensive Hospital based Rehab

## 2023-01-06 NOTE — PROGRESS NOTE ADULT - ASSESSMENT
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 Pt also has expressive aphasia with intermittent episodes of speaking. Patient received meds at 5:30 Am and 8:40 am was normal. Patient is on chemo medication Temorizide every 28 days has not taken it due to low platelet count. S/p tumor removal by Dr. Villafana 4/2022.     # Cortical/subcortical acute infarct in right parietal lobe  # Expressive aphasia-resolved  # H/o GBM s/p resection April 2022 on chemotherapy  -  CT Brain Stroke Protocol (01.02.23 @ 19:14) >No evidence of new territorial infarct, midline shift, hydrocephalus, or extra axial fluid collection. Again seen is neoplasm involving the splenium of the corpus callosum and extending into the left occipital/parietal region. As compared with prior CT there is new calcification and heterogeneity within the region of the neoplasm compatible with posttreatment changes  -  EEG (01.03.23 @ 21:40) >Epileptiform sharp waves  - c/w  Keppra   - taper decadron  -  MR Head w/wo IV Cont (01.04.23 @ 21:21) >New small cortical/subcortical acute infarct in the right parietal lobe.Progressively increasing confluent signal abnormality in bilateral periventricular white matter, with redemonstrated restricted diffusion in bilateral inferior frontal lobe and rostrum of the corpus callosum. No associated enhancement. The finding is nonspecific but likely reflects post radiation changes. Recommend attention on follow-up.Further decrease in heterogeneous enhancements associated with the persistently expansile left occipital mass compared to the prior brain MRI from 12/6/2020 which may reflect posttreatment change. Increased scattered calcifications and trace petechial hemorrhage as demonstrated on the prior head CT from 1/20/2023 likely representing posttreatment change. Unchanged extension of the mass into the splenium of the corpus callosum and contiguous expansile FLAIR signal abnormality in bilateral periatrial   white matter extending to the left temporal lobe.  -  hold chemo sec to  low platelets  - evaluated by  oncology outpt F/u  - c/w  ASA statin  - PT eval: recommend acute Inpatient Rehab    # Hypertension- uncontrolled sec to steroids  - DC toprol , start labetalol 100mg q8  - increase aldactone 50 mg daily  - Start lasix 40mg daily  - monitor BP    # Dyslipidemia  - c/w statin    #Hypokalemia-resolved    # Thrombocytopenia  - monitor platelets    # DVT prophylaxis  - scd    # Full code    # Pending - monitor BP  # Discussed plan of care with patient and family  # Disposition: TBD

## 2023-01-06 NOTE — DISCHARGE NOTE PROVIDER - HOSPITAL COURSE
57-year-old F with PMHx of high-grade glioblastoma multiforme s/p resection April 2022 on chemotherapy, followed by hematology oncology and neurosurgery, on seizure medications and HTN now presents accompanied by her daughter and her  for expressive aphasia and increased nausea and vomiting since this morning. Stroke Code called in the ED, NIH score 5, CTA neg for LVO, NI cancelled. Admitted for aphasia.    Patient admitted for Aphasia, 2/2 progression of GBM mass vs epileptiform. Has a hx of GBM s/p resection April 2022 on chemotherapy. stroke code at admission in the ED, NIH score 5. CTA negative for LVO. CTH new calcification and heterogeneity within the region of the neoplasm compatible with posttreatment changes which were also described on MR of the head 12/6/2022. EEG showed epileptiform activity. MR brain showed acute infarct in right parietal. As per discussion with neuro and Heme-onc patient was started on aspirin, continue with lipitor, tapering dexamethasone to home dose, continue to hold oral chemo, and continue with keppra 1500mg BID.    During the course of hospitalization patient had high blood pressure elevated to 180s/100s and antihypertensive regimen was adjusted. Metoprolol changed to 50mg Twice a day and Started on hydrochlorothiazide. Likely Htn from fluid retention on dexa.     Patient was seen by PT and was a candidate for acute inpatient rehab and continue speech therapy. Patient is stable at discharge and is being discharged to Rehab in 4a with advise to follow-up with hemeonc as OP.      57-year-old F with PMHx of high-grade glioblastoma multiforme s/p resection April 2022 on chemotherapy, followed by hematology oncology and neurosurgery, on seizure medications and HTN now presents accompanied by her daughter and her  for expressive aphasia and increased nausea and vomiting since this morning. Stroke Code called in the ED, NIH score 5, CTA neg for LVO, NI cancelled. Admitted for aphasia.    Patient admitted for Aphasia, 2/2 progression of GBM mass vs epileptiform. Has a hx of GBM s/p resection April 2022 on chemotherapy. stroke code at admission in the ED, NIH score 5. CTA negative for LVO. CTH new calcification and heterogeneity within the region of the neoplasm compatible with posttreatment changes which were also described on MR of the head 12/6/2022. EEG showed epileptiform activity. MR brain showed acute infarct in right parietal. As per discussion with neuro and Heme-onc patient was started on aspirin, continue with lipitor, tapering dexamethasone to home dose, continue to hold oral chemo, and continue with keppra 1500mg BID.    During the course of hospitalization patient had high blood pressure elevated to 180s/100s and antihypertensive regimen was adjusted. Metoprolol changed to 50mg Twice a day and Started on hydrochlorothiazide. Likely Htn from fluid retention on dexa.     Patient was seen by PT .  Patient is stable at discharge advise to follow-up with hemeonc as OP.         # Cortical/subcortical acute infarct in right parietal lobe  # Expressive aphasia-resolved  # H/o GBM s/p resection April 2022 on chemotherapy  -  CT Brain Stroke Protocol (01.02.23 @ 19:14) >No evidence of new territorial infarct, midline shift, hydrocephalus, or extra axial fluid collection. Again seen is neoplasm involving the splenium of the corpus callosum and extending into the left occipital/parietal region. As compared with prior CT there is new calcification and heterogeneity within the region of the neoplasm compatible with posttreatment changes  -  EEG (01.03.23 @ 21:40) >Epileptiform sharp waves  - c/w  Keppra   - taper decadron  -  MR Head w/wo IV Cont (01.04.23 @ 21:21) >New small cortical/subcortical acute infarct in the right parietal lobe.Progressively increasing confluent signal abnormality in bilateral periventricular white matter, with redemonstrated restricted diffusion in bilateral inferior frontal lobe and rostrum of the corpus callosum. No associated enhancement. The finding is nonspecific but likely reflects post radiation changes. Recommend attention on follow-up.Further decrease in heterogeneous enhancements associated with the persistently expansile left occipital mass compared to the prior brain MRI from 12/6/2020 which may reflect posttreatment change. Increased scattered calcifications and trace petechial hemorrhage as demonstrated on the prior head CT from 1/20/2023 likely representing posttreatment change. Unchanged extension of the mass into the splenium of the corpus callosum and contiguous expansile FLAIR signal abnormality in bilateral periatrial   white matter extending to the left temporal lobe.  - VA Duplex Carotid, Bilat (01.07.23 @ 09:04) Mild 20-39% stenosis in bilateral internal carotid artery  -  hold chemo sec to  low platelets  - evaluated by  oncology outpt F/u  - c/w  ASA statin  -evaluated by PT    # Hypertension- uncontrolled sec to steroids  - c/w  labetalol 100mg q8  - c/w aldactone 50 mg daily  -c/w  lasix 40mg daily      # Dyslipidemia  - c/w statin    #Hypokalemia  - replete k    # Thrombocytopenia  - monitor platelets

## 2023-01-06 NOTE — OCCUPATIONAL THERAPY INITIAL EVALUATION ADULT - ADDITIONAL COMMENTS
pt poor historian; PLOF was reported by son; as per son ORLIN, he is home throughout the day; recently graduated from [employer] and currently home w/mom to help w/ADL and mobility if needed, as well as pt's  able to help after work; ORLIN stated: "we have very supportive family"

## 2023-01-06 NOTE — OCCUPATIONAL THERAPY INITIAL EVALUATION ADULT - GENERAL OBSERVATIONS, REHAB EVAL
pt seen in the chair outside of pt's room w/lap band restrain; +IV; +labile from agitation to deep sleep w/ decreased arousability; when awake able to follow basic one step commands, but easily frustrated; as per RN and son AJ: pt is less agitated w/family by her side

## 2023-01-06 NOTE — OCCUPATIONAL THERAPY INITIAL EVALUATION ADULT - PATIENT/FAMILY/SIGNIFICANT OTHER GOALS STATEMENT, OT EVAL
pt is unable to verbalize clearly her wants and needs, as per her son AJ (Anselmo): "my mom would like to be able to go home, I been helping her for a while since dealing with glioblastoma tx"

## 2023-01-06 NOTE — DISCHARGE NOTE PROVIDER - NSDCFUSCHEDAPPT_GEN_ALL_CORE_FT
[facility]  [facility] [address]   Scheduled Appointment: 01/12/2023    Christian Dao  [facility]  [facility] [address]  Scheduled Appointment: 01/25/2023    [facility]  [facility] [address]   Scheduled Appointment: 01/26/2023    [facility]  [facility] [address]   Scheduled Appointment: 02/02/2023

## 2023-01-06 NOTE — DISCHARGE NOTE PROVIDER - ATTENDING DISCHARGE PHYSICAL EXAMINATION:
T(C): 36 (01-08-23 @ 08:00), Max: 36.8 (01-08-23 @ 00:08)  HR: 80 (01-08-23 @ 08:00) (80 - 86)  BP: 129/78 (01-08-23 @ 08:00) (119/71 - 135/86)  RR: 18 (01-08-23 @ 08:00) (17 - 18)  SpO2: --  O/E:  Awake, alert, not in distress.  HEENT: atraumatic, EOMI.  Chest: clear.  CVS: SIS2 +, no murmur.  P/A: Soft, BS+  CNS: awake, alert  Ext:  edema feet+  Skin: no rash, no ulcers.  All systems reviewed positive findings as above.

## 2023-01-06 NOTE — DISCHARGE NOTE PROVIDER - PROVIDER TOKENS
PROVIDER:[TOKEN:[[id number]],FOLLOWUP:[2 weeks]],PROVIDER:[TOKEN:[[id number]],FOLLOWUP:[2 weeks]] PROVIDER:[TOKEN:[[id number]],FOLLOWUP:[2 weeks]],PROVIDER:[TOKEN:[[id number]],FOLLOWUP:[2 weeks]],PROVIDER:[TOKEN:[[id number]],FOLLOWUP:[1 week]],PROVIDER:[TOKEN:[[id number]]]

## 2023-01-06 NOTE — DISCHARGE NOTE PROVIDER - CARE PROVIDERS DIRECT ADDRESSES
,[email or web address],[email or web address] ,[email or web address],DirectAddress_Unknown,DirectAddress_Unknown,[email or web address]

## 2023-01-06 NOTE — DISCHARGE NOTE PROVIDER - CARE PROVIDER_API CALL
Allison Dumont)  Neurosurgery  [address] [facility], [address]  [place] [address]  Phone: [phone]  Fax: [phone]  Follow Up Time: 2 weeks    Javy Shook)  Neurology  [address] [place] [address]  Phone: [phone]  Fax: [phone]  Follow Up Time: 2 weeks   Allison Dumont)  Neurosurgery  [address] [facility], [address]  [place] [address]  Phone: [phone]  Fax: [phone]  Follow Up Time: 2 weeks    Javy Shook)  Neurology  [address] [place] [address]  Phone: [phone]  Fax: [phone]  Follow Up Time: 2 weeks    Ranjan Adams)  Internal Medicine  [address] [place] [address]  Phone: [phone]  Fax: [phone]  Follow Up Time: 1 week    Christian Dao)  Hematology; Internal Medicine; Medical Oncology  [address] [place] [address]  Phone: [phone]  Fax: [phone]  Follow Up Time:

## 2023-01-06 NOTE — DISCHARGE NOTE PROVIDER - NSDCCPCAREPLAN_GEN_ALL_CORE_FT
PRINCIPAL DISCHARGE DIAGNOSIS  Diagnosis: Glioblastoma  Assessment and Plan of Treatment: You presented to us with aphasia and nausea. Blood work and imaging studies were obtained at admission.You were seen by Neurosurgery, neurology, and oncology teams during the admission. Imaging of head/brain showed stable cancer but new stroke. You were started on aspirin as per neurology recommendation. Your blood pressure medications were adjusted. You were seen by Physical therapy and deemed candidate for acute hospital rehab and are being transfered to  for rehab and speech therapy.  Continue to use the medications as prescribed, continue physical and speech therapy and follow-up with oncology/Neurosurgery as outpatient      SECONDARY DISCHARGE DIAGNOSES  Diagnosis: Nausea & vomiting  Assessment and Plan of Treatment:

## 2023-01-06 NOTE — DISCHARGE NOTE PROVIDER - DISCHARGING ATTENDING PHYSICIAN:
----- Message from Rian Jimenez sent at 4/18/2020  2:26 PM EDT -----  Regarding: Je Bain MD/ refill  Medication Refill    Caller (if not patient):Suzanne MCNEAL JR      Relationship of caller (if not patient): PT       Best contact number(s):[phone]      Name of medication and dosage if known: levocetirizine 5 MG,       Is patient out of this medication (yes/no):      Pharmacy name: [address] [place] [address] Alejandra Sow listed in chart? (yes/no): yes  Pharmacy phone number: unknown      Details to clarify the request:      Rian Jimenez
Dr Will

## 2023-01-06 NOTE — DISCHARGE NOTE PROVIDER - NSDCMRMEDTOKEN_GEN_ALL_CORE_FT
atorvastatin 80 mg oral tablet: 1 tab(s) orally once a day  dexamethasone 4 mg oral tablet: 1 tab(s) orally 2 times a day MDD:2  gabapentin 400 mg oral capsule: 1 cap(s) orally 2 times a day  Keppra 1000 mg oral tablet: 1 tab(s) orally every 12 hours for seizure ppx  metoprolol succinate 50 mg oral tablet, extended release: 1 tab(s) orally once a day  Multiple Vitamins oral capsule: 1 cap(s) orally once a day  omeprazole 40 mg oral delayed release capsule: 1 cap(s) orally once a day  potassium chloride 40 mEq/15 mL oral liquid: 15 milliliter(s) orally once a day  sertraline 50 mg oral tablet: 1 tab(s) orally once a day  spironolactone 25 mg oral tablet: 1 tab(s) orally once a day   atorvastatin 80 mg oral tablet: 1 tab(s) orally once a day  dexamethasone 4 mg oral tablet: 1 tab(s) orally 2 times a day MDD:2  gabapentin 400 mg oral capsule: 1 cap(s) orally 2 times a day  Keppra 1000 mg oral tablet: 1 tab(s) orally every 12 hours for seizure ppx  Multiple Vitamins oral capsule: 1 cap(s) orally once a day  omeprazole 40 mg oral delayed release capsule: 1 cap(s) orally once a day  sertraline 50 mg oral tablet: 1 tab(s) orally once a day   Aldactone 25 mg oral tablet: 2 tab(s) orally once a day  Aspirin Low Dose 81 mg oral tablet, chewable: 1 tab(s) orally once a day  atorvastatin 80 mg oral tablet: 1 tab(s) orally once a day  dexamethasone 0.5 mg oral tablet: 4 tab(s) orally once a day on 1/8 followed by 2 tablets orally once a day on 1/9 followed by 1 tablet orally once a day from 1/10 until you follow-up with Dr. Dao  gabapentin 400 mg oral capsule: 1 cap(s) orally 2 times a day  Keppra 750 mg oral tablet: 2 tab(s) orally every 12 hours  labetalol 100 mg oral tablet: 1 tab(s) orally 3 times a day  Lasix 40 mg oral tablet: 1 tab(s) orally once a day  Multiple Vitamins oral capsule: 1 cap(s) orally once a day  omeprazole 40 mg oral delayed release capsule: 1 cap(s) orally once a day  sertraline 50 mg oral tablet: 1 tab(s) orally once a day

## 2023-01-06 NOTE — OCCUPATIONAL THERAPY INITIAL EVALUATION ADULT - PATIENT PROFILE REVIEW, REHAB EVAL
“You can access the Follow[facility] Patient Portal, offered by [facility], by registering with the following website: [email or web address]” yes

## 2023-01-06 NOTE — OCCUPATIONAL THERAPY INITIAL EVALUATION ADULT - PERTINENT HX OF CURRENT PROBLEM, REHAB EVAL
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 Pt also has expressive aphasia with intermittent episodes of speaking. Patient received meds at 5:30 Am and 8:40 am was normal. Patient is on chemo medication Temorizide every 28 days has not taken it due to low platelet count. S/p tumor removal by Dr. Villafana 4/2022.  Stroke code activated in the ED. NIH score 5.

## 2023-01-06 NOTE — OCCUPATIONAL THERAPY INITIAL EVALUATION ADULT - LEVEL OF INDEPENDENCE, OT EVAL
unsafe at this time 2/2 pt's low frustration tolerance and ability to sustain attention at a single task at hand/unable to perform

## 2023-01-07 NOTE — PROGRESS NOTE ADULT - ASSESSMENT
57-year-old F with PMHx of high-grade glioblastoma multiforme s/p resection April 2022 on chemotherapy, followed by hematology oncology and neurosurgery, on seizure medications and HTN now presents accompanied by her daughter and her  for expressive aphasia and increased nausea and vomiting since this morning. Stroke Code called in the ED, NIH score 5, CTA neg for LVO, NI cancelled. Admitted for aphasia.    #Aphasia, 2/2 progression of GBM mass vs epileptiform   #hx of GBM s/p resection April 2022 on chemotherapy  - stroke code in the ED, NIH score 5  - CTA negative for LVO  - CTH new calcification and heterogeneity within the region of the neoplasm compatible with posttreatment changes which were also described on MR of the head 12/6/2022  - Keppra 1500mg IV BID  - Decadron 4mg IV q6hrs  - EEG epileptiform activity - continue with keppra 1500mg BID  - MR brain read done - new acute infarct right parietal - okay to start on aspirin as per neuro and hemeonc   - MR brain findings noted - stable in terms of tumor  - Taper decadron to home dose 0.5mg qd as per hemeonc OP note and conversation with fellow    #hx of GBM s/p resection April 2022 on chemotherapy  - follows with Dr. Dao  - Continue to hold chemo i/v/o low platelets  - marychuy dexa, Follow up OP with Dr. Dao    #HTN  - Labet 100mg q8  - Spironolactone 50mg qd  - lasix 40mg qd    #HLD   - c/w lipitor 80 bedtime    #Hypokalemia  - repleted  - monitor BMP    Misc:  DVT ppx: none  GI ppx: PPI  Activity: IAT  Diet: S/S eval  Code: Full Code  Dispo: home with home care    Pending - Pending d/c to home with home care for speech and PT/OT

## 2023-01-07 NOTE — PROGRESS NOTE ADULT - SUBJECTIVE AND OBJECTIVE BOX
Patient is a 57y old  Female who presents with a chief complaint of aphasia (04 Jan 2023 13:46)    Patient was seen and examined.  no new complaints  All systems reviewed.     PAST MEDICAL & SURGICAL HISTORY:  RAMAKRISHNA on CPAP  GERD (gastroesophageal reflux disease)  Asthmatic bronchitis, MILD , USES VENTOLIN Q2-3M  HTN (hypertension)  Migraines  Chronic neck pain  H/O sinus surgery  Status post D&amp;C  H/O arthroscopy of shoulder, right  History of hernia repair, 2019    Allergies  No Known Allergies    Home Medications:  atorvastatin 80 mg oral tablet: 1 tab(s) orally once a day (05 Apr 2022 23:24)  gabapentin 400 mg oral capsule: 1 cap(s) orally 2 times a day (05 Apr 2022 23:24)  metoprolol succinate 50 mg oral tablet, extended release: 1 tab(s) orally once a day (05 Apr 2022 23:24)  Multiple Vitamins oral capsule: 1 cap(s) orally once a day (03 Jan 2023 03:53)  omeprazole 40 mg oral delayed release capsule: 1 cap(s) orally once a day (03 Jan 2023 03:53)  potassium chloride 40 mEq/15 mL oral liquid: 15 milliliter(s) orally once a day (03 Jan 2023 03:54)  sertraline 50 mg oral tablet: 1 tab(s) orally once a day (05 Apr 2022 23:24)  spironolactone 25 mg oral tablet: 1 tab(s) orally once a day (03 Jan 2023 03:53)    MEDICATIONS  (STANDING):  aspirin  chewable 81 milliGRAM(s) Oral daily  atorvastatin 80 milliGRAM(s) Oral at bedtime  furosemide    Tablet 40 milliGRAM(s) Oral daily  gabapentin 400 milliGRAM(s) Oral two times a day  influenza   Vaccine 0.5 milliLiter(s) IntraMuscular once  labetalol 100 milliGRAM(s) Oral three times a day  levETIRAcetam 1500 milliGRAM(s) Oral every 12 hours  pantoprazole    Tablet 40 milliGRAM(s) Oral before breakfast  potassium chloride   Powder 40 milliEquivalent(s) Oral every 2 hours  sertraline 50 milliGRAM(s) Oral daily  spironolactone 50 milliGRAM(s) Oral daily    MEDICATIONS  (PRN):  acetaminophen     Tablet .. 650 milliGRAM(s) Oral every 6 hours PRN Moderate Pain (4 - 6), Severe Pain (7 - 10)  acetaminophen     Tablet .. 650 milliGRAM(s) Oral every 6 hours PRN Temp greater or equal to 38C (100.4F), Mild Pain (1 - 3)  aluminum hydroxide/magnesium hydroxide/simethicone Suspension 30 milliLiter(s) Oral every 4 hours PRN Dyspepsia  melatonin 5 milliGRAM(s) Oral at bedtime PRN Insomnia  ondansetron Injectable 4 milliGRAM(s) IV Push every 6 hours PRN Nausea and/or Vomiting    T(C): 36.4 (01-07-23 @ 08:00), Max: 36.5 (01-06-23 @ 23:37)  HR: 79 (01-07-23 @ 08:00) (70 - 88)  BP: 123/78 (01-07-23 @ 08:00) (123/78 - 160/91)  RR: 18 (01-07-23 @ 08:00) (18 - 19)  SpO2: --    O/E:  Awake, alert, not in distress.  HEENT: atraumatic, EOMI.  Chest: clear.  CVS: SIS2 +, no murmur.  P/A: Soft, BS+  CNS: awake, alert  Ext:  edema feet+  Skin: no rash, no ulcers.  All systems reviewed positive findings as above.                               12.4   4.77<L> )-----------( 78<L>    ( 07 Jan 2023 06:54 )             35.4<L>                        13.0   9.07  )-----------( 105<L>    ( 06 Jan 2023 08:30 )             38.0   01-07    140  |  100  |  13  ----------------------------<  102<H>  3.0<L>   |  24  |  0.5<L>  01-06    141  |  104  |  11  ----------------------------<  164<H>  4.0   |  26  |  0.5<L>    Ca    9.2      07 Jan 2023 06:54  Ca    9.3      06 Jan 2023 08:30  Mg     1.9     01-07    TPro  6.1  /  Alb  3.9  /  TBili  0.8  /  DBili  x   /  AST  78<H>  /  ALT  63<H>  /  AlkPhos  64  01-07  TPro  6.4  /  Alb  4.0  /  TBili  0.9  /  DBili  x   /  AST  82<H>  /  ALT  55<H>  /  AlkPhos  66  01-06  
Patient is a 57y old  Female who presents with a chief complaint of aphasia (04 Jan 2023 13:46)    Patient was seen and examined.  no new complaints  All systems reviewed.     PAST MEDICAL & SURGICAL HISTORY:  RAMAKRISHNA on CPAP  GERD (gastroesophageal reflux disease)  Asthmatic bronchitis, MILD , USES VENTOLIN Q2-3M  HTN (hypertension)  Migraines  Chronic neck pain  H/O sinus surgery  Status post D&amp;C  H/O arthroscopy of shoulder, right  History of hernia repair, 2019    Allergies  No Known Allergies    MEDICATIONS  (STANDING):  aspirin  chewable 81 milliGRAM(s) Oral daily  atorvastatin 80 milliGRAM(s) Oral at bedtime  dexAMETHasone  Injectable 4 milliGRAM(s) IV Push every 8 hours  gabapentin 400 milliGRAM(s) Oral two times a day  influenza   Vaccine 0.5 milliLiter(s) IntraMuscular once  levETIRAcetam  IVPB 1500 milliGRAM(s) IV Intermittent every 12 hours  metoprolol succinate ER 50 milliGRAM(s) Oral every 12 hours  pantoprazole    Tablet 40 milliGRAM(s) Oral before breakfast  sertraline 50 milliGRAM(s) Oral daily  spironolactone 25 milliGRAM(s) Oral daily    MEDICATIONS  (PRN):  acetaminophen     Tablet .. 650 milliGRAM(s) Oral every 6 hours PRN Moderate Pain (4 - 6), Severe Pain (7 - 10)  acetaminophen     Tablet .. 650 milliGRAM(s) Oral every 6 hours PRN Temp greater or equal to 38C (100.4F), Mild Pain (1 - 3)  aluminum hydroxide/magnesium hydroxide/simethicone Suspension 30 milliLiter(s) Oral every 4 hours PRN Dyspepsia  melatonin 5 milliGRAM(s) Oral at bedtime PRN Insomnia  ondansetron Injectable 4 milliGRAM(s) IV Push every 6 hours PRN Nausea and/or Vomiting    T(C): 36.3 (01-05-23 @ 08:00), Max: 36.4 (01-05-23 @ 00:07)  HR: 89 (01-05-23 @ 08:00) (71 - 89)  BP: 181/99 (01-05-23 @ 08:00) (159/87 - 181/99)  RR: 18 (01-05-23 @ 08:00) (18 - 18)  SpO2: 99% (01-05-23 @ 08:00) (96% - 99%)    O/E:  Awake, alert, not in distress.  HEENT: atraumatic, EOMI.  Chest: clear.  CVS: SIS2 +, no murmur.  P/A: Soft, BS+  CNS:awake, alert  Ext: no edema feet.  Skin: no rash, no ulcers.  All systems reviewed positive findings as above.                          12.4   6.11  )-----------( 73<L>    ( 05 Jan 2023 07:07 )             35.6<L>                        12.1   7.68  )-----------( 75<L>    ( 04 Jan 2023 06:10 )             35.3<L>  01-05    139  |  104  |  8<L>  ----------------------------<  134<H>  3.9   |  25  |  <0.5<L>  01-04    142  |  101  |  9<L>  ----------------------------<  130<H>  3.3<L>   |  28  |  <0.5<L>    Ca    8.7      05 Jan 2023 07:07  Ca    8.5      04 Jan 2023 06:10  Mg     2.2     01-05    TPro  6.5  /  Alb  4.2  /  TBili  0.8  /  DBili  x   /  AST  82<H>  /  ALT  41  /  AlkPhos  64  01-05  TPro  6.1  /  Alb  3.9  /  TBili  0.9  /  DBili  x   /  AST  61<H>  /  ALT  30  /  AlkPhos  67  01-04  
Neurology Progress Note    Interval History:    57-year-old F with PMHx of high-grade glioblastoma multiforme s/p resection April 2022 on chemotherapy, followed by hematology oncology and neurosurgery, on seizure medications of keppra 1g BID, and HTN on metoprolol and spironolactone, now presents accompanied by her daughter and her  for increased nausea and vomiting since this morning. Pt also has expressive aphasia with intermittent episodes of speaking. Patient received meds at 5:30 Am and 8:40 am was normal. Patient is on chemo medication Temorizide every 28 days has not taken it due to low platelet count. S/p tumor removal by Dr. Villafana 4/2022.  Last known well 8:45am. Stroke code activated in the ED. NIH score 5. CTA obtained in ED negative for LVO, NI cancelled. CTH shows  "new calcification and heterogeneity within the region of the neoplasm compatible with posttreatment changes which were also described on MR of the head 12/6/2022. Called back for MRI results of ischemic stroke.         Vital Signs Last 24 Hrs  T(C): 35.6 (05 Jan 2023 15:30), Max: 36.4 (05 Jan 2023 00:07)  T(F): 96 (05 Jan 2023 15:30), Max: 97.6 (05 Jan 2023 00:07)  HR: 73 (05 Jan 2023 15:30) (73 - 89)  BP: 181/90 (05 Jan 2023 15:30) (159/87 - 181/99)  BP(mean): --  RR: 18 (05 Jan 2023 15:30) (18 - 18)  SpO2: 99% (05 Jan 2023 08:00) (96% - 99%)    Parameters below as of 05 Jan 2023 15:30  Patient On (Oxygen Delivery Method): room air        Neurological Exam:   Awake alert and sitting in a chair.   Patient oriented to first and last name not age or month.   Patient making paraphasic errors on exam.   Unable to identify high frequency items such as a plastic cup.   Moderate aphasia  No facial asymmetry  Unaware of the name of the facility she is currently in.   Right visual field deficit, chronic.   Motor:  MRC grading 5/5 b/l UE/LE.    Sensation: Intact to light touch.   Coordination: No dysmetria on finger-to-nose and heel-to-shin.   Gait: deferred    NIHSS 7    Medications:  MEDICATIONS  (STANDING):  aspirin  chewable 81 milliGRAM(s) Oral daily  atorvastatin 80 milliGRAM(s) Oral at bedtime  gabapentin 400 milliGRAM(s) Oral two times a day  haloperidol     Tablet 1 milliGRAM(s) Oral once  influenza   Vaccine 0.5 milliLiter(s) IntraMuscular once  levETIRAcetam  IVPB 1500 milliGRAM(s) IV Intermittent every 12 hours  metoprolol succinate ER 50 milliGRAM(s) Oral every 12 hours  pantoprazole    Tablet 40 milliGRAM(s) Oral before breakfast  sertraline 50 milliGRAM(s) Oral daily  spironolactone 25 milliGRAM(s) Oral daily      Labs:  CBC Full  -  ( 05 Jan 2023 07:07 )  WBC Count : 6.11 K/uL  RBC Count : 3.57 M/uL  Hemoglobin : 12.4 g/dL  Hematocrit : 35.6 %  Platelet Count - Automated : 73 K/uL  Mean Cell Volume : 99.7 fL  Mean Cell Hemoglobin : 34.7 pg  Mean Cell Hemoglobin Concentration : 34.8 g/dL  Auto Neutrophil # : 5.47 K/uL  Auto Lymphocyte # : 0.45 K/uL  Auto Monocyte # : 0.15 K/uL  Auto Eosinophil # : 0.01 K/uL  Auto Basophil # : 0.01 K/uL  Auto Neutrophil % : 89.4 %  Auto Lymphocyte % : 7.4 %  Auto Monocyte % : 2.5 %  Auto Eosinophil % : 0.2 %  Auto Basophil % : 0.2 %    01-05    139  |  104  |  8<L>  ----------------------------<  134<H>  3.9   |  25  |  <0.5<L>    Ca    8.7      05 Jan 2023 07:07  Mg     2.2     01-05    TPro  6.5  /  Alb  4.2  /  TBili  0.8  /  DBili  x   /  AST  82<H>  /  ALT  41  /  AlkPhos  64  01-05    LIVER FUNCTIONS - ( 05 Jan 2023 07:07 )  Alb: 4.2 g/dL / Pro: 6.5 g/dL / ALK PHOS: 64 U/L / ALT: 41 U/L / AST: 82 U/L / GGT: x             < from: MR Head w/wo IV Cont (01.04.23 @ 21:21) >  IMPRESSION:    New small cortical/subcortical acute infarct in the right parietal lobe.    Progressively increasing confluent signal abnormality in bilateral   periventricular white matter, with redemonstrated restricted diffusion in   bilateral inferior frontal lobe and rostrum of the corpus callosum. No   associated enhancement. The finding is nonspecific but likely reflects   post radiation changes. Recommend attention on follow-up.    Further decrease in heterogeneous enhancements associated with the   persistently expansile left occipital mass compared to the prior brain   MRI from 12/6/2020 which may reflect posttreatment change. Increased   scattered calcifications and trace petechial hemorrhage as demonstrated   on the prior head CT from 1/20/2023 likely representing posttreatment   change. Recommend continued close follow-up MR brain with perfusion.    Unchanged extension of the mass into the splenium of the corpus callosum   and contiguous expansile FLAIR signal abnormality in bilateral periatrial   white matter extending to the left temporal lobe.      < end of copied text >  < from: EEG (01.03.23 @ 21:40) >  Epileptiform Activity  Yes    Frequency:  periodic    Side:  Left    Type:  Epileptiform sharp waves    Area of Activity:  Hemisphere    Events:  No    Impression:  Abnormal due to the presence of: focal slowing as above      Clinical Correlation & Recommendations  Consistent with focal electrophysiological dysfunction secondary to   nonspecific cause. Consistent with potential for partial seizure from   LEFT hemisphere.      < end of copied text >  
PRAVIN JHAVERI 57y Female  MRN#: [id number]   CODE STATUS:________    Hospital Day: 1d    Pt is currently admitted with the primary diagnosis of Expressive aphasia/Nausea/Vomiting    SUBJECTIVE  Hospital Course  57-year-old F with PMHx of high-grade glioblastoma multiforme s/p resection April 2022 on chemotherapy, followed by hematology oncology and neurosurgery, on seizure medications and HTN now presents accompanied by her daughter and her  for expressive aphasia and increased nausea and vomiting since this morning. Stroke Code called in the ED, NIH score 5, CTA neg for LVO, NI cancelled. Admitted for aphasia.    1/4 - pending MR brain, Onco consult, and neuro Follow up EEG positive for epileptiform activity    Overnight events   None reported     Subjective complaints   No new complaints    Present Today:   - Grande:  No [  ], Yes [   ] : Indication:     - Type of IV Access:       .. CVC/Piccline:  No [  ], Yes [   ] : Indication:       .. Midline: No [  ], Yes [   ] : Indication:                                             ----------------------------------------------------------  OBJECTIVE  PAST MEDICAL & SURGICAL HISTORY  RAMAKRISHNA on CPAP    GERD (gastroesophageal reflux disease)    Asthmatic bronchitis  MILD , USES VENTOLIN Q2-3M    HTN (hypertension)    Migraines    Chronic neck pain    H/O sinus surgery    Status post D&amp;C    H/O arthroscopy of shoulder  RT    History of hernia repair  2019                                              -----------------------------------------------------------  ALLERGIES:  No Known Allergies                                            ------------------------------------------------------------    HOME MEDICATIONS  Home Medications:  atorvastatin 80 mg oral tablet: 1 tab(s) orally once a day (05 Apr 2022 23:24)  gabapentin 400 mg oral capsule: 1 cap(s) orally 2 times a day (05 Apr 2022 23:24)  metoprolol succinate 50 mg oral tablet, extended release: 1 tab(s) orally once a day (05 Apr 2022 23:24)  Multiple Vitamins oral capsule: 1 cap(s) orally once a day (03 Jan 2023 03:53)  omeprazole 40 mg oral delayed release capsule: 1 cap(s) orally once a day (03 Jan 2023 03:53)  potassium chloride 40 mEq/15 mL oral liquid: 15 milliliter(s) orally once a day (03 Jan 2023 03:54)  sertraline 50 mg oral tablet: 1 tab(s) orally once a day (05 Apr 2022 23:24)  spironolactone 25 mg oral tablet: 1 tab(s) orally once a day (03 Jan 2023 03:53)                           MEDICATIONS:  STANDING MEDICATIONS  atorvastatin 80 milliGRAM(s) Oral at bedtime  dexAMETHasone  Injectable 4 milliGRAM(s) IV Push every 6 hours  gabapentin 400 milliGRAM(s) Oral two times a day  influenza   Vaccine 0.5 milliLiter(s) IntraMuscular once  levETIRAcetam  IVPB 1500 milliGRAM(s) IV Intermittent every 12 hours  metoprolol succinate ER 50 milliGRAM(s) Oral at bedtime  pantoprazole    Tablet 40 milliGRAM(s) Oral before breakfast  sertraline 50 milliGRAM(s) Oral daily  spironolactone 25 milliGRAM(s) Oral daily    PRN MEDICATIONS  acetaminophen     Tablet .. 650 milliGRAM(s) Oral every 6 hours PRN  aluminum hydroxide/magnesium hydroxide/simethicone Suspension 30 milliLiter(s) Oral every 4 hours PRN  melatonin 5 milliGRAM(s) Oral at bedtime PRN  ondansetron Injectable 4 milliGRAM(s) IV Push every 6 hours PRN                                            ------------------------------------------------------------  VITAL SIGNS: Last 24 Hours  T(C): 36.2 (04 Jan 2023 08:00), Max: 36.2 (04 Jan 2023 00:07)  T(F): 97.2 (04 Jan 2023 08:00), Max: 97.2 (04 Jan 2023 08:00)  HR: 83 (04 Jan 2023 08:00) (83 - 105)  BP: 179/91 (04 Jan 2023 08:00) (129/60 - 179/91)  BP(mean): --  RR: 18 (04 Jan 2023 08:00) (17 - 18)  SpO2: 94% (04 Jan 2023 00:07) (92% - 94%)                                             --------------------------------------------------------------  LABS:                        12.1   7.68  )-----------( 75       ( 04 Jan 2023 06:10 )             35.3     01-04    142  |  101  |  9<L>  ----------------------------<  130<H>  3.3<L>   |  28  |  <0.5<L>    Ca    8.5      04 Jan 2023 06:10  Mg     2.2     01-04    TPro  6.1  /  Alb  3.9  /  TBili  0.9  /  DBili  x   /  AST  61<H>  /  ALT  30  /  AlkPhos  67  01-04    PT/INR - ( 02 Jan 2023 18:54 )   PT: 12.40 sec;   INR: 1.08 ratio         PTT - ( 02 Jan 2023 18:54 )  PTT:30.5 sec              CARDIAC MARKERS ( 02 Jan 2023 18:54 )  x     / <0.01 ng/mL / x     / x     / x                                                  -------------------------------------------------------------  RADIOLOGY:                                            --------------------------------------------------------------    PHYSICAL EXAM:  GENERAL: NAD, lying in bed comfortably  HEAD:  Atraumatic, normocephalic  EYES: EOMI, PERRLA, conjunctiva and sclera clear  ENT: Moist mucous membranes  NECK: Supple, no JVD  HEART: Regular rate and rhythm, no murmurs, rubs, or gallops  LUNGS: Unlabored respirations.  Clear to auscultation bilaterally, no crackles, wheezing, or rhonchi  ABDOMEN: Soft, nontender, nondistended, +BS  EXTREMITIES: 2+ peripheral pulses bilaterally. No clubbing, cyanosis, or edema  NERVOUS SYSTEM:  A&Ox3, no focal deficits   SKIN: No rashes or lesions           
PRAVIN JHAVERI 57y Female  MRN#: [id number]   CODE STATUS:________    Hospital Day: 4d    Pt is currently admitted with the primary diagnosis of Aphasia    SUBJECTIVE  Hospital Course  57-year-old F with PMHx of high-grade glioblastoma multiforme s/p resection April 2022 on chemotherapy, followed by hematology oncology and neurosurgery, on seizure medications and HTN now presents accompanied by her daughter and her  for expressive aphasia and increased nausea and vomiting since this morning. Stroke Code called in the ED, NIH score 5, CTA neg for LVO, NI cancelled. Admitted for aphasia.    1/4 - pending MR brain, Onco consult, and neuro Follow up EEG positive for epileptiform activity  1/5 - New stroke on MR brain started on asa 81, pending final recs from neuro after discussion with neuroradiology, pending Heme-onc f/u  1/7 - continue with aspirin, keppra 1500mg bid, OP hemeonc and neuro f/u    Overnight events   None reported     Subjective complaints   No new complaints      Present Today:   - Grande:  No [  ], Yes [   ] : Indication:     - Type of IV Access:       .. CVC/Piccline:  No [  ], Yes [   ] : Indication:       .. Midline: No [  ], Yes [   ] : Indication:                                             ----------------------------------------------------------  OBJECTIVE  PAST MEDICAL & SURGICAL HISTORY  RAMAKRISHNA on CPAP    GERD (gastroesophageal reflux disease)    Asthmatic bronchitis  MILD , USES VENTOLIN Q2-3M    HTN (hypertension)    Migraines    Chronic neck pain    H/O sinus surgery    Status post D&amp;C    H/O arthroscopy of shoulder  RT    History of hernia repair  2019                                              -----------------------------------------------------------  ALLERGIES:  No Known Allergies                                            ------------------------------------------------------------    HOME MEDICATIONS  Home Medications:  atorvastatin 80 mg oral tablet: 1 tab(s) orally once a day (05 Apr 2022 23:24)  gabapentin 400 mg oral capsule: 1 cap(s) orally 2 times a day (05 Apr 2022 23:24)  Multiple Vitamins oral capsule: 1 cap(s) orally once a day (03 Jan 2023 03:53)  omeprazole 40 mg oral delayed release capsule: 1 cap(s) orally once a day (03 Jan 2023 03:53)  sertraline 50 mg oral tablet: 1 tab(s) orally once a day (05 Apr 2022 23:24)                           MEDICATIONS:  STANDING MEDICATIONS  aspirin  chewable 81 milliGRAM(s) Oral daily  atorvastatin 80 milliGRAM(s) Oral at bedtime  furosemide    Tablet 40 milliGRAM(s) Oral daily  gabapentin 400 milliGRAM(s) Oral two times a day  influenza   Vaccine 0.5 milliLiter(s) IntraMuscular once  labetalol 100 milliGRAM(s) Oral three times a day  levETIRAcetam 1500 milliGRAM(s) Oral every 12 hours  pantoprazole    Tablet 40 milliGRAM(s) Oral before breakfast  potassium chloride    Tablet ER 40 milliEquivalent(s) Oral every 4 hours  sertraline 50 milliGRAM(s) Oral daily  spironolactone 50 milliGRAM(s) Oral daily    PRN MEDICATIONS  acetaminophen     Tablet .. 650 milliGRAM(s) Oral every 6 hours PRN  acetaminophen     Tablet .. 650 milliGRAM(s) Oral every 6 hours PRN  aluminum hydroxide/magnesium hydroxide/simethicone Suspension 30 milliLiter(s) Oral every 4 hours PRN  melatonin 5 milliGRAM(s) Oral at bedtime PRN  ondansetron Injectable 4 milliGRAM(s) IV Push every 6 hours PRN                                            ------------------------------------------------------------  VITAL SIGNS: Last 24 Hours  T(C): 36.4 (07 Jan 2023 08:00), Max: 36.5 (06 Jan 2023 23:37)  T(F): 97.6 (07 Jan 2023 08:00), Max: 97.7 (06 Jan 2023 23:37)  HR: 79 (07 Jan 2023 08:00) (70 - 88)  BP: 123/78 (07 Jan 2023 08:00) (123/78 - 160/91)  BP(mean): --  RR: 18 (07 Jan 2023 08:00) (18 - 19)  SpO2: --      01-06-23 @ 07:01  -  01-07-23 @ 07:00  --------------------------------------------------------  IN: 0 mL / OUT: 600 mL / NET: -600 mL                                             --------------------------------------------------------------  LABS:                        12.4   4.77  )-----------( 78       ( 07 Jan 2023 06:54 )             35.4     01-07    140  |  100  |  13  ----------------------------<  102<H>  3.0<L>   |  24  |  0.5<L>    Ca    9.2      07 Jan 2023 06:54  Mg     1.9     01-07    TPro  6.1  /  Alb  3.9  /  TBili  0.8  /  DBili  x   /  AST  78<H>  /  ALT  63<H>  /  AlkPhos  64  01-07                                                              -------------------------------------------------------------  RADIOLOGY:                                            --------------------------------------------------------------    PHYSICAL EXAM:  GENERAL: NAD, lying in bed comfortably  HEAD:  Atraumatic, normocephalic  EYES: EOMI, PERRLA, conjunctiva and sclera clear  ENT: Moist mucous membranes  NECK: Supple, no JVD  HEART: Regular rate and rhythm, no murmurs, rubs, or gallops  LUNGS: Unlabored respirations.  Clear to auscultation bilaterally, no crackles, wheezing, or rhonchi  ABDOMEN: Soft, nontender, nondistended, +BS  EXTREMITIES: 2+ peripheral pulses bilaterally. No clubbing, cyanosis, or edema  NERVOUS SYSTEM:  A&Ox2, no focal deficits   SKIN: No rashes or lesions           
PRAVIN JHAVERI 57y Female  MRN#: [id number]   CODE STATUS:________    Hospital Day: 2d    Pt is currently admitted with the primary diagnosis of Aphasia    SUBJECTIVE  Hospital Course  57-year-old F with PMHx of high-grade glioblastoma multiforme s/p resection April 2022 on chemotherapy, followed by hematology oncology and neurosurgery, on seizure medications and HTN now presents accompanied by her daughter and her  for expressive aphasia and increased nausea and vomiting since this morning. Stroke Code called in the ED, NIH score 5, CTA neg for LVO, NI cancelled. Admitted for aphasia.    1/4 - pending MR brain, Onco consult, and neuro Follow up EEG positive for epileptiform activity  1/5 - New stroke on MR brain started on asa 81, pending final recs from neuro after discussion with neuroradiology, pending Heme-onc f/u    Overnight events   None reported     Subjective complaints   No new complaints  Present Today:   - Grande:  No [  ], Yes [   ] : Indication:     - Type of IV Access:       .. CVC/Piccline:  No [  ], Yes [   ] : Indication:       .. Midline: No [  ], Yes [   ] : Indication:                                             ----------------------------------------------------------  OBJECTIVE  PAST MEDICAL & SURGICAL HISTORY  RAMAKRISHNA on CPAP    GERD (gastroesophageal reflux disease)    Asthmatic bronchitis  MILD , USES VENTOLIN Q2-3M    HTN (hypertension)    Migraines    Chronic neck pain    H/O sinus surgery    Status post D&amp;C    H/O arthroscopy of shoulder  RT    History of hernia repair  2019                                              -----------------------------------------------------------  ALLERGIES:  No Known Allergies                                            ------------------------------------------------------------    HOME MEDICATIONS  Home Medications:  atorvastatin 80 mg oral tablet: 1 tab(s) orally once a day (05 Apr 2022 23:24)  gabapentin 400 mg oral capsule: 1 cap(s) orally 2 times a day (05 Apr 2022 23:24)  metoprolol succinate 50 mg oral tablet, extended release: 1 tab(s) orally once a day (05 Apr 2022 23:24)  Multiple Vitamins oral capsule: 1 cap(s) orally once a day (03 Jan 2023 03:53)  omeprazole 40 mg oral delayed release capsule: 1 cap(s) orally once a day (03 Jan 2023 03:53)  potassium chloride 40 mEq/15 mL oral liquid: 15 milliliter(s) orally once a day (03 Jan 2023 03:54)  sertraline 50 mg oral tablet: 1 tab(s) orally once a day (05 Apr 2022 23:24)  spironolactone 25 mg oral tablet: 1 tab(s) orally once a day (03 Jan 2023 03:53)                           MEDICATIONS:  STANDING MEDICATIONS  aspirin  chewable 81 milliGRAM(s) Oral daily  atorvastatin 80 milliGRAM(s) Oral at bedtime  dexAMETHasone  Injectable 4 milliGRAM(s) IV Push every 8 hours  gabapentin 400 milliGRAM(s) Oral two times a day  influenza   Vaccine 0.5 milliLiter(s) IntraMuscular once  levETIRAcetam  IVPB 1500 milliGRAM(s) IV Intermittent every 12 hours  metoprolol succinate ER 50 milliGRAM(s) Oral every 12 hours  pantoprazole    Tablet 40 milliGRAM(s) Oral before breakfast  sertraline 50 milliGRAM(s) Oral daily  spironolactone 25 milliGRAM(s) Oral daily    PRN MEDICATIONS  acetaminophen     Tablet .. 650 milliGRAM(s) Oral every 6 hours PRN  acetaminophen     Tablet .. 650 milliGRAM(s) Oral every 6 hours PRN  aluminum hydroxide/magnesium hydroxide/simethicone Suspension 30 milliLiter(s) Oral every 4 hours PRN  melatonin 5 milliGRAM(s) Oral at bedtime PRN  ondansetron Injectable 4 milliGRAM(s) IV Push every 6 hours PRN                                            ------------------------------------------------------------  VITAL SIGNS: Last 24 Hours  T(C): 36.3 (05 Jan 2023 08:00), Max: 36.4 (05 Jan 2023 00:07)  T(F): 97.4 (05 Jan 2023 08:00), Max: 97.6 (05 Jan 2023 00:07)  HR: 89 (05 Jan 2023 08:00) (71 - 89)  BP: 181/99 (05 Jan 2023 08:00) (159/87 - 181/99)  BP(mean): --  RR: 18 (05 Jan 2023 08:00) (18 - 18)  SpO2: 99% (05 Jan 2023 08:00) (96% - 99%)                                             --------------------------------------------------------------  LABS:                        12.4   6.11  )-----------( 73       ( 05 Jan 2023 07:07 )             35.6     01-05    139  |  104  |  8<L>  ----------------------------<  134<H>  3.9   |  25  |  <0.5<L>    Ca    8.7      05 Jan 2023 07:07  Mg     2.2     01-05    TPro  6.5  /  Alb  4.2  /  TBili  0.8  /  DBili  x   /  AST  82<H>  /  ALT  41  /  AlkPhos  64  01-05                                                              -------------------------------------------------------------  RADIOLOGY:                                            --------------------------------------------------------------    PHYSICAL EXAM:  GENERAL: NAD, lying in bed comfortably  HEAD:  Atraumatic, normocephalic  EYES: EOMI, PERRLA, conjunctiva and sclera clear  ENT: Moist mucous membranes  NECK: Supple, no JVD  HEART: Regular rate and rhythm, no murmurs, rubs, or gallops  LUNGS: Unlabored respirations.  Clear to auscultation bilaterally, no crackles, wheezing, or rhonchi  ABDOMEN: Soft, nontender, nondistended, +BS  EXTREMITIES: 2+ peripheral pulses bilaterally. No clubbing, cyanosis, or edema  NERVOUS SYSTEM:  A&Ox3, no focal deficits   SKIN: No rashes or lesions           
Patient is a 57y old  Female who presents with a chief complaint of aphasia (04 Jan 2023 13:46)    Patient was seen and examined.  no new complaints  All systems reviewed.     PAST MEDICAL & SURGICAL HISTORY:  RAMAKRISHNA on CPAP  GERD (gastroesophageal reflux disease)  Asthmatic bronchitis, MILD , USES VENTOLIN Q2-3M  HTN (hypertension)  Migraines  Chronic neck pain  H/O sinus surgery  Status post D&amp;C  H/O arthroscopy of shoulder, right  History of hernia repair, 2019    Allergies  No Known Allergies    Home Medications:  atorvastatin 80 mg oral tablet: 1 tab(s) orally once a day (05 Apr 2022 23:24)  gabapentin 400 mg oral capsule: 1 cap(s) orally 2 times a day (05 Apr 2022 23:24)  metoprolol succinate 50 mg oral tablet, extended release: 1 tab(s) orally once a day (05 Apr 2022 23:24)  Multiple Vitamins oral capsule: 1 cap(s) orally once a day (03 Jan 2023 03:53)  omeprazole 40 mg oral delayed release capsule: 1 cap(s) orally once a day (03 Jan 2023 03:53)  potassium chloride 40 mEq/15 mL oral liquid: 15 milliliter(s) orally once a day (03 Jan 2023 03:54)  sertraline 50 mg oral tablet: 1 tab(s) orally once a day (05 Apr 2022 23:24)  spironolactone 25 mg oral tablet: 1 tab(s) orally once a day (03 Jan 2023 03:53)      MEDICATIONS  (STANDING):  aspirin  chewable 81 milliGRAM(s) Oral daily  atorvastatin 80 milliGRAM(s) Oral at bedtime  dexAMETHasone     Tablet 4 milliGRAM(s) Oral daily  gabapentin 400 milliGRAM(s) Oral two times a day  influenza   Vaccine 0.5 milliLiter(s) IntraMuscular once  levETIRAcetam 1500 milliGRAM(s) Oral every 12 hours  metoprolol succinate ER 50 milliGRAM(s) Oral every 12 hours  pantoprazole    Tablet 40 milliGRAM(s) Oral before breakfast  sertraline 50 milliGRAM(s) Oral daily  spironolactone 25 milliGRAM(s) Oral daily    MEDICATIONS  (PRN):  acetaminophen     Tablet .. 650 milliGRAM(s) Oral every 6 hours PRN Moderate Pain (4 - 6), Severe Pain (7 - 10)  acetaminophen     Tablet .. 650 milliGRAM(s) Oral every 6 hours PRN Temp greater or equal to 38C (100.4F), Mild Pain (1 - 3)  aluminum hydroxide/magnesium hydroxide/simethicone Suspension 30 milliLiter(s) Oral every 4 hours PRN Dyspepsia  melatonin 5 milliGRAM(s) Oral at bedtime PRN Insomnia  ondansetron Injectable 4 milliGRAM(s) IV Push every 6 hours PRN Nausea and/or Vomiting    T(C): 36.6 (01-06-23 @ 07:00), Max: 36.6 (01-06-23 @ 07:00)  HR: 82 (01-06-23 @ 07:00) (62 - 82)  BP: 198/103 (01-06-23 @ 07:00) (181/90 - 198/103)  RR: 18 (01-06-23 @ 07:00) (18 - 18)  SpO2: 96% (01-06-23 @ 07:00) (96% - 96%)    O/E:  Awake, alert, not in distress.  HEENT: atraumatic, EOMI.  Chest: clear.  CVS: SIS2 +, no murmur.  P/A: Soft, BS+  CNS:awake, alert  Ext:  edema feet+  Skin: no rash, no ulcers.  All systems reviewed positive findings as above.                                   13.0   9.07  )-----------( 105<L>    ( 06 Jan 2023 08:30 )             38.0                         12.4   6.11  )-----------( 73<L>    ( 05 Jan 2023 07:07 )             35.6<L>  01-06    141  |  104  |  11  ----------------------------<  164<H>  4.0   |  26  |  0.5<L>  01-05    139  |  104  |  8<L>  ----------------------------<  134<H>  3.9   |  25  |  <0.5<L>    Ca    9.3      06 Jan 2023 08:30  Ca    8.7      05 Jan 2023 07:07  Mg     2.1     01-06    TPro  6.4  /  Alb  4.0  /  TBili  0.9  /  DBili  x   /  AST  82<H>  /  ALT  55<H>  /  AlkPhos  66  01-06  TPro  6.5  /  Alb  4.2  /  TBili  0.8  /  DBili  x   /  AST  82<H>  /  ALT  41  /  AlkPhos  64  01-05  
Patient is a 57y old  Female who presents with a chief complaint of aphasia (04 Jan 2023 13:46)    Patient was seen and examined.  no new complaints  All systems reviewed.     PAST MEDICAL & SURGICAL HISTORY:  RAMAKRISHNA on CPAP  GERD (gastroesophageal reflux disease)  Asthmatic bronchitis, MILD , USES VENTOLIN Q2-3M  HTN (hypertension)  Migraines  Chronic neck pain  H/O sinus surgery  Status post D&amp;C  H/O arthroscopy of shoulder, right  History of hernia repair, 2019    Allergies  No Known Allergies    MEDICATIONS  (STANDING):  atorvastatin 80 milliGRAM(s) Oral at bedtime  dexAMETHasone  Injectable 4 milliGRAM(s) IV Push every 6 hours  gabapentin 400 milliGRAM(s) Oral two times a day  influenza   Vaccine 0.5 milliLiter(s) IntraMuscular once  levETIRAcetam  IVPB 1500 milliGRAM(s) IV Intermittent every 12 hours  metoprolol succinate ER 50 milliGRAM(s) Oral at bedtime  pantoprazole    Tablet 40 milliGRAM(s) Oral before breakfast  sertraline 50 milliGRAM(s) Oral daily  spironolactone 25 milliGRAM(s) Oral daily    MEDICATIONS  (PRN):  acetaminophen     Tablet .. 650 milliGRAM(s) Oral every 6 hours PRN Moderate Pain (4 - 6), Severe Pain (7 - 10)  acetaminophen     Tablet .. 650 milliGRAM(s) Oral every 6 hours PRN Temp greater or equal to 38C (100.4F), Mild Pain (1 - 3)  aluminum hydroxide/magnesium hydroxide/simethicone Suspension 30 milliLiter(s) Oral every 4 hours PRN Dyspepsia  melatonin 5 milliGRAM(s) Oral at bedtime PRN Insomnia  ondansetron Injectable 4 milliGRAM(s) IV Push every 6 hours PRN Nausea and/or Vomiting    Vital Signs Last 24 Hrs  T(C): 35.6  T(F): 96  HR: 80  BP: 159/87  BP(mean): --  RR: 18  SpO2: 94%    O/E:  Awake, alert, not in distress.  HEENT: atraumatic, EOMI.  Chest: clear.  CVS: SIS2 +, no murmur.  P/A: Soft, BS+  CNS:awake, alert  Ext: no edema feet.  Skin: no rash, no ulcers.  All systems reviewed positive findings as above.        CAPILLARY BLOOD GLUCOSE  120 (02 Jan 2023 19:28)                          12.1   7.68  )-----------( 75<L>    ( 04 Jan 2023 06:10 )             35.3<L>                        11.6<L>  5.86  )-----------( 61<L>    ( 03 Jan 2023 07:20 )             33.2<L>    01-04    142  |  101  |  9<L>  ----------------------------<  130<H>  3.3<L>   |  28  |  <0.5<L>  01-03    141  |  100  |  3<L>  ----------------------------<  134<H>  3.3<L>   |  24  |  <0.5<L>    Ca    8.5      04 Jan 2023 06:10  Ca    8.3<L>      03 Jan 2023 07:20  Ca    8.0<L>      02 Jan 2023 18:54  Mg     2.2     01-04    TPro  6.1  /  Alb  3.9  /  TBili  0.9  /  DBili  x   /  AST  61<H>  /  ALT  30  /  AlkPhos  67  01-04  TPro  6.3  /  Alb  3.9  /  TBili  0.8  /  DBili  x   /  AST  50<H>  /  ALT  27  /  AlkPhos  71  01-03  TPro  6.1  /  Alb  4.0  /  TBili  1.0  /  DBili  x   /  AST  47<H>  /  ALT  27  /  AlkPhos  70  01-02      CARDIAC MARKERS ( 02 Jan 2023 18:54 )  x     / <0.01 ng/mL / x     / x     / x          PT/INR - ( 02 Jan 2023 18:54 )   PT: 12.40 sec;   INR: 1.08 ratio         PTT - ( 02 Jan 2023 18:54 )  PTT:30.5 sec

## 2023-01-07 NOTE — DISCHARGE NOTE NURSING/CASE MANAGEMENT/SOCIAL WORK - NSDCPEFALRISK_GEN_ALL_CORE
For information on Fall & Injury Prevention, visit: [email or web address] OR  [email or web address] OR  https://www.cdc.gov/steadi/patient.html

## 2023-01-07 NOTE — PROGRESS NOTE ADULT - PROVIDER SPECIALTY LIST ADULT
Hospitalist
Hospitalist
Internal Medicine
Neurology
Hospitalist
Hospitalist

## 2023-01-07 NOTE — DISCHARGE NOTE NURSING/CASE MANAGEMENT/SOCIAL WORK - NSDCVIVACCINE_GEN_ALL_CORE_FT
Tdap; 05-Apr-2022 19:28; Anahi Charles (RN); [employer]; [id number] (Exp. Date: 02-Feb-2024); IntraMuscular; Deltoid Left.; 0.5 milliLiter(s); VIS (VIS Published: 09-May-2013, VIS Presented: 05-Apr-2022);

## 2023-01-07 NOTE — PROGRESS NOTE ADULT - ASSESSMENT
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 Pt also has expressive aphasia with intermittent episodes of speaking. Patient received meds at 5:30 Am and 8:40 am was normal. Patient is on chemo medication Temorizide every 28 days has not taken it due to low platelet count. S/p tumor removal by Dr. Villafana 4/2022.     # Cortical/subcortical acute infarct in right parietal lobe  # Expressive aphasia-resolved  # H/o GBM s/p resection April 2022 on chemotherapy  -  CT Brain Stroke Protocol (01.02.23 @ 19:14) >No evidence of new territorial infarct, midline shift, hydrocephalus, or extra axial fluid collection. Again seen is neoplasm involving the splenium of the corpus callosum and extending into the left occipital/parietal region. As compared with prior CT there is new calcification and heterogeneity within the region of the neoplasm compatible with posttreatment changes  -  EEG (01.03.23 @ 21:40) >Epileptiform sharp waves  - c/w  Keppra   - taper decadron  -  MR Head w/wo IV Cont (01.04.23 @ 21:21) >New small cortical/subcortical acute infarct in the right parietal lobe.Progressively increasing confluent signal abnormality in bilateral periventricular white matter, with redemonstrated restricted diffusion in bilateral inferior frontal lobe and rostrum of the corpus callosum. No associated enhancement. The finding is nonspecific but likely reflects post radiation changes. Recommend attention on follow-up.Further decrease in heterogeneous enhancements associated with the persistently expansile left occipital mass compared to the prior brain MRI from 12/6/2020 which may reflect posttreatment change. Increased scattered calcifications and trace petechial hemorrhage as demonstrated on the prior head CT from 1/20/2023 likely representing posttreatment change. Unchanged extension of the mass into the splenium of the corpus callosum and contiguous expansile FLAIR signal abnormality in bilateral periatrial   white matter extending to the left temporal lobe.  -  hold chemo sec to  low platelets  - evaluated by  oncology outpt F/u  - c/w  ASA statin  -evaluated by PT    # Hypertension- uncontrolled sec to steroids  - c/w  labetalol 100mg q8  - c/w aldactone 50 mg daily  -c/w  lasix 40mg daily  - monitor BP    # Dyslipidemia  - c/w statin    #Hypokalemia  - replete k    # Thrombocytopenia  - monitor platelets    # DVT prophylaxis  - scd    # Full code    # Pending - monitor BP  # Discussed plan of care with patient and family  # Disposition: Home with [facility]

## 2023-01-07 NOTE — PROGRESS NOTE ADULT - TIME BILLING
Direct patient care. Discussed on rounds with Housestaff
Direct patient care. Discussed on rounds with Housestaff
Direct patient care, Discussed on rounds with Housestaff
Direct patient care. Discussed on rounds with Housestaff

## 2023-01-07 NOTE — DISCHARGE NOTE NURSING/CASE MANAGEMENT/SOCIAL WORK - PATIENT PORTAL LINK FT
You can access the FollowMyHealth Patient Portal offered by [facility] by registering at the following website: [email or web address]. By joining [email or web address]’s FollowMyHealth portal, you will also be able to view your health information using other applications (apps) compatible with our system.

## 2023-01-09 NOTE — HISTORY OF PRESENT ILLNESS
[de-identified] : This is a 56 yo F with PMHx of HTN and Anxiety who presents for evaluation of left occipital GBM, unmethylated MGMT, IDH wild type. PIK3CA + PIK3R1 mutations. She was previously being followed by neuro-oncology, Dr. Kennedy. Her history dates back to April 2022 when she presented to [facility] with seizure. MRI showed Enhancing cystic/necrotic lesion within the left occipital lobe measuring 2.2 cm. Smaller rim-enhancing lesion in the medial left periatrial region / splenium measuring measuring 7 mm. Findings are most compatible with neoplastic etiology (primary multifocal versus metastatic). Expansile nonenhancing FLAIR signal abnormality involving the splenium of the corpus callosum with extension along the occipital and temporal horns greater on the left. Additional faint diffuse white matter signal abnormality. Findings are also likely neoplastic.\par Leptomeningeal signal abnormality (FLAIR hyperintense, faintly hyperenhancing) within the left frontal and parietal sulci suspicious for leptomeningeal involvement.\par \par She underwent a biopsy with neurosurgery and pathology showed high grade glioma, unmethylated MGMT, IDH wild type. PIK3CA + PIK3R1 mutations. She was evaluated by radiation oncology and was started on chemoRT with concurrent Temodar which was completed 7/11/22. \par \par MRI from 7/13/22 showed  The irregular enhancing necrotic masses within the left occipital and posterior temporal lobes with extension across the corpus callosum have not significantly changed in size and extent. The associated FLAIR signal abnormality has slightly increased in extent within the left temporal lobe and right aspect of the splenium.\par New punctate focus of enhancement within the right parietal white matter (ser 9 im 17). \par \par During the course of her treatment, she was intermittently on Decadron with attempts to taper off. However, she began to experience headaches and repeat MRI done on 8/5/22 showed: the irregular enhancing necrotic masses within the left occipital and posterior temporal lobes with extension across the corpus callosum have not significantly changed in size and extent. The associated FLAIR signal abnormality has slightly increased in extent within the left temporal lobe and right aspect of the splenium. She was restarted on Decadron 4mg BID with improvement in her symptoms. \par \par To date, she still endorses some intermittent vision loss in her right eye. She also reports lack of peripheral vision on the same side. She notes her vision is better in the morning and starts to progressively worsen throughout the day.  [de-identified] : 09/07/2022 accompanied by her ; Reports feeling better, chronic headaches "disappear" after the last treatment 2 weeks ago; agitation and muscle on dexamethasone; no BP spikes; increasing dryness of her mouth.\par \par 9/21/22\par Patient is here for a follow-up visit for GBM, accompanied by family member.  She is feeling well with no new complaints.  Reviewed most recent CBC, which is stable and WNL.  Patient denies fever, chills, nausea, vomiting, persistent diarrhea, more frequent headaches, dyspnea or bleeding.  She is s/p second dose of Avastin today and she completed her first 5 days of Temodar.  Neurologically her symptoms have essentially been stable following taper of dexamethasone to 4 mg daily.  Since no cytopenias we will increase the Temodar dose with cycle 2 of adjuvant Temodar.  \par \par 10/05/2022 \par accompanied by ; Reports feeling relatively well, no changes in chronic conditions or new complaints since the last visit.\par \par 10/19/2022\par She is here for follow up. She  is due for Avastin today  5th dose. She saw Dr. Dumont on 10/12/2022 and MR brain was ordered for 12/2022. She is on 2 mg of  dexa daily. She had a cold and had amoxicilin for sinus.  ODing well otherwise. BP is a bit high systolic maybe from Avastin but  states it normalizes at home. She is on  HCT and metoprolol at home.\par \par 11/02/2022\par accompanied by ; Reports feeling well, motor functions are better; no changes in chronic conditions or new complaints since the last visit Just finished her temodar 5 day course.  CBC  no neutroeia or low plateles. UA is fine. BP is well controlled. \par \par 11/30/2022\par She is here for follow up. She is now on dexamethasone 0.5 mg daily and reports no significant changes - she is stable.\par \par 12/28/2022\par accompanied by ; no changes in chronic conditions or new complaints since the last visit.

## 2023-01-09 NOTE — REVIEW OF SYSTEMS
[Fatigue] : fatigue [Vision Problems] : vision problems [Muscle Weakness] : muscle weakness [Skin Rash] : skin rash [Difficulty Walking] : difficulty walking [Negative] : Allergic/Immunologic [FreeTextEntry4] : dry mouth [FreeTextEntry7] : GERD [de-identified] : Ring Worm rash at RUE BLE.

## 2023-01-09 NOTE — ASSESSMENT
[FreeTextEntry1] : # Lleft occipital GBM, unmethylated MGMT, IDH wild type. PIK3CA + PIK3R1 mutations. \par 06/30/2022 S/P chemoRT with concurrent Temodar, with worsening symptoms 2/2 to recurrent edema. She was restarted on Decadron 4 mg BID with improvement. \par 08/05/2022 MRI showed possible interval progression vs pseduo-progression (from RT)?\par 08/22/2022 transfer her care since Dr. Kennedy is leaving and started Avastin at 10 mg/kg with Temodar (150 mg/m2, total 320 mg) to be taken for 5 consecutive days every 28 days.\par 10/03/2022 MR brain ordered by [facility], Dr. Dumont - final results pending.\par - since no significant myelosuppression, 10/26-10/30/2022 Temodar was increased to 400 mg (200 mg/m2) with cycle 2.\par - 11/16/2022 started dexamethasone 0.5 mg PO QD - tapered without issues.\par - 11/23/2022 delayed Temodar 400 mg (200 mg/m2) PO QD D1-5 every 28 days.\par \par # Dermatophytoses - Ring Worm rash at RUE BLE.\par \par 12/28/2022 remains clinically stable to continue current management.\par \par PLAN:\par \par - Continue Bevacizumab 10 mg/kg every 2 weeks - GIVE TOMORROW 12/29/2022.\par \par - continue Temodar 400 mg (200 mg/m2) PO QD D1-5 cycle 4 as soon as gets Rx.\par \par - continue Decadron 0.5 mg to QD.\par \par - start Ketoconazole cream BID for Dermatophytoses.\par \par - MR brain ordered for 03/2023 by Dr. Dumont.\par \par - F/U with [facility], Dr. Dumont, as scheduled.\par \par - F/U with cardiology for HTN management.\par \par - Lab work prior to the next treatment CBC, CMP, UA.\par \par RTC in 4 weeks CBC, CMP, UA and treatment every 2 weeks.

## 2023-01-09 NOTE — PHYSICAL EXAM
[Ambulatory and capable of all self care but unable to carry out any work activities] : Status 2- Ambulatory and capable of all self care but unable to carry out any work activities. Up and about more than 50% of waking hours [Normal] : affect appropriate [de-identified] : moon face on steroids - improving  [de-identified] : Limited vision on right eye [de-identified] : some muscle weakness to bilateral lower ext [de-identified] : Ring Worm rash at RUE BLE.

## 2023-01-20 PROBLEM — I63.9 STROKE: Status: ACTIVE | Noted: 2023-01-01

## 2023-01-23 NOTE — ADDENDUM
[FreeTextEntry1] : Spoken to Dr. Stuart Nicole. If she was on IVIG at that time, it does confer increased risk of stroke.

## 2023-01-23 NOTE — ASSESSMENT
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 Dumont on 4/12/22 on  Bevacizumab? who presented to the hospital on 1/2/23 w expressive aphasia and lack of LOC and was found to have R parietal lobe stroke, etiology unclear (progression of disease vs cancer meds?).  is unclear if the stroke is medication induced and is concerned about needing to make decision regarding cancer meds. \par \par PLAN: \par -F/u with Dr. Stuart Nicole \par -Will message Dr. Stuart Nicole regarding etiology of stroke \par -C/w ASA 81 and Lipitor 80 for now\par -C/w Keppra 1500 BID\par -Repeat lipid panel, CMP, CBC, Keppra trough to home \par -F/u with Heme/Onc and Dr. Dumont for cancer status \par -Avoid dehydration  \par -C/w ST/PT/OT\par \par Aggressive risk factor modification should be continued for secondary stroke prevention, consisting of intensive blood pressure control and cholesterol monitoring. I encouraged the patient to discuss these important issues with her primary care doctor.  \par \par I have reviewed the goals of stroke risk factor modification. I counseled the patient on measures to reduce stroke risk, including the importance of medication compliance, risk factor control, exercise, healthy diet and avoidance of smoking. I reviewed stroke warning signs and symptoms and appropriate actions to take if such occur.\par \par Instructed patient to call 911 or report to ED if any acute neurological changes occur, such as having severe, sudden, unusual headache or BEFAST symptoms\par

## 2023-01-23 NOTE — PHYSICAL EXAM
[FreeTextEntry1] : Focal neurological exam:\par \par MS: Awake, alert, oriented to person, place. Normal affect. Follows commands mostly.  \par \par Language: Has tangential speech and loses train of thought. \par \par CNs 2 - 12 intact. EOMI no nystagmus, no diplopia. RHH. No facial asymmetry b/l, full eye closure strength b/l. Hearing grossly normal. Head turning & shoulder shrug intact b/l. Tongue midline, normal movements, no atrophy.\par \par Motor: Normal muscle bulk & tone. No noticeable tremor or seizure. No pronator drift. Muscle strength of b/l UE and b/l LE 5/5. \par \par Reflexes: DTR of biceps, knee and ankle normal \par \par Sensation: Intact to LT, temperature and vibration b/l throughout\par \par Cortical: No extinction\par \par Coordination: No dysmetria to FTN.\par \par Gait: No postural instability. Normal stance and tandem gait.\par \par NIHSS 4\par \par \par \par \par

## 2023-01-25 PROBLEM — Z51.81: Status: ACTIVE | Noted: 2023-01-01

## 2023-01-25 PROBLEM — Z79.52 LONG TERM SYSTEMIC STEROID USER: Status: ACTIVE | Noted: 2022-01-01

## 2023-01-25 PROBLEM — C71.9 GBM (GLIOBLASTOMA MULTIFORME): Status: ACTIVE | Noted: 2022-01-01

## 2023-01-25 PROBLEM — Z85.841: Status: RESOLVED | Noted: 2022-01-01 | Resolved: 2023-01-01

## 2023-01-25 PROBLEM — Z51.11 ENCOUNTER FOR ANTINEOPLASTIC CHEMOTHERAPY: Status: ACTIVE | Noted: 2022-01-01

## 2023-01-25 NOTE — PHYSICAL EXAM
[Ambulatory and capable of all self care but unable to carry out any work activities] : Status 2- Ambulatory and capable of all self care but unable to carry out any work activities. Up and about more than 50% of waking hours [Normal] : affect appropriate [de-identified] : moon face on steroids - improving  [de-identified] : Limited vision on right eye [de-identified] : some muscle weakness to bilateral lower ext [de-identified] : Ring Worm rash at RUE BLE.

## 2023-01-25 NOTE — ASSESSMENT
[FreeTextEntry1] : 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 \par 06/30/2022 S/P chemoRT with concurrent Temodar, with worsening symptoms 2/2 to recurrent edema. She was restarted on Decadron 4 mg BID with improvement. \par 08/05/2022 MRI showed possible interval progression vs pseduo-progression (from RT)?\par 08/22/2022 transfer her care since Dr. Kennedy is leaving and started Avastin at 10 mg/kg with Temodar (150 mg/m2, total 320 mg) to be taken for 5 consecutive days every 28 days.\par 10/03/2022 MR brain ordered by [facility], Dr. Dumont - final results pending.\par - since no significant myelosuppression, 10/26-10/30/2022 Temodar was increased to 400 mg (200 mg/m2) with cycle 2.\par - 11/16/2022 started dexamethasone 0.5 mg PO QD - tapered without issues.\par - 11/23/2022 delayed Temodar 400 mg (200 mg/m2) PO QD D1-5 every 28 days.\par - 01/04/2023 MR BRAIN - New small cortical/subcortical acute infarct in the right parietal lobe. Progressively increasing confluent signal abnormality in bilateral periventricular white matter, with redemonstrated restricted diffusion in bilateral inferior frontal lobe and rostrum of the corpus callosum. No associated enhancement. The finding is nonspecific but likely reflects post radiation changes. Recommend attention on follow-up. Further decrease in heterogeneous enhancements associated with the persistently expansile left occipital mass compared to the prior brain MRI from 12/6/2020 which may reflect posttreatment change. Increased scattered calcifications and trace petechial hemorrhage as demonstrated on the prior head CT from 1/20/2023 likely representing posttreatment change. Recommend continued close follow-up MR brain with perfusion. Unchanged extension of the mass into the splenium of the corpus callosum and contiguous expansile FLAIR signal abnormality in bilateral periatrial white matter extending to the left temporal lobe.\par - 01/25/2023 reported S/P CVA and another episode of "absent seizure" on 01/22/2023 as per family - will stop Avastin for now, proceed with repeat MR brain and continue only with Temodar at this time.\par \par # Dermatophytoses - Ring Worm rash at RUE BLE - none noted today.\par \par PLAN:\par \par - STOP Bevacizumab 10 mg/kg every 2 weeks.\par \par - continue Temodar 400 mg (200 mg/m2) PO QD D1-5 cycle 4 as soon as gets Rx.\par \par - continue Decadron 0.5 mg to QD.\par \par - continue Ketoconazole cream BID for Dermatophytoses.\par \par - proceed with MR brain ordered.\par \par - F/U with [facility], Dr. Dumont, as scheduled.\par \par - F/U with cardiology for HTN management.\par \par - Labs today: CBC CMP.\par \par RTC after MRI brain with CBC, CMP.

## 2023-01-25 NOTE — REVIEW OF SYSTEMS
[Fatigue] : fatigue [Vision Problems] : vision problems [Muscle Weakness] : muscle weakness [Skin Rash] : skin rash [Difficulty Walking] : difficulty walking [Negative] : Allergic/Immunologic [FreeTextEntry4] : dry mouth [FreeTextEntry7] : GERD [de-identified] : Ring Worm rash at RUE BLE - not noted. [de-identified] : wandering during an episode of "seizure" as per family report

## 2023-01-25 NOTE — HISTORY OF PRESENT ILLNESS
[de-identified] : This is a 56 yo F with PMHx of HTN and Anxiety who presents for evaluation of left occipital GBM, unmethylated MGMT, IDH wild type. PIK3CA + PIK3R1 mutations. She was previously being followed by neuro-oncology, Dr. Kennedy. Her history dates back to April 2022 when she presented to [facility] with seizure. MRI showed Enhancing cystic/necrotic lesion within the left occipital lobe measuring 2.2 cm. Smaller rim-enhancing lesion in the medial left periatrial region / splenium measuring measuring 7 mm. Findings are most compatible with neoplastic etiology (primary multifocal versus metastatic). Expansile nonenhancing FLAIR signal abnormality involving the splenium of the corpus callosum with extension along the occipital and temporal horns greater on the left. Additional faint diffuse white matter signal abnormality. Findings are also likely neoplastic.\par Leptomeningeal signal abnormality (FLAIR hyperintense, faintly hyperenhancing) within the left frontal and parietal sulci suspicious for leptomeningeal involvement.\par \par She underwent a biopsy with neurosurgery and pathology showed high grade glioma, unmethylated MGMT, IDH wild type. PIK3CA + PIK3R1 mutations. She was evaluated by radiation oncology and was started on chemoRT with concurrent Temodar which was completed 7/11/22. \par \par MRI from 7/13/22 showed  The irregular enhancing necrotic masses within the left occipital and posterior temporal lobes with extension across the corpus callosum have not significantly changed in size and extent. The associated FLAIR signal abnormality has slightly increased in extent within the left temporal lobe and right aspect of the splenium.\par New punctate focus of enhancement within the right parietal white matter (ser 9 im 17). \par \par During the course of her treatment, she was intermittently on Decadron with attempts to taper off. However, she began to experience headaches and repeat MRI done on 8/5/22 showed: the irregular enhancing necrotic masses within the left occipital and posterior temporal lobes with extension across the corpus callosum have not significantly changed in size and extent. The associated FLAIR signal abnormality has slightly increased in extent within the left temporal lobe and right aspect of the splenium. She was restarted on Decadron 4mg BID with improvement in her symptoms. \par \par To date, she still endorses some intermittent vision loss in her right eye. She also reports lack of peripheral vision on the same side. She notes her vision is better in the morning and starts to progressively worsen throughout the day.  [de-identified] : 09/07/2022 accompanied by her ; Reports feeling better, chronic headaches "disappear" after the last treatment 2 weeks ago; agitation and muscle on dexamethasone; no BP spikes; increasing dryness of her mouth.\par \par 9/21/22\par Patient is here for a follow-up visit for GBM, accompanied by family member.  She is feeling well with no new complaints.  Reviewed most recent CBC, which is stable and WNL.  Patient denies fever, chills, nausea, vomiting, persistent diarrhea, more frequent headaches, dyspnea or bleeding.  She is s/p second dose of Avastin today and she completed her first 5 days of Temodar.  Neurologically her symptoms have essentially been stable following taper of dexamethasone to 4 mg daily.  Since no cytopenias we will increase the Temodar dose with cycle 2 of adjuvant Temodar.  \par \par 10/05/2022 \par accompanied by ; Reports feeling relatively well, no changes in chronic conditions or new complaints since the last visit.\par \par 10/19/2022\par She is here for follow up. She  is due for Avastin today  5th dose. She saw Dr. Dumont on 10/12/2022 and MR brain was ordered for 12/2022. She is on 2 mg of dexamethasone daily. She had a cold and had amoxicillin for sinus. Doing well otherwise. BP is a bit high systolic maybe from Avastin but  states it normalizes at home. She is on  HCT and metoprolol at home.\par \par 11/02/2022\par accompanied by ; Reports feeling well, motor functions are better; no changes in chronic conditions or new complaints since the last visit Just finished her Temodar 5 day course. CBC - no neutropenia or low platelets. UA is fine. BP is well controlled. \par \par 11/30/2022\par She is here for follow up. She is now on dexamethasone 0.5 mg daily and reports no significant changes - she is stable.\par \par 12/28/2022\par accompanied by ; no changes in chronic conditions or new complaints since the last visit.\par \par 01/25/2023\par accompanied by her  and daughter; reports had another "seizure" episode on Sunday - did not go to ER/hospital. She looks and feels fine today, actually she is communicating better. Has old bruise at her left back head.

## 2023-01-29 PROBLEM — R56.9 SEIZURE: Status: ACTIVE | Noted: 2023-01-01

## 2023-01-31 NOTE — ED PROVIDER NOTE - PROGRESS NOTE DETAILS
Discussed with telestroke.  Patient not a TNKase candidate.  Recommending imaging and reassessment. Patient not following commands and having significant movement.  2 mg of Ativan given for CT imaging. EB: Patient signed out to Dr. esquivel pending labs, imaging and reassessment. Authored by Dr. Peñaloza: s/o to me by dr peña. f/u labs, imaging and neuro eval.  known hx of GBM, followed by dr toscano. daughter at bedside. now with sz activity. -[facility] - neuro.  meds given. Authored by Dr. Peñaloza: s/o to dr soriano - pending neurosurg eval and dispo Repeat sepsis perfusion exam performed.  Patient has warm skin and strong pulses.  Normal cap refill.  Initial lactate reviewed and is 1.7.  Patient received fluids based on ideal body weight and broad-spectrum antibiotics.  Appropriate culture sent. [place]: Pt evaluated by [facility] and [facility]. Per stroke team and NCC pt can be admitted to general neurology. Discussed with neuro, pending decision.

## 2023-01-31 NOTE — CONSULT NOTE ADULT - SUBJECTIVE AND OBJECTIVE BOX
SUMMARY: 58 yo F with PMHx of GBM s/p resection and  radiation, currently on chemotherapy, recent small right parietal ischemic stroke, seizure disorder, who presents with declining mental status since yesterday.Hisotory was obtained from patient's .  Patient was discharged from [facility] 01/07/2023, she was admitted with aphasia found to have acute stroke. For past few days patient has not been herself, having episodes of staring, not talking at all. She was supposed to have ambulatory EEG done. Since last night her mentation has worsened, and she has been very restless. Denies any H/o fall, head trauma, no witnessed shaking. She has been confused and not herself for past few days and missed several dose of seizure medication. Denies any recent fever, cough, flu like symptoms, diarrhea.    Patient was diagnosed with left occipital GBM (unmethylated MGMT, IDH wild mutation), underwent resection (04/2022) and radiation. She follows with Dr. Dao. She was taken off Avastin due to the stroke and Temodar was restarted ( due next week).  In ED , stroke code was activated , CTH: unchanged GBM, CTA: no LVO. stat EEG: numerous PLEDS    ALLERGIES: Allergies    No Known Allergies    Intolerances    REVIEW OF SYSTEMS: Negative except for that of HPI (as per family)    VITALS: [x] Reviewed  ICU Vital Signs Last 24 Hrs  T(C): 37.6 (31 Jan 2023 15:53), Max: 38 (31 Jan 2023 07:38)  T(F): 99.7 (31 Jan 2023 15:53), Max: 100.4 (31 Jan 2023 07:38)  HR: 92 (31 Jan 2023 15:53) (92 - 105)  BP: 118/80 (31 Jan 2023 15:53) (118/80 - 141/97)  BP(mean): 95 (31 Jan 2023 15:53) (95 - 95)  ABP: --  ABP(mean): --  RR: 20 (31 Jan 2023 15:53) (20 - 20)  SpO2: 96% (31 Jan 2023 15:53) (96% - 97%)    LABS:  Na: 136 (01-31 @ 06:10)  K: 2.9 (01-31 @ 06:10)  Cl: 92 (01-31 @ 06:10)  CO2: 34 (01-31 @ 06:10)  BUN: 9 (01-31 @ 06:10)  Cr: 0.6 (01-31 @ 06:10)  Glu: 125(01-31 @ 06:10)    Hgb: 11.2 (01-31 @ 06:10)  Hct: 31.4 (01-31 @ 06:10)  WBC: 6.34 (01-31 @ 06:10)  Plt: 143 (01-31 @ 06:10)    INR: 1.04 01-31-23 @ 06:10  PTT: 32.1 01-31-23 @ 06:10    LIVER FUNCTIONS - ( 31 Jan 2023 06:10 )  Alb: 4.3 g/dL / Pro: 6.9 g/dL / ALK PHOS: 80 U/L / ALT: 22 U/L / AST: 35 U/L / GGT: x           MEDICATIONS  (STANDING):  aspirin  chewable 81 milliGRAM(s) Oral daily  atorvastatin 40 milliGRAM(s) Oral at bedtime  dexAMETHasone  Injectable 0.5 milliGRAM(s) IV Push daily  furosemide   Injectable 20 milliGRAM(s) IV Push daily  heparin   Injectable 5000 Unit(s) SubCutaneous every 8 hours  lacosamide IVPB 100 milliGRAM(s) IV Intermittent every 12 hours  levETIRAcetam  IVPB 1500 milliGRAM(s) IV Intermittent every 12 hours  pantoprazole  Injectable 40 milliGRAM(s) IV Push daily  spironolactone 50 milliGRAM(s) Oral daily    MEDICATIONS  (PRN):  LORazepam   Injectable 2 milliGRAM(s) IV Push every 4 hours PRN Seizure lasting>2 minutes      IMAGING/DATA: [x] Reviewed    EXAMINATION:  General: No acute distress  HEENT: Anicteric sclerae  Cardiac: [id number]  Lungs: Clear  Abdomen: Soft, non-tender, +BS  Extremities: No c/c/e  Skin/Incision Site: Clean, dry and intact  Neurologic: Eyes opens to noxious, does not follow commands, PERRLA, right gaze preference but overcomes, +btt, withdrawal in all 4 extremities      DEVICES:   [] Restraints [] PIVs [] ET tube [] central line [] PICC [] arterial line [] hutchison [] NGT/OGT [] EVD [] LD [] AI/HMV [] LiCOX [] ICP monitor [] Trach [] PEG [] Chest Tube [] other:

## 2023-01-31 NOTE — ED PROVIDER NOTE - OBJECTIVE STATEMENT
57 year old female with hx of glioblastoma, htn, gerd, steven on cpap, CVA within the apst month presenting for worsening possible absence seizures. States that these have been occurring over the past week but since 9pm today patient has not come out of it. Has not been verbally responding to spouse, not following commands. Was at baseline prior to this today and speaking normally.

## 2023-01-31 NOTE — ED PROVIDER NOTE - PHYSICAL EXAMINATION
CONSTITUTIONAL: Well-developed; well-nourished; in no acute distress.   SKIN: warm, dry  HEAD: Normocephalic; atraumatic.  EYES: PERRL, EOMI, no conjunctival erythema  ENT: No nasal discharge; airway clear. mmm.  NECK: Supple; non tender.  CARD: S1, S2 normal; no murmurs, gallops, or rubs. Regular rate and rhythm.   RESP: No wheezes, rales or rhonchi.  ABD: soft ntnd  EXT: Normal ROM.  No clubbing, cyanosis or edema.  NEURO: Alert, not following commands, not verbally responding to questions. Moving all 4 extremities.

## 2023-01-31 NOTE — H&P ADULT - HISTORY OF PRESENT ILLNESS
56 yo F with PMHx of GBM s/p resection and  radiation, currently on chemotherapy, recent small right parietal ischemic stroke, seizure disorder, who presents with declining mental status since yesterday.Hisotory was obtained from patient's .  Patient was discharged from [facility] 01/07/2023, she was admitted with aphasia found to have acute stroke. For past few days patient has not been herself, having episodes of staring, not talking at all. She was supposed to have ambulatory EEG done. Since last night her mentation has worsened, and she has been very restless. Denies any H/o fall, head trauma, no witnessed shaking. She has been confused and not herself for past few days and missed several dose of seizure medication. Denies any recent fever, cough, flu like symptoms, diarrhea.    Patient was diagnosed with left occipital GBM (unmethylated MGMT, IDH wild mutation), underwent resection (04/2022) and radiation. She follows with Dr. Dao. She was taken off Avastin due to the stroke and Temodar was restarted ( due next week).  In ED , stroke code was activated , CTH: unchanged GBM, CTA: no LVO. stat EEG: numerous PLEDs. Patient was evaluated by Neurocritical care and Neurosurgery.  ED course:  Patient received Keppra 3gm and Vimpat 200 mg  Patient is being admitted for further evaluation and management

## 2023-01-31 NOTE — H&P ADULT - ASSESSMENT
Ms. Solomon is 57 years old female with PMH of GBM , Seizure, recent right parietal ischemic stroke , HTN was brought to ED due to worsening mental status since yesterday. Stroke imaging: cecile cute change, stat EEG revealed seizure. Patient is being admitted to Neurology for further work up and management.  Plan:  # Seizure:  - most likely due to missed dose of AED. need to assess progression of disease.   - continue Keppra 1500 mg IVPB BID  - continue Vimpat 100 mg IVPB BID  - Continue video EEG  - once seizure controlled, obtain MRI of brain w/wo contrast  - Seizure precaution  - Fall precaution  - Ativan 2mg IV push PRN if seizure duration>2 min    # GBM:  - s/p resection in 04/2022  - Follows with Dr. Dumont ( Neurosurgery) and Dr. Dao ( Oncology)  - Neurosurgery follow  - Discussed with Sylwia Ma over phone: recommended to continue same dose of Decadron 0.5 mg daily  - Hold off Temodar for now  - F/U MRI of brain w/wo RUBI  - Holding Gabapentin and Sertraline for better assessment of mental status      #Recent acute right parietal infarct  - continue ASA 81 mg daily  - C/w Atorvastatin 40 mg daily  - will follow up MRI for any new stroke      #HTN  - Decreased lasix from 40 mg to 20 mg daily for now: will increase if needed  - c/w Aldactone 50 mg daily  - Holding Labetalol 300 mg for now: restart if BP is high       #Prophylactic measures:  - DVT PPX: Heparin 5000 IU s/c Q8H  - GI PPX: Pantoprazole 40 mg IV daily  - Rehab: PT/OT eval  - Code status: full code   - Dispo: 3 E floor    Plan and goals of care have been discussed with patient's  over phone    Discussed with neurology attending

## 2023-01-31 NOTE — H&P ADULT - NSHPPHYSICALEXAM_GEN_ALL_CORE
Physical exam:  Constitutional: awake, but not aware  Eyes: Anicteric sclerae, moist conjunctivae, see below for CNs  Neck: trachea midline, FROM, supple,   Cardiovascular: Regular rate and rhythm,   Pulmonary: Anterior breath sounds clear bilaterally,   GI: Abdomen soft,       Neurologic:  -Mental status: Awake but not alert or oriented. not following commands, doesn't track, non verbal.  -Cranial nerves:   II: blink to threat present B/L  III, IV, VI: Extraocular movements are intact . Pupils equally round and reactive to light  V:  unable to assess  VII: Face is symmetric  Motor: Normal bulk and tone. All 4 extremities at least 3/5  Sensation: Responds to noxious stimuli B/L   Coordination: unable to assess  Reflexes: withdrawal B/L

## 2023-01-31 NOTE — ED PROVIDER NOTE - ATTENDING CONTRIBUTION TO CARE
57-year-old female past medical history of GBM currently on chemo follows with Dr. Pepe, hypertension, hyperlipidemia, CVA in the past month presents with altered mental status.  As per  states that recently she been having worsening episodes of absence seizures usually last for few minutes and resolves.  States that today around 9 PM she had a similar episode but never returned to baseline.  Has not been answering any questions since 9 PM.  Stroke code called on arrival.    CONSTITUTIONAL: Well-developed; well-nourished; in no acute distress.   SKIN: warm, dry  HEAD: Normocephalic; atraumatic.  EYES: PERRL, EOMI, no conjunctival erythema  ENT: No nasal discharge; airway clear.  NECK: Supple; non tender.  CARD: S1, S2 normal;  Regular rate and rhythm.   RESP: No wheezes, rales or rhonchi.  ABD: soft non tender, non distended, no rebound or guarding  EXT: Normal ROM.  5/5 strength in all 4 extremities   LYMPH: No acute cervical adenopathy.  NEURO: Not following commands.  Nonverbal.  Moving all extremities.  PSYCH: Cooperative, appropriate.

## 2023-01-31 NOTE — ED ADULT NURSE NOTE - CHIEF COMPLAINT QUOTE
BIBA with complaints of seizure last night and since then still confused. Usually she comes out of it.

## 2023-01-31 NOTE — ED PROVIDER NOTE - CARE PLAN
Principal Discharge DX:	Altered mental state  Secondary Diagnosis:	Glioblastoma multiforme  Secondary Diagnosis:	Absence seizure   1

## 2023-01-31 NOTE — STROKE CODE NOTE - ACTIVATED STROKE TEAM
Yes Continue Regimen: Patient is currently using mupirocin on affected areas. If mupirocin stops working will send gentamicin to pharmacy Detail Level: Zone

## 2023-01-31 NOTE — CONSULT NOTE ADULT - SUBJECTIVE AND OBJECTIVE BOX
HPI:  57-yo known to Neurosurgey. Pt s/p Craniotomy  for rsxn of GBM. S/P XRT and currently on chemo. Follows with Dr. Pepe, hypertension, hyperlipidemia, CVA in the past month (right parietal stroke) presents with altered mentation and inability to speak.  As per  patient has been having episodic difficulty speaking and confusion in the past several weeks.  further reports that as of last morning patient was able to speak and have basic conversation with him and was walking with a walker which is her baseline since stroke last month. The last time he saw her at her baseline was 2 pm yesterday and reports that she has been persistently altered and unable to speak after 2pm yesterday. Stroke code was activated on arrival to ed Stroke code was activated on arrival to ed. NIHSS 12. CTH No new infarct Redemonstrated neoplasm extending into the left occipital/parietal region, with calcification, compatible with posttreatment changes with possible foci of hemorrhage, better seen on  MR brain from 2023. Case was discussed with telestroke attending on call . Patient is currently not a candidate for IV Thrombolysis due to GBM, and recent CVA. There was a delay in obtaining imaging since patient was agitated and needed to be sedated at different point of the study. She received ativan 2 mg and versed 2 mg while in CT.       PAST MEDICAL & SURGICAL HISTORY:  RAMAKRISHNA on CPAP  GERD (gastroesophageal reflux disease)  Asthmatic bronchitis  MILD , USES VENTOLIN Q2-3M  HTN (hypertension)  Migraines  Chronic neck pain  H/O sinus surgery  Status post D&amp;C  H/O arthroscopy of shoulder  RT  History of hernia repair  2019      Home Medications:  atorvastatin 80 mg oral tablet: 1 tab(s) orally once a day (2022 23:24)  gabapentin 400 mg oral capsule: 1 cap(s) orally 2 times a day (2022 23:24)  Multiple Vitamins oral capsule: 1 cap(s) orally once a day (2023 03:53)  omeprazole 40 mg oral delayed release capsule: 1 cap(s) orally once a day (2023 03:53)  sertraline 50 mg oral tablet: 1 tab(s) orally once a day (2022 23:24)    Allergies    No Known Allergies    Intolerances    ROS:  [  ] A ten-point review of systems is negative except as noted   [X] Due to altered mental status/intubation, subjective information were not able to be obtained from the patient. History was obtained, to the extent possible, from review of the chart and collateral sources of information    MEDICATIONS  (STANDING):    MEDICATIONS  (PRN):      ICU Vital Signs Last 24 Hrs  T(C): 38 (2023 07:38), Max: 38 (2023 07:38)  T(F): 100.4 (2023 07:38), Max: 100.4 (2023 07:38)  HR: 96 (2023 08:00) (96 - 105)  BP: 141/97 (2023 07:38) (119/71 - 141/97)  BP(mean): --  ABP: --  ABP(mean): --  RR: 20 (2023 08:00) (20 - 20)  SpO2: 96% (2023 08:00) (96% - 97%)    O2 Parameters below as of 2023 08:00  Patient On (Oxygen Delivery Method): room air    I&O's Detail                            11.2   6.34  )-----------( 143      ( 2023 06:10 )             31.4     01-    136  |  92<L>  |  9<L>  ----------------------------<  125<H>  2.9<L>   |  34<H>  |  0.6<L>    Ca    9.2      2023 06:10    TPro  6.9  /  Alb  4.3  /  TBili  1.1  /  DBili  x   /  AST  35  /  ALT  22  /  AlkPhos  80  01-    CARDIAC MARKERS ( 2023 06:10 )  x     / <0.01 ng/mL / x     / x     / x          Urinalysis Basic - ( 2023 08:25 )    Color: Yellow / Appearance: Clear / S.022 / pH: x  Gluc: x / Ketone: Negative  / Bili: Negative / Urobili: <2 mg/dL   Blood: x / Protein: Trace / Nitrite: Negative   Leuk Esterase: Negative / RBC: x / WBC x   Sq Epi: x / Non Sq Epi: x / Bacteria: x        On PE:    Awake and Alert  Aphasic  pupils reactive  Combative  BUCKLEY  No following commands    RADIOLOGY:      Assessment:  GBM    Plan:  Will review films with Attending

## 2023-01-31 NOTE — CONSULT NOTE ADULT - ASSESSMENT
57-yo w/pmhx of GBM currently on chemo follows with Dr. Pepe, hypertension, Seizure,  hyperlipidemia, CVA in the past month (right parietal stroke) presents with altered mentation and inability to speak which has been persistent after 2pm yesterday. Stroke code was activated on arrival to ed. NIHSS 12. CTH No new infarct Redemonstrated neoplasm extending into the left occipital/parietal region, with calcification, compatible with posttreatment changes with possible foci of hemorrhage, better seen on  MR brain from 1/4/2023. Case was discussed with telestroke attending on call . Patient is currently not a candidate for IV Thrombolysis due to GBM, and recent CVA. There was a delay in obtaining imaging since patient was agitated and needed to be sedated at different point of the study. She received ativan 2 mg and versed 2 mg while in CT.       #AMS and global aphasia r/o New CVA Vs Status epilepticus    Plan  ·	Follow up CTA H/N/P   ·	Neurosurgical evaluation  ·	Administer Keppra 2grams IV x1 stat  ·	Correct electrolytes  ·	Obtain magnesium levels keep >2 <2.5  ·	VEEG   ·	MRI BRAIN ww/o contrast.  ·	Continue home medication  ·	Neuro attending note will follow     57-yo w/pmhx of GBM currently on chemo follows with Dr. Pepe, hypertension, Seizure,  hyperlipidemia, CVA in the past month (right parietal stroke) presents with altered mentation and inability to speak which has been persistent after 2pm yesterday. Stroke code was activated on arrival to ed. NIHSS 12. CTH No new infarct Redemonstrated neoplasm extending into the left occipital/parietal region, with calcification, compatible with posttreatment changes with possible foci of hemorrhage, better seen on  MR brain from 1/4/2023. Case was discussed with telestroke attending on call . Patient is currently not a candidate for IV Thrombolysis due to GBM, and recent CVA. There was a delay in obtaining imaging since patient was agitated and needed to be sedated at different point of the study. She received ativan 2 mg and versed 2 mg while in CT.       #AMS and global aphasia r/o New CVA Vs Status epilepticus    Plan  ·	Follow up CTA H/N/P   ·	Neurosurgical evaluation  ·	Administer Keppra 2grams IV x1 stat  ·	Continue keppra 1500mg bid  ·	Correct electrolytes  ·	Obtain magnesium levels keep >2 <2.5  ·	VEEG   ·	MRI BRAIN ww/o contrast.  ·	Continue home medication  ·	Neuro attending note will follow     57-yo w/pmhx of GBM currently on chemo follows with Dr. Pepe, hypertension, Seizure,  hyperlipidemia, CVA in the past month (right parietal stroke) presents with altered mentation and inability to speak which has been persistent after 2pm yesterday. Stroke code was activated on arrival to ed. NIHSS 12. CTH No new infarct Redemonstrated neoplasm extending into the left occipital/parietal region, with calcification, compatible with posttreatment changes with possible foci of hemorrhage, better seen on  MR brain from 1/4/2023. Case was discussed with telestroke attending on call . Patient is currently not a candidate for IV Thrombolysis due to GBM, and recent CVA. There was a delay in obtaining imaging since patient was agitated and needed to be sedated at different point of the study. She received ativan 2 mg and versed 2 mg while in CT.       #AMS and global aphasia r/o New stroke Vs Status epilepticus    Plan  ·	Follow up CTA H/N/P   ·	Neurosurgical evaluation  ·	Administer Keppra 2grams IV x1 stat  ·	Continue keppra 1500mg bid  ·	Correct electrolytes  ·	Obtain magnesium levels keep >2 <2.5  ·	VEEG   ·	MRI BRAIN ww/o contrast.  ·	Continue home medication  ·	Neuro attending note will follow

## 2023-01-31 NOTE — CONSULT NOTE ADULT - ASSESSMENT
57 years old female with PMH of GBM , Seizure, recent right parietal ischemic stroke , HTN was brought to ED due to worsening mental status since yesterday. Stroke imaging: cecile cute change, stat EEG revealed seizure. Patient is being admitted to Neurology for further work up and management.     - Keppra 1 g Q8H  - Continue vEEG  - Load Vimpat 200 now, then 100 mg BID  - gabapentin 400   - Repeat MR Brain as previous images were not "clear" as per patients family/outpatient neuro oncologist  - Sepsis work up  - Not a candidate for NCC

## 2023-01-31 NOTE — ED PROVIDER NOTE - CLINICAL SUMMARY MEDICAL DECISION MAKING FREE TEXT BOX
I ED work-up reviewed and patient evaluated by neurology, neuro critical care and neurosurgery.  Patient's mentation was worsening since last night.  No witnessed tonic-clonic activity.  Patient had several missed doses of seizure medication.  No fever or signs of infectious etiology.  HSV CT head showed unchanged GBM and CT angio performed shows normal large vessel occlusion.  Patient given load with Keppra and Vimpat.  Patient was given potassium repletion and magnesium.  Patient being admitted to neurology for further work-up and management for seizure.

## 2023-01-31 NOTE — ED ADULT TRIAGE NOTE - CHIEF COMPLAINT QUOTE
BIBA with complaints of seizure last night and since then still confused. Usually she gets over it. BIBA with complaints of seizure last night and since then still confused. Usually she comes out of it. BIBA with complaints of seizure last night and since then still confused. Usually she comes out of it. Hx Brain Tumor, suppose to have MRI today and EEG scheduled next week

## 2023-01-31 NOTE — ED ADULT NURSE REASSESSMENT NOTE - NS ED NURSE REASSESS COMMENT FT1
Received pt from previous nurse, Pt AxOx0 but awake and responsive to tactile stimuli. EEG in progress.  present at beside. Safety measures maintained.

## 2023-01-31 NOTE — CONSULT NOTE ADULT - SUBJECTIVE AND OBJECTIVE BOX
CC:HPI: HPI:      ROS:  Constitutional, Neurological, Psychiatric, Eyes, ENT, Cardiovascular, Respiratory, Gastrointestinal, Genitourinary, Musculoskeletal, Integumentary, Endocrine and Heme/Lymph are otherwise negative.     Physical Exam:  Constitutional: alert and in no acute distress.  Eyes: the sclera and conjunctiva were normal, pupils were equal in size, round, reactive to light, with normal accommodation and extraocular movements were intact.   Back: no costovertebral angle tenderness and no spinal tenderness.      Neuro Exam:  Orientation: Patient is awake alert , agitated not following commands , not verbal  Fund of knowledge: History obtained from  from bedside  Cranial Nerves:   Motor: muscle tone was normal in all four extremities, muscle strength was normal in all four extremities and normal bulk in all four extremities.   Sensory exam: light touch was intact.   Coordination:. normal gait. balance was intact. there was no past-pointing. no tremor present.   Deep tendon reflexes:   Biceps right 2+. Biceps left 2+.    Triceps right 2+. Triceps left 2+.  LOC  Brachioradialis right 2+. Brachioradialis left 2+.    Patella right 2+. Patella left 2+.    Ankle jerk right 2+. Ankle jerk left 2+.   Plantar responses normal on the right, normal on the left.      NIHSS: 12  LOC 0  Commands 2   questions 2  VF 2  Gaze 2  Face 0  RUE 0    RLE 0  LUE 0     LLE 0  Sensory 0  Speech 0  Language 3  Ataxia 0  Extinction 1    mRs 3  0 No symptoms at all  1 No significant disability despite symptoms; able to carry out all usual duties and activities without assistance  2 Slight disability; unable to carry out all previous activities, but able to look after own affairs  3 Moderate disability; requiring some help, but able to walk without assistance  4 Moderately severe disability; unable to walk without assistance and unable to attend to own bodily needs without assistance  5 Severe disability; bedridden, incontinent and requiring constant nursing care and attention  6 Dead    Allergies    No Known Allergies    Intolerances      MEDICATIONS  (STANDING):  cefepime   IVPB 2000 milliGRAM(s) IV Intermittent once  dexAMETHasone  Injectable 10 milliGRAM(s) IV Push Once  lactated ringers Bolus 2500 milliLiter(s) IV Bolus once  levETIRAcetam  IVPB 1000 milliGRAM(s) IV Intermittent once  levETIRAcetam  IVPB 1000 milliGRAM(s) IV Intermittent once  LORazepam   Injectable 2 milliGRAM(s) IV Push once  midazolam Injectable 1 milliGRAM(s) IV Push Once  midazolam Injectable 1 milliGRAM(s) IV Push Once  potassium chloride  20 mEq/100 mL IVPB 20 milliEquivalent(s) IV Intermittent once  vancomycin  IVPB. 1000 milliGRAM(s) IV Intermittent once    MEDICATIONS  (PRN):      LABS:                        11.2   6.34  )-----------( 143      ( 31 Jan 2023 06:10 )             31.4     01-31    136  |  92<L>  |  9<L>  ----------------------------<  125<H>  2.9<L>   |  34<H>  |  0.6<L>    Ca    9.2      31 Jan 2023 06:10    TPro  6.9  /  Alb  4.3  /  TBili  1.1  /  DBili  x   /  AST  35  /  ALT  22  /  AlkPhos  80  01-31    PT/INR - ( 31 Jan 2023 06:10 )   PT: 11.90 sec;   INR: 1.04 ratio         PTT - ( 31 Jan 2023 06:10 )  PTT:32.1 sec        Neuro Imaging:  < from: CT Brain Stroke Protocol (01.31.23 @ 06:34) >  FINDINGS:    Redemonstrated heterogenous tissue replacing the splenium of the corpus   callosum and extending predominantly into the left periventricular white   matter of the occipital/parietal region, better visualized on recent MR   brain from 1/4/2023. Again seen is prominent calcification hyperdensity   within the region, which may be related to posttreatment changes, however   foci of hemorrhage cannot be excluded.    Redemonstrated right parietal lobe heterogenous focus with calcification,   which correlates to previously seen small infarct better visualized on MR   brain on 1/4/2023.    Status post left occipital craniectomywith mesh cranioplasty.    No evidence of new acute territorial infarct. No midline shift. No   hydrocephalus. No new extra-axial fluid collection.    The imaged portions of the paranasal sinuses are aerated.The mastoid air   cells are aerated.      IMPRESSION:    No new acute territorial infarct, midline shift, hydrocephalus, or   extra-axial fluid collection.    Redemonstrated neoplasm replacing splenium of corpus callosum and   extending into the left occipital/parietal region, with calcification and   heterogeneity compatible with posttreatment changes with possible foci of   hemorrhage, better visualized on recent MR brain from 1/4/2023.    Redemonstrated right parietal lobe heterogeneous focus with   calcification, which correlates to previously seen small infarct, better   visualized on MR brain from 1/4/2023.      Dr. Luis Fernando Aviles discussed preliminary findings with ROBERT BRIDGES MD; on 1/31/2023 6:48 AM with readback.      ******PRELIMINARY REPORT******      ******PRELIMINARY REPORT******       LUIS FERNANDO AVILES MD; Resident Radiologist  This document is a PRELIMINARY interpretation and is pending final   attending approval. Jan 31 2023  7:02AM    < end of copied text >      EEG:     Echo:   Carotid Doppler: N/A  Telemetry:              CC: AMS and difficulty speaking      HPI: History obtained from  at bedside  57-yo with past medical history of GBM currently on chemo follows with Dr. Pepe, hypertension, hyperlipidemia, CVA in the past month (right parietal stroke) presents with altered mentation and inability to speak.  As per  patient has been having episodic difficulty speaking and confusion in the past several weeks.  further reports that as of last morning patient was able to speak and have basic conversation with him and was walking with a walker which is her baseline since stroke last month. The last time he saw her at her baseline was 2 pm yesterday and reports that she has been persistently altered and unable to speak after 2pm yesterday. Stroke code was activated on arrival to ed    Home Medications:  Atorvastatin 80 mg oral tablet: 1 tab(s) orally once a day (05 Apr 2022 23:24)  Gabapentin 400 mg oral capsule: 1 cap(s) orally 2 times a day (05 Apr 2022 23:24)  Multiple Vitamins oral capsule: 1 cap(s) orally once a day (03 Jan 2023 03:53)  Omeprazole 40 mg oral delayed release capsule: 1 cap(s) orally once a day (03 Jan 2023 03:53)  Sertraline 50 mg oral tablet: 1 tab(s) orally once a day (05 Apr 2022 23:24)  Keppra 1500mg bid    Social History  Denies alcohol and drug abuse  Remote h/o smoking        Neuro Exam:  Orientation: Patient is awake alert , agitated not following commands , not verbal  Fund of knowledge: Patient unable to give history, history obtained from  from bedside  Cranial Nerves: PERRLA, eyes preferential to the left, decreased response to threat in the right visual field, Face is symmetric, global aphasia, grimaces frequently  Motor: Moving all 4 extremities vigorously at random.             No shaking or tremors noted at this time  Sensory exam: Responding to pain in all extremities by withdrawing and grimacing  Coordination:. JESENIA  Right neglect    NIHSS: 12  LOC 0  Commands 2   questions 2  VF 2  Gaze 2  Face 0  RUE 0    RLE 0  LUE 0     LLE 0  Sensory 0  Speech 0  Language 3  Ataxia 0  Extinction 1    mRs 3  0 No symptoms at all  1 No significant disability despite symptoms; able to carry out all usual duties and activities without assistance  2 Slight disability; unable to carry out all previous activities, but able to look after own affairs  3 Moderate disability; requiring some help, but able to walk without assistance  4 Moderately severe disability; unable to walk without assistance and unable to attend to own bodily needs without assistance  5 Severe disability; bedridden, incontinent and requiring constant nursing care and attention  6 Dead    Allergies    No Known Allergies    Intolerances      MEDICATIONS  (STANDING):  cefepime   IVPB 2000 milliGRAM(s) IV Intermittent once  dexAMETHasone  Injectable 10 milliGRAM(s) IV Push Once  lactated ringers Bolus 2500 milliLiter(s) IV Bolus once  levETIRAcetam  IVPB 1000 milliGRAM(s) IV Intermittent once  levETIRAcetam  IVPB 1000 milliGRAM(s) IV Intermittent once  LORazepam   Injectable 2 milliGRAM(s) IV Push once  midazolam Injectable 1 milliGRAM(s) IV Push Once  midazolam Injectable 1 milliGRAM(s) IV Push Once  potassium chloride  20 mEq/100 mL IVPB 20 milliEquivalent(s) IV Intermittent once  vancomycin  IVPB. 1000 milliGRAM(s) IV Intermittent once    MEDICATIONS  (PRN):      LABS:                        11.2   6.34  )-----------( 143      ( 31 Jan 2023 06:10 )             31.4     01-31    136  |  92<L>  |  9<L>  ----------------------------<  125<H>  2.9<L>   |  34<H>  |  0.6<L>    Ca    9.2      31 Jan 2023 06:10    TPro  6.9  /  Alb  4.3  /  TBili  1.1  /  DBili  x   /  AST  35  /  ALT  22  /  AlkPhos  80  01-31    PT/INR - ( 31 Jan 2023 06:10 )   PT: 11.90 sec;   INR: 1.04 ratio         PTT - ( 31 Jan 2023 06:10 )  PTT:32.1 sec        Neuro Imaging:  < from: CT Brain Stroke Protocol (01.31.23 @ 06:34) >  FINDINGS:    Redemonstrated heterogenous tissue replacing the splenium of the corpus   callosum and extending predominantly into the left periventricular white   matter of the occipital/parietal region, better visualized on recent MR   brain from 1/4/2023. Again seen is prominent calcification hyperdensity   within the region, which may be related to posttreatment changes, however   foci of hemorrhage cannot be excluded.    Redemonstrated right parietal lobe heterogenous focus with calcification,   which correlates to previously seen small infarct better visualized on MR   brain on 1/4/2023.    Status post left occipital craniectomywith mesh cranioplasty.    No evidence of new acute territorial infarct. No midline shift. No   hydrocephalus. No new extra-axial fluid collection.    The imaged portions of the paranasal sinuses are aerated.The mastoid air   cells are aerated.      IMPRESSION:    No new acute territorial infarct, midline shift, hydrocephalus, or   extra-axial fluid collection.    Redemonstrated neoplasm replacing splenium of corpus callosum and   extending into the left occipital/parietal region, with calcification and   heterogeneity compatible with posttreatment changes with possible foci of   hemorrhage, better visualized on recent MR brain from 1/4/2023.    Redemonstrated right parietal lobe heterogeneous focus with   calcification, which correlates to previously seen small infarct, better   visualized on MR brain from 1/4/2023.      Dr. Luis Fernando Aviles discussed preliminary findings with ROBERT BRIDGES MD; on 1/31/2023 6:48 AM with readback.      ******PRELIMINARY REPORT******      ******PRELIMINARY REPORT******       LUIS FERNANDO AVILES MD; Resident Radiologist  This document is a PRELIMINARY interpretation and is pending final   attending approval. Jan 31 2023  7:02AM    < end of copied text >      EEG:     Echo:   Carotid Doppler: N/A  Telemetry:

## 2023-01-31 NOTE — H&P ADULT - ATTENDING COMMENTS
Patient seen and examined and agree with above except as noted.  Patients history, notes, labs, imaging, vitals and meds reviewed personally.  Patient seen this morning and is speaking intermittently and answering some questions.  She will stare and stop responding occasionally however VEEG does not appear to show seizures at these times  She appears to have a slight preference to the left  She is moving all her extremities    Likely she presented with seizures frequently     Plan as above

## 2023-01-31 NOTE — ED ADULT NURSE NOTE - OBJECTIVE STATEMENT
Pt. BIBA with complaints of confusion after seizure episode that occurred last night. As per , pt. has hx of seizures, however confusion usually resolves.

## 2023-01-31 NOTE — H&P ADULT - NSHPLABSRESULTS_GEN_ALL_CORE
Fingerstick Blood Glucose: CAPILLARY BLOOD GLUCOSE    LABS:                        11.2   6.34  )-----------( 143      ( 2023 06:10 )             31.4         136  |  92<L>  |  9<L>  ----------------------------<  125<H>  2.9<L>   |  34<H>  |  0.6<L>    Ca    9.2      2023 06:10    TPro  6.9  /  Alb  4.3  /  TBili  1.1  /  DBili  x   /  AST  35  /  ALT  22  /  AlkPhos  80      PT/INR - ( 2023 06:10 )   PT: 11.90 sec;   INR: 1.04 ratio         PTT - ( 2023 06:10 )  PTT:32.1 sec  CARDIAC MARKERS ( 2023 06:10 )  x     / <0.01 ng/mL / x     / x     / x          Urinalysis Basic - ( 2023 08:25 )    Color: Yellow / Appearance: Clear / S.022 / pH: x  Gluc: x / Ketone: Negative  / Bili: Negative / Urobili: <2 mg/dL   Blood: x / Protein: Trace / Nitrite: Negative   Leuk Esterase: Negative / RBC: x / WBC x   Sq Epi: x / Non Sq Epi: x / Bacteria: x    < from: CT Brain Stroke Protocol (23 @ 06:34) >    No new acute territorial infarct, midline shift, hydrocephalus, or   extra-axial fluid collection.    Redemonstrated neoplasm replacing splenium of corpus callosum and   extending into the left occipital/parietal region, with calcification,   heterogeneity and posttreatment changes, better visualized on recent MR   brainfrom 2023. While limited ability to entirely exclude hemorrhage   given heterogeneous hyperdensity in this region, findings appear similar   to prior and definitive acute hemorrhage is not identified.    Redemonstrated right parietal lobe heterogeneous focus with   calcification, which correlates to previously seen small infarct, better   visualized on MR brain from 2023.        < from: CT Angio Neck Stroke Protocol w/ IV Cont (23 @ 07:30) >    CT PERFUSION:    No evidence of ischemic penumbra or core infarct.    CTA HEAD/NECK:    No significant vascular stenosis or large vessel occlusion.

## 2023-02-01 NOTE — CONSULT NOTE ADULT - SUBJECTIVE AND OBJECTIVE BOX
CC:     HPI:  58 yo F with PMHx of GBM s/p resection and  radiation, currently on chemotherapy, recent small right parietal ischemic stroke, seizure disorder, who presents with declining mental status since yesterday..  Patient was discharged from [facility] 2023, she was admitted with aphasia found to have acute stroke. For past few days patient has not been herself, having episodes of staring, not talking at all. She was supposed to have ambulatory EEG done. She has been confused and not herself for past few days and missed several dose of seizure medication.  Patient was diagnosed with left occipital GBM (unmethylated MGMT, IDH wild mutation), underwent resection (2022) and radiation. She follows with Dr. Dao. She was taken off Avastin due to the stroke and Temodar was restarted ( due next week). In ED , stroke code was activated , CTH: unchanged GBM, CTA: no LVO. stat EEG: numerous PLEDs. Patient was evaluated by Neurocritical care and Neurosurgery. Patient received Keppra 3gm and Vimpat 200 mg. Patient is being admitted with seizure for further neurology workup and management. Palliative consulted for [facility].         PERTINENT PM/SXH:   RAMAKRISHNA on CPAP    GERD (gastroesophageal reflux disease)    Asthmatic bronchitis    HTN (hypertension)    Migraines    Chronic neck pain      H/O sinus surgery    Status post D&amp;C    H/O arthroscopy of shoulder    History of hernia repair      FAMILY HISTORY:    ITEMS NOT CHECKED ARE NOT PRESENT    SOCIAL HISTORY:   Significant other/partner[X ]  Children[ ]  Anabaptist/Spirituality:  Substance hx:  [ ]   Tobacco hx:  [ ]   Alcohol hx: [ ]   Living Situation: [X ]Home  [ ]Long term care  [ ]Rehab [ ]Other  Home Services: [ ] HHA [ ] Visting RN [ ] Hospice  Occupation:  Home Opioid hx:  [ ] Y [X ] N [ ] I-Stop Reference No: This report was requested by: Zuleima Lyon | Reference #: [id number]  Patient Name: Kristan Kitchen Date: 1965  Address: [address] [place] [id number]: Female  Rx Written	Rx Dispensed	Drug	Quantity	Days Supply	Prescriber Name	Prescriber Lisa #	Payment Method	Dispenser  2022	oxycodone-acetaminophen 5-325 mg tablet	30	7	Yamel Sargent M (VIRGILIO)	[id number]	Insurance	[facility]  05/10/2022	05/10/2022	lorazepam 1 mg tablet	5	3	Lacy Szymanski TED SIMPSON )	[id number]	Insurance	[facility] #[id number]  2022	oxycodone-acetaminophen 5-325 mg tablet	20	5	Naima Kinney [id number]	Insurance	[facility]  2022	alprazolam 1 mg tablet	2	1	Leatha Frey MD	[id number]	Insurance	[facility] #[id number]      ADVANCE DIRECTIVES:    [X ] Full Code [ ] DNR  MOLST  [ ]  Living Will  [ ]   DECISION MAKER(s): spouse  [ ] Health Care Proxy(s)  [X ] Surrogate(s)  [ ] Guardian           Name(s): Phone Number(s): Anselmo ()    BASELINE (I)ADL(s) (prior to admission):  [place]: [ ]Total  [X ] Moderate [ ]Dependent  Palliative Performance Status Version 2:         %    http://npcrc.org/files/news/palliative_performance_scale_ppsv2.pdf    Allergies    No Known Allergies    Intolerances    MEDICATIONS  (STANDING):  aspirin  chewable 81 milliGRAM(s) Oral daily  atorvastatin 40 milliGRAM(s) Oral at bedtime  dexAMETHasone  Injectable 0.5 milliGRAM(s) IV Push daily  heparin   Injectable 5000 Unit(s) SubCutaneous every 8 hours  labetalol 100 milliGRAM(s) Oral every 8 hours  lacosamide IVPB 150 milliGRAM(s) IV Intermittent every 12 hours  levETIRAcetam  IVPB 1500 milliGRAM(s) IV Intermittent every 12 hours  pantoprazole  Injectable 40 milliGRAM(s) IV Push daily  potassium chloride   Powder 20 milliEquivalent(s) Oral once  spironolactone 50 milliGRAM(s) Oral daily    MEDICATIONS  (PRN):  LORazepam   Injectable 2 milliGRAM(s) IV Push every 4 hours PRN Seizure lasting>2 minutes    PRESENT SYMPTOMS: [ ]Unable to obtain due to poor mentation   Source if other than patient:  [ ]Family   [ ]Team     Pain: [ ]yes [ ]no  QOL impact -   Location -                    Aggravating factors -  Quality -  Radiation -  Timing-  Severity (0-10 scale):  Minimal acceptable level (0-10 scale):     PAIN AD Score:     http://[email or web address]/painad.pdf (press ctrl +  left click to view)      Dyspnea:                           [ ]Mild [ ]Moderate [ ]Severe [ ]None  Anxiety:                             [ ]Mild [ ]Moderate [ ]Severe [ ]None  Fatigue:                             [ ]Mild [ ]Moderate [ ]Severe [ ]None  Nausea:                             [ ]Mild [ ]Moderate [ ]Severe [ ]None  Loss of appetite:              [ ]Mild [ ]Moderate [ ]Severe [ ]None  Constipation:                    [ ]Mild [ ]Moderate [ ]Severe [ ]None    Other Symptoms:  [ ]All other review of systems negative     Palliative Performance Status Version 2:         %    http://npcrc.org/files/news/palliative_performance_scale_ppsv2.pdf    PHYSICAL EXAM:  Vital Signs Last 24 Hrs  T(C): 37.2 (2023 09:28), Max: 37.6 (2023 15:53)  T(F): 98.9 (2023 09:28), Max: 99.7 (2023 15:53)  HR: 87 (2023 09:28) (82 - 96)  BP: 136/66 (2023 09:28) (118/80 - 179/86)  BP(mean): 95 (2023 15:53) (95 - 95)  RR: 18 (2023 09:28) (16 - 20)  SpO2: 95% (2023 09:28) (95% - 97%)      GENERAL:  [ ] No acute distress [ ]Lethargic  [ ]Unarousable  [ ]Verbal  [ ]Non-Verbal [ ]Cachexia    BEHAVIORAL/PSYCH:  [ ]Alert and Oriented x  [ ] Anxiety [ ] Delirium [ ] Agitation [ ] Calm   EYES: [ ] No scleral icterus [ ] Scleral icterus [ ] Closed  ENMT:  [ ]Dry mouth  [ ]No external oral lesions [ ] No external ear or nose lesions  CARDIOVASCULAR:  [ ]Regular [ ]Irregular [ ]Tachy [ ]Not Tachy  [ ]Aaron [ ] Edema [ ] No edema  PULMONARY:  [ ]Tachypnea  [ ]Audible excessive secretions [ ] No labored breathing [ ] labored breathing  GASTROINTESTINAL: [ ]Soft  [ ]Distended  [ ]Not distended [ ]Non tender [ ]Tender  MUSCULOSKELETAL: [ ]No clubbing [ ] clubbing  [ ] No cyanosis [ ] cyanosis  NEUROLOGIC: [ ]No focal deficits  [ ]Follows commands  [ ]Does not follow commands  [ ]Cognitive impairment  [ ]Dysphagia  [ ]Dysarthria  [ ]Paresis   SKIN: [ ] Jaundiced [ ] Non-jaundiced [ ]Rash [ ]No Rash [ ] Warm [ ] Dry  MISC/LINES: [ ] ET tube [ ] Trach [ ]NGT/OGT [ ]PEG [ ]Grande      LABS: reviewed by me                        11.2   5.47  )-----------( 105      ( 2023 07:07 )             31.6   02-01    143  |  100  |  8<L>  ----------------------------<  99  3.1<L>   |  25  |  0.5<L>    Ca    9.1      2023 07:07  Phos  3.3     02-  Mg     1.4     02-    TPro  6.9  /  Alb  4.3  /  TBili  1.1  /  DBili  x   /  AST  35  /  ALT  22  /  AlkPhos  80    PT/INR - ( 2023 06:10 )   PT: 11.90 sec;   INR: 1.04 ratio         PTT - ( 2023 06:10 )  PTT:32.1 sec    Urinalysis Basic - ( 2023 08:25 )    Color: Yellow / Appearance: Clear / S.022 / pH: x  Gluc: x / Ketone: Negative  / Bili: Negative / Urobili: <2 mg/dL   Blood: x / Protein: Trace / Nitrite: Negative   Leuk Esterase: Negative / RBC: x / WBC x   Sq Epi: x / Non Sq Epi: x / Bacteria: x      RADIOLOGY & ADDITIONAL STUDIES: reviewed by me    EKG: reviewed by me      < from: 12 Lead ECG (23 @ 09:36) >  Ventricular Rate 99 BPM    Atrial Rate 99 BPM    P-R Interval 110 ms    QRS Duration 72 ms    Q-T Interval 396 ms    QTC Calculation(Bazett) 508 ms    P Axis 34 degrees    R Axis 3 degrees    T Axis 31 degrees    Diagnosis Line Sinus rhythm with short IN  Voltage criteria for left ventricular hypertrophy  Inferior infarct , age undetermined  Prolonged QT  Abnormal ECG    Confirmed by Cole Conroy ([id number]) on 2023 4:24:28 PM    < end of copied text >      Focal and generalized slowin. moderate generalized slowing  2. mild to moderate left hemispheric mainly posterior quadrant and posterior temporal focal slowing    Interictal activity - frequent number of left posterior temporal sharp waves that also appear in periodic pattern    Events - see below    Seizures - one focal seizure that was associated with clear clinical change lasting for 120 sec. Electrographically it is characterized by left occipital rhythmic low amplitude spike and after going slow.    Impression: Abnormal VEEG as above    Plan - per neurology team    < from: CT Angio Brain Stroke Protocol  w/ IV Cont (23 @ 07:30) >  IMPRESSION:    In comparison with the prior CT perfusion and CTA of the head and neck   dated 2023:    CTA of the head and neck is limited by motion artifact.    CT PERFUSION:    No evidence of ischemic penumbra or core infarct.    CTA HEAD/NECK:    No significant vascular stenosis or large vessel occlusion.      < end of copied text >      REFERRALS:   [ ]Chaplaincy  [ ]Hospice  [ ]Child Life  [ ]Social Work  [ ]Case management [ ]Holistic Therapy     Goals of Care Document:    CC:     HPI:  56 yo F with PMHx of GBM s/p resection and  radiation, currently on chemotherapy, recent small right parietal ischemic stroke, seizure disorder, who presents with declining mental status since yesterday..  Patient was discharged from [facility] 2023, she was admitted with aphasia found to have acute stroke. For past few days patient has not been herself, having episodes of staring, not talking at all. She was supposed to have ambulatory EEG done. She has been confused and not herself for past few days and missed several dose of seizure medication.  Patient was diagnosed with left occipital GBM (unmethylated MGMT, IDH wild mutation), underwent resection (2022) and radiation. She follows with Dr. Dao. She was taken off Avastin due to the stroke and Temodar was restarted ( due next week). In ED , stroke code was activated , CTH: unchanged GBM, CTA: no LVO. stat EEG: numerous PLEDs. Patient was evaluated by Neurocritical care and Neurosurgery. Patient received Keppra 3gm and Vimpat 200 mg. Patient is being admitted with seizure for further neurology workup and management. Palliative consulted for [facility].       PERTINENT PM/SXH:   RAMAKRISHNA on CPAP    GERD (gastroesophageal reflux disease)    Asthmatic bronchitis    HTN (hypertension)    Migraines    Chronic neck pain      H/O sinus surgery    Status post D&amp;C    H/O arthroscopy of shoulder    History of hernia repair      FAMILY HISTORY:    ITEMS NOT CHECKED ARE NOT PRESENT    SOCIAL HISTORY:   Significant other/partner[X ]  Children[ ]  Jain/Spirituality:  Substance hx:  [ ]   Tobacco hx:  [ ]   Alcohol hx: [ ]   Living Situation: [X ]Home  [ ]Long term care  [ ]Rehab [ ]Other  Home Services: [ ] HHA [ ] Visting RN [ ] Hospice  Occupation:  Home Opioid hx:  [ ] Y [X ] N [ ] I-Stop Reference No: This report was requested by: Zuleima Lyon | Reference #: [id number]  Patient Name: Kristan Kitchen Date: 1965  Address: [address] [place] [id number]: Female  Rx Written	Rx Dispensed	Drug	Quantity	Days Supply	Prescriber Name	Prescriber Lisa #	Payment Method	Dispenser  2022	oxycodone-acetaminophen 5-325 mg tablet	30	7	Yamel Sargent M (VIRGILIO)	[id number]	Insurance	[facility]  05/10/2022	05/10/2022	lorazepam 1 mg tablet	5	3	Lacy Szymanski TED (M D )	[id number]	Insurance	[facility] #[id number]  2022	oxycodone-acetaminophen 5-325 mg tablet	20	5	Naima Kinney [id number]	Insurance	[facility]  2022	alprazolam 1 mg tablet	2	1	Leatha Frey MD	[id number]	Insurance	[facility] #[id number]      ADVANCE DIRECTIVES:    [X ] Full Code [ ] DNR  MOLST  [ ]  Living Will  [ ]   DECISION MAKER(s): spouse  [ ] Health Care Proxy(s)  [X ] Surrogate(s)  [ ] Guardian           Name(s): Phone Number(s): Anselmo ()    BASELINE (I)ADL(s) (prior to admission):  [place]: [ ]Total  [X ] Moderate [ ]Dependent  Palliative Performance Status Version 2:         %    http://npcrc.org/files/news/palliative_performance_scale_ppsv2.pdf    Allergies    No Known Allergies    Intolerances    MEDICATIONS  (STANDING):  aspirin  chewable 81 milliGRAM(s) Oral daily  atorvastatin 40 milliGRAM(s) Oral at bedtime  dexAMETHasone  Injectable 0.5 milliGRAM(s) IV Push daily  heparin   Injectable 5000 Unit(s) SubCutaneous every 8 hours  labetalol 100 milliGRAM(s) Oral every 8 hours  lacosamide IVPB 150 milliGRAM(s) IV Intermittent every 12 hours  levETIRAcetam  IVPB 1500 milliGRAM(s) IV Intermittent every 12 hours  pantoprazole  Injectable 40 milliGRAM(s) IV Push daily  potassium chloride   Powder 20 milliEquivalent(s) Oral once  spironolactone 50 milliGRAM(s) Oral daily    MEDICATIONS  (PRN):  LORazepam   Injectable 2 milliGRAM(s) IV Push every 4 hours PRN Seizure lasting>2 minutes    PRESENT SYMPTOMS: [X ]Unable to obtain due to poor mentation   Source if other than patient:  [ X ]Family   [ ]Team      * No nonverbal signs of dyspnea, pain. Per son patient has been comfortable.     Pain: [ ]yes [ ]no  QOL impact -   Location -                    Aggravating factors -  Quality -  Radiation -  Timing-  Severity (0-10 scale):  Minimal acceptable level (0-10 scale):     PAIN AD Score: 0    [email or web address]://[email or web address]/painad.pdf (press ctrl +  left click to view)      Dyspnea:                           [ ]Mild [ ]Moderate [ ]Severe [ ]None  Anxiety:                             [ ]Mild [ ]Moderate [ ]Severe [ ]None  Fatigue:                             [ ]Mild [ ]Moderate [ ]Severe [ ]None  Nausea:                             [ ]Mild [ ]Moderate [ ]Severe [ ]None  Loss of appetite:              [ ]Mild [ ]Moderate [ ]Severe [ ]None  Constipation:                    [ ]Mild [ ]Moderate [ ]Severe [ ]None    Other Symptoms:  [ ]All other review of systems negative     Palliative Performance Status Version 2:      10   %    http://npcrc.org/files/news/palliative_performance_scale_ppsv2.pdf    PHYSICAL EXAM:  Vital Signs Last 24 Hrs  T(C): 37.2 (2023 09:28), Max: 37.6 (2023 15:53)  T(F): 98.9 (2023 09:28), Max: 99.7 (2023 15:53)  HR: 87 (2023 09:28) (82 - 96)  BP: 136/66 (2023 09:28) (118/80 - 179/86)  BP(mean): 95 (2023 15:53) (95 - 95)  RR: 18 (2023 09:28) (16 - 20)  SpO2: 95% (2023 09:28) (95% - 97%)    GENERAL:  [ X] No acute distress [ ]Lethargic  [ ]Unarousable  [X ]Verbal  [ ]Non-Verbal [ ]Cachexia    BEHAVIORAL/PSYCH:  [ ]Alert and Oriented x  [ ] Anxiety [ ] Delirium [ ] Calm and confused, not responding to questions  EYES: [X ] No scleral icterus [ ] Scleral icterus [ ] Closed  ENMT:  [ ]Dry mouth  [X ]No external oral lesions [ X] No external ear or nose lesions  PULMONARY:  [ ] Tachypnea  [ ]Audible excessive secretions [ X] No labored breathing [ ] labored breathing  GASTROINTESTINAL: [X ]Soft  [ ]Distended  [ ]Not distended [ ]Non tender [ ]Tender  MUSCULOSKELETAL: [ X]No clubbing [ ] clubbing  [ ] No cyanosis [ ] cyanosis  NEUROLOGIC: [ ]No focal deficits  [ ]Follows commands  [ X]Does not follow commands  [X ]Cognitive impairment  [ ]Dysphagia  [ ]Dysarthria  [ ]Paresis   SKIN: [ ] Jaundiced [X ] Non-jaundiced [ ]Rash [ X]No Rash [ ] Warm [ ] Dry  MISC/LINES: [ ] ET tube [ ] Trach [ ]NGT/OGT [ ]PEG [ ]Grande      LABS: reviewed by me                        11.2   5.47  )-----------( 105      ( 2023 07:07 )             31.6   02-    143  |  100  |  8<L>  ----------------------------<  99  3.1<L>   |  25  |  0.5<L>    Ca    9.1      2023 07:07  Phos  3.3     02-  Mg     1.4     02-    TPro  6.9  /  Alb  4.3  /  TBili  1.1  /  DBili  x   /  AST  35  /  ALT  22  /  AlkPhos  80    PT/INR - ( 2023 06:10 )   PT: 11.90 sec;   INR: 1.04 ratio         PTT - ( 2023 06:10 )  PTT:32.1 sec    Urinalysis Basic - ( 2023 08:25 )    Color: Yellow / Appearance: Clear / S.022 / pH: x  Gluc: x / Ketone: Negative  / Bili: Negative / Urobili: <2 mg/dL   Blood: x / Protein: Trace / Nitrite: Negative   Leuk Esterase: Negative / RBC: x / WBC x   Sq Epi: x / Non Sq Epi: x / Bacteria: x      RADIOLOGY & ADDITIONAL STUDIES: reviewed by me    EKG: reviewed by me      < from: 12 Lead ECG (23 @ 09:36) >  Ventricular Rate 99 BPM    Atrial Rate 99 BPM    P-R Interval 110 ms    QRS Duration 72 ms    Q-T Interval 396 ms    QTC Calculation(Bazett) 508 ms    P Axis 34 degrees    R Axis 3 degrees    T Axis 31 degrees    Diagnosis Line Sinus rhythm with short CT  Voltage criteria for left ventricular hypertrophy  Inferior infarct , age undetermined  Prolonged QT  Abnormal ECG    Confirmed by Cole Conroy ([id number]) on 2023 4:24:28 PM    < end of copied text >      Focal and generalized slowin. moderate generalized slowing  2. mild to moderate left hemispheric mainly posterior quadrant and posterior temporal focal slowing    Interictal activity - frequent number of left posterior temporal sharp waves that also appear in periodic pattern    Events - see below    Seizures - one focal seizure that was associated with clear clinical change lasting for 120 sec. Electrographically it is characterized by left occipital rhythmic low amplitude spike and after going slow.    Impression: Abnormal VEEG as above    Plan - per neurology team    < from: CT Angio Brain Stroke Protocol  w/ IV Cont (23 @ 07:30) >  IMPRESSION:    In comparison with the prior CT perfusion and CTA of the head and neck   dated 2023:    CTA of the head and neck is limited by motion artifact.    CT PERFUSION:    No evidence of ischemic penumbra or core infarct.    CTA HEAD/NECK:    No significant vascular stenosis or large vessel occlusion.      < end of copied text >        Goals of Care Document:   - introduced palliative care to son at bedside. they are awaiting EEG and other workup. plan to follow up with  later this week.    HPI:  56 yo F with PMHx of GBM s/p resection and  radiation, currently on chemotherapy, recent small right parietal ischemic stroke, seizure disorder, who presents with declining mental status since yesterday..  Patient was discharged from [facility] 2023, she was admitted with aphasia found to have acute stroke. For past few days patient has not been herself, having episodes of staring, not talking at all. She was supposed to have ambulatory EEG done. She has been confused and not herself for past few days and missed several dose of seizure medication.  Patient was diagnosed with left occipital GBM (unmethylated MGMT, IDH wild mutation), underwent resection (2022) and radiation. She follows with Dr. Dao. She was taken off Avastin due to the stroke and Temodar was restarted ( due next week). In ED , stroke code was activated , CTH: unchanged GBM, CTA: no LVO. stat EEG: numerous PLEDs. Patient was evaluated by Neurocritical care and Neurosurgery. Patient received Keppra 3gm and Vimpat 200 mg. Patient is being admitted with seizure for further neurology workup and management. Palliative consulted for [facility].       PERTINENT PM/SXH:   RAMAKRISHNA on CPAP    GERD (gastroesophageal reflux disease)    Asthmatic bronchitis    HTN (hypertension)    Migraines    Chronic neck pain      H/O sinus surgery    Status post D&amp;C    H/O arthroscopy of shoulder    History of hernia repair      FAMILY HISTORY: difficult to obtain from patient    ITEMS NOT CHECKED ARE NOT PRESENT    SOCIAL HISTORY:   Significant other/partner[X ]  Children[ ]  Adventist/Spirituality:  Substance hx:  [ ]   Tobacco hx:  [ ]   Alcohol hx: [ ]   Living Situation: [X ]Home  [ ]Long term care  [ ]Rehab [ ]Other  Home Services: [ ] HHA [ ] Iris RN [ ] Hospice  Occupation:  Home Opioid hx:  [ ] Y [X ] N [ ] I-Stop Reference No: This report was requested by: Zuleima Lyon | Reference #: [id number]  Patient Name: Kristan Kitchen Date: 1965  Address: [address] [place] [id number]: Female  Rx Written	Rx Dispensed	Drug	Quantity	Days Supply	Prescriber Name	Prescriber Lisa #	Payment Method	Dispenser  2022	oxycodone-acetaminophen 5-325 mg tablet	30	7	Yamel Sargent M (VIRGILIO)	[id number]	Insurance	[facility]  05/10/2022	05/10/2022	lorazepam 1 mg tablet	5	3	Lacy Szymanski TED (M D )	[id number]	Insurance	[facility] #[id number]  2022	oxycodone-acetaminophen 5-325 mg tablet	20	5	Naima Kinney [id number]	Insurance	[facility]  2022	alprazolam 1 mg tablet	2	1	Leatha Frey MD	[id number]	Insurance	[facility] #[id number]      ADVANCE DIRECTIVES:    [X ] Full Code [ ] DNR  MOLST  [ ]  Living Will  [ ]   DECISION MAKER(s): spouse  [ ] Health Care Proxy(s)  [X ] Surrogate(s)  [ ] Guardian           Name(s): Phone Number(s): Anselmo ()    BASELINE (I)ADL(s) (prior to admission):  [place]: [ ]Total  [X ] Moderate [ ]Dependent  Palliative Performance Status Version 2:         %    http://npcrc.org/files/news/palliative_performance_scale_ppsv2.pdf    Allergies    No Known Allergies    Intolerances    MEDICATIONS  (STANDING):  aspirin  chewable 81 milliGRAM(s) Oral daily  atorvastatin 40 milliGRAM(s) Oral at bedtime  dexAMETHasone  Injectable 0.5 milliGRAM(s) IV Push daily  heparin   Injectable 5000 Unit(s) SubCutaneous every 8 hours  labetalol 100 milliGRAM(s) Oral every 8 hours  lacosamide IVPB 150 milliGRAM(s) IV Intermittent every 12 hours  levETIRAcetam  IVPB 1500 milliGRAM(s) IV Intermittent every 12 hours  pantoprazole  Injectable 40 milliGRAM(s) IV Push daily  potassium chloride   Powder 20 milliEquivalent(s) Oral once  spironolactone 50 milliGRAM(s) Oral daily    MEDICATIONS  (PRN):  LORazepam   Injectable 2 milliGRAM(s) IV Push every 4 hours PRN Seizure lasting>2 minutes    PRESENT SYMPTOMS: [X ]Unable to obtain due to poor mentation   Source if other than patient:  [ X ]Family   [ ]Team      * No nonverbal signs of dyspnea, pain. Per son patient has been comfortable.     Pain: [ ]yes [ ]no  QOL impact -   Location -                    Aggravating factors -  Quality -  Radiation -  Timing-  Severity (0-10 scale):  Minimal acceptable level (0-10 scale):     PAIN AD Score: 0    [email or web address]/cog/painad.pdf (press ctrl +  left click to view)      Dyspnea:                           [ ]Mild [ ]Moderate [ ]Severe [ ]None  Anxiety:                             [ ]Mild [ ]Moderate [ ]Severe [ ]None  Fatigue:                             [ ]Mild [ ]Moderate [ ]Severe [ ]None  Nausea:                             [ ]Mild [ ]Moderate [ ]Severe [ ]None  Loss of appetite:              [ ]Mild [ ]Moderate [ ]Severe [ ]None  Constipation:                    [ ]Mild [ ]Moderate [ ]Severe [ ]None    Other Symptoms:  [ ]All other review of systems negative     Palliative Performance Status Version 2:      10   %    http://npcrc.org/files/news/palliative_performance_scale_ppsv2.pdf    PHYSICAL EXAM:  Vital Signs Last 24 Hrs  T(C): 37.2 (2023 09:28), Max: 37.6 (2023 15:53)  T(F): 98.9 (2023 09:28), Max: 99.7 (2023 15:53)  HR: 87 (2023 09:28) (82 - 96)  BP: 136/66 (2023 09:28) (118/80 - 179/86)  BP(mean): 95 (2023 15:53) (95 - 95)  RR: 18 (2023 09:28) (16 - 20)  SpO2: 95% (2023 09:28) (95% - 97%)    GENERAL:  [ X] No acute distress [ ]Lethargic  [ ]Unarousable  [X ]Verbal  [ ]Non-Verbal [ ]Cachexia    BEHAVIORAL/PSYCH:  [ ]Alert and Oriented x  [ ] Anxiety [ ] Delirium [ ] Calm and confused, not responding to questions  EYES: [X ] No scleral icterus [ ] Scleral icterus [ ] Closed  ENMT:  [ ]Dry mouth  [X ]No external oral lesions [ X] No external ear or nose lesions  PULMONARY:  [ ] Tachypnea  [ ]Audible excessive secretions [ X] No labored breathing [ ] labored breathing  GASTROINTESTINAL: [X ]Soft  [ ]Distended  [ ]Not distended [ ]Non tender [ ]Tender  MUSCULOSKELETAL: [ X]No clubbing [ ] clubbing  [ ] No cyanosis [ ] cyanosis  NEUROLOGIC: [ ]No focal deficits  [ ]Follows commands  [ X]Does not follow commands  [X ]Cognitive impairment  [ ]Dysphagia  [ ]Dysarthria  [ ]Paresis   SKIN: [ ] Jaundiced [X ] Non-jaundiced [ ]Rash [ X]No Rash [ ] Warm [ ] Dry  MISC/LINES: [ ] ET tube [ ] Trach [ ]NGT/OGT [ ]PEG [ ]Grande      LABS: reviewed by me                        11.2   5.47  )-----------( 105      ( 2023 07:07 )             31.6   02-    143  |  100  |  8<L>  ----------------------------<  99  3.1<L>   |  25  |  0.5<L>    Ca    9.1      2023 07:07  Phos  3.3     02-  Mg     1.4     02-    TPro  6.9  /  Alb  4.3  /  TBili  1.1  /  DBili  x   /  AST  35  /  ALT  22  /  AlkPhos  80    PT/INR - ( 2023 06:10 )   PT: 11.90 sec;   INR: 1.04 ratio         PTT - ( 2023 06:10 )  PTT:32.1 sec    Urinalysis Basic - ( 2023 08:25 )    Color: Yellow / Appearance: Clear / S.022 / pH: x  Gluc: x / Ketone: Negative  / Bili: Negative / Urobili: <2 mg/dL   Blood: x / Protein: Trace / Nitrite: Negative   Leuk Esterase: Negative / RBC: x / WBC x   Sq Epi: x / Non Sq Epi: x / Bacteria: x      RADIOLOGY & ADDITIONAL STUDIES: reviewed by me    EKG: reviewed by me      < from: 12 Lead ECG (23 @ 09:36) >  Ventricular Rate 99 BPM    Atrial Rate 99 BPM    P-R Interval 110 ms    QRS Duration 72 ms    Q-T Interval 396 ms    QTC Calculation(Bazett) 508 ms    P Axis 34 degrees    R Axis 3 degrees    T Axis 31 degrees    Diagnosis Line Sinus rhythm with short [place]  Voltage criteria for left ventricular hypertrophy  Inferior infarct , age undetermined  Prolonged QT  Abnormal ECG    Confirmed by Cole Conroy ([id number]) on 2023 4:24:28 PM    < end of copied text >      Focal and generalized slowin. moderate generalized slowing  2. mild to moderate left hemispheric mainly posterior quadrant and posterior temporal focal slowing    Interictal activity - frequent number of left posterior temporal sharp waves that also appear in periodic pattern    Events - see below    Seizures - one focal seizure that was associated with clear clinical change lasting for 120 sec. Electrographically it is characterized by left occipital rhythmic low amplitude spike and after going slow.    Impression: Abnormal VEEG as above    Plan - per neurology team    < from: CT Angio Brain Stroke Protocol  w/ IV Cont (23 @ 07:30) >  IMPRESSION:    In comparison with the prior CT perfusion and CTA of the head and neck   dated 2023:    CTA of the head and neck is limited by motion artifact.    CT PERFUSION:    No evidence of ischemic penumbra or core infarct.    CTA HEAD/NECK:    No significant vascular stenosis or large vessel occlusion.      < end of copied text >        Goals of Care Document:   - introduced palliative care to son at bedside. they are awaiting EEG and other workup. plan to follow up with  later this week.

## 2023-02-01 NOTE — PATIENT PROFILE ADULT - FALL HARM RISK - HARM RISK INTERVENTIONS

## 2023-02-01 NOTE — EEG REPORT - NS EEG TEXT BOX
Epilepsy Attending Note:     ANGELICLIZJANNETPRAVIN    57y Female  MRN [id number]    Vital Signs Last 24 Hrs  T(C): 37.2 (2023 09:28), Max: 37.6 (2023 15:53)  T(F): 98.9 (2023 09:28), Max: 99.7 (2023 15:53)  HR: 87 (2023 09:28) (82 - 96)  BP: 136/66 (2023 09:28) (118/80 - 179/86)  BP(mean): 95 (2023 15:53) (95 - 95)  RR: 18 (2023 09:28) (16 - 20)  SpO2: 95% (2023 09:28) (95% - 97%)    Parameters below as of 2023 09:28  Patient On (Oxygen Delivery Method): room air                              11.2   5.47  )-----------( 105      ( 2023 07:07 )             31.6       02-    143  |  100  |  8<L>  ----------------------------<  99  3.1<L>   |  25  |  0.5<L>    Ca    9.1      2023 07:07  Phos  3.3     02-  Mg     1.4     02-    TPro  6.9  /  Alb  4.3  /  TBili  1.1  /  DBili  x   /  AST  35  /  ALT  22  /  AlkPhos  80        MEDICATIONS  (STANDING):  aspirin  chewable 81 milliGRAM(s) Oral daily  atorvastatin 40 milliGRAM(s) Oral at bedtime  dexAMETHasone  Injectable 0.5 milliGRAM(s) IV Push daily  heparin   Injectable 5000 Unit(s) SubCutaneous every 8 hours  labetalol 100 milliGRAM(s) Oral every 8 hours  lacosamide IVPB 100 milliGRAM(s) IV Intermittent every 12 hours  levETIRAcetam  IVPB 1500 milliGRAM(s) IV Intermittent every 12 hours  magnesium sulfate  IVPB 2 Gram(s) IV Intermittent once  pantoprazole  Injectable 40 milliGRAM(s) IV Push daily  potassium chloride   Powder 20 milliEquivalent(s) Oral once  spironolactone 50 milliGRAM(s) Oral daily    MEDICATIONS  (PRN):  LORazepam   Injectable 2 milliGRAM(s) IV Push every 4 hours PRN Seizure lasting>2 minutes          VEEG in the last 24 hours:    Background - continuous, less than optimally organized, reaching frequencies in the range of 6-7 Hz superimposed by small to moderate amount of lower range theta    Focal and generalized slowin. moderate generalized slowing  2. mild to moderate left hemispheric mainly posterior quadrant and posterior temporal focal slowing    Interictal activity - frequent number of left posterior temporal sharp waves that also appear in periodic pattern    Events - see below    Seizures - one focal seizure that was associated with clear clinical change lasting for 120 sec. Electrographically it is characterized by left occipital rhythmic low amplitude spike and after going slow.    Impression: Abnormal VEEG as above    Plan - per neurology team

## 2023-02-01 NOTE — CONSULT NOTE ADULT - PROBLEM SELECTOR RECOMMENDATION 9
s/p resection and radiation  follows Dr. Dao outpatient for chemotherapy - holding for now   recommendations as per heme-onc  on Decadron    pending MRI brain

## 2023-02-01 NOTE — CONSULT NOTE ADULT - ASSESSMENT
56 yo F with PMHx of GBM s/p resection and  radiation, currently on chemotherapy, recent small right parietal ischemic stroke, seizure disorder, who presents with declining mental status since yesterday.    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     - 06/30/2022 S/P chemoRT with concurrent Temodar, with worsening symptoms 2/2 to recurrent edema.  - 08/05/2022 MRI showed possible interval progression vs pseduo-progression (from RT)?  = 08/22/2022 transfer her care since Dr. Kennedy is leaving and started Avastin at 10 mg/kg with Temodar (150 mg/m2, total 320 mg) to be taken for 5 consecutive days every 28 days.  - 10/03/2022 MR brain ordered by [facility], Dr. Dumont - final results pending.  - since no significant myelosuppression, 10/26-10/30/2022 Temodar was increased to 400 mg (200 mg/m2) with cycle 2.  - 11/16/2022 started dexamethasone 0.5 mg PO QD - tapered without issues.  - 11/23/2022 delayed Temodar 400 mg (200 mg/m2) PO QD D1-5 every 28 days.  - 01/04/2023 MR BRAIN - New small cortical/subcortical acute infarct in the right parietal lobe. Progressively increasing confluent signal abnormality in bilateral periventricular white matter, with redemonstrated restricted diffusion in bilateral inferior frontal lobe and rostrum of the corpus callosum. No associated enhancement. The finding is nonspecific but likely reflects post radiation changes. Recommend attention on follow-up. Further decrease in heterogeneous enhancements associated with the persistently expansile left occipital mass compared to the prior brain MRI from 12/6/2020 which may reflect posttreatment change. Increased scattered calcifications and trace petechial hemorrhage as demonstrated on the prior head CT from 1/20/2023 likely representing posttreatment change. Recommend continued close follow-up MR brain with perfusion. Unchanged extension of the mass into the splenium of the corpus callosum and contiguous expansile FLAIR signal abnormality in bilateral periatrial white matter extending to the left temporal lobe.  - 01/25/2023 reported S/P CVA and another episode of "absent seizure" on 01/22/2023 as per family , her avastin was stopped  58 yo F with PMHx of GBM s/p resection and  radiation, currently on chemotherapy, recent small right parietal ischemic stroke, seizure disorder, who presents with declining mental status since yesterday.    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     - 06/30/2022 S/P chemoRT with concurrent Temodar, with worsening symptoms 2/2 to recurrent edema.  - 08/05/2022 MRI showed possible interval progression vs pseduo-progression (from RT)?  = 08/22/2022 transfer her care since Dr. Kennedy is leaving and started Avastin at 10 mg/kg with Temodar (150 mg/m2, total 320 mg) to be taken for 5 consecutive days every 28 days.  - 10/03/2022 MR brain ordered by [facility], Dr. Dumont - final results pending.  - since no significant myelosuppression, 10/26-10/30/2022 Temodar was increased to 400 mg (200 mg/m2) with cycle 2.  - 11/16/2022 started dexamethasone 0.5 mg PO QD - tapered without issues.  - 11/23/2022 delayed Temodar 400 mg (200 mg/m2) PO QD D1-5 every 28 days.  - 01/04/2023 MR BRAIN - New small cortical/subcortical acute infarct in the right parietal lobe. Progressively increasing confluent signal abnormality in bilateral periventricular white matter, with redemonstrated restricted diffusion in bilateral inferior frontal lobe and rostrum of the corpus callosum. No associated enhancement. The finding is nonspecific but likely reflects post radiation changes.  Unchanged extension of the mass into the splenium of the corpus callosum and contiguous expansile FLAIR signal abnormality in bilateral periatrial white matter extending to the left temporal lobe.  - She was referred for another MRI brain which she has not completed yet  - 01/25/2023 reported S/P CVA and another episode of "absent seizure" on 01/22/2023 as per family , her avastin was stopped (Last dose 12/29/22)    Recommendations:    - Replete Mag and K to higher seizure threshold  - MRI brain w/ contrast (would probably require sedation)  - Please get neurosurgery follow up. Escalate Steroid dose??  - agree with VEEG. AEDs as per Neurology.       For any questions call [phone] or text via [facility] teams 56 yo F with PMHx of GBM s/p resection and  radiation, currently on chemotherapy, recent small right parietal ischemic stroke, seizure disorder, who presents with declining mental status since yesterday.    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     - 06/30/2022 S/P chemoRT with concurrent Temodar, with worsening symptoms 2/2 to recurrent edema.  - 08/05/2022 MRI showed possible interval progression vs pseduo-progression (from RT)?  = 08/22/2022 transfer her care since Dr. Kennedy is leaving and started Avastin at 10 mg/kg with Temodar (150 mg/m2, total 320 mg) to be taken for 5 consecutive days every 28 days.  - 10/03/2022 MR brain ordered by [facility], Dr. Dumont - final results pending.  - since no significant myelosuppression, 10/26-10/30/2022 Temodar was increased to 400 mg (200 mg/m2) with cycle 2.  - 11/16/2022 started dexamethasone 0.5 mg PO QD - tapered without issues.  - 11/23/2022 delayed Temodar 400 mg (200 mg/m2) PO QD D1-5 every 28 days.  - 01/04/2023 MR BRAIN - New small cortical/subcortical acute infarct in the right parietal lobe. Progressively increasing confluent signal abnormality in bilateral periventricular white matter, with redemonstrated restricted diffusion in bilateral inferior frontal lobe and rostrum of the corpus callosum. No associated enhancement. The finding is nonspecific but likely reflects post radiation changes.  Unchanged extension of the mass into the splenium of the corpus callosum and contiguous expansile FLAIR signal abnormality in bilateral periatrial white matter extending to the left temporal lobe.  - She was referred for another MRI brain which she has not completed yet  - 01/25/2023 reported S/P CVA and another episode of "absent seizure" on 01/22/2023 as per family , her avastin was stopped (Last dose 12/29/22)  - she is on Temodar 400 mg (200 mg/m2) PO QD D1-5 cycle 4.    Recommendations:    - increase the steroids to 4 mg Q6hrs. Will require follow up on improvement in mental status and then taper the steroids  - Will confirm the Temodar regimen with the , if it is due next week, it needs to be restarted. (Called  but he did not )  - Replete Mag and K to higher seizure threshold  - MRI brain w/ contrast (would probably require sedation)  - Please get neurosurgery follow up. Escalate Steroid dose??  - agree with VEEG. AEDs as per Neurology.   - She has very poor prognosis with MGMT unmethylated GBM. will require GOC conversation.       For any questions call [phone] or text via MS teams

## 2023-02-01 NOTE — PROGRESS NOTE ADULT - ASSESSMENT
57 years old female with PMH of GBM , Seizure, recent right parietal ischemic stroke , HTN was brought to ED due to worsening mental status since yesterday. Stroke imaging: cecile cute change, stat EEG revealed seizure. Patient is being admitted to Neurology for further work up and management. improving mental status, EEG showed 1 focal seizure in past 24 hours.   Plan:  # Seizure:  - most likely due to missed dose of AED. need to assess progression of disease.   - continue Keppra 1500 mg IVPB BID  - continue Vimpat 100 mg IVPB BID  - Continue video EEG for at least 24 hours more  - once seizure controlled, obtain MRI of brain w/wo contrast  - Seizure precaution  - Fall precaution  - Ativan 2mg IV push PRN if seizure duration>2 min      #Hypomagnesemia and hypokalemia:  - Repleted Mg and K  - F/U BMP and Mg    # GBM:  - s/p resection in 04/2022  - Follows with Dr. Dumont ( Neurosurgery) and Dr. Dao ( Oncology)  - Neurosurgery follow  - Discussed with Sylwia Ma over phone: recommended to continue same dose of Decadron 0.5 mg daily  - Hold off Temodar for now  - F/U MRI of brain w/wo RUBI  - Holding Gabapentin and Sertraline for better assessment of mental status  - If family agrees will consult palliative care      #Recent acute right parietal infarct  - continue ASA 81 mg daily  - C/w Atorvastatin 40 mg daily  - will follow up MRI for any new stroke      #HTN  - Decreased lasix from 40 mg to 20 mg daily for now: will increase if needed  - c/w Aldactone 50 mg daily  - Restarted Labetelol 100 mg TID      #Prophylactic measures:  - DVT PPX: Heparin 5000 IU s/c Q8H  - GI PPX: Pantoprazole 40 mg IV daily  - Rehab: PT/OT eval  - Code status: full code   - Dispo: 3 E floor    Plan and goals of care have been discussed with patient's  over phone    Discussed with neurology attending

## 2023-02-01 NOTE — PROGRESS NOTE ADULT - SUBJECTIVE AND OBJECTIVE BOX
INTERVAL HPI/OVERNIGHT EVENTS:  Patient seen and examined. She is more awake than yesterday, trying to communicate    MEDICATIONS  (STANDING):  aspirin  chewable 81 milliGRAM(s) Oral daily  atorvastatin 40 milliGRAM(s) Oral at bedtime  dexAMETHasone  Injectable 0.5 milliGRAM(s) IV Push daily  heparin   Injectable 5000 Unit(s) SubCutaneous every 8 hours  labetalol 100 milliGRAM(s) Oral every 8 hours  lacosamide IVPB 100 milliGRAM(s) IV Intermittent every 12 hours  levETIRAcetam  IVPB 1500 milliGRAM(s) IV Intermittent every 12 hours  pantoprazole  Injectable 40 milliGRAM(s) IV Push daily  potassium chloride   Powder 20 milliEquivalent(s) Oral once  spironolactone 50 milliGRAM(s) Oral daily    MEDICATIONS  (PRN):  LORazepam   Injectable 2 milliGRAM(s) IV Push every 4 hours PRN Seizure lasting>2 minutes      Allergies    No Known Allergies    Intolerances        Vital Signs Last 24 Hrs  T(C): 37.2 (2023 09:28), Max: 37.6 (2023 15:53)  T(F): 98.9 (2023 09:28), Max: 99.7 (2023 15:53)  HR: 87 (2023 09:28) (82 - 96)  BP: 136/66 (2023 09:28) (118/80 - 179/86)  BP(mean): 95 (2023 15:53) (95 - 95)  RR: 18 (2023 09:28) (16 - 20)  SpO2: 95% (2023 09:28) (95% - 97%)    Parameters below as of 2023 09:28  Patient On (Oxygen Delivery Method): room air        Physical exam:  Constitutional: awake, but not aware  Eyes: Anicteric sclerae, moist conjunctivae, see below for CNs  Neck: trachea midline, FROM, supple,   Cardiovascular: Regular rate and rhythm,   Pulmonary: Anterior breath sounds clear bilaterally,   GI: Abdomen soft,       Neurologic:  -Mental status: Awake but not alert or oriented. not following commands, trying speak , able to say ( what happened?)  -Cranial nerves:   II: blink to threat present B/L  III, IV, VI: Extraocular movements are intact . Pupils equally round and reactive to light  V:  unable to assess  VII: Face is symmetric  Motor: Normal bulk and tone. All 4 extremities at least 3/5  Sensation: Responds to noxious stimuli B/L   Coordination: unable to assess  Reflexes: withdrawal B/L    LABS:                        11.2   5.47  )-----------( 105      ( 2023 07:07 )             31.6     02-01    143  |  100  |  8<L>  ----------------------------<  99  3.1<L>   |  25  |  0.5<L>    Ca    9.1      2023 07:07  Phos  3.3     02-  Mg     1.4     02-    TPro  6.9  /  Alb  4.3  /  TBili  1.1  /  DBili  x   /  AST  35  /  ALT  22  /  AlkPhos  80      PT/INR - ( 2023 06:10 )   PT: 11.90 sec;   INR: 1.04 ratio         PTT - ( 2023 06:10 )  PTT:32.1 sec  Urinalysis Basic - ( 2023 08:25 )    Color: Yellow / Appearance: Clear / S.022 / pH: x  Gluc: x / Ketone: Negative  / Bili: Negative / Urobili: <2 mg/dL   Blood: x / Protein: Trace / Nitrite: Negative   Leuk Esterase: Negative / RBC: x / WBC x   Sq Epi: x / Non Sq Epi: x / Bacteria: x        RADIOLOGY & ADDITIONAL TESTS:  VEEG in the last 24 hours:    Background - continuous, less than optimally organized, reaching frequencies in the range of 6-7 Hz superimposed by small to moderate amount of lower range theta    Focal and generalized slowin. moderate generalized slowing  2. mild to moderate left hemispheric mainly posterior quadrant and posterior temporal focal slowing    Interictal activity - frequent number of left posterior temporal sharp waves that also appear in periodic pattern    Events - see below    Seizures - one focal seizure that was associated with clear clinical change lasting for 120 sec. Electrographically it is characterized by left occipital rhythmic low amplitude spike and after going slow.    Impression: Abnormal VEEG as above

## 2023-02-01 NOTE — PROGRESS NOTE ADULT - ASSESSMENT
58 y/o woman with PMH of high grade GBM s/p resection in 4/22 by Dr. Dumont and s/p chemo and RT, small right parietal ischemic stroke dx last admission, seizure disorder and HTN on labetalol and spironolactone now presents with altered mental status per  (episodes of staring and not talking, restless and missing several doses of seizure medication). In the ED, stroke code was activated. CTH: unchanged GBM, CTA: no LVO. stat EEG: numerous PLEDs. Patient was evaluated by Neurocritical care and Neurosurgery. She is now admitted to neurology.    1. Altered mental status attributed to seizures  management per neurology - on keppra, Vimpat  check Mag level and keep >2  seizure precautions  video EEG  evaluated by neurocrit  UA negative for infection    2. High grade GBM s/p resection in 4/22 by Dr. Dumont and s/p chemo and RT  follows with Dr. Dao  was started on Temodar and later Avastin was added (Avastin stopped due to stroke) and s/p Bevacizumab and Temodar to be restarted per notes (Temodar was on hold due to thrombocytopenia)  HemOnc consulted  on decadron    3. HTN - restart labetalol since SBP is now in the 170s and monitor  on spironolactone    4. Hypokalemia - repleted and f/u BMP and Mg  ? need for IV lasix     5. Recent stroke - on ASA/statin    6. DVT prophylaxis - heparin SQ   56 y/o woman with PMH of high grade GBM s/p resection in 4/22 by Dr. Dumont and s/p chemo and RT, small right parietal ischemic stroke dx last admission, seizure disorder and HTN on labetalol and spironolactone now presents with altered mental status per  (episodes of staring and not talking, restless and missing several doses of seizure medication). In the ED, stroke code was activated. CTH: unchanged GBM, CTA: no LVO. stat EEG: numerous PLEDs. Patient was evaluated by Neurocritical care and Neurosurgery. She is now admitted to neurology.    1. Altered mental status attributed to seizures  management per neurology - on keppra, Vimpat  check Mag level and keep >2  seizure precautions  video EEG  evaluated by neurocrit  UA negative for infection    2. High grade GBM s/p resection in 4/22 by Dr. Dumont and s/p chemo and RT  follows with Dr. Dao  was started on Temodar and later Avastin was added (Avastin stopped due to stroke) and s/p Bevacizumab and Temodar to be restarted per notes (Temodar was on hold due to thrombocytopenia)  HemOnc consulted  on decadron  palliative care consult    3. HTN - restart labetalol since SBP is now in the 170s and monitor  on spironolactone    4. Hypokalemia - replete and f/u BMP and Mg  replete hypomagnesemia  change back to PO lasix and monitor     5. Recent stroke - on ASA/statin    6. DVT prophylaxis - heparin SQ    case discussed with neurology resident today    full code  guarded prognosis

## 2023-02-01 NOTE — SWALLOW BEDSIDE ASSESSMENT ADULT - SWALLOW EVAL: DIAGNOSIS
+ toleration observed without overt symptoms of penetration/aspiration for puree, minced moist and thins

## 2023-02-01 NOTE — PROGRESS NOTE ADULT - SUBJECTIVE AND OBJECTIVE BOX
PRAVIN JHAVERI  57y Female    INTERVAL HPI/OVERNIGHT EVENTS:    called for comanagement  previous notes reviewed by me    T(F): 99 (02-01-23 @ 07:37), Max: 99.7 (01-31-23 @ 15:53)  HR: 82 (02-01-23 @ 07:37) (82 - 96)  BP: 174/84 (02-01-23 @ 07:37) (118/80 - 179/86)  RR: 18 (02-01-23 @ 07:37) (16 - 20)  SpO2: 97% (02-01-23 @ 07:37) (95% - 97%)  I&O's Summary    31 Jan 2023 07:01  -  01 Feb 2023 07:00  --------------------------------------------------------  IN: 0 mL / OUT: 1500 mL / NET: -1500 mL    01 Feb 2023 07:01  -  01 Feb 2023 08:36  --------------------------------------------------------  IN: 0 mL / OUT: 1000 mL / NET: -1000 mL      PHYSICAL EXAM:  GENERAL: NAD  HEAD:  Normocephalic  EYES:  conjunctiva and sclera clear  ENMT: Moist mucous membranes  NERVOUS SYSTEM:    CHEST/LUNG: CTA b/l; No rales, rhonchi, wheezing  HEART: Regular rate and rhythm; No murmurs  ABDOMEN: Soft, Nontender, Nondistended; Bowel sounds present  EXTREMITIES:   No edema  SKIN:     Consultant(s) Notes Reviewed:  [x ] YES  [ ] NO  Care Discussed with Consultants/Other Providers [ x] YES  [ ] NO    MEDICATIONS  (STANDING):  aspirin  chewable 81 milliGRAM(s) Oral daily  atorvastatin 40 milliGRAM(s) Oral at bedtime  dexAMETHasone  Injectable 0.5 milliGRAM(s) IV Push daily  furosemide   Injectable 20 milliGRAM(s) IV Push daily  heparin   Injectable 5000 Unit(s) SubCutaneous every 8 hours  lacosamide IVPB 100 milliGRAM(s) IV Intermittent every 12 hours  levETIRAcetam  IVPB 1500 milliGRAM(s) IV Intermittent every 12 hours  pantoprazole  Injectable 40 milliGRAM(s) IV Push daily  spironolactone 50 milliGRAM(s) Oral daily    MEDICATIONS  (PRN):  LORazepam   Injectable 2 milliGRAM(s) IV Push every 4 hours PRN Seizure lasting>2 minutes      LABS:                        11.2   6.34  )-----------( 143      ( 31 Jan 2023 06:10 )             31.4     01-31    136  |  92<L>  |  9<L>  ----------------------------<  125<H>  2.9<L>   |  34<H>  |  0.6<L>    Ca    9.2      31 Jan 2023 06:10    TPro  6.9  /  Alb  4.3  /  TBili  1.1  /  DBili  x   /  AST  35  /  ALT  22  /  AlkPhos  80  01-31    PT/INR - ( 31 Jan 2023 06:10 )   PT: 11.90 sec;   INR: 1.04 ratio         PTT - ( 31 Jan 2023 06:10 )  PTT:32.1 sec      RADIOLOGY & ADDITIONAL TESTS:    Imaging and report Personally Reviewed:  [x ] YES  [ ] NO    < from: Xray Chest 1 View- PORTABLE-Urgent (Xray Chest 1 View- PORTABLE-Urgent .) (01.31.23 @ 08:06) >  Impression:    Cardiomegaly. No acute opacifications or effusions.    < end of copied text >      < from: CT Angio Neck Stroke Protocol w/ IV Cont (01.31.23 @ 07:30) >  IMPRESSION:    In comparison with the prior CT perfusion and CTA of the head and neck   dated January 2, 2023:    CTA of the head and neck is limited by motion artifact.    CT PERFUSION:    No evidence of ischemic penumbra or core infarct.    CTA HEAD/NECK:    No significant vascular stenosis or large vessel occlusion.      < end of copied text >      < from: CT Brain Stroke Protocol (01.31.23 @ 06:34) >    IMPRESSION:    No new acute territorial infarct, midline shift, hydrocephalus, or   extra-axial fluid collection.    Redemonstrated neoplasm replacing splenium of corpus callosum and   extending into the left occipital/parietal region, with calcification,   heterogeneity and posttreatment changes, better visualized on recent MR   brainfrom 1/4/2023. While limited ability to entirely exclude hemorrhage   given heterogeneous hyperdensity in this region, findings appear similar   to prior and definitive acute hemorrhage is not identified.    Redemonstrated right parietal lobe heterogeneous focus with   calcification, which correlates to previously seen small infarct, better   visualized on MR brain from 1/4/2023.    < end of copied text >               PRAVIN JHAVERI  57y Female    INTERVAL HPI/OVERNIGHT EVENTS:    called for comanagement  previous notes reviewed by me  son at bedside  pt is on video EEG monitoring now  moving all extremities  arousable to voice    T(F): 99 (02-01-23 @ 07:37), Max: 99.7 (01-31-23 @ 15:53)  HR: 82 (02-01-23 @ 07:37) (82 - 96)  BP: 174/84 (02-01-23 @ 07:37) (118/80 - 179/86)  RR: 18 (02-01-23 @ 07:37) (16 - 20)  SpO2: 97% (02-01-23 @ 07:37) (95% - 97%) on RA  I&O's Summary    31 Jan 2023 07:01  -  01 Feb 2023 07:00  --------------------------------------------------------  IN: 0 mL / OUT: 1500 mL / NET: -1500 mL    01 Feb 2023 07:01  -  01 Feb 2023 08:36  --------------------------------------------------------  IN: 0 mL / OUT: 1000 mL / NET: -1000 mL      PHYSICAL EXAM:  GENERAL: NAD  HEAD:  Normocephalic, video EEG monitoring in place  EYES:  conjunctiva and sclera clear  ENMT: Mouth closed  NERVOUS SYSTEM: lethargic, arousable to voice, moving all extremities  CHEST/LUNG: CTA b/l  HEART: Regular rate and rhythm  ABDOMEN: Soft, Nontender, Nondistended; Bowel sounds present  EXTREMITIES:   No edema  SKIN: warm, dry    Consultant(s) Notes Reviewed:  [x ] YES  [ ] NO  Care Discussed with Consultants/Other Providers [ x] YES  [ ] NO    MEDICATIONS  (STANDING):  aspirin  chewable 81 milliGRAM(s) Oral daily  atorvastatin 40 milliGRAM(s) Oral at bedtime  dexAMETHasone  Injectable 0.5 milliGRAM(s) IV Push daily  furosemide   Injectable 20 milliGRAM(s) IV Push daily  heparin   Injectable 5000 Unit(s) SubCutaneous every 8 hours  lacosamide IVPB 100 milliGRAM(s) IV Intermittent every 12 hours  levETIRAcetam  IVPB 1500 milliGRAM(s) IV Intermittent every 12 hours  pantoprazole  Injectable 40 milliGRAM(s) IV Push daily  spironolactone 50 milliGRAM(s) Oral daily    MEDICATIONS  (PRN):  LORazepam   Injectable 2 milliGRAM(s) IV Push every 4 hours PRN Seizure lasting>2 minutes      LABS:                        11.2   6.34  )-----------( 143      ( 31 Jan 2023 06:10 )             31.4     01-31    136  |  92<L>  |  9<L>  ----------------------------<  125<H>  2.9<L>   |  34<H>  |  0.6<L>    Ca    9.2      31 Jan 2023 06:10    TPro  6.9  /  Alb  4.3  /  TBili  1.1  /  DBili  x   /  AST  35  /  ALT  22  /  AlkPhos  80  01-31    PT/INR - ( 31 Jan 2023 06:10 )   PT: 11.90 sec;   INR: 1.04 ratio         PTT - ( 31 Jan 2023 06:10 )  PTT:32.1 sec      RADIOLOGY & ADDITIONAL TESTS:    Imaging and report Personally Reviewed:  [x ] YES  [ ] NO    < from: Xray Chest 1 View- PORTABLE-Urgent (Xray Chest 1 View- PORTABLE-Urgent .) (01.31.23 @ 08:06) >  Impression:    Cardiomegaly. No acute opacifications or effusions.    < end of copied text >      < from: CT Angio Neck Stroke Protocol w/ IV Cont (01.31.23 @ 07:30) >  IMPRESSION:    In comparison with the prior CT perfusion and CTA of the head and neck   dated January 2, 2023:    CTA of the head and neck is limited by motion artifact.    CT PERFUSION:    No evidence of ischemic penumbra or core infarct.    CTA HEAD/NECK:    No significant vascular stenosis or large vessel occlusion.      < end of copied text >      < from: CT Brain Stroke Protocol (01.31.23 @ 06:34) >    IMPRESSION:    No new acute territorial infarct, midline shift, hydrocephalus, or   extra-axial fluid collection.    Redemonstrated neoplasm replacing splenium of corpus callosum and   extending into the left occipital/parietal region, with calcification,   heterogeneity and posttreatment changes, better visualized on recent MR   brainfrom 1/4/2023. While limited ability to entirely exclude hemorrhage   given heterogeneous hyperdensity in this region, findings appear similar   to prior and definitive acute hemorrhage is not identified.    Redemonstrated right parietal lobe heterogeneous focus with   calcification, which correlates to previously seen small infarct, better   visualized on MR brain from 1/4/2023.    < end of copied text >

## 2023-02-01 NOTE — CONSULT NOTE ADULT - NS ATTEND AMEND GEN_ALL_CORE FT
Pt is a 56 yo F with PMHx of GMB s/p radiation, recent small right parietal ischemic stroke, seizure disorder, who presents with declining mentation since yesterday. Per report, pt was aphasic with right gaze deviation on presentation. Concern for SE, thus pt was given ativan 3mg IV total and versed 2mg IV, as well as LEV 1G IV. Pt had missed several doses of AED over the past few days due to worsening mentation and poor oral intake. On my exam, pt responds to verbal stimuli, does not answer questions or follow commands, however has purposeful movement in all extremities, gaze is midline. CT head stable from prior. STAT EEG with continuous left hemispheric PLEDS. Consider NCC admission for nonconvulsive status management. NS consult. Reasonable to obtain MRI brain to assess for tumor progression and/or ischemic stroke. general neurology aware for seizure management.
57F with PMH of GBM s/p resection (4/2022) s/p chemo and RT, R ischemic stroke, seizure disorder, HTN, here with AMS, attributed to seizures, currently on AEDs. Course c/b hypertension, on labetalol, and hypokalemia, requiring repletion. Patient comfortable currently, awaiting EEG results and MRI, d/w son and patient, will follow for ongoing GOC.    ______________  Cali Hidalgo MD  Palliative Medicine  [facility]   of Geriatric and Palliative Medicine  [phone]

## 2023-02-01 NOTE — CONSULT NOTE ADULT - CONSULT REASON
patient with GBM , s/p sx and radiation now on chemo. presenting with seizure, had recent stroke. Patient's  agreed to talk to Palliative care team

## 2023-02-01 NOTE — CONSULT NOTE ADULT - PROBLEM SELECTOR RECOMMENDATION 4
FUll COde   is next of kin  Will FU with family re: GOC  Will follow - monitor K, Mg, other lytes, replete PRN

## 2023-02-01 NOTE — CONSULT NOTE ADULT - ASSESSMENT
57yFemale being evaluated for      MEDD (morphine equivalent daily dose):      See Recs below.    Please call [phone] with questions or concerns 24/7.   We will continue to follow.     Discussed with primary MD.   58 yo F with PMHx of GBM s/p resection and  radiation, currently on chemotherapy, recent small right parietal ischemic stroke, seizure disorder, who presents with declining mental status since yesterday..  Patient was discharged from [facility] 01/07/2023, she was admitted with aphasia found to have acute stroke. For past few days patient has not been herself, having episodes of staring, not talking at all. She was supposed to have ambulatory EEG done. She has been confused and not herself for past few days and missed several dose of seizure medication.  Patient was diagnosed with left occipital GBM (unmethylated MGMT, IDH wild mutation), underwent resection (04/2022) and radiation. She follows with Dr. Dao. She was taken off Avastin due to the stroke and Temodar was restarted ( due next week). In ED , stroke code was activated , CTH: unchanged GBM, CTA: no LVO. stat EEG: numerous PLEDs. Patient was evaluated by Neurocritical care and Neurosurgery. Patient received Keppra 3gm and Vimpat 200 mg. Patient is being admitted with seizure for further neurology workup and management. Palliative consulted for GOC.     Patient comfortable on exam.   Introduced palliative to son at bedside. WIll FU with  who is primary decision maker once initial workup is complete re: GOC.     MEDD (morphine equivalent daily dose): 0      See Recs below.    Please call [phone] with questions or concerns 24/7.   We will continue to follow.     Discussed with primary MD.

## 2023-02-01 NOTE — CONSULT NOTE ADULT - SUBJECTIVE AND OBJECTIVE BOX
Hematology Consult Note    HPI:  56 yo F with PMHx of GBM s/p resection and  radiation, currently on chemotherapy, recent small right parietal ischemic stroke, seizure disorder, who presents with declining mental status since yesterday.Hisotory was obtained from patient's .  Patient was discharged from [facility] 01/07/2023, she was admitted with aphasia found to have acute stroke. For past few days patient has not been herself, having episodes of staring, not talking at all. She was supposed to have ambulatory EEG done. Since last night her mentation has worsened, and she has been very restless. Denies any H/o fall, head trauma, no witnessed shaking. She has been confused and not herself for past few days and missed several dose of seizure medication. Denies any recent fever, cough, flu like symptoms, diarrhea.    Patient was diagnosed with left occipital GBM (unmethylated MGMT, IDH wild mutation), underwent resection (04/2022) and radiation. She follows with Dr. Dao. She was taken off Avastin due to the stroke and Temodar was restarted ( due next week).  In ED , stroke code was activated , CTH: unchanged GBM, CTA: no LVO. stat EEG: numerous PLEDs. Patient was evaluated by Neurocritical care and Neurosurgery.  ED course:  Patient received Keppra 3gm and Vimpat 200 mg  Patient is being admitted for further evaluation and management       (31 Jan 2023 16:17)      Allergies    No Known Allergies    Intolerances        MEDICATIONS  (STANDING):  aspirin  chewable 81 milliGRAM(s) Oral daily  atorvastatin 40 milliGRAM(s) Oral at bedtime  dexAMETHasone  Injectable 0.5 milliGRAM(s) IV Push daily  heparin   Injectable 5000 Unit(s) SubCutaneous every 8 hours  labetalol 100 milliGRAM(s) Oral every 8 hours  lacosamide IVPB 100 milliGRAM(s) IV Intermittent every 12 hours  levETIRAcetam  IVPB 1500 milliGRAM(s) IV Intermittent every 12 hours  magnesium sulfate  IVPB 2 Gram(s) IV Intermittent once  pantoprazole  Injectable 40 milliGRAM(s) IV Push daily  potassium chloride   Powder 20 milliEquivalent(s) Oral once  spironolactone 50 milliGRAM(s) Oral daily    MEDICATIONS  (PRN):  LORazepam   Injectable 2 milliGRAM(s) IV Push every 4 hours PRN Seizure lasting>2 minutes      PAST MEDICAL & SURGICAL HISTORY:  RAMAKRISHNA on CPAP      GERD (gastroesophageal reflux disease)      Asthmatic bronchitis  MILD , USES VENTOLIN Q2-3M      HTN (hypertension)      Migraines      Chronic neck pain      H/O sinus surgery      Status post D&amp;C      H/O arthroscopy of shoulder  RT      History of hernia repair  2019          FAMILY HISTORY:      SOCIAL HISTORY: No EtOH, no tobacco    REVIEW OF SYSTEMS:    CONSTITUTIONAL: No weakness, fevers or chills  EYES/ENT: No visual changes;  No vertigo or throat pain   NECK: No pain or stiffness  RESPIRATORY: No cough, wheezing, hemoptysis; No shortness of breath  CARDIOVASCULAR: No chest pain or palpitations  GASTROINTESTINAL: No abdominal or epigastric pain. No nausea, vomiting, or hematemesis; No diarrhea or constipation. No melena or hematochezia.  GENITOURINARY: No dysuria, frequency or hematuria  NEUROLOGICAL: No numbness or weakness  SKIN: No itching, burning, rashes, or lesions   All other review of systems is negative unless indicated above.        T(F): 98.9 (02-01-23 @ 09:28), Max: 99.7 (01-31-23 @ 15:53)  HR: 87 (02-01-23 @ 09:28)  BP: 136/66 (02-01-23 @ 09:28)  RR: 18 (02-01-23 @ 09:28)  SpO2: 95% (02-01-23 @ 09:28)  Wt(kg): --    GENERAL: NAD, well-developed  HEAD:  Atraumatic, Normocephalic  EYES: EOMI, PERRLA, conjunctiva and sclera clear  NECK: Supple, No JVD  CHEST/LUNG: Clear to auscultation bilaterally; No wheeze  HEART: Regular rate and rhythm; No murmurs, rubs, or gallops  ABDOMEN: Soft, Nontender, Nondistended; Bowel sounds present  EXTREMITIES:  2+ Peripheral Pulses, No clubbing, cyanosis, or edema  NEUROLOGY: non-focal  SKIN: No rashes or lesions                          11.2   5.47  )-----------( 105      ( 01 Feb 2023 07:07 )             31.6       02-01    143  |  100  |  8<L>  ----------------------------<  99  3.1<L>   |  25  |  0.5<L>    Ca    9.1      01 Feb 2023 07:07  Phos  3.3     02-01  Mg     1.4     02-01    TPro  6.9  /  Alb  4.3  /  TBili  1.1  /  DBili  x   /  AST  35  /  ALT  22  /  AlkPhos  80  01-31      Magnesium, Serum: 1.4 mg/dL (02-01 @ 07:07)  Phosphorus Level, Serum: 3.3 mg/dL (02-01 @ 07:07)

## 2023-02-01 NOTE — SWALLOW BEDSIDE ASSESSMENT ADULT - SLP PERTINENT HISTORY OF CURRENT PROBLEM
57 years old female with PMH of GBM , Seizure, recent right parietal ischemic stroke , HTN was brought to ED due to worsening mental status since yesterday. Stroke imaging: cecile cute change, stat EEG revealed seizure. Patient is being admitted to Neurology for further work up and management. improving mental status, EEG showed 1 focal seizure in past 24 hours.

## 2023-02-01 NOTE — SWALLOW BEDSIDE ASSESSMENT ADULT - SLP GENERAL OBSERVATIONS
pt awake worsening aphasia unable to follow commands or answer questions some spontaneous speech noted

## 2023-02-02 NOTE — PHYSICAL THERAPY INITIAL EVALUATION ADULT - ACTIVE RANGE OF MOTION EXAMINATION, REHAB EVAL
B UE AROM difficult to assess 2* to limited ability to follow commands; B UE AAROM WFL/bilateral  lower extremity Active ROM was WFL (within functional limits)

## 2023-02-02 NOTE — PHYSICAL THERAPY INITIAL EVALUATION ADULT - SITTING BALANCE: STATIC
poor minus Required supportive surface to maintain sitting balance; tolerated sitting for ~3 min/poor minus

## 2023-02-02 NOTE — PHYSICAL THERAPY INITIAL EVALUATION ADULT - PHYSICAL ASSIST/NONPHYSICAL ASSIST: SCOOT/BRIDGE, REHAB EVAL
Verbal and tactile cues for task performance/verbal cues/nonverbal cues (demo/gestures)/1 person assist

## 2023-02-02 NOTE — PROGRESS NOTE ADULT - ASSESSMENT
58 y/o woman with PMH of high grade GBM s/p resection in 4/22 by Dr. Dumont and s/p chemo and RT, small right parietal ischemic stroke dx last admission, seizure disorder and HTN on labetalol and spironolactone now presents with altered mental status per  (episodes of staring and not talking, restless and missing several doses of seizure medication). In the ED, stroke code was activated. CTH: unchanged GBM, CTA: no LVO. stat EEG: numerous PLEDs. Patient was evaluated by Neurocritical care and Neurosurgery. She is now admitted to neurology.    #Altered mental status attributed to seizures  management per neurology - on keppra, Vimpat  check Mag level and keep >2  seizure precautions  video EEG showing some interictal activity  - Management per neurology  - Cont vimpat and keppra  evaluated by neurocrit  UA negative for infection    #High grade GBM s/p resection in 4/22 by Dr. Dumont and s/p chemo and RT  follows with Dr. Dao  was started on Temodar and later Avastin was added (Avastin stopped due to stroke) and s/p Bevacizumab and Temodar to be restarted per notes on patients regular schedule  on decadron    #HTN -  BP improved  - Cont labetalol 100 q8h and spironolactone 50mg qD  on spironolactone    #Hypokalemia - replete and f/u BMP and Mg  replete hypomagnesemia  Lasix changed back to PO lasix. Hold lasix tomorrow if K is still low    #CVA - on ASA/statin    DVT prophylaxis - heparin SQ

## 2023-02-02 NOTE — PROGRESS NOTE ADULT - SUBJECTIVE AND OBJECTIVE BOX
EMILIODAVIDEPRAVIN  57y, Female  Allergy: No Known Allergies    Hospital Day: 2d    Patient seen and examined. No acute events overnight    PMH/PSH:  PAST MEDICAL & SURGICAL HISTORY:  RAMAKRISHNA on CPAP      GERD (gastroesophageal reflux disease)      Asthmatic bronchitis  MILD , USES VENTOLIN Q2-3M      HTN (hypertension)      Migraines      Chronic neck pain      H/O sinus surgery      Status post D&amp;C      H/O arthroscopy of shoulder  RT      History of hernia repair  2019          VITALS:  T(F): 96.8 (02-02-23 @ 15:11), Max: 99.2 (02-01-23 @ 20:27)  HR: 77 (02-02-23 @ 16:40)  BP: 126/76 (02-02-23 @ 16:40) (113/65 - 139/63)  RR: 18 (02-02-23 @ 15:11)  SpO2: --    TESTS & MEASUREMENTS:  Weight (Kg): 92 (02-01-23 @ 11:00)  BMI (kg/m2): 34.8 (02-01)    01-31-23 @ 07:01  -  02-01-23 @ 07:00  --------------------------------------------------------  IN: 0 mL / OUT: 1500 mL / NET: -1500 mL    02-01-23 @ 07:01  -  02-02-23 @ 07:00  --------------------------------------------------------  IN: 0 mL / OUT: 1150 mL / NET: -1150 mL                            9.9    3.80  )-----------( 97       ( 02 Feb 2023 06:16 )             29.8       02-02    145  |  102  |  11  ----------------------------<  120<H>  2.5<LL>   |  31  |  <0.5<L>    Ca    8.6      02 Feb 2023 06:16  Phos  3.1     02-02  Mg     2.0     02-02              Culture - Urine (collected 01-31-23 @ 08:25)  Source: Clean Catch Clean Catch (Midstream)  Final Report (02-01-23 @ 16:31):    No growth    Culture - Blood (collected 01-31-23 @ 06:40)  Source: .Blood Blood-Peripheral  Preliminary Report (02-01-23 @ 14:01):    No growth to date.    Culture - Blood (collected 01-31-23 @ 06:40)  Source: .Blood Blood-Peripheral  Preliminary Report (02-01-23 @ 14:01):    No growth to date.          RADIOLOGY & ADDITIONAL TESTS:    RECENT DIAGNOSTIC ORDERS:  EEG w/ Video 2-12 Hours, Unmonitored (02-02-23 @ 15:22)  MR Head w/wo IV Cont: Routine   Indication: GBM  Transport: Stretcher-Crib (02-02-23 @ 15:19)  Phosphorus Level, Serum: AM Sched. Collection: 03-Feb-2023 04:30 (02-02-23 @ 13:56)  Magnesium, Serum: AM Sched. Collection: 03-Feb-2023 04:30 (02-02-23 @ 13:56)  Comprehensive Metabolic Panel: AM Sched. Collection: 03-Feb-2023 04:30 (02-02-23 @ 13:56)  Complete Blood Count + Automated Diff: AM Sched. Collection: 03-Feb-2023 04:30 (02-02-23 @ 13:56)      MEDICATIONS:  MEDICATIONS  (STANDING):  aspirin  chewable 81 milliGRAM(s) Oral daily  atorvastatin 40 milliGRAM(s) Oral at bedtime  dexAMETHasone  Injectable 4 milliGRAM(s) IV Push every 6 hours  furosemide    Tablet 20 milliGRAM(s) Oral daily  heparin   Injectable 5000 Unit(s) SubCutaneous every 8 hours  influenza   Vaccine 0.5 milliLiter(s) IntraMuscular once  labetalol 100 milliGRAM(s) Oral every 8 hours  lacosamide IVPB 200 milliGRAM(s) IV Intermittent every 12 hours  levETIRAcetam  IVPB 1500 milliGRAM(s) IV Intermittent every 12 hours  pantoprazole  Injectable 40 milliGRAM(s) IV Push daily  spironolactone 50 milliGRAM(s) Oral daily    MEDICATIONS  (PRN):  LORazepam   Injectable 2 milliGRAM(s) IV Push every 4 hours PRN Seizure lasting>2 minutes  LORazepam   Injectable 1 milliGRAM(s) IV Push once PRN for MRI      HOME MEDICATIONS:  atorvastatin 80 mg oral tablet (04-05)  gabapentin 400 mg oral capsule (04-05)  Multiple Vitamins oral capsule (01-03)  omeprazole 40 mg oral delayed release capsule (01-03)  sertraline 50 mg oral tablet (04-05)      REVIEW OF SYSTEMS:  All other review of systems is negative unless indicated above.     PHYSICAL EXAM:  PHYSICAL EXAM:  GENERAL: NAD, well-developed  HEAD:  Atraumatic, Normocephalic  NECK: Supple, No JVD  CHEST/LUNG: Clear to auscultation bilaterally; No wheeze  HEART: Regular rate and rhythm; No murmurs, rubs, or gallops  ABDOMEN: Soft, Nontender, Nondistended; Bowel sounds present  EXTREMITIES:  2+ Peripheral Pulses, No clubbing, cyanosis, or edema  SKIN: No rashes or lesions

## 2023-02-02 NOTE — PHYSICAL THERAPY INITIAL EVALUATION ADULT - ASR WT BEARING STATUS EVAL
Problem: Sepsis/Septic Shock (Adult)  Goal: Signs and Symptoms of Listed Potential Problems Will be Absent, Minimized or Managed (Sepsis/Septic Shock)  Outcome: Ongoing (interventions implemented as appropriate)      Problem: Patient Care Overview  Goal: Plan of Care Review  Outcome: Ongoing (interventions implemented as appropriate)   10/03/18 0435   Coping/Psychosocial   Plan of Care Reviewed With patient;spouse   Plan of Care Review   Progress declining   OTHER   Outcome Summary Pt required 1x dose IV Dilaudid this shift and 2x doses of IV Zofran. Pt urine is dark but pt urinated 450mL this shift. Wife concerned with kidney function. Possible D/C today          no weight-bearing restrictions

## 2023-02-02 NOTE — PHYSICAL THERAPY INITIAL EVALUATION ADULT - PATIENT/FAMILY AGREES WITH PLAN
yes Solaraze Counseling:  I discussed with the patient the risks of Solaraze including but not limited to erythema, scaling, itching, weeping, crusting, and pain.

## 2023-02-02 NOTE — CHART NOTE - NSCHARTNOTEFT_GEN_A_CORE
Spoke to  Helder on the phone. [phone].    Last dose of Temodar was 1/16/23. Patient would start next cycle (cycle 5 ) of chemo with Temodar 400 mg (200 mg/m2) PO QD D1-5 Q28 days on 2/13/23.  Please request the family to bring chemo pills and fill the non-formulary form and send to pharmacy before 2/13/23. Spoke to  Helder on the phone. [phone].    Last dose of Temodar was 1/16/23. Patient would start next cycle (cycle 5 ) of chemo with Temodar 400 mg (200 mg/m2) PO QD D1-5 Q28 days on 2/13/23.  Please request the family to bring chemo pills and fill the non-formulary form and send to pharmacy before 2/13/23.    fort any questions call [phone] or text via MS teams Spoke to  Helder on the phone. [phone].    Last dose of Temodar was 1/16/23. Patient would start next cycle (cycle 5 ) of chemo with Temodar 400 mg (200 mg/m2) PO QD D1-5 Q28 days on 2/09/23.  Please request the family to bring chemo pills and fill the non-formulary form and send to pharmacy before 2/09/23.    fort any questions call [phone] or text via MS teams

## 2023-02-02 NOTE — PHYSICAL THERAPY INITIAL EVALUATION ADULT - PERTINENT HX OF CURRENT PROBLEM, REHAB EVAL
56 yo F with PMHx of GBM s/p resection and  radiation, currently on chemotherapy, recent small right parietal ischemic stroke, seizure disorder, who presents with declining mental status since yesterday.Hisotory was obtained from patient's .  Patient was discharged from [facility] 01/07/2023, she was admitted with aphasia found to have acute stroke. For past few days patient has not been herself, having episodes of staring, not talking at all. She was supposed to have ambulatory EEG done. Since last night her mentation has worsened, and she has been very restless. Denies any H/o fall, head trauma, no witnessed shaking. She has been confused and not herself for past few days and missed several dose of seizure medication. Denies any recent fever, cough, flu like symptoms, diarrhea.     Patient was diagnosed with left occipital GBM (unmethylated MGMT, IDH wild mutation), underwent resection (04/2022) and radiation. She follows with Dr. Dao. She was taken off Avastin due to the stroke and Temodar was restarted ( due next week).    In ED , stroke code was activated , CTH: unchanged GBM, CTA: no LVO. stat EEG: numerous PLEDs. Patient was evaluated by Neurocritical care and Neurosurgery.

## 2023-02-02 NOTE — PHYSICAL THERAPY INITIAL EVALUATION ADULT - GENERAL OBSERVATIONS, REHAB EVAL
Mom requesting Vyvanse 50 mg refill. States he is out of pills and requests another doctor fill Rx today.  
Pt semifowlers in bed, +vEEG, PT to hold at this time. Will follow up as appropriate.
9:45-10:30 Pt. encountered in semifowler in bed in NAD. +B hand mittens, +vEEG, +IV L UE, +2L O2 NC. Son at bedside; assisted in providing subjective history. Vital signs assessed in supine: /71mmHg, HR 83 bpm. Visual tracking and accommodation grossly in tact; examination limited by poor selective attention to task. Cecilia MACHADO aware.

## 2023-02-02 NOTE — PHYSICAL THERAPY INITIAL EVALUATION ADULT - PHYSICAL ASSIST/NONPHYSICAL ASSIST, REHAB EVAL
Verbal and tactile cues for task performance and hand placement on rails/verbal cues/1 person assist

## 2023-02-02 NOTE — PROGRESS NOTE ADULT - ASSESSMENT
57 years old female with PMH of GBM , Seizure, recent right parietal ischemic stroke, HTN was brought to ED due to worsening mental status since yesterday. Labs Stroke imaging: no acute change, stat EEG revealed seizure.  Initial 24H EEG showed 1 focal seizure in past 24 hours.  for which Vimpat was increased. EEG today still with sharp transients.     Plan:  # Seizure:  - most likely due to missed dose of AED. need to assess progression of disease.   - continue Keppra 1500 mg IVPB BID  - continue Vimpat 150 mg IVPB BID  - Continue video EEG for at least 24 hours more  - once seizure controlled, obtain MRI of brain w/wo contrast  - Seizure precaution  - Fall precaution  - Ativan 2mg IV push PRN if seizure duration>2 min      #Hypomagnesemia and hypokalemia:  - Daily bmp, REPLETE as needed    # GBM:  - s/p resection in 04/2022  - Follows with Dr. Dumont ( Neurosurgery) and Dr. Dao ( Oncology)  - Neurosurgery follow  - Discussed with Sylwia Ma over phone: recommended to continue same dose of Decadron 0.5 mg daily  - Hold off Temodar for now  - F/U MRI of brain w/wo RUBI  - Holding Gabapentin and Sertraline for better assessment of mental status  - If family agrees will consult palliative care      #Recent acute right parietal infarct  - continue ASA 81 mg daily  - C/w Atorvastatin 40 mg daily  - will follow up MRI for any new stroke      #HTN  - Decreased lasix from 40 mg to 20 mg daily for now: will increase if needed  - c/w Aldactone 50 mg daily  - Restarted Labetelol 100 mg TID      #Prophylactic measures:  - DVT PPX: Heparin 5000 IU s/c Q8H  - GI PPX: Pantoprazole 40 mg IV daily  - Rehab: PT/OT eval  - Code status: full code   - Dispo: 3 E floor      Discussed with neurology attending

## 2023-02-02 NOTE — PROGRESS NOTE ADULT - SUBJECTIVE AND OBJECTIVE BOX
Neurology Progress Note    Interval History:  The patient was seen and examined at the bedside. Unable to participate today in subjective.       PAST MEDICAL & SURGICAL HISTORY:  RAMAKRISHNA on CPAP    GERD (gastroesophageal reflux disease)    Asthmatic bronchitis  MILD , USES VENTOLIN Q2-3M    HTN (hypertension)    Migraines    Chronic neck pain    H/O sinus surgery    Status post D&amp;C    H/O arthroscopy of shoulder  RT    History of hernia repair              Medications:  aspirin  chewable 81 milliGRAM(s) Oral daily  atorvastatin 40 milliGRAM(s) Oral at bedtime  dexAMETHasone  Injectable 4 milliGRAM(s) IV Push every 6 hours  furosemide    Tablet 20 milliGRAM(s) Oral daily  heparin   Injectable 5000 Unit(s) SubCutaneous every 8 hours  influenza   Vaccine 0.5 milliLiter(s) IntraMuscular once  labetalol 100 milliGRAM(s) Oral every 8 hours  lacosamide IVPB 150 milliGRAM(s) IV Intermittent every 12 hours  levETIRAcetam  IVPB 1500 milliGRAM(s) IV Intermittent every 12 hours  LORazepam   Injectable 2 milliGRAM(s) IV Push every 4 hours PRN  pantoprazole  Injectable 40 milliGRAM(s) IV Push daily  potassium chloride  20 mEq/100 mL IVPB 20 milliEquivalent(s) IV Intermittent once  spironolactone 50 milliGRAM(s) Oral daily      Vital Signs Last 24 Hrs  T(C): 36.5 (2023 04:53), Max: 37.3 (2023 18:24)  T(F): 97.7 (2023 04:53), Max: 99.2 (2023 20:27)  HR: 77 (2023 04:53) (77 - 109)  BP: 128/56 (2023 04:53) (113/65 - 137/70)  BP(mean): 83 (2023 20:27) (83 - 96)  RR: 18 (2023 20:27) (18 - 19)  SpO2: 97% (2023 14:50) (97% - 97%)    Parameters below as of 2023 14:50  Patient On (Oxygen Delivery Method): room air    Neurologic exam  Mental status: Lethargic,  -Cranial nerves:   II: blink to threat present B/L  III, IV, VI: Extraocular movements are intact . Pupils equally round and reactive to light  V:  unable to assess  VII: Face is symmetric  Motor: Normal bulk and tone. All 4 extremities at least 3/5  Sensation: Responds to noxious stimuli B/L   Coordination: No tremor or movement at rest.  Reflexes: withdrawal B/L    Labs:  CBC Full  -  ( 2023 06:16 )  WBC Count : 3.80 K/uL  RBC Count : 2.94 M/uL  Hemoglobin : 9.9 g/dL  Hematocrit : 29.8 %  Platelet Count - Automated : 97 K/uL  Mean Cell Volume : 101.4 fL  Mean Cell Hemoglobin : 33.7 pg  Mean Cell Hemoglobin Concentration : 33.2 g/dL  Auto Neutrophil # : 2.83 K/uL  Auto Lymphocyte # : 0.60 K/uL  Auto Monocyte # : 0.31 K/uL  Auto Eosinophil # : 0.05 K/uL  Auto Basophil # : 0.00 K/uL  Auto Neutrophil % : 74.4 %  Auto Lymphocyte % : 15.8 %  Auto Monocyte % : 8.2 %  Auto Eosinophil % : 1.3 %  Auto Basophil % : 0.0 %        145  |  102  |  11  ----------------------------<  120<H>  2.5<LL>   |  31  |  <0.5<L>    Ca    8.6      2023 06:16  Phos  3.1     02-  Mg     2.0     -      EEG last 24H  Background - continuous, less than optimally organized, reaching frequencies in the range of 6-7 Hz superimposed by small to moderate amount of lower range theta    Focal and generalized slowin. moderate generalized slowing  2. mild to moderate left hemispheric mainly posterior quadrant and posterior temporal focal slowing    Interictal activity - rare to small number of left posterior temporal sharp waves and sharp transients

## 2023-02-02 NOTE — PHYSICAL THERAPY INITIAL EVALUATION ADULT - PHYSICAL ASSIST/NONPHYSICAL ASSIST: SUPINE/SIT, REHAB EVAL
Verbal and tactile cues for task performance/verbal cues/nonverbal cues (demo/gestures)/2 person assist

## 2023-02-02 NOTE — EEG REPORT - NS EEG TEXT BOX
Epilepsy Attending Note:     ANGELICPRAVIN ORDONEZ    57y Female  MRN [id number]    Vital Signs Last 24 Hrs  T(C): 36.5 (2023 04:53), Max: 37.3 (2023 18:24)  T(F): 97.7 (2023 04:53), Max: 99.2 (2023 20:27)  HR: 77 (2023 04:53) (77 - 109)  BP: 128/56 (2023 04:53) (113/65 - 137/70)  BP(mean): 83 (2023 20:27) (83 - 96)  RR: 18 (2023 20:27) (18 - 19)  SpO2: 97% (2023 14:50) (97% - 97%)    Parameters below as of 2023 14:50  Patient On (Oxygen Delivery Method): room air                              9.9    3.80  )-----------( 97       ( 2023 06:16 )             29.8       02-02    145  |  102  |  11  ----------------------------<  120<H>  2.5<LL>   |  31  |  <0.5<L>    Ca    8.6      2023 06:16  Phos  3.1     02-02  Mg     2.0     02-02        MEDICATIONS  (STANDING):  aspirin  chewable 81 milliGRAM(s) Oral daily  atorvastatin 40 milliGRAM(s) Oral at bedtime  dexAMETHasone  Injectable 4 milliGRAM(s) IV Push every 6 hours  furosemide    Tablet 20 milliGRAM(s) Oral daily  heparin   Injectable 5000 Unit(s) SubCutaneous every 8 hours  influenza   Vaccine 0.5 milliLiter(s) IntraMuscular once  labetalol 100 milliGRAM(s) Oral every 8 hours  lacosamide IVPB 150 milliGRAM(s) IV Intermittent every 12 hours  levETIRAcetam  IVPB 1500 milliGRAM(s) IV Intermittent every 12 hours  pantoprazole  Injectable 40 milliGRAM(s) IV Push daily  potassium chloride  20 mEq/100 mL IVPB 20 milliEquivalent(s) IV Intermittent once  potassium chloride  20 mEq/100 mL IVPB 20 milliEquivalent(s) IV Intermittent once  spironolactone 50 milliGRAM(s) Oral daily    MEDICATIONS  (PRN):  LORazepam   Injectable 2 milliGRAM(s) IV Push every 4 hours PRN Seizure lasting>2 minutes            VEEG in the last 24 hours:    Background - continuous, less than optimally organized, reaching frequencies in the range of 6-7 Hz superimposed by small to moderate amount of lower range theta    Focal and generalized slowin. moderate generalized slowing  2. mild to moderate left hemispheric mainly posterior quadrant and posterior temporal focal slowing    Interictal activity - rare to small number of left posterior temporal sharp waves and sharp transients    Events - none    Seizures - none    Impression: Abnormal VEEG as above    Plan - per neurology team

## 2023-02-02 NOTE — PHYSICAL THERAPY INITIAL EVALUATION ADULT - ADDITIONAL COMMENTS
Per son, pt. lives in a private home with son and  with ~5-6 [place]. Pt. stays on first floor and is not required to negotiate flight of stairs inside residence. Son reports that she owns a standard walker and RW for use in the home. Previously required assistance with daily activities.

## 2023-02-03 NOTE — PROGRESS NOTE ADULT - ASSESSMENT
57 years old female with PMH of GBM , Seizure, recent right parietal ischemic stroke, HTN was brought to ED due to worsening mental status since yesterday. Labs Stroke imaging: no acute change, stat EEG revealed seizure.  Initial 24H EEG showed 1 focal seizure in past 24 hours.  for which Vimpat was increased. Pending MRI with/without contrast.     Plan:  # Seizure:  - most likely due to missed dose of AED vs progression of GBM  - continue Keppra 1500 mg IVPB BID  - continue Vimpat 200mg IVPB BID  - Pending MRI of brain w/wo contrast (did not tolerate today despire ativan 2mg)  - Seizure precaution  - Fall precaution  - Ativan 2mg IV push PRN if seizure duration>2 min      #Hypomagnesemia and hypokalemia:  - Daily bmp, REPLETE as needed    # GBM:  - s/p resection in 04/2022  - Follows with Dr. Dumont ( Neurosurgery) and Dr. Dao ( Oncology)  - Neurosurgery follow  - Discussed with Sylwia Ma over phone: recommended to continue same dose of Decadron 0.5 mg daily  - Hold off Temodar for now  - F/U MRI of brain w/wo RUBI  - Holding Gabapentin and Sertraline for better assessment of mental status  - If family agrees will consult palliative care      #Recent acute right parietal infarct  - continue ASA 81 mg daily  - C/w Atorvastatin 40 mg daily  - Pending mri      #HTN   - Cont labetalol 100 q8h and spironolactone 50mg qD  - cw lasix 20 mg daily    #Prophylactic measures:  - DVT PPX: Heparin 5000 IU s/c Q8H  - GI PPX: Pantoprazole 40 mg IV daily  - Rehab: PT/OT eval  - Code status: full code   - Dispo: 3 E floor      Discussed with neurology attending   57 years old female with PMH of GBM , Seizure, recent right parietal ischemic stroke, HTN was brought to ED due to worsening mental status since yesterday. Labs Stroke imaging: no acute change, stat EEG revealed seizure.  Initial 24H EEG showed 1 focal seizure in past 24 hours.  for which Vimpat was increased. Pending MRI with/without contrast.     Plan:  # Seizure:  - most likely due to missed dose of AED vs progression of GBM  - continue Keppra 1500 mg IVPB BID  - continue Vimpat 200mg IVPB BID  - Pending MRI of brain w/wo contrast (did not tolerate today despite ativan 2mg)  - Seizure precaution  - Fall precaution  - Ativan 2mg IV push PRN if seizure duration>2 min      #Hypomagnesemia and hypokalemia:  - Daily bmp, REPLETE as needed    # GBM:  - s/p resection in 04/2022  - Follows with Dr. Dumont ( Neurosurgery) and Dr. Dao ( Oncology)  - Neurosurgery follow  - Discussed with Sylwia Ma over phone: recommended to continue same dose of Decadron 0.5 mg daily  - Hold off Temodar for now  - F/U MRI of brain w/wo RUBI  - Holding Gabapentin and Sertraline for better assessment of mental status  - If family agrees will consult palliative care      #Recent acute right parietal infarct  - continue ASA 81 mg daily  - C/w Atorvastatin 40 mg daily  - Pending mri      #HTN   - Cont labetalol 100 q8h and spironolactone 50mg qD  - cw lasix 20 mg daily    #Prophylactic measures:  - DVT PPX: Heparin 5000 IU s/c Q8H  - GI PPX: Pantoprazole 40 mg IV daily  - Rehab: PT/OT eval  - Code status: full code   - Dispo: 3 E floor      Discussed with neurology attending

## 2023-02-03 NOTE — OCCUPATIONAL THERAPY INITIAL EVALUATION ADULT - PERTINENT HX OF CURRENT PROBLEM, REHAB EVAL
57 years old female with PMH of GBM , Seizure, recent right parietal ischemic stroke, HTN was brought to ED due to worsening mental status since yesterday. Labs Stroke imaging: no acute change, stat EEG revealed seizure.  Initial 24H EEG showed 1 focal seizure in past 24 hours.  for which Vimpat was increased.

## 2023-02-03 NOTE — PROGRESS NOTE ADULT - SUBJECTIVE AND OBJECTIVE BOX
Neurology Progress Note    Interval History:  The patient was seen and examined at the bedside. Denied headache, abdominal pain, body pain.       PAST MEDICAL & SURGICAL HISTORY:  RAMAKRISHNA on CPAP    GERD (gastroesophageal reflux disease)    Asthmatic bronchitis  MILD , USES VENTOLIN Q2-3M    HTN (hypertension)    Migraines    Chronic neck pain    H/O sinus surgery    Status post D&amp;C    H/O arthroscopy of shoulder  RT    History of hernia repair  2019            Medications:  aspirin  chewable 81 milliGRAM(s) Oral daily  atorvastatin 40 milliGRAM(s) Oral at bedtime  dexAMETHasone  Injectable 4 milliGRAM(s) IV Push every 6 hours  furosemide    Tablet 20 milliGRAM(s) Oral daily  heparin   Injectable 5000 Unit(s) SubCutaneous every 8 hours  influenza   Vaccine 0.5 milliLiter(s) IntraMuscular once  labetalol 100 milliGRAM(s) Oral every 8 hours  lacosamide IVPB 200 milliGRAM(s) IV Intermittent every 12 hours  levETIRAcetam  IVPB 1500 milliGRAM(s) IV Intermittent every 12 hours  LORazepam   Injectable 2 milliGRAM(s) IV Push every 4 hours PRN  LORazepam   Injectable 1 milliGRAM(s) IV Push once  pantoprazole  Injectable 40 milliGRAM(s) IV Push daily  spironolactone 50 milliGRAM(s) Oral daily      Vital Signs Last 24 Hrs  T(C): 36 (03 Feb 2023 04:44), Max: 36 (02 Feb 2023 15:11)  T(F): 96.8 (03 Feb 2023 04:44), Max: 96.8 (02 Feb 2023 15:11)  HR: 64 (03 Feb 2023 04:44) (64 - 77)  BP: 128/80 (03 Feb 2023 04:44) (126/76 - 141/66)  BP(mean): 99 (03 Feb 2023 04:44) (99 - 99)  RR: 18 (03 Feb 2023 04:44) (18 - 18)  SpO2: 96% (03 Feb 2023 04:44) (96% - 96%)    Parameters below as of 03 Feb 2023 04:44  Patient On (Oxygen Delivery Method): nasal cannula      Neurologic exam  Mental status: Drowsy but arousable  -Cranial nerves:   II: blink to threat present B/L  III, IV, VI: Extraocular movements are intact. Pupils equally round and reactive to light  V:  unable to assess  VII: Face is symmetric  Motor: Normal bulk and tone. All 4 extremities at least 3/5  Sensation: Responds to noxious stimuli B/L   Coordination: No tremor or movement at rest.  Reflexes: withdrawal B/L    Labs:  CBC Full  -  ( 03 Feb 2023 07:26 )  WBC Count : 4.07 K/uL  RBC Count : 3.08 M/uL  Hemoglobin : 10.4 g/dL  Hematocrit : 30.9 %  Platelet Count - Automated : 94 K/uL  Mean Cell Volume : 100.3 fL  Mean Cell Hemoglobin : 33.8 pg  Mean Cell Hemoglobin Concentration : 33.7 g/dL  Auto Neutrophil # : 3.48 K/uL  Auto Lymphocyte # : 0.41 K/uL  Auto Monocyte # : 0.15 K/uL  Auto Eosinophil # : 0.00 K/uL  Auto Basophil # : 0.00 K/uL  Auto Neutrophil % : 85.5 %  Auto Lymphocyte % : 10.1 %  Auto Monocyte % : 3.7 %  Auto Eosinophil % : 0.0 %  Auto Basophil % : 0.0 %    02-03    144  |  108  |  11  ----------------------------<  123<H>  3.8   |  27  |  <0.5<L>    Ca    9.3      03 Feb 2023 07:26  Phos  2.2     02-03  Mg     2.4     02-03    TPro  6.1  /  Alb  4.0  /  TBili  0.7  /  DBili  x   /  AST  36  /  ALT  29  /  AlkPhos  72  02-03    LIVER FUNCTIONS - ( 03 Feb 2023 07:26 )  Alb: 4.0 g/dL / Pro: 6.1 g/dL / ALK PHOS: 72 U/L / ALT: 29 U/L / AST: 36 U/L / GGT: x

## 2023-02-04 NOTE — PROGRESS NOTE ADULT - ASSESSMENT
57 years old female with PMH of GBM , Seizure, recent right parietal ischemic stroke, HTN was brought to ED due to worsening mental status since yesterday. Labs Stroke imaging: no acute change, stat EEG revealed seizure.  Initial 24H EEG showed 1 focal seizure in past 24 hours.  for which Vimpat was increased. Pending MRI with/without contrast.     Plan:  # Seizure:  - most likely due to missed dose of AED vs progression of GBM  - continue Keppra 1500 mg IVPB BID  - continue Vimpat 200mg IVPB BID  - Pending MRI of brain w/wo contrast (did not tolerate today despite ativan 2mg)  - Seizure precaution  - Fall precaution  - Ativan 2mg IV push PRN if seizure duration>2 min    #Hypomagnesemia and hypokalemia:  - Daily bmp, REPLETE as needed    # GBM:  - s/p resection in 04/2022  - Follows with Dr. Dumont ( Neurosurgery) and Dr. Dao ( Oncology)  - Neurosurgery follow  - Discussed with Sylwia Ma over phone: recommended to continue same dose of Decadron 0.5 mg daily  - Hold off Temodar for now- F/U MRI of brain w/wo RUBI  - Holding Gabapentin and Sertraline for better assessment of mental status  - If family agrees will consult palliative care    #Recent acute right parietal infarct  - continue ASA 81 mg daily  - C/w Atorvastatin 40 mg daily  - Pending mri    #HTN   - Cont labetalol 100 q8h and spironolactone 50mg qD  - cw lasix 20 mg daily    #Prophylactic measures:  - DVT PPX: Heparin 5000 IU s/c Q8H  - GI PPX: Pantoprazole 40 mg IV daily  - Rehab: PT/OT eval  - Code status: full code   - Dispo: 3 E floor    Discussed with neurology attending   57 years old female with PMH of GBM , Seizure, recent right parietal ischemic stroke, HTN was brought to ED due to worsening mental status since yesterday. Labs Stroke imaging: no acute change, stat EEG revealed seizure.  Initial 24H EEG showed 1 focal seizure in past 24 hours.  for which Vimpat was increased. Pending MRI with/without contrast.     Plan:  # Seizure:  - most likely due to missed dose of AED vs progression of GBM  - continue Keppra 1500 mg IVPB BID  - continue Vimpat 200mg IVPB BID  - Pending MRI of brain w/wo contrast (did not tolerate today despite ativan 2mg)  - Seizure precaution  - Fall precaution  - Ativan 2mg IV push PRN if seizure duration>2 min  - Reordered vEEG    #Hypomagnesemia and hypokalemia:  - Daily bmp, REPLETE as needed    # GBM:  - s/p resection in 04/2022  - Follows with Dr. Dumont ( Neurosurgery) and Dr. Dao ( Oncology)  - Neurosurgery follow  - Discussed with Sylwia Ma over phone: recommended to continue same dose of Decadron 0.5 mg daily  - Hold off Temodar for now- F/U MRI of brain w/wo RUBI  - Holding Gabapentin and Sertraline for better assessment of mental status  - If family agrees will consult palliative care    #Recent acute right parietal infarct  - continue ASA 81 mg daily  - C/w Atorvastatin 40 mg daily  - Pending mri    #HTN   - Cont labetalol 100 q8h and spironolactone 50mg qD  - cw lasix 20 mg daily    #Prophylactic measures:  - DVT PPX: Heparin 5000 IU s/c Q8H  - GI PPX: Pantoprazole 40 mg IV daily  - Rehab: PT/OT eval  - Code status: full code   - Dispo: 3 E floor    Discussed with neurology attending

## 2023-02-04 NOTE — PROGRESS NOTE ADULT - SUBJECTIVE AND OBJECTIVE BOX
Patient is a 57y old  Female who presents with a chief complaint of Seizure (03 Feb 2023 14:20)      Subjective: Pt was on VEEG this AM.       Vital Signs Last 24 Hrs  T(C): 36.5 (04 Feb 2023 14:15), Max: 36.5 (04 Feb 2023 14:15)  T(F): 97.7 (04 Feb 2023 14:15), Max: 97.7 (04 Feb 2023 14:15)  HR: 78 (04 Feb 2023 14:15) (68 - 89)  BP: 119/63 (04 Feb 2023 14:15) (119/63 - 176/81)  BP(mean): --  RR: 18 (04 Feb 2023 05:06) (18 - 20)  SpO2: 98% (03 Feb 2023 21:16) (98% - 98%)    Parameters below as of 03 Feb 2023 21:16  Patient On (Oxygen Delivery Method): room air        PHYSICAL EXAM  General: adult in NAD  HEENT:  anicteric sclera, pink conjunctiva  Neck: supple  CV: normal S1/S2  Lungs: positive air movement b/l ant lungs  Abdomen: soft non-tender non-distended  Ext: no clubbing cyanosis or edema  Skin: no rashes and no petechiae  Neuro: alert and oriented X 0    MEDICATIONS  (STANDING):  aspirin  chewable 81 milliGRAM(s) Oral daily  atorvastatin 40 milliGRAM(s) Oral at bedtime  dexAMETHasone  Injectable 4 milliGRAM(s) IV Push every 6 hours  furosemide    Tablet 20 milliGRAM(s) Oral daily  heparin   Injectable 5000 Unit(s) SubCutaneous every 8 hours  influenza   Vaccine 0.5 milliLiter(s) IntraMuscular once  labetalol 100 milliGRAM(s) Oral every 8 hours  lacosamide IVPB 200 milliGRAM(s) IV Intermittent every 12 hours  levETIRAcetam  IVPB 1500 milliGRAM(s) IV Intermittent every 12 hours  LORazepam   Injectable 1 milliGRAM(s) IV Push once  pantoprazole  Injectable 40 milliGRAM(s) IV Push daily  spironolactone 50 milliGRAM(s) Oral daily    MEDICATIONS  (PRN):  LORazepam   Injectable 2 milliGRAM(s) IV Push every 4 hours PRN Seizure lasting>2 minutes      LABS:                          10.4   4.26  )-----------( 85       ( 04 Feb 2023 07:37 )             30.4         Mean Cell Volume : 99.7 fL  Mean Cell Hemoglobin : 34.1 pg  Mean Cell Hemoglobin Concentration : 34.2 g/dL  Auto Neutrophil # : 3.65 K/uL  Auto Lymphocyte # : 0.40 K/uL  Auto Monocyte # : 0.13 K/uL  Auto Eosinophil # : 0.00 K/uL  Auto Basophil # : 0.00 K/uL  Auto Neutrophil % : 85.6 %  Auto Lymphocyte % : 9.4 %  Auto Monocyte % : 3.1 %  Auto Eosinophil % : 0.0 %  Auto Basophil % : 0.0 %      Serial CBC's  02-04 @ 07:37  Hct-30.4 / Hgb-10.4 / Plat-85 / RBC-3.05 / WBC-4.26  Serial CBC's  02-03 @ 07:26  Hct-30.9 / Hgb-10.4 / Plat-94 / RBC-3.08 / WBC-4.07  Serial CBC's  02-02 @ 06:16  Hct-29.8 / Hgb-9.9 / Plat-97 / RBC-2.94 / WBC-3.80  Serial CBC's  02-01 @ 07:07  Hct-31.6 / Hgb-11.2 / Plat-105 / RBC-3.28 / WBC-5.47      02-04    145  |  106  |  16  ----------------------------<  130<H>  3.5   |  26  |  <0.5<L>    Ca    9.4      04 Feb 2023 07:37  Phos  2.5     02-04  Mg     2.3     02-04    TPro  6.4  /  Alb  4.1  /  TBili  0.8  /  DBili  x   /  AST  47<H>  /  ALT  40  /  AlkPhos  69  02-04        RADIOLOGY & ADDITIONAL STUDIES:    < from: MR Head No Cont (02.03.23 @ 11:20) >  FINDINGS/  IMPRESSION:    Significantly limited examination due to motion.    In comparison to prior MRI of 1/4/2023 there are similar areas of   abnormal FLAIR signal with associated restricted diffusion in the   periventricular white matter, corpus callosum as well as the left   occipital lobe. There are increased foci of abnormal signal in the right   parietal and left frontal regions potentially reflecting new foci of   neoplasm versus radiation therapy changes though evaluation is limited   without contrast as well as motion.    Redemonstration of left occipital craniotomy postsurgical changes.

## 2023-02-04 NOTE — PROGRESS NOTE ADULT - ASSESSMENT
56 yo F with PMHx of GBM s/p resection and  radiation, currently on chemotherapy, recent small right parietal ischemic stroke, seizure disorder, who presents with declining mental status since yesterday.    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     - 06/30/2022 S/P chemoRT with concurrent Temodar, with worsening symptoms 2/2 to recurrent edema.  - 08/05/2022 MRI showed possible interval progression vs pseduo-progression (from RT)?  = 08/22/2022 transfer her care since Dr. Kennedy is leaving and started Avastin at 10 mg/kg with Temodar (150 mg/m2, total 320 mg) to be taken for 5 consecutive days every 28 days.  - 10/03/2022 MR brain ordered by [facility], Dr. Dumont - final results pending.  - since no significant myelosuppression, 10/26-10/30/2022 Temodar was increased to 400 mg (200 mg/m2) with cycle 2.  - 11/16/2022 started dexamethasone 0.5 mg PO QD - tapered without issues.  - 11/23/2022 delayed Temodar 400 mg (200 mg/m2) PO QD D1-5 every 28 days.  - 01/04/2023 MR BRAIN - New small cortical/subcortical acute infarct in the right parietal lobe. Progressively increasing confluent signal abnormality in bilateral periventricular white matter, with redemonstrated restricted diffusion in bilateral inferior frontal lobe and rostrum of the corpus callosum. No associated enhancement. The finding is nonspecific but likely reflects post radiation changes.  Unchanged extension of the mass into the splenium of the corpus callosum and contiguous expansile FLAIR signal abnormality in bilateral periatrial white matter extending to the left temporal lobe.  - She was referred for another MRI brain which she has not completed yet  - 01/25/2023 reported S/P CVA and another episode of "absent seizure" on 01/22/2023 as per family , her avastin was stopped (Last dose 12/29/22)  - she is on Temodar 400 mg (200 mg/m2) PO QD D1-5 cycle 4. Due to start cycle 5 on 2/13/23  - MRI head no cont 2/3/23: Motion limited study. There are increased foci of abnormal signal in the right parietal and left frontal regions potentially reflecting new foci of neoplasm versus radiation therapy changes though evaluation is limited without contrast as well as motion.      Recommendations:    - C/w steroids 4 mg Q6hrs. Will require follow up on improvement in mental status and then taper the steroids  - Replete Mag and K to higher seizure threshold  - Recommend MRI brain w/ contrast with anesthesia given multiple failed attempts. Unclear from current MRI without contrast about the progression of ds.   - Neurosurgery follow up  - Agree with VEEG. AEDs as per Neurology.   - She has very poor prognosis with MGMT unmethylated GBM. will require GOC conversation.   - We can discuss continuation of the same treatment vs potential change of treatment once progression is confirmed on MRI. Pt's daughter to get in touch with Dr Chawla next week to discuss further.     Plan d/w pt's   at bedside and daughter Chanell over the phone.

## 2023-02-04 NOTE — PROGRESS NOTE ADULT - SUBJECTIVE AND OBJECTIVE BOX
NEUROLOGY CONSULT PROGRESS NOTE    INTERVAL HISTORY: Patient nodding and agitated, partially responsive to questions. As per family, patient worsened compared with her stay in the ICU. Family informed about pour prognosis of the disease. Ordered vEEG.    REVIEW OF SYSTEMS:  As per HPI, otherwise negative for Constitutional, Eyes, Ears/Nose/Mouth/Throat, Neck, Cardiovascular, Respiratory, Gastrointestinal, Genitourinary, Skin, Endocrine, Musculoskeletal, Psychiatric, and Hematologic/Lymphatic.    MEDICATIONS:  aspirin  chewable 81 milliGRAM(s) Oral daily  atorvastatin 40 milliGRAM(s) Oral at bedtime  dexAMETHasone  Injectable 4 milliGRAM(s) IV Push every 6 hours  furosemide    Tablet 20 milliGRAM(s) Oral daily  heparin   Injectable 5000 Unit(s) SubCutaneous every 8 hours  influenza   Vaccine 0.5 milliLiter(s) IntraMuscular once  labetalol 100 milliGRAM(s) Oral every 8 hours  lacosamide IVPB 200 milliGRAM(s) IV Intermittent every 12 hours  levETIRAcetam  IVPB 1500 milliGRAM(s) IV Intermittent every 12 hours  LORazepam   Injectable 2 milliGRAM(s) IV Push every 4 hours PRN  LORazepam   Injectable 1 milliGRAM(s) IV Push once  pantoprazole  Injectable 40 milliGRAM(s) IV Push daily  spironolactone 50 milliGRAM(s) Oral daily    VITAL SIGNS:  Vital Signs Last 24 Hrs  T(C): 36.5 (04 Feb 2023 14:15), Max: 36.5 (04 Feb 2023 14:15)  T(F): 97.7 (04 Feb 2023 14:15), Max: 97.7 (04 Feb 2023 14:15)  HR: 78 (04 Feb 2023 14:15) (68 - 78)  BP: 119/63 (04 Feb 2023 14:15) (119/63 - 149/90)  BP(mean): --  RR: 18 (04 Feb 2023 05:06) (18 - 18)  SpO2: 98% (03 Feb 2023 21:16) (98% - 98%)    Parameters below as of 03 Feb 2023 21:16  Patient On (Oxygen Delivery Method): room air    PHYSICAL EXAMINATION:  Constitutional: hyperkinetic  Eyes: anicteric sclera  Cardiovascular: +S1/S2, RRR; no M/R/G  Neurological:  Mental status: Drowsy but arousable  -Cranial nerves:   II: blink to threat present B/L  III, IV, VI: Extraocular movements are intact. Pupils equally round and reactive to light  V:  unable to assess  VII: Face is symmetric  Motor: Normal bulk and tone. All 4 extremities at least 3/5  Sensation: Responds to noxious stimuli B/L   Coordination: No tremor or movement at rest.  Reflexes: withdrawal B/L      LABS:                          10.4   4.26  )-----------( 85       ( 04 Feb 2023 07:37 )             30.4     02-04    145  |  106  |  16  ----------------------------<  130<H>  3.5   |  26  |  <0.5<L>    Ca    9.4      04 Feb 2023 07:37  Phos  2.5     02-04  Mg     2.3     02-04    TPro  6.4  /  Alb  4.1  /  TBili  0.8  /  DBili  x   /  AST  47<H>  /  ALT  40  /  AlkPhos  69  02-04          RADIOLOGY & ADDITIONAL STUDIES:      IMPRESSION & RECOMMENDATIONS:

## 2023-02-05 NOTE — PROGRESS NOTE ADULT - SUBJECTIVE AND OBJECTIVE BOX
MARIOKIERANDAVIDEPRAVIN  57y, Female  Allergy: No Known Allergies    Hospital Day: 5d    Patient seen and examined earlier today.  appeared to be lethargic.  on vEEG     PMH/PSH:  PAST MEDICAL & SURGICAL HISTORY:  RAMAKRISHNA on CPAP      GERD (gastroesophageal reflux disease)      Asthmatic bronchitis  MILD , USES VENTOLIN Q2-3M      HTN (hypertension)      Migraines      Chronic neck pain      H/O sinus surgery      Status post D&amp;C      H/O arthroscopy of shoulder  RT      History of hernia repair  2019          LAST 24-Hr EVENTS:    VITALS:  T(F): 98.6 (02-05-23 @ 14:20), Max: 98.6 (02-05-23 @ 14:20)  HR: 88 (02-05-23 @ 14:20)  BP: 112/59 (02-05-23 @ 14:20) (112/59 - 172/85)  RR: 18 (02-04-23 @ 20:45)  SpO2: --          TESTS & MEASUREMENTS:  Weight/BMI  92 (02-01-23 @ 11:00)    02-03-23 @ 07:01  -  02-04-23 @ 07:00  --------------------------------------------------------  IN: 200 mL / OUT: 0 mL / NET: 200 mL    02-05-23 @ 07:01  -  02-05-23 @ 17:24  --------------------------------------------------------  IN: 0 mL / OUT: 300 mL / NET: -300 mL                            11.0   4.40  )-----------( 61       ( 05 Feb 2023 07:58 )             33.3         02-05    143  |  106  |  20  ----------------------------<  128<H>  5.4<H>   |  24  |  0.6<L>    Ca    9.6      05 Feb 2023 07:58  Phos  2.8     02-05  Mg     2.2     02-05    TPro  6.4  /  Alb  4.1  /  TBili  0.8  /  DBili  x   /  AST  47<H>  /  ALT  40  /  AlkPhos  69  02-04    LIVER FUNCTIONS - ( 04 Feb 2023 07:37 )  Alb: 4.1 g/dL / Pro: 6.4 g/dL / ALK PHOS: 69 U/L / ALT: 40 U/L / AST: 47 U/L / GGT: x                 Culture - Urine (collected 01-31-23 @ 08:25)  Source: Clean Catch Clean Catch (Midstream)  Final Report (02-01-23 @ 16:31):    No growth    Culture - Blood (collected 01-31-23 @ 06:40)  Source: .Blood Blood-Peripheral  Final Report (02-05-23 @ 14:00):    No Growth Final    Culture - Blood (collected 01-31-23 @ 06:40)  Source: .Blood Blood-Peripheral  Final Report (02-05-23 @ 14:00):    No Growth Final                COVID-19 PCR: NotDetec (01-31-23 @ 06:10)        A1C with Estimated Average Glucose Result: 6.1 % (04-06-22 @ 06:45)          RADIOLOGY, ECG, & ADDITIONAL TESTS:  12 Lead ECG:   Ventricular Rate 99 BPM    Atrial Rate 99 BPM    P-R Interval 110 ms    QRS Duration 72 ms    Q-T Interval 396 ms    QTC Calculation(Bazett) 508 ms    P Axis 34 degrees    R Axis 3 degrees    T Axis 31 degrees    Diagnosis Line Sinus rhythm with short TX  Voltage criteria for left ventricular hypertrophy  Inferior infarct , age undetermined  Prolonged QT  Abnormal ECG    Confirmed by Cole Conroy ([id number]) on 1/31/2023 4:24:28 PM (01-31-23 @ 09:36)      RECENT DIAGNOSTIC ORDERS:      MEDICATIONS:  MEDICATIONS  (STANDING):  aspirin  chewable 81 milliGRAM(s) Oral daily  atorvastatin 40 milliGRAM(s) Oral at bedtime  dexAMETHasone  Injectable 4 milliGRAM(s) IV Push every 6 hours  furosemide    Tablet 20 milliGRAM(s) Oral daily  heparin   Injectable 5000 Unit(s) SubCutaneous every 8 hours  influenza   Vaccine 0.5 milliLiter(s) IntraMuscular once  labetalol 100 milliGRAM(s) Oral every 8 hours  lacosamide IVPB 200 milliGRAM(s) IV Intermittent every 12 hours  levETIRAcetam  IVPB 1500 milliGRAM(s) IV Intermittent every 12 hours  LORazepam   Injectable 1 milliGRAM(s) IV Push once  pantoprazole  Injectable 40 milliGRAM(s) IV Push daily  spironolactone 50 milliGRAM(s) Oral daily    MEDICATIONS  (PRN):  LORazepam   Injectable 2 milliGRAM(s) IV Push every 4 hours PRN Seizure lasting>2 minutes      HOME MEDICATIONS:  Aldactone 25 mg oral tablet (01-07)  Aspirin Low Dose 81 mg oral tablet, chewable (01-07)  atorvastatin 80 mg oral tablet (04-05)  dexamethasone 0.5 mg oral tablet (01-07)  gabapentin 400 mg oral capsule (04-05)  Keppra 750 mg oral tablet (01-07)  labetalol 100 mg oral tablet (01-07)  Lasix 40 mg oral tablet (01-07)  Multiple Vitamins oral capsule (01-03)  omeprazole 40 mg oral delayed release capsule (01-03)  sertraline 50 mg oral tablet (04-05)      PHYSICAL EXAM:  GENERAL: NAD,   HEAD:  Atraumatic, Normocephalic  EYES: EOMI, conjunctiva and sclera clear  ENT: Moist mucous membranes  CHEST/LUNG: No dyspnea, non-labored breathing. No use of accessory muscles.   HEART: Regular   ABDOMEN: Nondistended  EXTREMITIES: No clubbing, cyanosis, or edema  NERVOUS SYSTEM:  LEthargic  MSK: FROM all 4 extremities

## 2023-02-05 NOTE — PROGRESS NOTE ADULT - SUBJECTIVE AND OBJECTIVE BOX
Neurology Progress Note    Interval History:    Patient seen and examined at bedside. There were no acute events overnight. Patient partially responsive during questioning, denied any complaints.      PAST MEDICAL & SURGICAL HISTORY:  RAMAKRISHNA on CPAP  GERD (gastroesophageal reflux disease)  Asthmatic bronchitis  MILD , USES VENTOLIN Q2-3M  HTN (hypertension)  Migraines  Chronic neck pain  H/O sinus surgery  Status post D&C  H/O arthroscopy of shoulder  RT  History of hernia repair        Medications:  aspirin  chewable 81 milliGRAM(s) Oral daily  atorvastatin 40 milliGRAM(s) Oral at bedtime  dexAMETHasone  Injectable 4 milliGRAM(s) IV Push every 6 hours  furosemide    Tablet 20 milliGRAM(s) Oral daily  heparin   Injectable 5000 Unit(s) SubCutaneous every 8 hours  influenza   Vaccine 0.5 milliLiter(s) IntraMuscular once  labetalol 100 milliGRAM(s) Oral every 8 hours  labetalol Injectable 5 milliGRAM(s) IV Push once  lacosamide IVPB 200 milliGRAM(s) IV Intermittent every 12 hours  levETIRAcetam  IVPB 1500 milliGRAM(s) IV Intermittent every 12 hours  LORazepam   Injectable 1 milliGRAM(s) IV Push once  LORazepam   Injectable 2 milliGRAM(s) IV Push every 4 hours PRN  pantoprazole  Injectable 40 milliGRAM(s) IV Push daily  spironolactone 50 milliGRAM(s) Oral daily      Vital Signs Last 24 Hrs  T(C): 36.7 (2023 20:56), Max: 37 (2023 14:20)  T(F): 98.1 (2023 20:56), Max: 98.6 (2023 14:20)  HR: 74 (2023 22:57) (74 - 88)  BP: 155/85 (2023 22:57) (112/59 - 172/85)  RR: 18 (2023 20:56) (18 - 18)      Neurological Exam:   Mental status: Lethargic, but arousable. No dysarthria, no aphasia.   Cranial nerves: Pupils equally round and reactive to light, no nystagmus, extraocular muscles intact, V1 through V3 intact bilaterally and symmetric, face symmetric, tongue was midline.  Motor: Moves all four extremities anti-gravity.  strength 5/5. Normal tone and bulk. No abnormal movements.    Sensation: Withdraws to noxious stimuli.  Coordination: Unable to assess.  Reflexes: 2+ in bilateral UE/LE, downgoing toes bilaterally.  Gait: Deferred.      Labs:  CBC Full  -  ( 2023 07:58 )  WBC Count : 4.40 K/uL  RBC Count : 3.32 M/uL  Hemoglobin : 11.0 g/dL  Hematocrit : 33.3 %  Platelet Count - Automated : 61 K/uL  Mean Cell Volume : 100.3 fL  Mean Cell Hemoglobin : 33.1 pg  Mean Cell Hemoglobin Concentration : 33.0 g/dL          143  |  106  |  20  ----------------------------<  128<H>  5.4<H>   |  24  |  0.6<L>    Ca    9.6      2023 07:58  Phos  2.8     -  Mg     2.2     -    TPro  6.4  /  Alb  4.1  /  TBili  0.8  /  DBili  x   /  AST  47<H>  /  ALT  40  /  AlkPhos  69  02-    LIVER FUNCTIONS - ( 2023 07:37 )  Alb: 4.1 g/dL / Pro: 6.4 g/dL / ALK PHOS: 69 U/L / ALT: 40 U/L / AST: 47 U/L / GGT: x               < from: MR Head No Cont (23 @ 11:20) >  PROCEDURE DATE:  2023          INTERPRETATION:  INDICATIONS:  Glioblastoma status post craniotomy and   radiation. Recent right parietal ischemic stroke. Worsening mental status.    TECHNIQUE:  Multiplanar imaging was performed using T1 weighted, T2   weighted and FLAIR sequences.  Diffusion weighted and GRE images were   also obtained.    COMPARISON EXAMINATION:  MR brain dated 2023.    FINDINGS/  IMPRESSION:    Significantly limited examination due to motion.    In comparison to prior MRI of 2023 there are similar areas of   abnormal FLAIR signal with associated restricted diffusion in the   periventricular white matter, corpus callosum as well as the left   occipital lobe. There are increased foci of abnormal signal in the right   parietal and left frontal regions potentially reflecting new foci of   neoplasm versus radiation therapy changes though evaluation is limited   without contrast as well as motion.    Redemonstration of left occipital craniotomy postsurgical changes.      VEEG in the last 24 hours:    Background - continuous, less than optimally organized, reaching frequencies in the range of 6-7 Hz superimposed by small to moderate amount of lower range theta    Focal and generalized slowin. Mild  generalized slowing  2. Mild  to moderate left hemispheric mainly posterior quadrant and posterior temporal focal slowing    Interictal activity - rare to small number of low amplitude,  left posterior temporal sharp waves and sharp transients    Events -   Pushbutton marker pressed 4 times with no change in EEG and no clear clinical correlate.     Seizures - none    Impression: Abnormal VEEG due to:  1. Mild generalized slowing c/w diffuse cortical dysfunction   2. Moderate focal slowing of the left posterior temporal and posterior quadrant region with intermixed, low amplitude sharp transients.    This finding is consistent with focal cortical dysfunction with cortical irritability.

## 2023-02-05 NOTE — EEG REPORT - NS EEG TEXT BOX
DAILY VEEG REPORT     PRAVIN JHAVERI    57y Female  MRN [id number]    Vital Signs Last 24 Hrs  T(C): 36.5 (2023 04:53), Max: 37.3 (2023 18:24)  T(F): 97.7 (2023 04:53), Max: 99.2 (2023 20:27)  HR: 77 (2023 04:53) (77 - 109)  BP: 128/56 (2023 04:53) (113/65 - 137/70)  BP(mean): 83 (2023 20:27) (83 - 96)  RR: 18 (2023 20:27) (18 - 19)  SpO2: 97% (2023 14:50) (97% - 97%)    Parameters below as of 2023 14:50  Patient On (Oxygen Delivery Method): room air                              9.9    3.80  )-----------( 97       ( 2023 06:16 )             29.8       02-02    145  |  102  |  11  ----------------------------<  120<H>  2.5<LL>   |  31  |  <0.5<L>    Ca    8.6      2023 06:16  Phos  3.1     02-02  Mg     2.0     02-02        MEDICATIONS  (STANDING):  aspirin  chewable 81 milliGRAM(s) Oral daily  atorvastatin 40 milliGRAM(s) Oral at bedtime  dexAMETHasone  Injectable 4 milliGRAM(s) IV Push every 6 hours  furosemide    Tablet 20 milliGRAM(s) Oral daily  heparin   Injectable 5000 Unit(s) SubCutaneous every 8 hours  influenza   Vaccine 0.5 milliLiter(s) IntraMuscular once  labetalol 100 milliGRAM(s) Oral every 8 hours  lacosamide IVPB 150 milliGRAM(s) IV Intermittent every 12 hours  levETIRAcetam  IVPB 1500 milliGRAM(s) IV Intermittent every 12 hours  pantoprazole  Injectable 40 milliGRAM(s) IV Push daily  potassium chloride  20 mEq/100 mL IVPB 20 milliEquivalent(s) IV Intermittent once  potassium chloride  20 mEq/100 mL IVPB 20 milliEquivalent(s) IV Intermittent once  spironolactone 50 milliGRAM(s) Oral daily    MEDICATIONS  (PRN):  LORazepam   Injectable 2 milliGRAM(s) IV Push every 4 hours PRN Seizure lasting>2 minutes            VEEG in the last 24 hours:    Background - continuous, less than optimally organized, reaching frequencies in the range of 6-7 Hz superimposed by small to moderate amount of lower range theta    Focal and generalized slowin. Mild  generalized slowing  2. Mild  to moderate left hemispheric mainly posterior quadrant and posterior temporal focal slowing    Interictal activity - rare to small number of low amplitude,  left posterior temporal sharp waves and sharp transients    Events -   Pushbutton marker pressed 4 times with no change in EEG and no clear clinical correlate.     Seizures - none    Impression: Abnormal VEEG due to:  1. Mild generalized slowing c/w diffuse cortical dysfunction   2. Moderate focal slowing of the left posterior temporal and posterior quadrant region with intermixed, low amplitude sharp transients.    This finding is consistent with focal cortical dysfunction with cortical irritability.

## 2023-02-05 NOTE — PROGRESS NOTE ADULT - ASSESSMENT
This is a 57 year old F with PMHx of GBM, seizure, recent right parietal ischemic stroke, HTN was brought to ED due to worsening mental status since yesterday. Labs Stroke imaging: no acute change, stat EEG revealed seizure. Initial 24 HR EEG showed 1 focal seizure in past 24 hours, for which Vimpat was increased.     Plan:  Neuro  #Seizure  - Most likely due to missed dose of AED vs progression of GBM  - Continue Keppra 1500 mg IVPB BID  - Continue Vimpat 200 mg IVPB BID  - Repeat MRI Brain completed, there are similar areas of abnormal FLAIR signal with associated restricted diffusion in the periventricular white matter, corpus callosum as well as the left occipital lobe. There are increased foci of abnormal signal in the right parietal and left frontal regions potentially reflecting new foci of neoplasm versus radiation therapy changes though evaluation is limited without contrast as well as motion  - Seizure precaution  - Fall precaution  - Ativan 2 mg IV push PRN if seizure duration>2 min  - Re-ordered VEEG showed mild generalized slowing and moderate focal slowing of the left posterior temporal and posterior quadrant region with intermixed, low amplitude sharp transients  - Okay to d/c VEEG    #GBM  - S/p resection in 04/2022  - Follows with Dr. Dumont ( Neurosurgery) and Dr. Dao ( Oncology)  - Neurosurgery follow  - Discussed with Sylwia Ma over phone: recommended to continue same dose of Decadron 0.5 mg daily  - Hold off Temodar for now  - Holding Gabapentin and Sertraline for better assessment of mental status  - Palliative care on board, appreciate recs    #Recent acute right parietal infarct  - Continue ASA 81 mg daily  - Continue Atorvastatin 40 mg daily  - MRI Brain completed    Cardio  #HTN  - Continue labetalol 100 q8h and spironolactone 50 mg qD  - Continue lasix 20 mg daily  - Can give IV push labetolol 5 mg if patient unable to tolerate po  Nephro  #Hypomagnesemia and hypokalemia:  - Daily BMPs    Misc  - DVT ppx: Heparin subq  - GI ppx: Protonix  - Rehab: PT/OT eval  - Code status: Full Code   - Dispo: 3E floor   This is a 57 year old F with PMHx of GBM, seizure, recent right parietal ischemic stroke, HTN was brought to ED due to worsening mental status since yesterday. Labs Stroke imaging: no acute change, stat EEG revealed seizure. Initial 24 HR EEG showed 1 focal seizure in past 24 hours, for which Vimpat was increased.     Plan:  Neuro  #Seizure  - Most likely due to missed dose of AED vs progression of GBM  - Continue Keppra 1500 mg IVPB BID  - Continue Vimpat 200 mg IVPB BID  - Repeat MRI Brain completed, there are similar areas of abnormal FLAIR signal with associated restricted diffusion in the periventricular white matter, corpus callosum as well as the left occipital lobe. There are increased foci of abnormal signal in the right parietal and left frontal regions potentially reflecting new foci of neoplasm versus radiation therapy changes though evaluation is limited without contrast as well as motion  - Seizure precaution  - Fall precaution  - Ativan 2 mg IV push PRN if seizure duration>2 min  - Re-ordered VEEG showed mild generalized slowing and moderate focal slowing of the left posterior temporal and posterior quadrant region with intermixed, low amplitude sharp transients  - Okay to d/c VEEG    #GBM  - S/p resection in 04/2022  - Follows with Dr. Dumont ( Neurosurgery) and Dr. Dao ( Oncology)  - Neurosurgery follow  - Discussed with Sylwia Ma over phone: recommended to continue same dose of Decadron 0.5 mg daily  - Hold off Temodar for now  - Holding Gabapentin and Sertraline for better assessment of mental status  - Palliative care on board, appreciate recs    #Recent acute right parietal infarct  - Continue ASA 81 mg daily  - Continue Atorvastatin 40 mg daily  - MRI Brain completed    Cardio  #HTN  - Continue labetalol 100 q8h and spironolactone 50 mg qD  - Continue lasix 20 mg daily  - Can give IV push labetolol 5 mg if patient unable to tolerate po    Pulm  - Place call to provider if SpO2 < 92%    Nephro  #Hypomagnesemia and hypokalemia:  - Daily BMPs    Misc  - DVT ppx: Heparin subq  - GI ppx: Protonix  - Rehab: PT/OT eval  - Code status: Full Code   - Dispo: 3E floor   This is a 57 year old F with PMHx of GBM, seizure, recent right parietal ischemic stroke, HTN was brought to ED due to worsening mental status since yesterday. Labs Stroke imaging: no acute change, stat EEG revealed seizure. Initial 24 HR EEG showed 1 focal seizure in past 24 hours, for which Vimpat was increased.     Plan:  Neuro  #Seizure  - Most likely due to missed dose of AED vs progression of GBM  - Continue Keppra 1500 mg IVPB BID  - Continue Vimpat 200 mg IVPB BID  - Repeat MRI Brain completed, there are similar areas of abnormal FLAIR signal with associated restricted diffusion in the periventricular white matter, corpus callosum as well as the left occipital lobe. There are increased foci of abnormal signal in the right parietal and left frontal regions potentially reflecting new foci of neoplasm versus radiation therapy changes though evaluation is limited without contrast as well as motion  - Seizure precaution  - Fall precaution  - Ativan 2 mg IV push PRN if seizure duration>2 min  - Re-ordered VEEG showed mild generalized slowing and moderate focal slowing of the left posterior temporal and posterior quadrant region with intermixed, low amplitude sharp transients  - Okay to d/c VEEG    #GBM  - S/p resection in 04/2022  - Follows with Dr. Dumont ( Neurosurgery) and Dr. Dao ( Oncology)  - Neurosurgery follow  - Discussed with Sylwia Ma over phone: recommended to continue same dose of Decadron 0.5 mg daily  - Hold off Temodar for now  - Holding Gabapentin and Sertraline for better assessment of mental status  - Palliative care on board, appreciate recs    #Recent acute right parietal infarct  - Continue ASA 81 mg daily  - Continue Atorvastatin 40 mg daily  - MRI Brain completed    Cardio  #HTN  - Continue labetalol 100 q8h and spironolactone 50 mg qD  - Continue lasix 20 mg daily  - Can give IV push labetolol 5 mg if patient unable to tolerate po    Pulm  - Place call to provider if SpO2 < 92%    Nephro  #Hypomagnesemia and hypokalemia:  - Daily BMPs    Misc  - DVT ppx: Heparin subq  - GI ppx: Protonix  - Rehab: PT/OT eval  - Code status: Full Code   - Dispo: 3E floor

## 2023-02-05 NOTE — PROGRESS NOTE ADULT - ASSESSMENT
56 y/o woman with PMH of high grade GBM s/p resection in 4/22 by Dr. Dumont and s/p chemo and RT, small right parietal ischemic stroke dx last admission, seizure disorder and HTN on labetalol and spironolactone now presents with altered mental status per  (episodes of staring and not talking, restless and missing several doses of seizure medication). In the ED, stroke code was activated. CTH: unchanged GBM, CTA: no LVO. stat EEG: numerous PLEDs. Patient was evaluated by Neurocritical care and Neurosurgery. She is now admitted to neurology.    #Altered mental status attributed to seizures  management per neurology - on keppra, Vimpat  check Mag level and keep >2  seizure precautions  video EEG showing some interictal activity  - Management per neurology  - Cont vimpat and keppra  evaluated by neurocrit  UA negative for infection  on vEEG - f/u Neurology   Abnormal VEEG due to:  1. Mild generalized slowing c/w diffuse cortical dysfunction   2. Moderate focal slowing of the left posterior temporal and posterior quadrant region with intermixed, low amplitude sharp transients.    This finding is consistent with focal cortical dysfunction with cortical irritability.     #High grade GBM s/p resection in 4/22 by Dr. Dumont and s/p chemo and RT  follows with Dr. Dao  was started on Temodar and later Avastin was added (Avastin stopped due to stroke) and s/p Bevacizumab and Temodar to be restarted per notes on patients regular schedule  on decadron    #HTN -  BP improved  - Cont labetalol 100 q8h and spironolactone 50mg qD  on spironolactone    #Hypokalemia - resolved  replete hypomagnesemia     #CVA - on ASA/statin    DVT prophylaxis - heparin SQ

## 2023-02-06 NOTE — OCCUPATIONAL THERAPY INITIAL EVALUATION ADULT - PATIENT PROFILE REVIEW, REHAB EVAL
Chart thoroughly reviewed prior to IE/yes

## 2023-02-06 NOTE — PROGRESS NOTE ADULT - ASSESSMENT
57y PMHx of GBM, seizure, recent right parietal ischemic stroke, HTN was brought to ED due to worsening mental status. Stat EEG revealed seizure, AEDs given. Initial 24 HR EEG showed 1 focal seizure in past 24 hours, for which Vimpat was increased. C/c/b difficulty obtaining MRI to delineate guarded prognosis.     Plan:  Neuro  #Seizure  - Most likely due to missed dose of AED vs progression of GBM  - Continue Keppra 1500 mg IVPB BID  - Continue Vimpat 200 mg IVPB BID  - Repeat MRI Brain completed, there are similar areas of abnormal FLAIR signal with associated restricted diffusion in the periventricular white matter, corpus callosum as well as the left occipital lobe. There are increased foci of abnormal signal in the right parietal and left frontal regions potentially reflecting new foci of neoplasm versus radiation therapy changes though evaluation is limited without contrast as well as motion  - Seizure precaution  - Fall precaution  - Ativan 2 mg IV push PRN if seizure duration>2 min  - Re-ordered VEEG showed mild generalized slowing and moderate focal slowing of the left posterior temporal and posterior quadrant region with intermixed, low amplitude sharp transients  - CTH today as patient is more lethargic    #GBM  - S/p resection in 04/2022  - Follows with Dr. Dumont ( Neurosurgery) and Dr. Dao ( Oncology)  - Neurosurgery follow  - Discussed with Sylwia Ma over phone: recommended to continue same dose of Decadron 0.5 mg daily  - Temodar to be resumed on 2/9  - Holding Gabapentin and Sertraline for better assessment of mental status  - Palliative care on board, appreciate recs    #Recent acute right parietal infarct  - Continue ASA 81 mg daily  - Continue Atorvastatin 40 mg daily  - MRI Brain completed    Cardio  #HTN  - Continue labetalol 100 q8h and spironolactone 50 mg qD  - Continue lasix 20 mg daily  - Can give IV push labetolol 5 mg if patient unable to tolerate po    Pulm  - Place call to provider if SpO2 < 92%    Nephro  #Hypomagnesemia and hypokalemia:  - Daily BMPs    Misc  - DVT ppx: Heparin subq  - GI ppx: Protonix  - Rehab: PT/OT eval  - Code status: Full Code   - Dispo: 3E floor     57y PMHx of GBM, seizure, recent right parietal ischemic stroke, HTN was brought to ED due to worsening mental status. Stat EEG revealed seizure, AEDs given. Initial 24 HR EEG showed 1 focal seizure in past 24 hours, for which Vimpat was increased. C/c/b difficulty obtaining MRI to delineate guarded prognosis.     Plan:  Neuro  #Seizure  - Most likely due to missed dose of AED vs progression of GBM  - Continue Keppra 1500 mg IVPB BID  - Continue Vimpat 200 mg IVPB BID  - Repeat MRI Brain completed, there are similar areas of abnormal FLAIR signal with associated restricted diffusion in the periventricular white matter, corpus callosum as well as the left occipital lobe. There are increased foci of abnormal signal in the right parietal and left frontal regions potentially reflecting new foci of neoplasm versus radiation therapy changes though evaluation is limited without contrast as well as motion  - Seizure precaution  - Fall precaution  - Ativan 2 mg IV push PRN if seizure duration>2 min  - Re-ordered VEEG showed mild generalized slowing and moderate focal slowing of the left posterior temporal and posterior quadrant region with intermixed, low amplitude sharp transients  - CTH with contrast; today as patient is more lethargic    #GBM  - S/p resection in 04/2022  - Follows with Dr. Dumont ( Neurosurgery) and Dr. Dao ( Oncology)  - Neurosurgery follow  - Discussed with Sylwia Ma over phone: recommended to continue same dose of Decadron 0.5 mg daily  - Temodar to be resumed on 2/9  - Holding Gabapentin and Sertraline for better assessment of mental status  - Palliative care on board, appreciate recs    #Recent acute right parietal infarct  - Continue ASA 81 mg daily  - Continue Atorvastatin 40 mg daily  - MRI Brain completed    Cardio  #HTN  - Continue labetalol 100 q8h and spironolactone 50 mg qD  - Continue lasix 20 mg daily  - Can give IV push labetolol 5 mg if patient unable to tolerate po    Pulm  - Place call to provider if SpO2 < 92%    Nephro  #Hypomagnesemia and hypokalemia:  - Daily BMPs    Misc  - DVT ppx: Heparin subq  - GI ppx: Protonix  - Rehab: PT/OT eval  - Code status: Full Code   - Dispo: 3E floor     57y PMHx of GBM, seizure, recent right parietal ischemic stroke, HTN was brought to ED due to worsening mental status. Stat EEG revealed seizure, AEDs given. Initial 24 HR EEG showed 1 focal seizure in past 24 hours, for which Vimpat was increased. C/c/b difficulty obtaining MRI to delineate guarded prognosis.     Plan:  Neuro  #Seizure  - Most likely due to missed dose of AED vs progression of GBM  - Continue Keppra 1500 mg IVPB BID  - Continue Vimpat 200 mg IVPB BID  - Repeat MRI Brain (Jan 2023), there are similar areas of abnormal FLAIR signal with associated restricted diffusion in the periventricular white matter, corpus callosum as well as the left occipital lobe. There are increased foci of abnormal signal in the right parietal and left frontal regions potentially reflecting new foci of neoplasm versus radiation therapy changes though evaluation is limited without contrast as well as motion  - Seizure precaution  - Fall precaution  - Ativan 2 mg IV push PRN if seizure duration>2 min  - Re-ordered VEEG showed mild generalized slowing and moderate focal slowing of the left posterior temporal and posterior quadrant region with intermixed, low amplitude sharp transients  - CTH with contrast; today as patient is more lethargic and unable to tolerate MRI    #GBM  - S/p resection in 04/2022  - Follows with Dr. Dumont ( Neurosurgery) and Dr. Dao ( Oncology)  - Neurosurgery follow  - Discussed with Sylwia Ma over phone: recommended to continue same dose of Decadron 0.5 mg daily  - Temodar to be resumed on 2/9  - Holding Gabapentin and Sertraline for better assessment of mental status  - Palliative care on board, appreciate recs  - if no additional intervention oncologically or surgically, may d/c w/ outpt f/u for continued chemoRx    #Recent acute right parietal infarct  - Continue ASA 81 mg daily  - Continue Atorvastatin 40 mg daily    Cardio  #HTN  - Continue labetalol 100 q8h and spironolactone 50 mg qD  - Continue lasix 20 mg daily  - Can give IV push labetolol 5 mg if patient unable to tolerate po    Pulm  - Place call to provider if SpO2 < 92%    Nephro  #Hypomagnesemia and hypokalemia:  - Daily BMPs    Misc  - DVT ppx: Heparin subq  - GI ppx: Protonix  - Rehab: PT/OT eval  - Code status: Full Code   - Dispo: 3E floor

## 2023-02-06 NOTE — PROGRESS NOTE ADULT - SUBJECTIVE AND OBJECTIVE BOX
Neurology Progress Note    Interval History:  The patient was seen and examined at the bedside. Patient lethargic today, denied headache nausea      PAST MEDICAL & SURGICAL HISTORY:  RAMAKRISHNA on CPAP    GERD (gastroesophageal reflux disease)    Asthmatic bronchitis  MILD , USES VENTOLIN Q2-3M    HTN (hypertension)    Migraines    Chronic neck pain    H/O sinus surgery    Status post D&amp;C    H/O arthroscopy of shoulder  RT    History of hernia repair  2019            Medications:  aspirin  chewable 81 milliGRAM(s) Oral daily  atorvastatin 40 milliGRAM(s) Oral at bedtime  dexAMETHasone  Injectable 4 milliGRAM(s) IV Push every 6 hours  furosemide    Tablet 20 milliGRAM(s) Oral daily  heparin   Injectable 5000 Unit(s) SubCutaneous every 8 hours  influenza   Vaccine 0.5 milliLiter(s) IntraMuscular once  labetalol 100 milliGRAM(s) Oral every 8 hours  lacosamide IVPB 200 milliGRAM(s) IV Intermittent every 12 hours  levETIRAcetam  IVPB 1500 milliGRAM(s) IV Intermittent every 12 hours  LORazepam   Injectable 1 milliGRAM(s) IV Push once  LORazepam   Injectable 2 milliGRAM(s) IV Push every 4 hours PRN  pantoprazole  Injectable 40 milliGRAM(s) IV Push daily  potassium chloride  20 mEq/100 mL IVPB 20 milliEquivalent(s) IV Intermittent once  spironolactone 50 milliGRAM(s) Oral daily      Vital Signs Last 24 Hrs  T(C): 36.9 (06 Feb 2023 14:46), Max: 36.9 (06 Feb 2023 14:46)  T(F): 98.5 (06 Feb 2023 14:46), Max: 98.5 (06 Feb 2023 14:46)  HR: 85 (06 Feb 2023 14:46) (74 - 85)  BP: 166/89 (06 Feb 2023 14:46) (149/67 - 172/79)  BP(mean): --  RR: 18 (06 Feb 2023 14:46) (18 - 18)  SpO2: 99% (06 Feb 2023 14:46) (90% - 99%)    Parameters below as of 06 Feb 2023 14:46  Patient On (Oxygen Delivery Method): nasal cannula  O2 Flow (L/min): 2      Neurological Exam:   Mental status: Awake, alert and oriented x3.  Recent and remote memory intact.  Naming, repetition and comprehension intact.  Attention/concentration intact.  No dysarthria, no aphasia.  Fund of knowledge appropriate.    Cranial nerves: Pupils equally round and reactive to light, visual fields full, no nystagmus, extraocular muscles intact, V1 through V3 intact bilaterally and symmetric, face symmetric, hearing intact to finger rub, palate elevation symmetric, tongue was midline.  Motor:  MRC grading 5/5 b/l UE/LE.   strength 5/5.  Normal tone and bulk.  No abnormal movements.    Sensation: Intact to light touch, proprioception, and pinprick.   Coordination: No dysmetria on finger-to-nose and heel-to-shin.  No dysdiadokinesia.  Reflexes: 2+ in bilateral UE/LE, downgoing toes bilaterally. (-) Hirsch.  Gait: Narrow and steady. No ataxia.  Romberg negative    Labs:  CBC Full  -  ( 06 Feb 2023 06:44 )  WBC Count : 4.04 K/uL  RBC Count : 2.99 M/uL  Hemoglobin : 10.1 g/dL  Hematocrit : 30.0 %  Platelet Count - Automated : 77 K/uL  Mean Cell Volume : 100.3 fL  Mean Cell Hemoglobin : 33.8 pg  Mean Cell Hemoglobin Concentration : 33.7 g/dL  Auto Neutrophil # : x  Auto Lymphocyte # : x  Auto Monocyte # : x  Auto Eosinophil # : x  Auto Basophil # : x  Auto Neutrophil % : x  Auto Lymphocyte % : x  Auto Monocyte % : x  Auto Eosinophil % : x  Auto Basophil % : x    02-06    145  |  107  |  24<H>  ----------------------------<  149<H>  3.4<L>   |  28  |  0.5<L>    Ca    9.4      06 Feb 2023 06:44  Phos  3.5     02-06  Mg     2.3     02-06    TPro  6.5  /  Alb  4.0  /  TBili  0.9  /  DBili  x   /  AST  40  /  ALT  50<H>  /  AlkPhos  61  02-06    LIVER FUNCTIONS - ( 06 Feb 2023 06:44 )  Alb: 4.0 g/dL / Pro: 6.5 g/dL / ALK PHOS: 61 U/L / ALT: 50 U/L / AST: 40 U/L / GGT: x                 Assessment:  This is a 57y Female w/ h/o     Plan: Neurology Progress Note    Interval History:  The patient was seen and examined at the bedside. Patient lethargic today, denied headache nausea      PAST MEDICAL & SURGICAL HISTORY:  RAMAKRISHNA on CPAP    GERD (gastroesophageal reflux disease)    Asthmatic bronchitis  MILD , USES VENTOLIN Q2-3M    HTN (hypertension)    Migraines    Chronic neck pain    H/O sinus surgery    Status post D&amp;C    H/O arthroscopy of shoulder  RT    History of hernia repair  2019            Medications:  aspirin  chewable 81 milliGRAM(s) Oral daily  atorvastatin 40 milliGRAM(s) Oral at bedtime  dexAMETHasone  Injectable 4 milliGRAM(s) IV Push every 6 hours  furosemide    Tablet 20 milliGRAM(s) Oral daily  heparin   Injectable 5000 Unit(s) SubCutaneous every 8 hours  influenza   Vaccine 0.5 milliLiter(s) IntraMuscular once  labetalol 100 milliGRAM(s) Oral every 8 hours  lacosamide IVPB 200 milliGRAM(s) IV Intermittent every 12 hours  levETIRAcetam  IVPB 1500 milliGRAM(s) IV Intermittent every 12 hours  LORazepam   Injectable 1 milliGRAM(s) IV Push once  LORazepam   Injectable 2 milliGRAM(s) IV Push every 4 hours PRN  pantoprazole  Injectable 40 milliGRAM(s) IV Push daily  potassium chloride  20 mEq/100 mL IVPB 20 milliEquivalent(s) IV Intermittent once  spironolactone 50 milliGRAM(s) Oral daily      Vital Signs Last 24 Hrs  T(C): 36.9 (06 Feb 2023 14:46), Max: 36.9 (06 Feb 2023 14:46)  T(F): 98.5 (06 Feb 2023 14:46), Max: 98.5 (06 Feb 2023 14:46)  HR: 85 (06 Feb 2023 14:46) (74 - 85)  BP: 166/89 (06 Feb 2023 14:46) (149/67 - 172/79)  BP(mean): --  RR: 18 (06 Feb 2023 14:46) (18 - 18)  SpO2: 99% (06 Feb 2023 14:46) (90% - 99%)    Parameters below as of 06 Feb 2023 14:46  Patient On (Oxygen Delivery Method): nasal cannula  O2 Flow (L/min): 2      Neurological Exam:   Mental status: Lethargic. Opens eyes with light touch of arm. Shakes head to questions. Intermittently follows commands.   Cranial nerves: Pupils equally round and reactive to light. Facial symmetry.   Motor: No abnormal movements. Can move UE and LE antigravity  Sensation: Intact to light touch.  Coordination: No tremor at rest  Reflexes: 2+ patellar  Gait: Deferred    Labs:  CBC Full  -  ( 06 Feb 2023 06:44 )  WBC Count : 4.04 K/uL  RBC Count : 2.99 M/uL  Hemoglobin : 10.1 g/dL  Hematocrit : 30.0 %  Platelet Count - Automated : 77 K/uL  Mean Cell Volume : 100.3 fL  Mean Cell Hemoglobin : 33.8 pg  Mean Cell Hemoglobin Concentration : 33.7 g/dL  Auto Neutrophil # : x  Auto Lymphocyte # : x  Auto Monocyte # : x  Auto Eosinophil # : x  Auto Basophil # : x  Auto Neutrophil % : x  Auto Lymphocyte % : x  Auto Monocyte % : x  Auto Eosinophil % : x  Auto Basophil % : x    02-06    145  |  107  |  24<H>  ----------------------------<  149<H>  3.4<L>   |  28  |  0.5<L>    Ca    9.4      06 Feb 2023 06:44  Phos  3.5     02-06  Mg     2.3     02-06    TPro  6.5  /  Alb  4.0  /  TBili  0.9  /  DBili  x   /  AST  40  /  ALT  50<H>  /  AlkPhos  61  02-06    LIVER FUNCTIONS - ( 06 Feb 2023 06:44 )  Alb: 4.0 g/dL / Pro: 6.5 g/dL / ALK PHOS: 61 U/L / ALT: 50 U/L / AST: 40 U/L / GGT: x              Neurology Progress Note    Interval History:  The patient was seen and examined at the bedside. Patient lethargic today, denied headache nausea.  Intermittent lethargy per family.      PAST MEDICAL & SURGICAL HISTORY:  RAMAKRISHNA on CPAP    GERD (gastroesophageal reflux disease)    Asthmatic bronchitis  MILD , USES VENTOLIN Q2-3M    HTN (hypertension)    Migraines    Chronic neck pain    H/O sinus surgery    Status post D&amp;C    H/O arthroscopy of shoulder  RT    History of hernia repair  2019            Medications:  aspirin  chewable 81 milliGRAM(s) Oral daily  atorvastatin 40 milliGRAM(s) Oral at bedtime  dexAMETHasone  Injectable 4 milliGRAM(s) IV Push every 6 hours  furosemide    Tablet 20 milliGRAM(s) Oral daily  heparin   Injectable 5000 Unit(s) SubCutaneous every 8 hours  influenza   Vaccine 0.5 milliLiter(s) IntraMuscular once  labetalol 100 milliGRAM(s) Oral every 8 hours  lacosamide IVPB 200 milliGRAM(s) IV Intermittent every 12 hours  levETIRAcetam  IVPB 1500 milliGRAM(s) IV Intermittent every 12 hours  LORazepam   Injectable 1 milliGRAM(s) IV Push once  LORazepam   Injectable 2 milliGRAM(s) IV Push every 4 hours PRN  pantoprazole  Injectable 40 milliGRAM(s) IV Push daily  potassium chloride  20 mEq/100 mL IVPB 20 milliEquivalent(s) IV Intermittent once  spironolactone 50 milliGRAM(s) Oral daily      Vital Signs Last 24 Hrs  T(C): 36.9 (06 Feb 2023 14:46), Max: 36.9 (06 Feb 2023 14:46)  T(F): 98.5 (06 Feb 2023 14:46), Max: 98.5 (06 Feb 2023 14:46)  HR: 85 (06 Feb 2023 14:46) (74 - 85)  BP: 166/89 (06 Feb 2023 14:46) (149/67 - 172/79)  BP(mean): --  RR: 18 (06 Feb 2023 14:46) (18 - 18)  SpO2: 99% (06 Feb 2023 14:46) (90% - 99%)    Parameters below as of 06 Feb 2023 14:46  Patient On (Oxygen Delivery Method): nasal cannula  O2 Flow (L/min): 2      Neurological Exam:   Mental status: Lethargic. Opens eyes with light touch of arm. Shakes head to questions. Intermittently follows commands.   Cranial nerves: Pupils equally round and reactive to light. Facial symmetry.   Motor: No abnormal movements. Can move UE and LE antigravity  Sensation: Intact to light touch.  Coordination: No tremor at rest  Reflexes: 2+ patellar  Gait: Deferred    Labs:  CBC Full  -  ( 06 Feb 2023 06:44 )  WBC Count : 4.04 K/uL  RBC Count : 2.99 M/uL  Hemoglobin : 10.1 g/dL  Hematocrit : 30.0 %  Platelet Count - Automated : 77 K/uL  Mean Cell Volume : 100.3 fL  Mean Cell Hemoglobin : 33.8 pg  Mean Cell Hemoglobin Concentration : 33.7 g/dL  Auto Neutrophil # : x  Auto Lymphocyte # : x  Auto Monocyte # : x  Auto Eosinophil # : x  Auto Basophil # : x  Auto Neutrophil % : x  Auto Lymphocyte % : x  Auto Monocyte % : x  Auto Eosinophil % : x  Auto Basophil % : x    02-06    145  |  107  |  24<H>  ----------------------------<  149<H>  3.4<L>   |  28  |  0.5<L>    Ca    9.4      06 Feb 2023 06:44  Phos  3.5     02-06  Mg     2.3     02-06    TPro  6.5  /  Alb  4.0  /  TBili  0.9  /  DBili  x   /  AST  40  /  ALT  50<H>  /  AlkPhos  61  02-06    LIVER FUNCTIONS - ( 06 Feb 2023 06:44 )  Alb: 4.0 g/dL / Pro: 6.5 g/dL / ALK PHOS: 61 U/L / ALT: 50 U/L / AST: 40 U/L / GGT: x

## 2023-02-06 NOTE — OCCUPATIONAL THERAPY INITIAL EVALUATION ADULT - GENERAL OBSERVATIONS, REHAB EVAL
Pt encountered supine in bed asleep with pt son at bedside. Attempted to see pt with PT E[place]. Attempted to arouse pt, however pt was sleeping and unable to maintain arousal. Will follow up.
Pt encountered asleep in bed with pt son at bedside. Despite max verbal and tactile cueing, pt remained asleep. Pt son reported that pt has been asleep most of the day. WIll follow up.
Pt encountered semi-ramos in bed with video EEG. OT to hold IE at this time. Will follow up.

## 2023-02-06 NOTE — EEG REPORT - NS EEG TEXT BOX
Epilepsy Attending Note:     PRAVIN JHAVERI    57y Female  MRN [id number]    Vital Signs Last 24 Hrs  T(C): 36.2 (06 Feb 2023 04:40), Max: 37 (05 Feb 2023 14:20)  T(F): 97.2 (06 Feb 2023 04:40), Max: 98.6 (05 Feb 2023 14:20)  HR: 83 (06 Feb 2023 04:40) (74 - 88)  BP: 149/67 (06 Feb 2023 04:40) (112/59 - 172/79)  BP(mean): --  RR: 18 (06 Feb 2023 05:30) (18 - 18)  SpO2: 95% (06 Feb 2023 05:30) (90% - 95%)    Parameters below as of 06 Feb 2023 05:30  Patient On (Oxygen Delivery Method): nasal cannula  O2 Flow (L/min): 2                            10.1   4.04  )-----------( 77       ( 06 Feb 2023 06:44 )             30.0       02-06    145  |  107  |  24<H>  ----------------------------<  149<H>  3.4<L>   |  28  |  0.5<L>    Ca    9.4      06 Feb 2023 06:44  Phos  3.5     02-06  Mg     2.3     02-06    TPro  6.5  /  Alb  4.0  /  TBili  0.9  /  DBili  x   /  AST  40  /  ALT  50<H>  /  AlkPhos  61  02-06      MEDICATIONS  (STANDING):  aspirin  chewable 81 milliGRAM(s) Oral daily  atorvastatin 40 milliGRAM(s) Oral at bedtime  dexAMETHasone  Injectable 4 milliGRAM(s) IV Push every 6 hours  furosemide    Tablet 20 milliGRAM(s) Oral daily  heparin   Injectable 5000 Unit(s) SubCutaneous every 8 hours  influenza   Vaccine 0.5 milliLiter(s) IntraMuscular once  labetalol 100 milliGRAM(s) Oral every 8 hours  lacosamide IVPB 200 milliGRAM(s) IV Intermittent every 12 hours  levETIRAcetam  IVPB 1500 milliGRAM(s) IV Intermittent every 12 hours  LORazepam   Injectable 1 milliGRAM(s) IV Push once  pantoprazole  Injectable 40 milliGRAM(s) IV Push daily  spironolactone 50 milliGRAM(s) Oral daily    MEDICATIONS  (PRN):  LORazepam   Injectable 2 milliGRAM(s) IV Push every 4 hours PRN Seizure lasting>2 minutes            VEEG in the last 24 hours:    Background-------continues , symmetrical, less than optimally organized reaching 7-8 hz  that is reactive     Focal and generalized slowing---mild to moderate generalized slowing. moderate left hemispheric focal slowing    Interictal activity-------------moderate to large number of left posterior mainly  TO sharp waves that in rare condition appear in a periodic pattern    Events----none    Seizures----none    Impression: abnormal as above    Plan - as/neurology

## 2023-02-07 NOTE — PROGRESS NOTE ADULT - PROBLEM SELECTOR PLAN 3
recommendations per neurology   continue IV antiseizure medications as ordered per neuro - vimpat, keppra  continue IV Ativan 2 mg PRN for seizure

## 2023-02-07 NOTE — PROGRESS NOTE ADULT - SUBJECTIVE AND OBJECTIVE BOX
Neurology Progress Note    Interval History:  The patient was seen and examined at the bedside. CPAP ordered last night. Patient drowsy and unable to participate today.       PAST MEDICAL & SURGICAL HISTORY:  RAMAKRISHNA on CPAP    GERD (gastroesophageal reflux disease)    Asthmatic bronchitis  MILD , USES VENTOLIN Q2-3M    HTN (hypertension)    Migraines    Chronic neck pain    H/O sinus surgery    Status post D&amp;C    H/O arthroscopy of shoulder  RT    History of hernia repair  2019            Medications:  dexAMETHasone  Injectable 4 milliGRAM(s) IV Push every 8 hours  glycopyrrolate Injectable 0.2 milliGRAM(s) IV Push every 6 hours PRN  lacosamide IVPB 200 milliGRAM(s) IV Intermittent every 12 hours  levETIRAcetam  IVPB 1500 milliGRAM(s) IV Intermittent every 12 hours  LORazepam   Injectable 2 milliGRAM(s) IV Push every 4 hours PRN  LORazepam   Injectable 0.5 milliGRAM(s) IV Push every 6 hours PRN  morphine  - Injectable 2 milliGRAM(s) IV Push every 1 hour PRN      Vital Signs Last 24 Hrs  T(C): 37.3 (07 Feb 2023 13:56), Max: 37.3 (07 Feb 2023 13:56)  T(F): 99.2 (07 Feb 2023 13:56), Max: 99.2 (07 Feb 2023 13:56)  HR: 99 (07 Feb 2023 13:56) (80 - 99)  BP: 163/89 (07 Feb 2023 13:56) (140/82 - 173/83)  BP(mean): --  RR: 18 (07 Feb 2023 13:56) (16 - 18)  SpO2: 98% (07 Feb 2023 13:56) (91% - 100%)    Parameters below as of 07 Feb 2023 13:56  Patient On (Oxygen Delivery Method): room air  O2 Flow (L/min): 2      Neurological Exam:   Mental status: Lethargic.  Cranial nerves: Pupils equally round and reactive to light. +BTT  Motor:  Withdraws to pain all four extremities.    Sensation: as above  Coordination: No  resting tremor  Gait:  Deferred    Labs:  CBC Full  -  ( 07 Feb 2023 06:55 )  WBC Count : 4.60 K/uL  RBC Count : 3.07 M/uL  Hemoglobin : 10.4 g/dL  Hematocrit : 31.9 %  Platelet Count - Automated : 70 K/uL  Mean Cell Volume : 103.9 fL  Mean Cell Hemoglobin : 33.9 pg  Mean Cell Hemoglobin Concentration : 32.6 g/dL  Auto Neutrophil # : 3.80 K/uL  Auto Lymphocyte # : 0.40 K/uL  Auto Monocyte # : 0.32 K/uL  Auto Eosinophil # : 0.00 K/uL  Auto Basophil # : 0.01 K/uL  Auto Neutrophil % : 82.6 %  Auto Lymphocyte % : 8.7 %  Auto Monocyte % : 7.0 %  Auto Eosinophil % : 0.0 %  Auto Basophil % : 0.2 %    02-07    144  |  108  |  26<H>  ----------------------------<  128<H>  3.8   |  26  |  0.6<L>    Ca    9.5      07 Feb 2023 06:55  Phos  4.2     02-07  Mg     2.4     02-07    TPro  6.4  /  Alb  4.1  /  TBili  1.0  /  DBili  x   /  AST  43<H>  /  ALT  53<H>  /  AlkPhos  58  02-07    LIVER FUNCTIONS - ( 07 Feb 2023 06:55 )  Alb: 4.1 g/dL / Pro: 6.4 g/dL / ALK PHOS: 58 U/L / ALT: 53 U/L / AST: 43 U/L / GGT: x                Neurology Progress Note    Interval History:  The patient was seen and examined at the bedside. CPAP ordered last night. Patient drowsy and unable to participate today.  Discussed with Oncology and family - prognosis for meaningful recovery likely poor and family wants to pursue comfort measures.        PAST MEDICAL & SURGICAL HISTORY:  RAMAKRISHNA on CPAP    GERD (gastroesophageal reflux disease)    Asthmatic bronchitis  MILD , USES VENTOLIN Q2-3M    HTN (hypertension)    Migraines    Chronic neck pain    H/O sinus surgery    Status post D&amp;C    H/O arthroscopy of shoulder  RT    History of hernia repair  2019            Medications:  dexAMETHasone  Injectable 4 milliGRAM(s) IV Push every 8 hours  glycopyrrolate Injectable 0.2 milliGRAM(s) IV Push every 6 hours PRN  lacosamide IVPB 200 milliGRAM(s) IV Intermittent every 12 hours  levETIRAcetam  IVPB 1500 milliGRAM(s) IV Intermittent every 12 hours  LORazepam   Injectable 2 milliGRAM(s) IV Push every 4 hours PRN  LORazepam   Injectable 0.5 milliGRAM(s) IV Push every 6 hours PRN  morphine  - Injectable 2 milliGRAM(s) IV Push every 1 hour PRN      Vital Signs Last 24 Hrs  T(C): 37.3 (07 Feb 2023 13:56), Max: 37.3 (07 Feb 2023 13:56)  T(F): 99.2 (07 Feb 2023 13:56), Max: 99.2 (07 Feb 2023 13:56)  HR: 99 (07 Feb 2023 13:56) (80 - 99)  BP: 163/89 (07 Feb 2023 13:56) (140/82 - 173/83)  BP(mean): --  RR: 18 (07 Feb 2023 13:56) (16 - 18)  SpO2: 98% (07 Feb 2023 13:56) (91% - 100%)    Parameters below as of 07 Feb 2023 13:56  Patient On (Oxygen Delivery Method): room air  O2 Flow (L/min): 2      Neurological Exam:   Mental status: Lethargic.  Cranial nerves: Pupils equally round and reactive to light. +BTT  Motor:  Withdraws to pain all four extremities.    Sensation: as above  Coordination: No  resting tremor  Gait:  Deferred    Labs:  CBC Full  -  ( 07 Feb 2023 06:55 )  WBC Count : 4.60 K/uL  RBC Count : 3.07 M/uL  Hemoglobin : 10.4 g/dL  Hematocrit : 31.9 %  Platelet Count - Automated : 70 K/uL  Mean Cell Volume : 103.9 fL  Mean Cell Hemoglobin : 33.9 pg  Mean Cell Hemoglobin Concentration : 32.6 g/dL  Auto Neutrophil # : 3.80 K/uL  Auto Lymphocyte # : 0.40 K/uL  Auto Monocyte # : 0.32 K/uL  Auto Eosinophil # : 0.00 K/uL  Auto Basophil # : 0.01 K/uL  Auto Neutrophil % : 82.6 %  Auto Lymphocyte % : 8.7 %  Auto Monocyte % : 7.0 %  Auto Eosinophil % : 0.0 %  Auto Basophil % : 0.2 %    02-07    144  |  108  |  26<H>  ----------------------------<  128<H>  3.8   |  26  |  0.6<L>    Ca    9.5      07 Feb 2023 06:55  Phos  4.2     02-07  Mg     2.4     02-07    TPro  6.4  /  Alb  4.1  /  TBili  1.0  /  DBili  x   /  AST  43<H>  /  ALT  53<H>  /  AlkPhos  58  02-07    LIVER FUNCTIONS - ( 07 Feb 2023 06:55 )  Alb: 4.1 g/dL / Pro: 6.4 g/dL / ALK PHOS: 58 U/L / ALT: 53 U/L / AST: 43 U/L / GGT: x

## 2023-02-07 NOTE — CHART NOTE - NSCHARTNOTEFT_GEN_A_CORE
PALLIATIVE MEDICINE INTERDISCIPLINARY TEAM NOTE     Provider:                                         [ X  ] Initial visit        Met with: [  X ] Patient  [ X  ] Family  [   ] Other:    Primary Language: [  X ] English [   ] Other*:                      *Interpretation provided by:    SUPPORT DIAGNOSES            (Check all that apply)    [   ] EOL issues  [ X  ] Advanced Illness  [   ] Cultural / spiritual concerns  [   ] Pain / suffering  [   ] Dementia / AMS  [   ] Other:  [   ] AD issues  [   ] Grief / loss / sadness  [   ] Discharge issues  [   ] Distress / coping    PSYCHOSOCIAL ASSESSMENT OF PATIENT         (Check all that apply)    [X   ] Initial Assessment            [   ] Reassessment          [   ] Not Applicable this visit    Pain/suffering acuity:  [ X  ] None to mild (0-3)           [   ] Moderate (4-6)        [   ] High (7-10)    Mental Status:  [   ] Alert/oriented (x3)          [   ] Confused/Altered(x2/x1)         [X   ] Non-resp (SLEEPING AT TIME OF VISIT)    Functional status:  [   ] Independent w ADLs      [   ] Needs Assistance             [X   ] Bedbound/Full Care    Coping:  [   ] Coping well                     [ X  ] Coping w/difficulty (FAMILY)          [   ] Poor coping    Support system:  [   ] Strong                              [  X ] Adequate                        [   ] Inadequate      Past history and medications for:     [ ] Anxiety       [ ] Depression    [ ] Sleep disorders       SERVICE PROVIDED                                                                                              [   ]Discharge support / facilitation  [   ]AD / goals of care counseling                                  [   ]EOL / death / bereavement counseling  [ X  ]Counseling / support                                                [   ] Family meeting  [   ]Prayer / sacrament / ritual                                      [   ] Referral   [   ]Other                                                                       NOTE and Plan of Care (PoC):  Patient is a 56 y/o female with PMHx of GBM s/p resection and radiation. Patient was asleep in bed at time of visit. No non verbal signs of pain' patient did have a slight cough and some wheezing. Daughter said this came about in the last few days. Team spoke to daughter about goals of care and advance directives. Daughter spoke to her father who is the health care proxy.  decided to put patient on CMO.  will follow for support.

## 2023-02-07 NOTE — PROGRESS NOTE ADULT - ASSESSMENT
56 yo F with PMHx of GBM s/p resection and  radiation, currently on chemotherapy, recent small right parietal ischemic stroke, seizure disorder, who presents with declining mental status since yesterday..  Patient was discharged from [facility] 01/07/2023, she was admitted with aphasia found to have acute stroke. For past few days patient has not been herself, having episodes of staring, not talking at all. She was supposed to have ambulatory EEG done. She has been confused and not herself for past few days and missed several dose of seizure medication.  Patient was diagnosed with left occipital GBM (unmethylated MGMT, IDH wild mutation), underwent resection (04/2022) and radiation. She follows with Dr. Dao. She was taken off Avastin due to the stroke and Temodar was restarted ( due next week). In ED , stroke code was activated , CTH: unchanged GBM, CTA: no LVO. stat EEG: numerous PLEDs. Patient was evaluated by Neurocritical care and Neurosurgery. Patient received Keppra 3gm and Vimpat 200 mg. Patient admitted with seizure for further neurology workup and management. Palliative consulted for GOC.     Family agreeable to starting comfort care and stopping life prolonging measures. See GOC above.   See recommendations below.     MEDD (morphine equivalent daily dose): 0      See Recs below.    Please call [phone] with questions or concerns 24/7.   We will continue to follow.     Discussed with primary MD.

## 2023-02-07 NOTE — PROGRESS NOTE ADULT - ASSESSMENT
57y PMHx of GBM, seizure, recent right parietal ischemic stroke, HTN was brought to ED due to worsening mental status. Stat EEG revealed seizure, AEDs given. Initial 24 HR EEG showed 1 focal seizure in past 24 hours, for which Vimpat was increased. Discussion with family today and pallative care: pursuing comfort measures.     INCOMPLETE  Plan:  Neuro  #Seizure  - Most likely due to missed dose of AED vs progression of GBM  - Continue Keppra 1500 mg IVPB BID  - Continue Vimpat 200 mg IVPB BID  - Repeat MRI Brain (Jan 2023), there are similar areas of abnormal FLAIR signal with associated restricted diffusion in the periventricular white matter, corpus callosum as well as the left occipital lobe. There are increased foci of abnormal signal in the right parietal and left frontal regions potentially reflecting new foci of neoplasm versus radiation therapy changes though evaluation is limited without contrast as well as motion    Morphine 2 mg IVP Q 1 H PRN for pain or dyspnea  Ativan 0.5 mg IV Q4h PRN for agitation and anxiety  Glycopyrrolate 0.2 mg IV Q6h PRN for secretions.    #GBM  - S/p resection in 04/2022  - Follows with Dr. Dumont ( Neurosurgery) and Dr. Dao ( Oncology)  - Neurosurgery follow  - Discussed with Sylwia Ma over phone: recommended to continue same dose of Decadron 0.5 mg daily  - Temodar to be resumed on 2/9  - Holding Gabapentin and Sertraline for better assessment of mental status  - Palliative care on board, appreciate recs  - if no additional intervention oncologically or surgically, may d/c w/ outpt f/u for continued chemoRx    #Recent acute right parietal infarct  - Continue ASA 81 mg daily  - Continue Atorvastatin 40 mg daily    Cardio  #HTN  - Continue labetalol 100 q8h and spironolactone 50 mg qD  - Continue lasix 20 mg daily  - Can give IV push labetolol 5 mg if patient unable to tolerate po    Pulm  - Place call to provider if SpO2 < 92%    Nephro  #Hypomagnesemia and hypokalemia:  - Daily BMPs    Misc  - DVT ppx: Heparin subq  - GI ppx: Protonix  - Rehab: PT/OT eval  - Code status: Full Code   - Dispo: 3E floor     57y PMHx of GBM, seizure, recent right parietal ischemic stroke, HTN was brought to ED due to worsening mental status. Stat EEG revealed seizure, AEDs given. Initial 24 HR EEG showed 1 focal seizure in past 24 hours, for which Vimpat was increased. Discussion with family today and palliative care: pursuing comfort measures with continuation of PO steroid and AED, with comfort measures.     #Comfort Measures Only  #Seizure 2/2 GBM progresion and R parietal infarct  - C/w decadron 4mg --> increase to q8h  - Continue Keppra 1500 mg IVPB BID  - Continue Vimpat 200 mg IVPB BID  - Morphine 2 mg IVP Q 1 H PRN for pain or dyspnea  - Ativan 0.5 mg IV Q4h PRN for agitation and anxiety  - Glycopyrrolate 0.2 mg IV Q6h PRN for secretions    #HTN  - D/c HTN meds  - No vitals per CMO    Pulm  - NC as needed for comfort    Nephro  #Hypomagnesemia and hypokalemia:  - No blood draws  - c/w PO magnesium if able to tolerate PO    Misc  - DVT ppx: discontinue  - GI ppx: discontinue  - Code status: DNR/DNI  - Dispo: Pending hospice services     57y PMHx of GBM, seizure, recent right parietal ischemic stroke, HTN was brought to ED due to worsening mental status. Stat EEG revealed seizure, AEDs given. Initial 24 HR EEG showed 1 focal seizure in past 24 hours, for which Vimpat was increased. Discussion with family today and palliative care: pursuing comfort measures with continuation of PO steroid and AED, with comfort measures.     #Comfort Measures Only  #Seizure 2/2 GBM progresion and R parietal infarct  - C/w decadron 4mg --> decrease to q8h  - Continue Keppra 1500 mg IVPB BID  - Continue Vimpat 200 mg IVPB BID  - Morphine 2 mg IVP Q 1 H PRN for pain or dyspnea  - Ativan 0.5 mg IV Q4h PRN for agitation and anxiety  - Glycopyrrolate 0.2 mg IV Q6h PRN for secretions    #HTN  - D/c HTN meds  - No vitals per CMO    Pulm  - NC as needed for comfort    Nephro  #Hypomagnesemia and hypokalemia:  - No blood draws  - c/w PO magnesium if able to tolerate PO    Misc  - DVT ppx: discontinue  - GI ppx: discontinue  - Code status: DNR/DNI  - Dispo: Pending hospice services

## 2023-02-07 NOTE — PROGRESS NOTE ADULT - SUBJECTIVE AND OBJECTIVE BOX
HPI:    58 yo F with PMHx of GBM s/p resection and  radiation, currently on chemotherapy, recent small right parietal ischemic stroke, seizure disorder, who presents with declining mental status since yesterday..  Patient was discharged from [facility]/07/[facility]23, she was admitted with aphasia found to have acute stroke. For past few days patient has not been herself, having episodes of staring, not talking at all. She was supposed to have ambulatory EEG done. She has been confused and not herself for past few days and missed several dose of seizure medication.  Patient was diagnosed with left occipital GBM (unmethylated MGMT, IDH wild mutation), underwent resection (04/2022) and radiation. She follows with Dr. Dao. She was taken off Avastin due to the stroke and Temodar was restarted ( due next week). In ED , stroke code was activated , CTH: unchanged GBM, CTA: no LVO. stat EEG: numerous PLEDs. Patient was evaluated by Neurocritical care and Neurosurgery. Patient received Keppra 3gm and Vimpat 200 mg. Patient is being admitted with seizure for further neurology workup and management. Palliative consulted for [facility].      INTERVAL EVENTS:  - patient unable to get MRI without sedation. family spoke with heme-onc and decided on hospice/end of life care  - patient sleeping comfortably but breathing heavily and mildly agitated  -  at bedside     ADVANCE DIRECTIVES:    MOLST  [ ]  Living Will  [ ]   DECISION MAKER(s): Spouse  [ ] Health Care Proxy(s)  [X ] Surrogate(s)  [ ] Guardian           Name(s): Phone Number(s): Spouse- Anselmo    BASELINE (I)ADL(s) (prior to admission):  [place]: [ ]Total  [ X] Moderate [ ]Dependent  Palliative Performance Status Version 2:        50 %    http://npcrc.org/files/news/palliative_performance_scale_[email or web address].pdf    Allergies    No Known Allergies    Intolerances    MEDICATIONS  (STANDING):  dexAMETHasone  Injectable 4 milliGRAM(s) IV Push every 6 hours  lacosamide IVPB 200 milliGRAM(s) IV Intermittent every 12 hours  levETIRAcetam  IVPB 1500 milliGRAM(s) IV Intermittent every 12 hours    MEDICATIONS  (PRN):  glycopyrrolate Injectable 0.2 milliGRAM(s) IV Push every 6 hours PRN Respiratory secretions  LORazepam   Injectable 2 milliGRAM(s) IV Push every 4 hours PRN Seizure lasting>2 minutes  LORazepam   Injectable 0.5 milliGRAM(s) IV Push every 6 hours PRN Agitation  morphine  - Injectable 2 milliGRAM(s) IV Push every 1 hour PRN Pain or dyspnea    PRESENT SYMPTOMS: [ X]Unable to obtain due to poor mentation   Source if other than patient:  [ X]Family   [ ]Team     * Family does not believe she is uncomfortable. she often snores due to RAMAKRISHNA.     Pain: [ ]yes [ ]no  QOL impact -   Location -                    Aggravating factors -  Quality -  Radiation -  Timing-  Severity (0-10 scale):  Minimal acceptable level (0-10 scale):     PAIN AD Score: 1-2    [email or web address] (press ctrl +  left click to view)    Dyspnea:                           [ ]Mild [ ]Moderate [ ]Severe  Anxiety:                             [ ]Mild [ ]Moderate [ ]Severe  Fatigue:                             [ ]Mild [ ]Moderate [ ]Severe  Nausea:                             [ ]Mild [ ]Moderate [ ]Severe  Loss of appetite:              [ ]Mild [ ]Moderate [ ]Severe  Constipation:                    [ ]Mild [ ]Moderate [ ]Severe    Other Symptoms:  [ ]All other review of systems negative     Palliative Performance Status Version 2:        10 %    http://npcrc.org/files/news/palliative_performance_scale_ppsv2.pdf    PHYSICAL EXAM:  Vital Signs Last 24 Hrs  T(C): 36.6 (07 Feb 2023 05:00), Max: 36.9 (06 Feb 2023 14:46)  T(F): 97.8 (07 Feb 2023 05:00), Max: 98.5 (06 Feb 2023 14:46)  HR: 80 (07 Feb 2023 05:00) (80 - 85)  BP: 140/82 (07 Feb 2023 05:00) (140/82 - 173/83)  RR: 18 (07 Feb 2023 07:45) (16 - 18)  SpO2: 98% (07 Feb 2023 07:45) (91% - 100%)    GENERAL:  []Alert  [ ]Oriented x   [ X]Lethargic  [ ]Cachexia  [X ]Unarousable  [ ]Verbal  [ X ]Non-Verbal  Behavioral:   [ ] Anxiety  [ ] Delirium [ Mildly intermittently ] Agitation [ ] Other  HEENT:  [ X]Normal   [ ]Dry mouth   [ ]ET Tube/Trach  [ ]Oral lesions  PULMONARY:   [ ]Clear [X ]Tachypnea  [ ]Audible excessive secretions   [ ]Rhonchi        [ ]Right [ ]Left [ ]Bilateral  [ ]Crackles        [ ]Right [ ]Left [ ]Bilateral  [ ]Wheezing     [ ]Right [ ]Left [ ]Bilateral  [ ]Diminished breath sounds [ ]right [ ]left [ ]bilateral  GASTROINTESTINAL:  [ X]Soft  [ ]Distended   [ ]+BS  [ ]Non tender [ ]Tender  [ ]PEG [ ]OGT/ NGT  Last BM:   MUSCULOSKELETAL:   [ ]Normal   [ ]Weakness  [ X]Bed/Wheelchair bound [ ]Edema  NEUROLOGIC:   [ ]No focal deficits  [X ]Cognitive impairment  [ ]Dysphagia [ ]Dysarthria [ ]Paresis [ ]Other   SKIN:   [ X ]Normal    [ ]Rash  [ ]Pressure ulcer(s)       Present on admission [ ]y [ ]n    LABS:                        10.4   4.60  )-----------( 70       ( 07 Feb 2023 06:55 )             31.9   02-07    144  |  108  |  26<H>  ----------------------------<  128<H>  3.8   |  26  |  0.6<L>    Ca    9.5      07 Feb 2023 06:55  Phos  4.2     02-07  Mg     2.4     02-07    TPro  6.4  /  Alb  4.1  /  TBili  1.0  /  DBili  x   /  AST  43<H>  /  ALT  53<H>  /  AlkPhos  58  02-07    RADIOLOGY & ADDITIONAL STUDIES:    < from: 12 Lead ECG (01.31.23 @ 09:36) >  Ventricular Rate 99 BPM    Atrial Rate 99 BPM    P-R Interval 110 ms    QRS Duration 72 ms    Q-T Interval 396 ms    QTC Calculation(Bazett) 508 ms    P Axis 34 degrees    R Axis 3 degrees    T Axis 31 degrees    Diagnosis Line Sinus rhythm with short CA  Voltage criteria for left ventricular hypertrophy  Inferior infarct , age undetermined  Prolonged QT  Abnormal ECG    Confirmed by Cole Conroy ([id number]) on 1/31/2023 4:24:28 PM    < end of copied text >      < from: MR Head No Cont (02.03.23 @ 11:20) >  FINDINGS/  IMPRESSION:    Significantly limited examination due to motion.    In comparison to prior MRI of 1/4/2023 there are similar areas of   abnormal FLAIR signal with associated restricted diffusion in the   periventricular white matter, corpus callosum as well as the left   occipital lobe. There are increased foci of abnormal signal in the right   parietal and left frontal regions potentially reflecting new foci of   neoplasm versus radiation therapy changes though evaluation is limited   without contrast as well as motion.    Redemonstration of left occipital craniotomy postsurgical changes.    --- End of Report ---    < end of copied text >    REFERRALS:   [ ]Chaplaincy  [ X ]Hospice  [ ]Child Life  [ ]Social Work  [ ]Case management [ ]Holistic Therapy

## 2023-02-07 NOTE — PROGRESS NOTE ADULT - ASSESSMENT
58 yo F with PMHx of GBM s/p resection and  radiation, currently on chemotherapy, recent small right parietal ischemic stroke, seizure disorder, who presents with declining mental status since yesterday.    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     - 06/30/2022 S/P chemoRT with concurrent Temodar, with worsening symptoms 2/2 to recurrent edema.  - 08/05/2022 MRI showed possible interval progression vs pseduo-progression (from RT)?  = 08/22/2022 transfer her care since Dr. Kennedy is leaving and started Avastin at 10 mg/kg with Temodar (150 mg/m2, total 320 mg) to be taken for 5 consecutive days every 28 days.  - 10/03/2022 MR brain ordered by [facility], Dr. Dumont - final results pending.  - since no significant myelosuppression, 10/26-10/30/2022 Temodar was increased to 400 mg (200 mg/m2) with cycle 2.  - 11/16/2022 started dexamethasone 0.5 mg PO QD - tapered without issues.  - 11/23/2022 delayed Temodar 400 mg (200 mg/m2) PO QD D1-5 every 28 days.  - 01/04/2023 MR BRAIN - New small cortical/subcortical acute infarct in the right parietal lobe. Progressively increasing confluent signal abnormality in bilateral periventricular white matter, with redemonstrated restricted diffusion in bilateral inferior frontal lobe and rostrum of the corpus callosum. No associated enhancement. The finding is nonspecific but likely reflects post radiation changes.  Unchanged extension of the mass into the splenium of the corpus callosum and contiguous expansile FLAIR signal abnormality in bilateral periatrial white matter extending to the left temporal lobe.  - She was referred for another MRI brain which she has not completed yet  - 01/25/2023 reported S/P CVA and another episode of "absent seizure" on 01/22/2023 as per family , her avastin was stopped (Last dose 12/29/22)  - she is on Temodar 400 mg (200 mg/m2) PO QD D1-5 cycle 4. Due to start cycle 5 on 2/13/23  - MRI head no cont 2/3/23: Motion limited study. There are increased foci of abnormal signal in the right parietal and left frontal regions potentially reflecting new foci of neoplasm versus radiation therapy changes though evaluation is limited without contrast as well as motion.      Recommendations:      - consult Hospice  - Consult palliative care for symptomatic management while in hospital    Patient has worsening mental status and she is unarousable today probably from disease progression. Given the agressive nature of her disease (MGMT unmethylated IDH WT) we recommended them hospice since there are no durable chance of response with further treatment.   we called and discussed the patient  with   at bedside and daughter Chanell over the phone. Please refer to [facility] note above for details

## 2023-02-07 NOTE — PROGRESS NOTE ADULT - TREATMENT GUIDELINE COMMENT
Hospice referral
DNR/DNI  CMO- no labs, vitals,  FS, injections, artificial feeding, Pulse ox, IVF  D/C oral meds as patient is too lethargic  Continue AEDs

## 2023-02-07 NOTE — PROGRESS NOTE ADULT - SUBJECTIVE AND OBJECTIVE BOX
LENGTH OF HOSPITAL STAY: 7d    CHIEF COMPLAINT:   Patient is a 57y old  Female who presents with a chief complaint of Seizure (06 Feb 2023 15:09)      HISTORY OF PRESENTING ILLNESS:    HPI:  56 yo F with PMHx of GBM s/p resection and  radiation, currently on chemotherapy, recent small right parietal ischemic stroke, seizure disorder, who presents with declining mental status since yesterday.Hisotory was obtained from patient's .  Patient was discharged from [facility] 01/07/2023, she was admitted with aphasia found to have acute stroke. For past few days patient has not been herself, having episodes of staring, not talking at all. She was supposed to have ambulatory EEG done. Since last night her mentation has worsened, and she has been very restless. Denies any H/o fall, head trauma, no witnessed shaking. She has been confused and not herself for past few days and missed several dose of seizure medication. Denies any recent fever, cough, flu like symptoms, diarrhea.    Patient was diagnosed with left occipital GBM (unmethylated MGMT, IDH wild mutation), underwent resection (04/2022) and radiation. She follows with Dr. Dao. She was taken off Avastin due to the stroke and Temodar was restarted ( due next week).  In ED , stroke code was activated , CTH: unchanged GBM, CTA: no LVO. stat EEG: numerous PLEDs. Patient was evaluated by Neurocritical care and Neurosurgery.  ED course:  Patient received Keppra 3gm and Vimpat 200 mg  Patient is being admitted for further evaluation and management       (31 Jan 2023 16:17)    PAST MEDICAL & SURGICAL HISTORY  PAST MEDICAL & SURGICAL HISTORY:  RAMAKRISHNA on CPAP      GERD (gastroesophageal reflux disease)      Asthmatic bronchitis  MILD , USES VENTOLIN Q2-3M      HTN (hypertension)      Migraines      Chronic neck pain      H/O sinus surgery      Status post D&amp;C      H/O arthroscopy of shoulder  RT      History of hernia repair  2019        SOCIAL HISTORY:    ALLERGIES:  No Known Allergies    MEDICATIONS:  STANDING MEDICATIONS  aspirin  chewable 81 milliGRAM(s) Oral daily  atorvastatin 40 milliGRAM(s) Oral at bedtime  dexAMETHasone  Injectable 4 milliGRAM(s) IV Push every 6 hours  furosemide    Tablet 20 milliGRAM(s) Oral daily  heparin   Injectable 5000 Unit(s) SubCutaneous every 8 hours  influenza   Vaccine 0.5 milliLiter(s) IntraMuscular once  labetalol 100 milliGRAM(s) Oral every 8 hours  lacosamide IVPB 200 milliGRAM(s) IV Intermittent every 12 hours  levETIRAcetam  IVPB 1500 milliGRAM(s) IV Intermittent every 12 hours  LORazepam   Injectable 1 milliGRAM(s) IV Push once  pantoprazole  Injectable 40 milliGRAM(s) IV Push daily  spironolactone 50 milliGRAM(s) Oral daily    PRN MEDICATIONS  LORazepam   Injectable 2 milliGRAM(s) IV Push every 4 hours PRN    VITALS:   T(F): 97.8  HR: 80  BP: 140/82  RR: 16  SpO2: 91%    LABS:                        10.4   4.60  )-----------( 70       ( 07 Feb 2023 06:55 )             31.9     02-07    144  |  108  |  26<H>  ----------------------------<  128<H>  3.8   |  26  |  0.6<L>    Ca    9.5      07 Feb 2023 06:55  Phos  4.2     02-07  Mg     2.4     02-07    TPro  6.4  /  Alb  4.1  /  TBili  1.0  /  DBili  x   /  AST  43<H>  /  ALT  53<H>  /  AlkPhos  58  02-07                  RADIOLOGY:    PHYSICAL EXAM:  GEN: unarousable  HEENT: AT, NC, AKSHAT  LUNGS: Clear to auscultation bilaterally   HEART: S1/S2 present. RRR.   ABD: Soft, non-tender, non-distended. Bowel sounds present  EXT: edema, cyanosis, clubbing absent  NEURO: AAOX0

## 2023-02-07 NOTE — PROGRESS NOTE ADULT - CONVERSATION DETAILS
We spoke to the spouse Anselmo & daughter Chanell and discussed the dismal prognosis. We offered them Hospice as she is a good candidate.
Spoke with patients  and then patients daughter. Discussed the patients poor prognosis and the option for hospice. They are open to speaking with hospice about discharge planning options. Discussed starting end of life/symptom management now in the hospital while they set up discharge. They agree to starting CMO including no labs, vitals, FS, injections, escalation of care/O2 supplementation, IVF, artificial feeding, etc. They want to focus on symptom management at the top priority and stop life prolonging measures.

## 2023-02-08 NOTE — PROGRESS NOTE ADULT - ASSESSMENT
58 yo F with PMHx of GBM s/p resection and  radiation, currently on chemotherapy, recent small right parietal ischemic stroke, seizure disorder, who presents with declining mental status since yesterday..  Patient was discharged from [facility] 01/07/2023, she was admitted with aphasia found to have acute stroke. For past few days patient has not been herself, having episodes of staring, not talking at all. She was supposed to have ambulatory EEG done. She has been confused and not herself for past few days and missed several dose of seizure medication.  Patient was diagnosed with left occipital GBM (unmethylated MGMT, IDH wild mutation), underwent resection (04/2022) and radiation. She follows with Dr. Dao. She was taken off Avastin due to the stroke and Temodar was restarted ( due next week). In ED , stroke code was activated , CTH: unchanged GBM, CTA: no LVO. stat EEG: numerous PLEDs. Patient was evaluated by Neurocritical care and Neurosurgery. Patient received Keppra 3gm and Vimpat 200 mg. Patient admitted with seizure for further neurology workup and management. Palliative consulted for [facility].     CMO with increased work of breathing today. increased PRN morphine to 4 mg and asked RN to call if the dose is in effective.     MEDD (morphine equivalent daily dose): 12      See Recs below.    Please call [phone] with questions or concerns 24/7.   We will continue to follow.     Discussed with primary MD.

## 2023-02-08 NOTE — HOSPICE CARE NOTE - CONVESATION DETAILS
referral received. Phone call to patient's spouse Anselmo who deferred to his daughter Chanell. Call placed to Chanell who was at work and said she will call hospice RN this afternoon. Hospice contact information provided.

## 2023-02-08 NOTE — PROGRESS NOTE ADULT - PROVIDER SPECIALTY LIST ADULT
Hospitalist
Neurology
Neurology
Heme/Onc
Neurology
Hospitalist
Internal Medicine
Neurology
Heme/Onc
Palliative Care
Palliative Care

## 2023-02-08 NOTE — PROGRESS NOTE ADULT - ATTENDING COMMENTS
Patient seen and examined and agree with above except as noted.  Patients history, notes, labs, imaging, vitals and meds reviewed personally.  Clinically no new complaints and exam improved    Plan as above  Increase vimpat to 200mg BID and obtain MRI brain w/w/o RUBI
Patient seen and examined and agree with above except as noted.  Patients history, notes, labs, imaging, vitals and meds reviewed personally.  One seizure on VEEG  Will increase vimpat to 150mg BID and bring magnesium to >2.0
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
Seen /examined w/ hemonc fellow; note reviewed ;case discussed  - had discussion w/ pt's , pt's daughter over the phone  - explianed that her prognosis is bad based on the characteristics of the tumor  - family concerned that after avastin was d/gillian, her status worsened; at the same time, this was the time when she had stroke  - MRI under anesthesia is recommended to better evaluate the status of the tumor
Patient seen and examined and agree with above except as noted.  Patients history, notes ,labs, imaging, vitals and meds reviewed personally.    Plan as above which was discussed with  and father in law
Patient seen and examined and agree with above except as noted.  Patients history, notes, labs, imaging, vitals and meds reviewed personally.  No new events or complaints    Plan as above
Patient seen and examined and agree with above except as noted.  Patients history, notes, labs, imaging, vitals and meds reviewed personally.  No seizures on VEEG in last 24 hours  Most likely behavioral events of arrest at this point are related to significant edema and lesions in the brain including involvement of corpus callosum.  She is abulic and was starring and not responding while VEEG running and no seizures were seen    Plan as above  Would obtain MRI brain under sedation to better clarify extent of disease  Family to speak with Hospice on Monday
Pt w/ h/o GBM s/p chemo now with known progression restarted on chemotherapy with breakthrough seizure.  Clinically fluctuating otherwise stable.  Prognosis as per oncology likely poor but pt still getting chemotherapy.  Unable to tolerate MRI but no MR under anesthesia available due to technical difficulties.  Will repeat HCT w/ contrast and if radiographically stable and palliative care not option, may d/c w/ outpt f/u with oncology.
Pt w/ GBM with leptomeningeal spread currently with refractory seizures likely worsening.  Discussed with oncology and family > wants to pursue comfort measures.  Palliative team on board.
She was seen with fellow.  When I saw her I could not wake her.  She remains on dexamethasone 4 mg every 6 hours and clinically she has been declining.  We are unable to obtain an MRI with anesthesia with IV contrast but clinically she is  progressing.  Prognosis is extremely poor I recommended comfort measures and hospice and family agreed she is appropriate for inpatient hospice in my opinion as well.  I spoke to the patient's son at bedside I also spoke to the patient's daughter and  as well on the phone after my meeting.  I also discussed the case with Dr. Cleary and he is in agreement.    Please have hospice see patient.  Inpatient hospice is appropriate.    In regards to dexamethasone I think it is reasonable to decrease it to twice a day just for comfort although even discontinuing it is an option but I am not sure if family would be ready with  this step at this point in time

## 2023-02-08 NOTE — CHART NOTE - NSCHARTNOTEFT_GEN_A_CORE
visited patient earlier today. patient is CMO - no non verbal signs of pain or discomfort. her  and son at bedside. support rendered. will follow. [phone]

## 2023-02-08 NOTE — PROGRESS NOTE ADULT - PROBLEM SELECTOR PLAN 4
> 16 min spent  see above
recommendations per neurology   continue IV antiseizure medications as ordered per neuro - vimpat, keppra  continue IV Ativan 2 mg PRN for seizure

## 2023-02-08 NOTE — PROGRESS NOTE ADULT - SUBJECTIVE AND OBJECTIVE BOX
INTERVAL HPI/OVERNIGHT EVENTS:  Patient seen and examined.     MEDICATIONS  (STANDING):  dexAMETHasone  Injectable 4 milliGRAM(s) IV Push every 8 hours  lacosamide IVPB 200 milliGRAM(s) IV Intermittent every 12 hours  levETIRAcetam  IVPB 1500 milliGRAM(s) IV Intermittent every 12 hours    MEDICATIONS  (PRN):  glycopyrrolate Injectable 0.2 milliGRAM(s) IV Push every 6 hours PRN Respiratory secretions  LORazepam   Injectable 0.5 milliGRAM(s) IV Push every 4 hours PRN Restlessness/dyspnea  morphine  - Injectable 4 milliGRAM(s) IV Push every 30 minutes PRN Dyspnea or pain      Allergies    No Known Allergies    Intolerances        Vital Signs Last 24 Hrs  T(C): --  T(F): --  HR: --  BP: --  BP(mean): --  RR: --  SpO2: --        Physical exam:  Mental status: Lethargic.  Cranial nerves: Pupils equally round and reactive to light. +BTT  Motor:  Withdraws to pain all four extremities.    Sensation: as above  Coordination: No  resting tremor  Gait:  Deferred      LABS:                        10.4   4.60  )-----------( 70       ( 07 Feb 2023 06:55 )             31.9     02-07    144  |  108  |  26<H>  ----------------------------<  128<H>  3.8   |  26  |  0.6<L>    Ca    9.5      07 Feb 2023 06:55  Phos  4.2     02-07  Mg     2.4     02-07    TPro  6.4  /  Alb  4.1  /  TBili  1.0  /  DBili  x   /  AST  43<H>  /  ALT  53<H>  /  AlkPhos  58  02-07             INTERVAL HPI/OVERNIGHT EVENTS:  Patient seen and examined.  palliative on board.    MEDICATIONS  (STANDING):  dexAMETHasone  Injectable 4 milliGRAM(s) IV Push every 8 hours  lacosamide IVPB 200 milliGRAM(s) IV Intermittent every 12 hours  levETIRAcetam  IVPB 1500 milliGRAM(s) IV Intermittent every 12 hours    MEDICATIONS  (PRN):  glycopyrrolate Injectable 0.2 milliGRAM(s) IV Push every 6 hours PRN Respiratory secretions  LORazepam   Injectable 0.5 milliGRAM(s) IV Push every 4 hours PRN Restlessness/dyspnea  morphine  - Injectable 4 milliGRAM(s) IV Push every 30 minutes PRN Dyspnea or pain    Allergies    No Known Allergies    Intolerances    Physical exam:  Mental status: Lethargic. tachypneic  Cranial nerves: Pupils equally round and reactive to light. +BTT  Motor:  Withdraws to pain all four extremities.    Sensation: as above  Coordination: No  resting tremor  Gait:  Deferred      LABS:                        10.4   4.60  )-----------( 70       ( 07 Feb 2023 06:55 )             31.9     02-07    144  |  108  |  26<H>  ----------------------------<  128<H>  3.8   |  26  |  0.6<L>    Ca    9.5      07 Feb 2023 06:55  Phos  4.2     02-07  Mg     2.4     02-07    TPro  6.4  /  Alb  4.1  /  TBili  1.0  /  DBili  x   /  AST  43<H>  /  ALT  53<H>  /  AlkPhos  58  02-07

## 2023-02-08 NOTE — PROGRESS NOTE ADULT - SUBJECTIVE AND OBJECTIVE BOX
HPI:    56 yo F with PMHx of GBM s/p resection and  radiation, currently on chemotherapy, recent small right parietal ischemic stroke, seizure disorder, who presents with declining mental status since yesterday..  Patient was discharged from [facility] 01/07/2023, she was admitted with aphasia found to have acute stroke. For past few days patient has not been herself, having episodes of staring, not talking at all. She was supposed to have ambulatory EEG done. She has been confused and not herself for past few days and missed several dose of seizure medication.  Patient was diagnosed with left occipital GBM (unmethylated MGMT, IDH wild mutation), underwent resection (04/2022) and radiation. She follows with Dr. Dao. She was taken off Avastin due to the stroke and Temodar was restarted ( due next week). In ED , stroke code was activated , CTH: unchanged GBM, CTA: no LVO. stat EEG: numerous PLEDs. Patient was evaluated by Neurocritical care and Neurosurgery. Patient received Keppra 3gm and Vimpat 200 mg. Patient is being admitted with seizure for further neurology workup and management. Palliative consulted for [facility].      INTERVAL EVENTS:  - patient CMO since yesterday  - patient with more labored breathing today, s/p 2 doses PRN morphine and one dose PRN ativan without much improvement    ADVANCE DIRECTIVES:    MOLST  [ X]  Living Will  [ ]   DECISION MAKER(s): Spouse  [ ] Health Care Proxy(s)  [X ] Surrogate(s)  [ ] Guardian           Name(s): Phone Number(s): Spouse- Anselmo    BASELINE (I)ADL(s) (prior to admission):  [place]: [ ]Total  [ X] Moderate [ ]Dependent  Palliative Performance Status Version 2:        50 %    http://npcrc.org/files/news/palliative_performance_scale_ppsv2.pdf    Allergies    No Known Allergies    Intolerances    MEDICATIONS  (STANDING):  dexAMETHasone  Injectable 4 milliGRAM(s) IV Push every 6 hours  lacosamide IVPB 200 milliGRAM(s) IV Intermittent every 12 hours  levETIRAcetam  IVPB 1500 milliGRAM(s) IV Intermittent every 12 hours    MEDICATIONS  (PRN):  glycopyrrolate Injectable 0.2 milliGRAM(s) IV Push every 6 hours PRN Respiratory secretions  LORazepam   Injectable 2 milliGRAM(s) IV Push every 4 hours PRN Seizure lasting>2 minutes  LORazepam   Injectable 0.5 milliGRAM(s) IV Push every 6 hours PRN Agitation  morphine  - Injectable 2 milliGRAM(s) IV Push every 1 hour PRN Pain or dyspnea    PRESENT SYMPTOMS: [ X]Unable to obtain due to poor mentation   Source if other than patient:  [ ]Family   [ ]Team     Pain: [ ]yes [ ]no  QOL impact -   Location -                    Aggravating factors -  Quality -  Radiation -  Timing-  Severity (0-10 scale):  Minimal acceptable level (0-10 scale):     PAIN AD Score: 4    http://geriatrictoolkit.[facility]/cog/painad.pdf (press ctrl +  left click to view)    Dyspnea:                           [ ]Mild [ ]Moderate [ ]Severe - yes, observed   Anxiety:                             [ ]Mild [ ]Moderate [ ]Severe  Fatigue:                             [ ]Mild [ ]Moderate [ ]Severe  Nausea:                             [ ]Mild [ ]Moderate [ ]Severe  Loss of appetite:              [ ]Mild [ ]Moderate [ ]Severe  Constipation:                    [ ]Mild [ ]Moderate [ ]Severe    Other Symptoms:  [ ]All other review of systems negative     Palliative Performance Status Version 2:        10 %    http://npcrc.org/files/news/palliative_performance_scale_ppsv2.pdf    PHYSICAL EXAM:    GENERAL:  []Alert  [ ]Oriented x   [ X]Lethargic  [ ]Cachexia  [X ]Unarousable  [ ]Verbal  [ X ]Non-Verbal  Behavioral:   [ ] Anxiety  [ ] Delirium [ Mildly intermittently ] Agitation [ ] Other  HEENT:  [ X]Normal   [ ]Dry mouth   [ ]ET Tube/Trach  [ ]Oral lesions  PULMONARY:   [ ]Clear [X ]Tachypnea/labored breathing [ ]Audible excessive secretions   [ ]Rhonchi        [ ]Right [ ]Left [ ]Bilateral  [ ]Crackles        [ ]Right [ ]Left [ ]Bilateral  [ ]Wheezing     [ ]Right [ ]Left [ ]Bilateral  [ ]Diminished breath sounds [ ]right [ ]left [ ]bilateral  GASTROINTESTINAL:  [ X]Soft  [ ]Distended   [ ]+BS  [ ]Non tender [ ]Tender  [ ]PEG [ ]OGT/ NGT  Last BM:   MUSCULOSKELETAL:   [ ]Normal   [ ]Weakness  [ X]Bed/Wheelchair bound [ ]Edema  NEUROLOGIC:   [ ]No focal deficits  [X ]Cognitive impairment  [ ]Dysphagia [ ]Dysarthria [ ]Paresis [ ]Other   SKIN:   [ X ]Normal    [ ]Rash  [ ]Pressure ulcer(s)       Present on admission [ ]y [ ]n    LABS:                        10.4   4.60  )-----------( 70       ( 07 Feb 2023 06:55 )             31.9   02-07    144  |  108  |  26<H>  ----------------------------<  128<H>  3.8   |  26  |  0.6<L>    Ca    9.5      07 Feb 2023 06:55  Phos  4.2     02-07  Mg     2.4     02-07    TPro  6.4  /  Alb  4.1  /  TBili  1.0  /  DBili  x   /  AST  43<H>  /  ALT  53<H>  /  AlkPhos  58  02-07    RADIOLOGY & ADDITIONAL STUDIES:    < from: 12 Lead ECG (01.31.23 @ 09:36) >  Ventricular Rate 99 BPM    Atrial Rate 99 BPM    P-R Interval 110 ms    QRS Duration 72 ms    Q-T Interval 396 ms    QTC Calculation(Bazett) 508 ms    P Axis 34 degrees    R Axis 3 degrees    T Axis 31 degrees    Diagnosis Line Sinus rhythm with short TX  Voltage criteria for left ventricular hypertrophy  Inferior infarct , age undetermined  Prolonged QT  Abnormal ECG    Confirmed by Cole Conroy ([id number]) on 1/31/2023 4:24:28 PM    < end of copied text >      < from: MR Head No Cont (02.03.23 @ 11:20) >  FINDINGS/  IMPRESSION:    Significantly limited examination due to motion.    In comparison to prior MRI of 1/4/2023 there are similar areas of   abnormal FLAIR signal with associated restricted diffusion in the   periventricular white matter, corpus callosum as well as the left   occipital lobe. There are increased foci of abnormal signal in the right   parietal and left frontal regions potentially reflecting new foci of   neoplasm versus radiation therapy changes though evaluation is limited   without contrast as well as motion.    Redemonstration of left occipital craniotomy postsurgical changes.    --- End of Report ---    < end of copied text >    REFERRALS:   [ ]Chaplaincy  [ X ]Hospice  [ ]Child Life  [ ]Social Work  [ ]Case management [ ]Holistic Therapy

## 2023-02-08 NOTE — PROGRESS NOTE ADULT - PROBLEM SELECTOR PLAN 3
worse today increased work of breathing  morphine 4 mg IVP Q 30 min PRN for pain or dyspnea  * requested nursing staff call [phone] during day or  after 5 if the dosing is not effective worse today increased work of breathing  morphine 4 mg IVP Q 30 min PRN for pain or dyspnea  * requested nursing staff call [phone] during day or  after 5 if the dosing is not effective  see attending addendum below

## 2023-02-08 NOTE — PROGRESS NOTE ADULT - REASON FOR ADMISSION
Seizure

## 2023-02-08 NOTE — PROGRESS NOTE ADULT - PROBLEM SELECTOR PLAN 1
- no labs  - no IVF  - no artificial feeding  - no vitals  - no pulse ox  - no 02 supplementation beyond nasal cannula (if AVAPS is comfortable at night she may use for RAMAKRISHNA)  - no injections  - no FS  - comfort feedings OK     Ativan 0.5 mg IV Q4h PRN for agitation and anxiety  Glycopyrrolate 0.2 mg IV Q6h PRN for secretions
- no labs  - no IVF  - no artificial feeding  - no vitals  - no pulse ox  - no 02 supplementation beyond nasal cannula (if AVAPS is comfortable at night she may use for RAMAKRISHNA)  - no injections  - no FS  - comfort feedings OK     Morphine 2 mg IVP Q 1 H PRN for pain or dyspnea  Ativan 0.5 mg IV Q4h PRN for agitation and anxiety  Glycopyrrolate 0.2 mg IV Q6h PRN for secretions

## 2023-02-08 NOTE — PROGRESS NOTE ADULT - NS ATTEND AMEND GEN_ALL_CORE FT
57F with PMH of GBM s/p resection (4/2022) s/p chemo and RT, R ischemic stroke, seizure disorder, HTN, here with AMS, attributed to seizures, currently on AEDs. Course c/b hypertension, on labetalol, and hypokalemia, requiring repletion. Patient is now CMO, in dying process, with worsening respiratory distress. Morphine uptitrated from 2mg to 4mg today, without significant improvement in symptoms. From 10am-6pm, patient used 24mg of IV morphine without relief, will start morphine gtt (3mg/h used in 8 hours, will start at 4mg/h given mod-severe continued symptoms) and increase PRN to 8mg IV Q30 mins PRN (increase in 100% from current 4mg dose in setting of persistent mod-severe symptoms). [phone] as needed for symptom management.     ______________  Cali Hidalgo MD  Palliative Medicine  [facility]   of Geriatric and Palliative Medicine  [phone]

## 2023-02-08 NOTE — PROGRESS NOTE ADULT - PROBLEM SELECTOR PLAN 2
heme- onc following for recommendations  MRI with anesthesia cancelled  oncology - hospice offered and accepted by family  no further chemo or radiation  continue IV steroids for now  CMO
heme- onc following for recommendations  MRI with anesthesia cancelled  oncology - hospice offered and accepted by family  no further chemo or radiation  continue IV steroids for now  CMO

## 2023-02-08 NOTE — PROGRESS NOTE ADULT - ASSESSMENT
57y PMHx of GBM, seizure, recent right parietal ischemic stroke, HTN was brought to ED due to worsening mental status. Stat EEG revealed seizure, AEDs given. Initial 24 HR EEG showed 1 focal seizure in past 24 hours, for which Vimpat was increased. Discussion with family today and palliative care: pursuing comfort measures with continuation of PO steroid and AED, with comfort measures.     #Comfort Measures Only  #Seizure 2/2 GBM progresion and R parietal infarct  - C/w decadron 4mg --> decrease to q8h  - Continue Keppra 1500 mg IVPB BID  - Continue Vimpat 200 mg IVPB BID  - Morphine 2 mg IVP Q 1 H PRN for pain or dyspnea  - Ativan 0.5 mg IV Q4h PRN for agitation and anxiety  - Glycopyrrolate 0.2 mg IV Q6h PRN for secretions    #HTN  - D/c HTN meds  - No vitals per CMO    Pulm  - NC as needed for comfort  - AVAP overnight for comfort    Nephro  #Hypomagnesemia and hypokalemia:  - No blood draws  - c/w PO magnesium if able to tolerate PO    Misc  - DVT ppx: discontinue  - GI ppx: discontinue  - Code status: DNR/DNI  - Dispo: Pending hospice services

## 2023-02-08 NOTE — HOSPICE CARE NOTE - CONVESATION DETAILS
Spoke with patient's daughter Chanell. Reviewed hospice services and philosophy. Answered all questions regarding hospice in the home vs nursing home vs [facility]. Daughter states that she will discuss options with her dad and they will make a decision.

## 2023-02-09 NOTE — CHART NOTE - NSCHARTNOTEFT_GEN_A_CORE
RN called for patient who is currently on comfort measures. As per palliative recommendations, patient was on morphine which was uptitrated eventually to a morphine drip.    On exam, patient's pupils were fixed and dilated, non-reactive to light and accomodation. No pulse was palpable, no carotid pulses were palpated or auscultated. Lung auscultation revealed no breath sounds. No S1/S2 heard on cardiac auscultation.     Patient pronounced with time of death at 0510.

## 2023-02-09 NOTE — DISCHARGE NOTE FOR THE EXPIRED PATIENT - HOSPITAL COURSE
57 year old F with PMHx of GBM, seizure, recent right parietal ischemic stroke, HTN was brought to ED due to worsening mental status since yesterday. Labs Stroke imaging: no acute change, stat EEG revealed seizure. Initial 24 HR EEG showed 1 focal seizure in past 24 hours, for which Vimpat was increased.   57 year old F with PMHx of GBM, seizure, recent right parietal ischemic stroke, HTN was brought to ED due to worsening mental status from 1 day prior to arrival. Labs Stroke imaging: no acute change, stat EEG revealed seizure. Initial 24 HR EEG showed 1 focal seizure in past 24 hours, for which Vimpat was increased.  EEG showed improvement , therefore EEG was discontinued. Patient became increasingly lethargic. Neurosurgery and Oncology were on board. Had a detailed discussion with patient's family regarding her grave prognosis. Family decided to withdraw treatment and pursue comfort care. Palliative care was involved , and patient was made DNR/DNI and comfort measure. All medications were stopped except anti-seizure medications and medications to provide comfort. ON 02/09/2023 , at 5:00 am on-call resident was informed by RN that patient doesn't have a pulse. Patient's pulse was absent, pupils were dilated and fixed. Patient was pronounced dead at 5:10 am. Patient's  was contacted over phone , but was unable to reach him. Then he came to patient's room shortly around 6:00 am. [facility] has been contacted.

## 2023-02-16 DIAGNOSIS — Z66 DO NOT RESUSCITATE: ICD-10-CM

## 2023-02-16 DIAGNOSIS — Z20.822 CONTACT WITH AND (SUSPECTED) EXPOSURE TO COVID-19: ICD-10-CM

## 2023-02-16 DIAGNOSIS — G40.109 LOCALIZATION-RELATED (FOCAL) (PARTIAL) SYMPTOMATIC EPILEPSY AND EPILEPTIC SYNDROMES WITH SIMPLE PARTIAL SEIZURES, NOT INTRACTABLE, WITHOUT STATUS EPILEPTICUS: ICD-10-CM

## 2023-02-16 DIAGNOSIS — I10 ESSENTIAL (PRIMARY) HYPERTENSION: ICD-10-CM

## 2023-02-16 DIAGNOSIS — R53.83 OTHER FATIGUE: ICD-10-CM

## 2023-02-16 DIAGNOSIS — E83.42 HYPOMAGNESEMIA: ICD-10-CM

## 2023-02-16 DIAGNOSIS — G47.33 OBSTRUCTIVE SLEEP APNEA (ADULT) (PEDIATRIC): ICD-10-CM

## 2023-02-16 DIAGNOSIS — Z51.5 ENCOUNTER FOR PALLIATIVE CARE: ICD-10-CM

## 2023-02-16 DIAGNOSIS — H51.8 OTHER SPECIFIED DISORDERS OF BINOCULAR MOVEMENT: ICD-10-CM

## 2023-02-16 DIAGNOSIS — K21.9 GASTRO-ESOPHAGEAL REFLUX DISEASE WITHOUT ESOPHAGITIS: ICD-10-CM

## 2023-02-16 DIAGNOSIS — G93.6 CEREBRAL EDEMA: ICD-10-CM

## 2023-02-16 DIAGNOSIS — R94.31 ABNORMAL ELECTROCARDIOGRAM [ECG] [EKG]: ICD-10-CM

## 2023-02-16 DIAGNOSIS — E78.5 HYPERLIPIDEMIA, UNSPECIFIED: ICD-10-CM

## 2023-02-16 DIAGNOSIS — R45.1 RESTLESSNESS AND AGITATION: ICD-10-CM

## 2023-02-16 DIAGNOSIS — E87.6 HYPOKALEMIA: ICD-10-CM

## 2023-02-16 DIAGNOSIS — I69.398 OTHER SEQUELAE OF CEREBRAL INFARCTION: ICD-10-CM

## 2023-02-16 DIAGNOSIS — R47.01 APHASIA: ICD-10-CM

## 2023-02-16 DIAGNOSIS — Z92.3 PERSONAL HISTORY OF IRRADIATION: ICD-10-CM

## 2023-02-16 DIAGNOSIS — C71.4 MALIGNANT NEOPLASM OF OCCIPITAL LOBE: ICD-10-CM

## 2023-02-16 DIAGNOSIS — G43.909 MIGRAINE, UNSPECIFIED, NOT INTRACTABLE, WITHOUT STATUS MIGRAINOSUS: ICD-10-CM

## 2023-02-16 DIAGNOSIS — D69.6 THROMBOCYTOPENIA, UNSPECIFIED: ICD-10-CM

## 2023-02-16 DIAGNOSIS — J45.909 UNSPECIFIED ASTHMA, UNCOMPLICATED: ICD-10-CM

## 2023-02-16 DIAGNOSIS — Z79.82 LONG TERM (CURRENT) USE OF ASPIRIN: ICD-10-CM

## 2023-02-17 ENCOUNTER — APPOINTMENT (OUTPATIENT)
Dept: NEUROLOGY | Facility: CLINIC | Age: 58
End: 2023-02-17

## 2023-02-27 ENCOUNTER — APPOINTMENT (OUTPATIENT)
Dept: RADIATION ONCOLOGY | Facility: HOSPITAL | Age: 58
End: 2023-02-27

## 2023-02-27 ENCOUNTER — APPOINTMENT (OUTPATIENT)
Age: 58
End: 2023-02-27

## 2023-04-05 NOTE — DISCHARGE NOTE FOR THE EXPIRED PATIENT - NS PRELIMINARY CAUSE OF DEATH_RESTRICTED
Patients father aware and voiced understanding, no concerns voiced at this time. Cardiopulmonary Arrest

## 2023-07-21 NOTE — PATIENT PROFILE ADULT - TRANSPORTATION
Progress Assessment  [address] [place] [id number]        Patient: Lj Crenshaw   : 1950  Diagnosis/ICD-10 Code:  Generalized weakness [R53.1]  Referring practitioner: Scott Hernandez III, MD  Date of Initial Visit: Type: THERAPY  Noted: 2023  Today's Date: 2023  Patient seen for 6 sessions      Subjective:   Lj Crenshaw reports: 0/10  Subjective Questionnaire: 30 second STS with UE support: 4 times at 22.5 inch table; TU.5 seconds with rollator  Clinical Progress: improved  Home Program Compliance: Yes  Treatment has included: therapeutic exercise, neuromuscular re-education, manual therapy, therapeutic activity, and gait training    Subjective Pt reports that his strength and balance has improved slightly since starting with PT. Pt reports his balance has improved 25% since start therapy.   Objective     Strength/Myotome Testing     Left Shoulder      Planes of Motion   Flexion: 4+   Abduction: 4+   External rotation at 0°: 4+   Internal rotation at 0°: 4+      Isolated Muscles   Biceps: 4+   Triceps: 4+     Right Shoulder      Planes of Motion   Flexion: 4+   Abduction: 4+   External rotation at 0°: 4+   Internal rotation at 0°: 4+      Isolated Muscles   Biceps: 4+   Triceps: 4+      Left Hip   Planes of Motion   Flexion: 3-  Abduction: 4  Adduction: 4-     Right Hip   Planes of Motion   Flexion: 2+  Abduction: 4-  Adduction: 4-     Left Knee   Flexion: 4+  Extension: 4+     Right Knee   Flexion: 4+  Extension: 4+     Left Ankle/Foot   Dorsiflexion: 3-     Right Ankle/Foot   Dorsiflexion: 1    Assessment/Plan    Pt has made progress with improving TUG score by 1.5 seconds since last progress note. Pt has also made progress with improving strength since last progress note but does continue to have decrease in strength and balance deficits that require skilled PT in order to address deficits to return patient back to maximum function. Pt has made progress with improving  STS in the last month and able to maintain more upright posture. Will continue to progress patient as able.     Plan Goals:   1. Mobility: Walking/Moving Around Functional Limitation                               LTG 1: 12 weeks:  The patient will demonstrate TUG score <16 seconds for reduced fall risk.                           STATUS:  IN PROGRESS              STG 1a: 6 weeks:  The patient will demonstrate TUG score <18 seconds for reduced fall risk.                           STATUS:  IN PROGRESS    2. The patient has limited strength of the B shoulders and B hips.              LTG 2: 12 weeks:  The patient will demonstrate 4+ /5 strength for B shoulder flexion, abduction, external rotation, and internal rotation in order to demonstrate improved shoulder stability.                          STATUS:  IN PROGRESS              LTG 2a: 12 weeks:  The patient will demonstrate 4+/5 strength for B hip flexion, abduction, and extension in order to improve hip stability.                          STATUS:  IN PROGRESS    3. The patient has gait dysfunction.              LTG 3: 12 weeks:  The patient will demonstrate > 22 / 30 on the Functional Gait Assessment for improved functional mobility.                            STATUS:  IN PROGRESS              STG 3a: The patient will demonstrate > 18 / 30 on the Functional Gait Assessment for improved functional mobility.                            STATUS:  IN PROGRESS     4. The patient has difficulty with balance.               LTG 4: 12 weeks: The patient will hold the sharpened romberg position on open with eyes open for 20 seconds in order to improve balance and decrease risk of falling.                           STATUS: IN PROGRESS              STG 4: 6 weeks: The patient will hold the romberg position on foam with eyes closed for 20 seconds in order to improve balance and decrease risk of falling.                           STATUS: IN PROGRESS    Progress toward previous  goals: Partially Met    See Exercise, Manual, and Modality Logs for complete treatment.         Recommendations: Continue as planned  Timeframe:2x a week for 1 month  Prognosis to achieve goals: good    PT SIGNATURE: Electronically signed by Tal Guerra, PT  [place] LICENSE: [id number]    Based upon review of the patient's progress and continued therapy plan, it is my medical opinion that Lj Crenshaw should continue physical therapy treatment at [facility]  [address]  [facility] [address] [phone].      Timed:                 Manual Therapy:    0     mins  63537;     Therapeutic Exercise:    20     mins  35119;     Neuromuscular Reyna:    10    mins  57848;    Therapeutic Activity:     15     mins  44089;     Gait Trainin     mins  57285;     Ultrasound:     0     mins  97641;    Ionto                               0    mins   22907  Self pay                         0     mins [id number]    Un-Timed:  Electrical Stimulation:    0     mins  90263 (MC )  Canalith Repos    0     mins [id number]  Dry Needling     0     mins self-pay  Traction     0     mins 09837    Timed Treatment:   45   mins   Total Treatment:     45   mins      I certify that the therapy services are furnished while this patient is under my care.  The services outlined above are required by this patient, and will be reviewed every 90 days.          no

## 2023-09-13 NOTE — ASU DISCHARGE PLAN (ADULT/PEDIATRIC) - BATHING
=========================  NSICU ATTENDING PROGRESS NOTE  ========================    HPI:  40 y/o F with hypothyroid was found down by mom unresponsive in the bathroom on 6/6, was airlifted to [facility] for a large R frontal IPH with IVH and hydrocephalus. She was emergently intubated and taken to operating room for right hemicraniectomy and EVD placement with Dr. Bernstein. Patient was deemed medically stable and was sent  to [facility] for 5 weeks then transferred to [facility] for 4 weeks and presents today in preparation for cranioplasty this week.  (10 Sep 2023 17:50)    On admission, the patient was:  GCS:  Mejia-Borden:  modified Moreno:  ICH score:  NIHSS:      ICU Vital Signs Last 24 Hrs  T(C): 36.7 (13 Sep 2023 09:06), Max: 36.8 (12 Sep 2023 21:22)  T(F): 98.1 (13 Sep 2023 09:06), Max: 98.3 (12 Sep 2023 21:22)  HR: 100 (13 Sep 2023 12:00) (68 - 116)  BP: 132/83 (13 Sep 2023 12:00) (94/61 - 132/83)  BP(mean): 103 (13 Sep 2023 12:00) (70 - 103)  ABP: 152/82 (13 Sep 2023 12:00) (100/56 - 184/98)  ABP(mean): 110 (13 Sep 2023 12:00) (72 - 132)  RR: 16 (13 Sep 2023 12:00) (14 - 22)  SpO2: 94% (13 Sep 2023 12:00) (94% - 100%)      09-12-23 @ 07:01  -  09-13-23 @ 07:00  --------------------------------------------------------  IN: 1560 mL / OUT: 875 mL / NET: 685 mL    09-13-23 @ 07:01  - 09-13-23 @ 12:38  --------------------------------------------------------  IN: 300 mL / OUT: 150 mL / NET: 150 mL      EXAMINATION:  General: Calm  HEENT:  MMM  Neuro: Patient oriented x 2, pupils 3mm and reactive   Right side antigravity and 4/5  LUE 0/5  LLE TF  Cards: S1/S2, RRR  Respiratory: Clear, no wheeze  Abdomen: Soft, nontender  Extremities: No edema  Skin: Warm/dry    MEDICATIONS:   acetaminophen     Tablet .. 1000 milliGRAM(s) Oral every 8 hours  baclofen 5 milliGRAM(s) Oral every 8 hours  bisacodyl 5 milliGRAM(s) Oral every 12 hours PRN  ceFAZolin   IVPB 2000 milliGRAM(s) IV Intermittent every 8 hours  chlorhexidine 2% Cloths 1 Application(s) Topical <User Schedule>  escitalopram 15 milliGRAM(s) Oral daily  gabapentin 400 milliGRAM(s) Oral <User Schedule>  gabapentin 400 milliGRAM(s) Oral <User Schedule>  glucagon  Injectable 1 milliGRAM(s) IntraMuscular once  influenza   Vaccine 0.5 milliLiter(s) IntraMuscular once  insulin lispro (ADMELOG) corrective regimen sliding scale   SubCutaneous every 6 hours  labetalol Injectable 10 milliGRAM(s) IV Push every 2 hours PRN  lacosamide IVPB 150 milliGRAM(s) IV Intermittent every 12 hours  levothyroxine 75 MICROGram(s) Oral daily  memantine 5 milliGRAM(s) Oral <User Schedule>  ondansetron   Disintegrating Tablet 4 milliGRAM(s) Oral every 6 hours  pantoprazole    Tablet 40 milliGRAM(s) Oral before breakfast  senna 2 Tablet(s) Oral at bedtime  sodium chloride 0.9%. 1000 milliLiter(s) (60 mL/Hr) IV Continuous <Continuous>  traMADol 50 milliGRAM(s) Oral every 6 hours PRN  traZODone 50 milliGRAM(s) Oral at bedtime  valproate sodium  IVPB 500 milliGRAM(s) IV Intermittent <User Schedule>      LABS:                      9.7    11.26 )-----------( 179      ( 13 Sep 2023 05:29 )             28.8     09-13    135  |  100  |  4<L>  ----------------------------<  132<H>  4.1   |  26  |  0.35<L>    Ca    8.2<L>      13 Sep 2023 05:38  Phos  4.1     09-13  Mg     2.0     09-13       Shower after 48hours

## 2023-10-20 NOTE — ED ADULT NURSE NOTE - SUICIDE SCREENING QUESTION 2
Patient unable to complete Cheiloplasty (Less Than 50%) Text: A decision was made to reconstruct the defect with a  cheiloplasty.  The defect was undermined extensively.  Additional obicularis oris muscle was excised with a 15c blade scalpel.  The defect was converted into a full thickness wedge, of less than 50% of the vertical height of the lip, to facilite a better cosmetic result.  Small vessels were then tied off with 5-0 monocyrl. The obicularis oris, superficial fascia, adipose and dermis were then reapproximated.  After the deeper layers were approximated the epidermis was reapproximated with particular care given to realign the vermilion border.

## 2023-11-10 ENCOUNTER — APPOINTMENT (OUTPATIENT)
Dept: NEUROLOGY | Facility: CLINIC | Age: 58
End: 2023-11-10

## 2024-04-12 NOTE — REVIEW OF SYSTEMS
----- Message from Ingrid Wells MA sent at 4/12/2024  3:02 PM CDT -----  Regarding: RE: Pt Advice  Pt stated he is not having any other symptoms at this time he had it before going to urgent care. Pt stated that urgent care would call him if urine showed infection and pt hasn't received call. Pt states it just still feels like he is sitting on a golf ball  ----- Message -----  From: Lin Morales NP  Sent: 4/12/2024   2:34 PM CDT  To: Ingrid Wells MA  Subject: RE: Pt Advice                                    He was given doxycycline by urgent care prior to seeing me. Does he know if his urine from urgent care showed an infection?  Other symptoms? Fever, dysuria, hematuria, urgency, frequency?  ----- Message -----  From: Ingrid Wells MA  Sent: 4/12/2024   1:56 PM CDT  To: Lin Morales NP  Subject: FW: Pt Advice                                    Please advise with recommendations    ----- Message -----  From: Hang Ahmadi  Sent: 4/12/2024   1:50 PM CDT  To: [facility] Staff  Subject: Pt Advice                                        Needs Medical Advice      Who Called: Pt         Pharm[facility] and phone #:    [id number] [id number] [place] [address] AT [place] [address] & [place] [address]  [id number] [place] [id number]  Phone: [phone] Fax: [phone]          Would the patient rather a call back or a response via [facility]? Call back    Best Call Back Number:  [phone]      Additional Information: Sts he was prescribed doxycycline (MONODOX) 100 MG capsule and it is currently not working and would like to see if he can be prescribed something else. Sts he is having severe prostate pain.   Please Advise - Thank you             
[FreeTextEntry4] : dry mouth

## 2024-11-09 NOTE — ED ADULT NURSE NOTE - NS ED NURSE RECORD ANOTHER HT AND WT
[facility] (PGY2): TVUS showing IUD in place, complex cystic structure in right adnexa,  small volume of free fluid is identified within the pelvic cul-de-sac as well as right adnexal regions. Lab results unremarkable. Patient updated regarding those results. She cleared for discharge with GYN follow up. Follow-up information reviewed with patient. Return precautions including but not limited to those listed on discharge instructions were discussed at length and patient felt comfortable going home. All questions answered prior to discharge. Yes

## 2025-02-14 NOTE — H&P PST ADULT - ADMIT DATE
"Subjective   Patient ID: Anthony Henry \"Romario" is a 76 y.o. male who presents for No chief complaint on file..    HPI    Virtual or Telephone Consent    An interactive audio and video telecommunication system which permits real time communications between the patient (at the originating site) and provider (at the distant site) was utilized to provide this telehealth service.   Verbal consent was requested and obtained from Anthony Henry on this date, 02/16/25 for a telehealth visit.       STARTED TUESDAY AM with congestion and headache and body ache  Sore throat initially  Cough persists  - using Robitussin OTC  No vomiting or diarrhea.  COVID POSITIVE last night at [facility]  Prescribed something that was cost prohibitive.    Review of Systems    Review of Systems negative except as noted in HPI and Chief complaint.     Objective             VITALS:  There were no vitals taken for this visit.     Physical Exam    Assessment/Plan   Problem List Items Addressed This Visit    None  Visit Diagnoses       COVID-19    -  Primary    Relevant Medications    nirmatrelvir-ritonavir (Paxlovid) 300 mg (150 mg x 2)-100 mg tablet therapy pack        Reviewed patient's medication list for accuracy.  Discussed medications to hold if applicable.  Reviewed risks, side effects and expected treatment course of Paxlovid including nausea and bitter taste.  Finish full course and continue with symptomatic care.  Call with any problems or concerns.       FOLLOW UP PRN,  WITH ANY PROBLEMS OR CONCERNS.       "
28-Mar-2019

## 2025-06-03 NOTE — PRE-ANESTHESIA EVALUATION ADULT - ANESTHESIA, PREVIOUS REACTION, PROFILE
Hello,    This patient is scheduled for a complete echocardiogram tomorrow 6/4/25, he did have an echo limited done 4/1/25, and MD's notes say reassess EF, would you like a limited instead?    Any questions please call [phone]   none